# Patient Record
Sex: MALE | Race: WHITE | NOT HISPANIC OR LATINO | Employment: OTHER | URBAN - METROPOLITAN AREA
[De-identification: names, ages, dates, MRNs, and addresses within clinical notes are randomized per-mention and may not be internally consistent; named-entity substitution may affect disease eponyms.]

---

## 2017-02-10 ENCOUNTER — OFFICE VISIT (OUTPATIENT)
Dept: CARDIOLOGY | Facility: MEDICAL CENTER | Age: 67
End: 2017-02-10
Payer: COMMERCIAL

## 2017-02-10 VITALS
OXYGEN SATURATION: 97 % | BODY MASS INDEX: 35.49 KG/M2 | SYSTOLIC BLOOD PRESSURE: 130 MMHG | HEIGHT: 72 IN | WEIGHT: 262 LBS | DIASTOLIC BLOOD PRESSURE: 76 MMHG | HEART RATE: 58 BPM

## 2017-02-10 DIAGNOSIS — I65.29 STENOSIS OF CAROTID ARTERY, UNSPECIFIED LATERALITY: ICD-10-CM

## 2017-02-10 DIAGNOSIS — E78.01 FAMILIAL HYPERCHOLESTEROLEMIA: ICD-10-CM

## 2017-02-10 DIAGNOSIS — I25.10 CORONARY ARTERY DISEASE INVOLVING NATIVE CORONARY ARTERY OF NATIVE HEART WITHOUT ANGINA PECTORIS: ICD-10-CM

## 2017-02-10 DIAGNOSIS — I73.9 PVD (PERIPHERAL VASCULAR DISEASE) (HCC): ICD-10-CM

## 2017-02-10 DIAGNOSIS — I10 ESSENTIAL HYPERTENSION: ICD-10-CM

## 2017-02-10 DIAGNOSIS — Z95.5 STENTED CORONARY ARTERY: ICD-10-CM

## 2017-02-10 PROCEDURE — 99214 OFFICE O/P EST MOD 30 MIN: CPT | Performed by: INTERNAL MEDICINE

## 2017-02-10 ASSESSMENT — ENCOUNTER SYMPTOMS
PALPITATIONS: 0
EYES NEGATIVE: 1
FEVER: 0
HEMOPTYSIS: 0
RESPIRATORY NEGATIVE: 1
DIZZINESS: 0
MUSCULOSKELETAL NEGATIVE: 1
STRIDOR: 0
WEAKNESS: 0
CARDIOVASCULAR NEGATIVE: 1
SHORTNESS OF BREATH: 0
COUGH: 0
CHILLS: 0
ORTHOPNEA: 0
GASTROINTESTINAL NEGATIVE: 1
NEUROLOGICAL NEGATIVE: 1
CONSTITUTIONAL NEGATIVE: 1
WHEEZING: 0
PND: 0
LOSS OF CONSCIOUSNESS: 0
SORE THROAT: 0
CLAUDICATION: 0
SPUTUM PRODUCTION: 0
BRUISES/BLEEDS EASILY: 0

## 2017-02-10 NOTE — MR AVS SNAPSHOT
"        Jan Rivas   2/10/2017 2:00 PM   Office Visit   MRN: [id number]    Department:  [facility]   Dept Phone:  [phone]    Description:  Male : 1950   Provider:  Ketan Anne M.D.           Reason for Visit     Follow-Up           Allergies as of 2/10/2017     No Known Allergies      You were diagnosed with     Coronary artery disease involving native coronary artery of native heart without angina pectoris   [5830339]       Stenosis of carotid artery, unspecified laterality   [008030]       Essential hypertension   [6749414]       Familial hypercholesterolemia   [433661]       Stented coronary artery   [229050]       PVD (peripheral vascular disease) (CMS-HCC)   [699683]         Vital Signs     Blood Pressure Pulse Height Weight Body Mass Index Oxygen Saturation    130/76 mmHg 58 1.829 m (6' 0.01\") 118.842 kg (262 lb) 35.53 kg/m2 97%    Smoking Status                   Former Smoker           Basic Information     Date Of Birth Sex Race Ethnicity Preferred Language    1950 Male White Non- English      Problem List              ICD-10-CM Priority Class Noted - Resolved    CAD (coronary artery disease) I25.10 High  10/22/2013 - Present    Stented coronary artery Z95.5 Medium  10/31/2013 - Present    Hypertension I10 Low  10/31/2013 - Present    Hyperlipemia E78.5 Low  10/31/2013 - Present    Carotid stenosis I65.29   2014 - Present    HTN (hypertension) I10   3/5/2015 - Present    PVD (peripheral vascular disease) (CMS-HCC) I73.9   2/10/2017 - Present      Health Maintenance        Date Due Completion Dates    IMM DTaP/Tdap/Td Vaccine (1 - Tdap) 1971 ---    COLONOSCOPY 2002 ---    IMM ZOSTER VACCINE 2012 ---    IMM INFLUENZA (1) 2016 10/23/2013            Current Immunizations     Influenza Vaccine Pediatric 10/23/2013  2:41 PM    Pneumococcal polysaccharide vaccine (PPSV-23) 10/23/2013  2:42 PM      Below and/or attached are the medications your " provider expects you to take. Review all of your home medications and newly ordered medications with your provider and/or pharmacist. Follow medication instructions as directed by your provider and/or pharmacist. Please keep your medication list with you and share with your provider. Update the information when medications are discontinued, doses are changed, or new medications (including over-the-counter products) are added; and carry medication information at all times in the event of emergency situations     Allergies:  No Known Allergies          Medications  Valid as of: February 10, 2017 -  2:11 PM    Generic Name Brand Name Tablet Size Instructions for use    Aspirin (Chew Tab) ASA 81 MG Take 1 Tab by mouth every day.        Fluocinolone Acetonide (Solution) SYNALAR 0.01 % Apply  to affected area(s) 2 times a day.        Latanoprost   by Ophthalmic route.        Lisinopril (Tab) PRINIVIL 5 MG Take 1 Tab by mouth every day.        Metoprolol Tartrate (Tab) LOPRESSOR 25 MG Take 1 Tab by mouth 2 times a day.        Nitroglycerin (SL Tab) NITROSTAT 0.4 MG Place 1 Tab under tongue as needed for Chest Pain.        Simvastatin (Tab) ZOCOR 40 MG Take 1 Tab by mouth every evening.        .                 Medicines prescribed today were sent to:     [id number] - [place] - [address]    [address] [place] [address]    Phone: [phone] Fax: [phone]    Open 24 Hours?: No      Medication refill instructions:       If your prescription bottle indicates you have medication refills left, it is not necessary to call your provider’s office. Please contact your pharmacy and they will refill your medication.    If your prescription bottle indicates you do not have any refills left, you may request refills at any time through one of the following ways: The online [employer] system (except Urgent Care), by calling your provider’s office, or by asking your pharmacy to contact your provider’s office  with a refill request. Medication refills are processed only during regular business hours and may not be available until the next business day. Your provider may request additional information or to have a follow-up visit with you prior to refilling your medication.   *Please Note: Medication refills are assigned a new Rx number when refilled electronically. Your pharmacy may indicate that no refills were authorized even though a new prescription for the same medication is available at the pharmacy. Please request the medicine by name with the pharmacy before contacting your provider for a refill.        Your To Do List     Future Labs/Procedures Complete By Expires    LIPID PROFILE  As directed 2/11/2018         Qwbcg Access Code: [email or web address]  Expires: 3/12/2017  2:11 PM    Qwbcg  A secure, online tool to manage your health information     [employer]’s Qwbcg® is a secure, online tool that connects you to your personalized health information from the privacy of your home -- day or night - making it very easy for you to manage your healthcare. Once the activation process is completed, you can even access your medical information using the Qwbcg jordan, which is available for free in the [employer] Jordan store or Google Play store.     Qwbcg provides the following levels of access (as shown below):   My Chart Features   [facility] Doctor [facility]  Urgent  Care Non-[facility]  Primary Care  Doctor   Email your healthcare team securely and privately 24/7 X X X    Manage appointments: schedule your next appointment; view details of past/upcoming appointments X      Request prescription refills. X      View recent personal medical records, including lab and immunizations X X X X   View health record, including health history, allergies, medications X X X X   Read reports about your outpatient visits, procedures, consult and ER notes X X X X   See your discharge summary, which is a recap of  your hospital and/or ER visit that includes your diagnosis, lab results, and care plan. X X       How to register for [employer]:  1. Go to  [email or web address].  2. Click on the Sign Up Now box, which takes you to the New Member Sign Up page. You will need to provide the following information:  a. Enter your [employer] Access Code exactly as it appears at the top of this page. (You will not need to use this code after you’ve completed the sign-up process. If you do not sign up before the expiration date, you must request a new code.)   b. Enter your date of birth.   c. Enter your home email address.   d. Click Submit, and follow the next screen’s instructions.  3. Create a [employer] ID. This will be your [employer] login ID and cannot be changed, so think of one that is secure and easy to remember.  4. Create a [employer] password. You can change your password at any time.  5. Enter your Password Reset Question and Answer. This can be used at a later time if you forget your password.   6. Enter your e-mail address. This allows you to receive e-mail notifications when new information is available in [employer].  7. Click Sign Up. You can now view your health information.    For assistance activating your [employer] account, call [phone]

## 2017-02-10 NOTE — PROGRESS NOTES
Subjective:   Jan Rivas is a 66 y.o. male who presents today as an annual follow-up for his CAD and peripheral vascular disease. The last one year he said no significant changes to his past medical history and is not having any chest pain shortness breath lower extremity edema or PND. He checks his blood pressure at least once a week at the drugstore notices blood pressure which ranged in the systolic range from 120-128. He is otherwise doing well. His primary care physician would like to know if he needs an annual stress test.    Past Medical History   Diagnosis Date   • Hypertension    • Hyperlipidemia    • CAD (coronary artery disease) stenLCA 2011     Past Surgical History   Procedure Laterality Date   • Cardiac cath  2/21/15     Cath at [place].  Patent stent and no obstructive CAD.   • Cardiac cath  5/4/10     Taxus stent to LAD     Family History   Problem Relation Age of Onset   • Heart Attack Mother      History   Smoking status   • Former Smoker -- 0.50 packs/day for 40 years   • Types: Cigarettes   • Quit date: 05/04/2010   Smokeless tobacco   • Never Used     No Known Allergies  Outpatient Encounter Prescriptions as of 2/10/2017   Medication Sig Dispense Refill   • nitroglycerin (NITROSTAT) 0.4 MG SL Tab Place 1 Tab under tongue as needed for Chest Pain. 25 Tab 6   • simvastatin (ZOCOR) 40 MG Tab Take 1 Tab by mouth every evening. 90 Tab 3   • metoprolol (LOPRESSOR) 25 MG Tab Take 1 Tab by mouth 2 times a day. 180 Tab 3   • lisinopril (PRINIVIL) 5 MG Tab Take 1 Tab by mouth every day. 90 Tab 3   • aspirin (ASA) 81 MG CHEW chewable tablet Take 1 Tab by mouth every day. 100 Tab 11   • LATANOPROST OP by Ophthalmic route.     • fluocinolone acetonide (SYNALAR) 0.01 % external solution Apply  to affected area(s) 2 times a day.       No facility-administered encounter medications on file as of 2/10/2017.     Review of Systems   Constitutional: Negative.  Negative for fever, chills and malaise/fatigue.  "  HENT: Negative.  Negative for sore throat.    Eyes: Negative.    Respiratory: Negative.  Negative for cough, hemoptysis, sputum production, shortness of breath, wheezing and stridor.    Cardiovascular: Negative.  Negative for chest pain, palpitations, orthopnea, claudication, leg swelling and PND.   Gastrointestinal: Negative.    Genitourinary: Negative.    Musculoskeletal: Negative.    Skin: Negative.    Neurological: Negative.  Negative for dizziness, loss of consciousness and weakness.   Endo/Heme/Allergies: Negative.  Does not bruise/bleed easily.   All other systems reviewed and are negative.       Objective:   /76 mmHg  Pulse 58  Ht 1.829 m (6' 0.01\")  Wt 118.842 kg (262 lb)  BMI 35.53 kg/m2  SpO2 97%    Physical Exam   Constitutional: He is oriented to person, place, and time. He appears well-developed and well-nourished. No distress.   HENT:   Head: Normocephalic.   Mouth/Throat: Oropharynx is clear and moist.   Eyes: EOM are normal. Pupils are equal, round, and reactive to light. Right eye exhibits no discharge. Left eye exhibits no discharge. No scleral icterus.   Neck: Normal range of motion. Neck supple. No JVD present. No tracheal deviation present.   Cardiovascular: Normal rate, regular rhythm, S1 normal, S2 normal, normal heart sounds, intact distal pulses and normal pulses.  Exam reveals no gallop, no S3, no S4 and no friction rub.    No murmur heard.   No systolic murmur is present    No diastolic murmur is present   Pulses:       Carotid pulses are 2+ on the right side, and 2+ on the left side.       Radial pulses are 2+ on the right side, and 2+ on the left side.        Dorsalis pedis pulses are 2+ on the right side, and 2+ on the left side.        Posterior tibial pulses are 2+ on the right side, and 2+ on the left side.   Pulmonary/Chest: Effort normal and breath sounds normal. No respiratory distress. He has no wheezes. He has no rales.   Abdominal: Soft. Bowel sounds are normal. " He exhibits no distension and no mass. There is no tenderness. There is no rebound and no guarding.   Musculoskeletal: He exhibits no edema.   Neurological: He is alert and oriented to person, place, and time. No cranial nerve deficit.   Skin: Skin is warm and dry. He is not diaphoretic. No pallor.   Psychiatric: He has a normal mood and affect. His behavior is normal. Judgment and thought content normal.   Nursing note and vitals reviewed.      Assessment:     1. Coronary artery disease involving native coronary artery of native heart without angina pectoris     2. Stenosis of carotid artery, unspecified laterality     3. Essential hypertension     4. Familial hypercholesterolemia  LIPID PROFILE   5. Stented coronary artery  LIPID PROFILE   6. PVD (peripheral vascular disease) (CMS-[facility])  LIPID PROFILE       Medical Decision Making:  Today's Assessment / Status / Plan:     66-year-old male with CAD and peripheral vascular disease. He is doing quite well on his current medical regimen. We'll make no changes today. I will check a fasting cholesterol level to make sure that he is both compliant and had a reasonable level. Otherwise I will see him back in one year. He does not require an annual stress test at this point.    Thank for you allowing me to take part in your patient's care, please call should you have any questions or would like to discuss this patient.

## 2017-02-14 LAB
CHOLEST SERPL-MCNC: 127 MG/DL (ref 100–199)
COMMENT 011824: NORMAL
HDLC SERPL-MCNC: 55 MG/DL
LDLC SERPL CALC-MCNC: 53 MG/DL (ref 0–99)
TRIGL SERPL-MCNC: 95 MG/DL (ref 0–149)
VLDLC SERPL CALC-MCNC: 19 MG/DL (ref 5–40)

## 2017-04-21 DIAGNOSIS — E78.5 HYPERLIPIDEMIA, UNSPECIFIED HYPERLIPIDEMIA TYPE: ICD-10-CM

## 2017-04-21 DIAGNOSIS — I10 BENIGN ESSENTIAL HTN: ICD-10-CM

## 2017-04-21 RX ORDER — LISINOPRIL 5 MG/1
TABLET ORAL
Qty: 90 TAB | Refills: 3 | Status: SHIPPED | OUTPATIENT
Start: 2017-04-21 | End: 2018-04-18 | Stop reason: SDUPTHER

## 2017-04-21 RX ORDER — SIMVASTATIN 40 MG
TABLET ORAL
Qty: 90 TAB | Refills: 3 | Status: SHIPPED | OUTPATIENT
Start: 2017-04-21 | End: 2018-04-18 | Stop reason: SDUPTHER

## 2018-01-23 ENCOUNTER — OFFICE VISIT (OUTPATIENT)
Dept: CARDIOLOGY | Facility: MEDICAL CENTER | Age: 68
End: 2018-01-23
Payer: MEDICARE

## 2018-01-23 VITALS
SYSTOLIC BLOOD PRESSURE: 126 MMHG | OXYGEN SATURATION: 95 % | HEIGHT: 72 IN | BODY MASS INDEX: 35.08 KG/M2 | HEART RATE: 58 BPM | DIASTOLIC BLOOD PRESSURE: 78 MMHG | WEIGHT: 259 LBS

## 2018-01-23 DIAGNOSIS — Z95.5 STENTED CORONARY ARTERY: ICD-10-CM

## 2018-01-23 DIAGNOSIS — I73.9 PVD (PERIPHERAL VASCULAR DISEASE) (HCC): ICD-10-CM

## 2018-01-23 DIAGNOSIS — I25.10 CORONARY ARTERY DISEASE INVOLVING NATIVE CORONARY ARTERY OF NATIVE HEART WITHOUT ANGINA PECTORIS: ICD-10-CM

## 2018-01-23 DIAGNOSIS — Z79.899 HIGH RISK MEDICATION USE: ICD-10-CM

## 2018-01-23 DIAGNOSIS — E78.01 FAMILIAL HYPERCHOLESTEROLEMIA: ICD-10-CM

## 2018-01-23 DIAGNOSIS — I65.29 STENOSIS OF CAROTID ARTERY, UNSPECIFIED LATERALITY: ICD-10-CM

## 2018-01-23 DIAGNOSIS — I10 ESSENTIAL HYPERTENSION: ICD-10-CM

## 2018-01-23 PROCEDURE — 99214 OFFICE O/P EST MOD 30 MIN: CPT | Performed by: INTERNAL MEDICINE

## 2018-01-23 RX ORDER — ZOLPIDEM TARTRATE 10 MG/1
10 TABLET ORAL NIGHTLY PRN
COMMUNITY
End: 2019-07-31

## 2018-01-23 ASSESSMENT — ENCOUNTER SYMPTOMS
COUGH: 0
PND: 0
CARDIOVASCULAR NEGATIVE: 1
SHORTNESS OF BREATH: 0
CLAUDICATION: 0
HEMOPTYSIS: 0
NEUROLOGICAL NEGATIVE: 1
SORE THROAT: 0
ORTHOPNEA: 0
STRIDOR: 0
DIZZINESS: 0
CHILLS: 0
MUSCULOSKELETAL NEGATIVE: 1
SPUTUM PRODUCTION: 0
CONSTITUTIONAL NEGATIVE: 1
GASTROINTESTINAL NEGATIVE: 1
LOSS OF CONSCIOUSNESS: 0
WHEEZING: 0
EYES NEGATIVE: 1
PALPITATIONS: 0
FEVER: 0
WEAKNESS: 0
BRUISES/BLEEDS EASILY: 0
RESPIRATORY NEGATIVE: 1

## 2018-01-23 NOTE — PROGRESS NOTES
Subjective:   Jan Rivas is a 67 y.o. male who presents today as a follow-up for his CAD hyperlipidemia and peripheral vascular disease. Since he was last seen he had his lipids checked which were with cold. His blood pressure control. Is not having any chest pain palpitations PND or orthopnea. His last ultrasound of his carotids was 4 years ago. We will repeat that. He is otherwise having no complaints today.    Past Medical History:   Diagnosis Date   • CAD (coronary artery disease) stenLCA 2011   • Hyperlipidemia    • Hypertension      Past Surgical History:   Procedure Laterality Date   • CARDIAC CATH  2/21/15    Cath at [place].  Patent stent and no obstructive CAD.   • CARDIAC CATH  5/4/10    Taxus stent to LAD     Family History   Problem Relation Age of Onset   • Heart Attack Mother      History   Smoking Status   • Former Smoker   • Packs/day: 0.50   • Years: 40.00   • Types: Cigarettes   • Quit date: 5/4/2010   Smokeless Tobacco   • Never Used     No Known Allergies  Outpatient Encounter Prescriptions as of 1/23/2018   Medication Sig Dispense Refill   • zolpidem (AMBIEN) 10 MG Tab Take 10 mg by mouth at bedtime as needed for Sleep.     • metoprolol (LOPRESSOR) 25 MG Tab TAKE 1 TAB BY MOUTH 2 TIMES A DAY. 180 Tab 3   • lisinopril (PRINIVIL) 5 MG Tab TAKE 1 TAB BY MOUTH EVERY DAY. 90 Tab 3   • simvastatin (ZOCOR) 40 MG Tab TAKE 1 TAB BY MOUTH EVERY EVENING. 90 Tab 3   • nitroglycerin (NITROSTAT) 0.4 MG SL Tab Place 1 Tab under tongue as needed for Chest Pain. 25 Tab 6   • aspirin (ASA) 81 MG CHEW chewable tablet Take 1 Tab by mouth every day. 100 Tab 11   • LATANOPROST OP by Ophthalmic route.     • [DISCONTINUED] fluocinolone acetonide (SYNALAR) 0.01 % external solution Apply  to affected area(s) 2 times a day.       No facility-administered encounter medications on file as of 1/23/2018.      Review of Systems   Constitutional: Negative.  Negative for chills, fever and malaise/fatigue.   HENT: Negative.   Negative for sore throat.    Eyes: Negative.    Respiratory: Negative.  Negative for cough, hemoptysis, sputum production, shortness of breath, wheezing and stridor.    Cardiovascular: Negative.  Negative for chest pain, palpitations, orthopnea, claudication, leg swelling and PND.   Gastrointestinal: Negative.    Genitourinary: Negative.    Musculoskeletal: Negative.    Skin: Negative.    Neurological: Negative.  Negative for dizziness, loss of consciousness and weakness.   Endo/Heme/Allergies: Negative.  Does not bruise/bleed easily.   All other systems reviewed and are negative.       Objective:   /78   Pulse (!) 58   Ht 1.829 m (6')   Wt 117.5 kg (259 lb)   SpO2 95%   BMI 35.13 kg/m²     Physical Exam   Constitutional: He is oriented to person, place, and time. He appears well-developed and well-nourished. No distress.   HENT:   Head: Normocephalic.   Mouth/Throat: Oropharynx is clear and moist.   Eyes: EOM are normal. Pupils are equal, round, and reactive to light. Right eye exhibits no discharge. Left eye exhibits no discharge. No scleral icterus.   Neck: Normal range of motion. Neck supple. No JVD present. No tracheal deviation present.   Cardiovascular: Normal rate, regular rhythm, S1 normal, S2 normal, normal heart sounds, intact distal pulses and normal pulses.  Exam reveals no gallop, no S3, no S4 and no friction rub.    No murmur heard.   No systolic murmur is present    No diastolic murmur is present   Pulses:       Carotid pulses are 2+ on the right side, and 2+ on the left side.       Radial pulses are 2+ on the right side, and 2+ on the left side.        Dorsalis pedis pulses are 2+ on the right side, and 2+ on the left side.        Posterior tibial pulses are 2+ on the right side, and 2+ on the left side.   Pulmonary/Chest: Effort normal and breath sounds normal. No respiratory distress. He has no wheezes. He has no rales.   Abdominal: Soft. Bowel sounds are normal. He exhibits no  distension and no mass. There is no tenderness. There is no rebound and no guarding.   Musculoskeletal: He exhibits no edema.   Neurological: He is alert and oriented to person, place, and time. No cranial nerve deficit.   Skin: Skin is warm and dry. He is not diaphoretic. No pallor.   Psychiatric: He has a normal mood and affect. His behavior is normal. Judgment and thought content normal.   Nursing note and vitals reviewed.      Assessment:     1. Stented coronary artery     2. PVD (peripheral vascular disease) (CMS-HCC)     3. Essential hypertension  Carotid Duplex   4. Familial hypercholesterolemia  LIPID PANEL   5. Coronary artery disease involving native coronary artery of native heart without angina pectoris  Carotid Duplex   6. Stenosis of carotid artery, unspecified laterality  Carotid Duplex   7. High risk medication use  LIPID PANEL    Carotid Duplex       Medical Decision Making:  Today's Assessment / Status / Plan:     67-year-old male with CAD peripheral vascular disease hypertension and hyperlipidemia. We will check his cholesterol to make sure that it is still doing well. We will also repeat his peripheral vascular ultrasound of his carotid stenosis. I will see him back in 6 months.    Thank for you allowing me to take part in your patient's care, please call should you have any questions or would like to discuss this patient.

## 2018-01-25 ENCOUNTER — TELEPHONE (OUTPATIENT)
Dept: CARDIOLOGY | Facility: MEDICAL CENTER | Age: 68
End: 2018-01-25

## 2018-01-25 NOTE — TELEPHONE ENCOUNTER
patient has question about carotid duplex   Received: Today   Message Contents   INEZ Waters/Lilian       Patient had his carotid duplex faxed to Dr. Anne (in [place]) and he wants to know if he still needs to have the carotid duplex on 02/05 that was ordered by Dr. Anne. He can be reached at [phone].      Returned call. Pt not home. Female answerer asks if she can take a message. Made aware patient can hold off on exam for now if he wishes to. She asks about results of other tests. I let her know that I was only returning New's call to answer his very direct question. She states understanding. Explained to please have him call to cancel study also if he intends to do so. She states understanding.

## 2018-01-31 ENCOUNTER — APPOINTMENT (OUTPATIENT)
Dept: RADIOLOGY | Facility: MEDICAL CENTER | Age: 68
End: 2018-01-31
Attending: INTERNAL MEDICINE
Payer: COMMERCIAL

## 2018-04-18 DIAGNOSIS — E78.5 HYPERLIPIDEMIA, UNSPECIFIED HYPERLIPIDEMIA TYPE: ICD-10-CM

## 2018-04-18 DIAGNOSIS — I10 BENIGN ESSENTIAL HTN: ICD-10-CM

## 2018-04-20 RX ORDER — SIMVASTATIN 40 MG
TABLET ORAL
Qty: 90 TAB | Refills: 3 | Status: SHIPPED | OUTPATIENT
Start: 2018-04-20 | End: 2019-04-23 | Stop reason: SDUPTHER

## 2018-04-20 RX ORDER — LISINOPRIL 5 MG/1
TABLET ORAL
Qty: 90 TAB | Refills: 3 | Status: SHIPPED | OUTPATIENT
Start: 2018-04-20 | End: 2019-04-30 | Stop reason: SDUPTHER

## 2018-07-20 ENCOUNTER — TELEPHONE (OUTPATIENT)
Dept: CARDIOLOGY | Facility: MEDICAL CENTER | Age: 68
End: 2018-07-20

## 2018-07-20 NOTE — TELEPHONE ENCOUNTER
Attempted to call patient to find out if he ever had the blood work and carotid done that was ordered by RO on the last visit. VM was too full, unable to leave msg. Patient has a FV with Dayana on 7/23/2018 @ 10:00am*THIAGO

## 2018-07-23 ENCOUNTER — OFFICE VISIT (OUTPATIENT)
Dept: CARDIOLOGY | Facility: MEDICAL CENTER | Age: 68
End: 2018-07-23
Payer: MEDICARE

## 2018-07-23 VITALS
DIASTOLIC BLOOD PRESSURE: 80 MMHG | OXYGEN SATURATION: 96 % | WEIGHT: 253 LBS | HEART RATE: 58 BPM | SYSTOLIC BLOOD PRESSURE: 126 MMHG | BODY MASS INDEX: 34.27 KG/M2 | HEIGHT: 72 IN

## 2018-07-23 DIAGNOSIS — E78.01 FAMILIAL HYPERCHOLESTEROLEMIA: ICD-10-CM

## 2018-07-23 DIAGNOSIS — I25.10 CORONARY ARTERY DISEASE INVOLVING NATIVE CORONARY ARTERY OF NATIVE HEART WITHOUT ANGINA PECTORIS: ICD-10-CM

## 2018-07-23 DIAGNOSIS — I73.9 PVD (PERIPHERAL VASCULAR DISEASE) (HCC): ICD-10-CM

## 2018-07-23 DIAGNOSIS — Z79.899 HIGH RISK MEDICATION USE: ICD-10-CM

## 2018-07-23 DIAGNOSIS — I10 ESSENTIAL HYPERTENSION: ICD-10-CM

## 2018-07-23 DIAGNOSIS — Z95.5 STENTED CORONARY ARTERY: ICD-10-CM

## 2018-07-23 DIAGNOSIS — I65.29 STENOSIS OF CAROTID ARTERY, UNSPECIFIED LATERALITY: ICD-10-CM

## 2018-07-23 PROCEDURE — 99214 OFFICE O/P EST MOD 30 MIN: CPT | Performed by: INTERNAL MEDICINE

## 2018-07-23 ASSESSMENT — ENCOUNTER SYMPTOMS
ORTHOPNEA: 0
CARDIOVASCULAR NEGATIVE: 1
FEVER: 0
COUGH: 0
CONSTITUTIONAL NEGATIVE: 1
PND: 0
CLAUDICATION: 0
CHILLS: 0
HEMOPTYSIS: 0
SHORTNESS OF BREATH: 0
SPUTUM PRODUCTION: 0
GASTROINTESTINAL NEGATIVE: 1
EYES NEGATIVE: 1
LOSS OF CONSCIOUSNESS: 0
MUSCULOSKELETAL NEGATIVE: 1
PALPITATIONS: 0
WEAKNESS: 0
WHEEZING: 0
NEUROLOGICAL NEGATIVE: 1
BRUISES/BLEEDS EASILY: 0
STRIDOR: 0
SORE THROAT: 0
DIZZINESS: 0
RESPIRATORY NEGATIVE: 1

## 2018-07-23 NOTE — PROGRESS NOTES
Chief Complaint   Patient presents with   • HTN (Controlled)       Subjective:   Jan Rivas is a 68 y.o. male who presents today as a follow-up for his hyperlipidemia peripheral vascular disease and CAD status post stents.  Since we last saw him he did not complete his carotid duplex as he had had this done at [place].  He also did not get his lipids drawn.  He was recently diagnosed with a kidney cyst and was confused as to why surgery did not want to remove this.  He had an ultrasound to see why he had back pain.  He also completed a 21 day cruise from [place] to [place].  He is going to go back on [place] for 2 weeks.    Past Medical History:   Diagnosis Date   • CAD (coronary artery disease) stenLCA 2011   • Hyperlipidemia    • Hypertension      Past Surgical History:   Procedure Laterality Date   • CARDIAC CATH  2/21/15    Cath at [place].  Patent stent and no obstructive CAD.   • CARDIAC CATH  5/4/10    Taxus stent to LAD     Family History   Problem Relation Age of Onset   • Heart Attack Mother      Social History     Social History   • Marital status:      Spouse name: N/A   • Number of children: N/A   • Years of education: N/A     Occupational History   • Not on file.     Social History Main Topics   • Smoking status: Former Smoker     Packs/day: 0.50     Years: 40.00     Types: Cigarettes     Quit date: 5/4/2010   • Smokeless tobacco: Never Used   • Alcohol use Yes      Comment: evry day 2-3 drinks per day   • Drug use: No   • Sexual activity: Not on file     Other Topics Concern   • Not on file     Social History Narrative   • No narrative on file     No Known Allergies  Outpatient Encounter Prescriptions as of 7/23/2018   Medication Sig Dispense Refill   • simvastatin (ZOCOR) 40 MG Tab TAKE 1 TAB BY MOUTH EVERY EVENING. 90 Tab 3   • metoprolol (LOPRESSOR) 25 MG Tab TAKE 1 TAB BY MOUTH 2 TIMES A DAY. 180 Tab 3   • lisinopril (PRINIVIL) 5 MG Tab TAKE 1 TAB BY MOUTH EVERY DAY. 90  Tab 3   • zolpidem (AMBIEN) 10 MG Tab Take 10 mg by mouth at bedtime as needed for Sleep.     • nitroglycerin (NITROSTAT) 0.4 MG SL Tab Place 1 Tab under tongue as needed for Chest Pain. 25 Tab 6   • aspirin (ASA) 81 MG CHEW chewable tablet Take 1 Tab by mouth every day. 100 Tab 11   • LATANOPROST OP by Ophthalmic route.       No facility-administered encounter medications on file as of 7/23/2018.      Review of Systems   Constitutional: Negative.  Negative for chills, fever and malaise/fatigue.   HENT: Negative.  Negative for sore throat.    Eyes: Negative.    Respiratory: Negative.  Negative for cough, hemoptysis, sputum production, shortness of breath, wheezing and stridor.    Cardiovascular: Negative.  Negative for chest pain, palpitations, orthopnea, claudication, leg swelling and PND.   Gastrointestinal: Negative.    Genitourinary: Negative.    Musculoskeletal: Negative.    Skin: Negative.    Neurological: Negative.  Negative for dizziness, loss of consciousness and weakness.   Endo/Heme/Allergies: Negative.  Does not bruise/bleed easily.   All other systems reviewed and are negative.       Objective:   /80   Pulse (!) 58   Ht 1.829 m (6')   Wt 114.8 kg (253 lb)   SpO2 96%   BMI 34.31 kg/m²     Physical Exam   Constitutional: He appears well-developed and well-nourished. No distress.   HENT:   Head: Normocephalic and atraumatic.   Right Ear: External ear normal.   Left Ear: External ear normal.   Nose: Nose normal.   Mouth/Throat: No oropharyngeal exudate.   Eyes: Conjunctivae and EOM are normal. Pupils are equal, round, and reactive to light. Right eye exhibits no discharge. Left eye exhibits no discharge. No scleral icterus.   Neck: Neck supple. No JVD present.   Cardiovascular: Normal rate, regular rhythm and intact distal pulses.  Exam reveals no gallop and no friction rub.    No murmur heard.  Pulmonary/Chest: Effort normal. No stridor. No respiratory distress. He has no wheezes. He has no  rales. He exhibits no tenderness.   Abdominal: Soft. He exhibits no distension. There is no guarding.   Musculoskeletal: Normal range of motion. He exhibits no edema, tenderness or deformity.   Neurological: He is alert. He has normal reflexes. He displays normal reflexes. No cranial nerve deficit. He exhibits normal muscle tone. Coordination normal.   Skin: Skin is warm and dry. No rash noted. He is not diaphoretic. No erythema. No pallor.   Psychiatric: He has a normal mood and affect. His behavior is normal. Judgment and thought content normal.   Nursing note and vitals reviewed.    Echo:  FINDINGS  Left Ventricle  Normal left ventricular size and function. Normal left ventricular wall   thickness. Grade I diastolic dysfunction is present. Left ventricular   ejection fraction is 55% to 60%.No regional wall motion disturbances    Carotids:  CONCLUSIONS   Mild bilateral internal carotid artery stenosis (<50%).    No noted subcalvian or vertebral arterial disease.   No comparison available.    Assessment:     1. Stented coronary artery     2. PVD (peripheral vascular disease) (HCC)     3. Essential hypertension     4. Familial hypercholesterolemia  LIPID PANEL   5. High risk medication use     6. Stenosis of carotid artery, unspecified laterality     7. Coronary artery disease involving native coronary artery of native heart without angina pectoris  LIPID PANEL       Medical Decision Making:  Today's Assessment / Status / Plan:     68-year-old male with CAD status post stents with hypertension and peripheral vascular disease.  We will get his records from his most recent ultrasounds performed in [place].  Meanwhile we will check his lipids.  We will see him back in 1 year.    Thank for you allowing me to take part in your patient's care, please call should you have any questions or would like to discuss this patient.

## 2018-07-24 ENCOUNTER — TELEPHONE (OUTPATIENT)
Dept: CARDIOLOGY | Facility: MEDICAL CENTER | Age: 68
End: 2018-07-24

## 2018-07-24 NOTE — TELEPHONE ENCOUNTER
Message   Received: Yesterday   Message Contents   Ketan Anne M.D.  Arielle Queen R.N.             This patient had a carotid duplex and an HARRY done at Dr. Lizarraga's office in [place].  Do you mind getting me a copy of those?  Thanks      Faxed request to [phone]

## 2018-07-25 LAB
CHOLEST SERPL-MCNC: 124 MG/DL (ref 100–199)
HDLC SERPL-MCNC: 56 MG/DL
LABORATORY COMMENT REPORT: NORMAL
LDLC SERPL CALC-MCNC: 49 MG/DL (ref 0–99)
TRIGL SERPL-MCNC: 94 MG/DL (ref 0–149)
VLDLC SERPL CALC-MCNC: 19 MG/DL (ref 5–40)

## 2018-07-30 ENCOUNTER — TELEPHONE (OUTPATIENT)
Dept: CARDIOLOGY | Facility: MEDICAL CENTER | Age: 68
End: 2018-07-30

## 2019-04-23 DIAGNOSIS — E78.5 HYPERLIPIDEMIA, UNSPECIFIED HYPERLIPIDEMIA TYPE: ICD-10-CM

## 2019-04-23 DIAGNOSIS — I10 BENIGN ESSENTIAL HTN: ICD-10-CM

## 2019-04-24 RX ORDER — SIMVASTATIN 40 MG
TABLET ORAL
Qty: 90 TAB | Refills: 0 | Status: SHIPPED | OUTPATIENT
Start: 2019-04-24 | End: 2019-07-22 | Stop reason: SDUPTHER

## 2019-04-30 DIAGNOSIS — I10 BENIGN ESSENTIAL HTN: ICD-10-CM

## 2019-04-30 DIAGNOSIS — E78.5 HYPERLIPIDEMIA, UNSPECIFIED HYPERLIPIDEMIA TYPE: ICD-10-CM

## 2019-04-30 RX ORDER — LISINOPRIL 5 MG/1
5 TABLET ORAL
Qty: 90 TAB | Refills: 0 | Status: SHIPPED | OUTPATIENT
Start: 2019-04-30 | End: 2019-12-06

## 2019-05-29 ENCOUNTER — TELEPHONE (OUTPATIENT)
Dept: SCHEDULING | Facility: IMAGING CENTER | Age: 69
End: 2019-05-29

## 2019-06-03 ENCOUNTER — OFFICE VISIT (OUTPATIENT)
Dept: URGENT CARE | Facility: MEDICAL CENTER | Age: 69
End: 2019-06-03
Payer: MEDICARE

## 2019-06-03 ENCOUNTER — HOSPITAL ENCOUNTER (OUTPATIENT)
Dept: RADIOLOGY | Facility: MEDICAL CENTER | Age: 69
End: 2019-06-03
Attending: PHYSICIAN ASSISTANT
Payer: MEDICARE

## 2019-06-03 VITALS
WEIGHT: 255 LBS | BODY MASS INDEX: 34.54 KG/M2 | DIASTOLIC BLOOD PRESSURE: 70 MMHG | HEART RATE: 90 BPM | TEMPERATURE: 98.1 F | SYSTOLIC BLOOD PRESSURE: 128 MMHG | HEIGHT: 72 IN | OXYGEN SATURATION: 97 %

## 2019-06-03 DIAGNOSIS — M25.851 RIGHT HIP IMPINGEMENT SYNDROME: ICD-10-CM

## 2019-06-03 DIAGNOSIS — R21 RASH: ICD-10-CM

## 2019-06-03 DIAGNOSIS — M16.11 ARTHRITIS OF RIGHT HIP: ICD-10-CM

## 2019-06-03 PROCEDURE — 73502 X-RAY EXAM HIP UNI 2-3 VIEWS: CPT | Mod: RT

## 2019-06-03 PROCEDURE — 99204 OFFICE O/P NEW MOD 45 MIN: CPT | Performed by: PHYSICIAN ASSISTANT

## 2019-06-03 RX ORDER — MUPIROCIN CALCIUM 20 MG/G
CREAM TOPICAL
Qty: 1 TUBE | Refills: 0 | Status: SHIPPED | OUTPATIENT
Start: 2019-06-03 | End: 2019-12-06

## 2019-06-03 RX ORDER — TRAZODONE HYDROCHLORIDE 50 MG/1
TABLET ORAL
Refills: 7 | COMMUNITY
Start: 2019-05-21 | End: 2021-11-16 | Stop reason: SDUPTHER

## 2019-06-03 RX ORDER — LOSARTAN POTASSIUM 25 MG/1
25 TABLET ORAL DAILY
COMMUNITY
Start: 2019-05-30 | End: 2021-06-25 | Stop reason: SDUPTHER

## 2019-06-03 RX ORDER — METHYLPREDNISOLONE 4 MG/1
TABLET ORAL
Qty: 1 KIT | Refills: 0 | Status: SHIPPED | OUTPATIENT
Start: 2019-06-03 | End: 2019-07-31

## 2019-06-03 ASSESSMENT — ENCOUNTER SYMPTOMS
TINGLING: 0
CHILLS: 0
BACK PAIN: 1
FEVER: 0
HIP PAIN: 1
SENSORY CHANGE: 0
FOCAL WEAKNESS: 0

## 2019-06-03 NOTE — PROGRESS NOTES
Subjective:   Jan Rivas is a 69 y.o. male who presents today with   Chief Complaint   Patient presents with   • Hip Pain     x2weeks, pain in right lower back/hip radiating all the way down to ankle, tylenol isnt much help       Hip Pain   This is a new problem. The current episode started 1 to 4 weeks ago. The problem occurs constantly. The problem has been unchanged. Associated symptoms include a rash. Pertinent negatives include no chills or fever. He has tried acetaminophen for the symptoms. The treatment provided no relief.   Patient reports 2 weeks of right-sided hip and lower back pain.  He states that it is worse in the morning and is constant throughout the day.  Tylenol has not been helping him.  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  He also has a rash on his upper extremities in which he has been scratching and defaced.  Patient states his hip pain occasionally radiates all the way down to his ankle.    PMH:  has a past medical history of CAD (coronary artery disease) (stenLCA 2011); Hyperlipidemia; and Hypertension.  MEDS:   Current Outpatient Prescriptions:   •  losartan (COZAAR) 25 MG Tab, , Disp: , Rfl:   •  traZODone (DESYREL) 50 MG Tab, TAKE 1 TO 2 TABLETS BY MOUTH AT BEDTIME AS NEEDED, Disp: , Rfl: 7  •  MethylPREDNISolone (MEDROL DOSEPAK) 4 MG Tablet Therapy Pack, Take as directed on kit, Disp: 1 Kit, Rfl: 0  •  mupirocin calcium (BACTROBAN) 2 % Cream, Apply thin layer to affected area twice daily for 7 to 10 days, Disp: 1 Tube, Rfl: 0  •  simvastatin (ZOCOR) 40 MG Tab, TAKE 1 TAB BY MOUTH EVERY EVENING., Disp: 90 Tab, Rfl: 0  •  metoprolol (LOPRESSOR) 25 MG Tab, TAKE 1 TAB BY MOUTH 2 TIMES A DAY., Disp: 180 Tab, Rfl: 0  •  zolpidem (AMBIEN) 10 MG Tab, Take 10 mg by mouth at bedtime as needed for Sleep., Disp: , Rfl:   •  lisinopril (PRINIVIL) 5 MG Tab, Take 1 Tab by  mouth every day., Disp: 90 Tab, Rfl: 0  •  nitroglycerin (NITROSTAT) 0.4 MG SL Tab, Place 1 Tab under tongue as needed for Chest Pain., Disp: 25 Tab, Rfl: 6  •  aspirin (ASA) 81 MG CHEW chewable tablet, Take 1 Tab by mouth every day., Disp: 100 Tab, Rfl: 11  •  LATANOPROST OP, by Ophthalmic route., Disp: , Rfl:   ALLERGIES: No Known Allergies  SURGHX:   Past Surgical History:   Procedure Laterality Date   • [facility] CARDIAC CATH  2/21/15    Cath at [place].  Patent stent and no obstructive CAD.   • [facility] CARDIAC CATH  5/4/10    Taxus stent to LAD     SOCHX:  reports that he quit smoking about 9 years ago. His smoking use included Cigarettes. He has a 20.00 pack-year smoking history. He has never used smokeless tobacco. He reports that he drinks alcohol. He reports that he does not use drugs.  FH: Reviewed with patient, not pertinent to this visit.       Review of Systems   Constitutional: Negative for chills and fever.   Musculoskeletal: Positive for back pain.        Right sided hip pain   Skin: Positive for itching and rash.   Neurological: Negative for tingling, sensory change and focal weakness.   All other systems reviewed and are negative.       Objective:   /70   Pulse 90   Temp 36.7 °C (98.1 °F) (Temporal)   Ht 1.829 m (6')   Wt 115.7 kg (255 lb)   SpO2 97%   BMI 34.58 kg/m²   Physical Exam   Constitutional: Vital signs are normal. He appears well-developed and well-nourished. No distress.   HENT:   Head: Normocephalic and atraumatic.   Right Ear: Hearing normal.   Left Ear: Hearing normal.   Eyes: Pupils are equal, round, and reactive to light.   Cardiovascular: Normal rate, regular rhythm and normal heart sounds.    Pulmonary/Chest: Effort normal.   Musculoskeletal:        Right hip: He exhibits decreased range of motion, decreased strength and tenderness.   Neurological: He is alert. Coordination normal.   Skin: Skin is warm and dry. Rash noted. Rash is papular.        Erythematous excoriated rash  on patient's upper extremities.  No signs of infection today or drainage.   Psychiatric: He has a normal mood and affect.   Nursing note and vitals reviewed.    DX HIP  1.  Mild degenerative change of the right hip.    2.  Appearance of the proximal femur which has been scribed in the clinical setting of femoral acetabular impingement.    3.  Degenerative disc disease involving the lumbar spine.    Assessment/Plan:   Assessment    1. Arthritis of right hip  - MethylPREDNISolone (MEDROL DOSEPAK) 4 MG Tablet Therapy Pack; Take as directed on kit  Dispense: 1 Kit; Refill: 0    2. Rash  - mupirocin calcium (BACTROBAN) 2 % Cream; Apply thin layer to affected area twice daily for 7 to 10 days  Dispense: 1 Tube; Refill: 0    Other orders  - losartan (COZAAR) 25 MG Tab;   - traZODone (DESYREL) 50 MG Tab; TAKE 1 TO 2 TABLETS BY MOUTH AT BEDTIME AS NEEDED; Refill: 7  Discussed with patient that he should have a trial of steroids and follow-up with orthopedic doctor at this time.  Differential diagnosis, natural history, supportive care, and indications for immediate follow-up discussed.   Patient given instructions and understanding of medications and treatment.    If not improving in 3-5 days, F/U with PCP or return to  if symptoms worsen.    Patient agreeable to plan.      Please note that this dictation was created using voice recognition software. I have made every reasonable attempt to correct obvious errors, but I expect that there are errors of grammar and possibly content that I did not discover before finalizing the note.    Jose Carlos Minaya PA-C

## 2019-07-22 DIAGNOSIS — I10 BENIGN ESSENTIAL HTN: ICD-10-CM

## 2019-07-22 DIAGNOSIS — E78.5 HYPERLIPIDEMIA, UNSPECIFIED HYPERLIPIDEMIA TYPE: ICD-10-CM

## 2019-07-23 RX ORDER — SIMVASTATIN 40 MG
TABLET ORAL
Qty: 90 TAB | Refills: 0 | Status: SHIPPED | OUTPATIENT
Start: 2019-07-23 | End: 2019-07-25

## 2019-07-25 ENCOUNTER — OFFICE VISIT (OUTPATIENT)
Dept: CARDIOLOGY | Facility: MEDICAL CENTER | Age: 69
End: 2019-07-25
Payer: MEDICARE

## 2019-07-25 VITALS
DIASTOLIC BLOOD PRESSURE: 80 MMHG | BODY MASS INDEX: 35.03 KG/M2 | HEART RATE: 50 BPM | OXYGEN SATURATION: 97 % | HEIGHT: 72 IN | SYSTOLIC BLOOD PRESSURE: 116 MMHG | WEIGHT: 258.6 LBS

## 2019-07-25 DIAGNOSIS — Z95.5 STENTED CORONARY ARTERY: ICD-10-CM

## 2019-07-25 DIAGNOSIS — I25.10 CORONARY ARTERY DISEASE INVOLVING NATIVE CORONARY ARTERY OF NATIVE HEART WITHOUT ANGINA PECTORIS: ICD-10-CM

## 2019-07-25 DIAGNOSIS — I65.29 STENOSIS OF CAROTID ARTERY, UNSPECIFIED LATERALITY: ICD-10-CM

## 2019-07-25 DIAGNOSIS — I73.9 PVD (PERIPHERAL VASCULAR DISEASE) (HCC): ICD-10-CM

## 2019-07-25 DIAGNOSIS — Z79.899 HIGH RISK MEDICATION USE: ICD-10-CM

## 2019-07-25 DIAGNOSIS — E78.5 HYPERLIPIDEMIA, UNSPECIFIED HYPERLIPIDEMIA TYPE: ICD-10-CM

## 2019-07-25 DIAGNOSIS — I10 ESSENTIAL HYPERTENSION: ICD-10-CM

## 2019-07-25 DIAGNOSIS — E78.01 FAMILIAL HYPERCHOLESTEROLEMIA: ICD-10-CM

## 2019-07-25 DIAGNOSIS — I10 BENIGN ESSENTIAL HTN: ICD-10-CM

## 2019-07-25 PROCEDURE — 99214 OFFICE O/P EST MOD 30 MIN: CPT | Performed by: INTERNAL MEDICINE

## 2019-07-25 RX ORDER — LATANOPROST 50 UG/ML
1 SOLUTION/ DROPS OPHTHALMIC
COMMUNITY
Start: 2019-07-22

## 2019-07-25 RX ORDER — CELECOXIB 200 MG/1
200 CAPSULE ORAL
Refills: 2 | COMMUNITY
Start: 2019-07-13 | End: 2020-12-17

## 2019-07-25 RX ORDER — ATORVASTATIN CALCIUM 40 MG/1
40 TABLET, FILM COATED ORAL
Qty: 90 TAB | Refills: 3 | Status: SHIPPED | OUTPATIENT
Start: 2019-07-25 | End: 2020-06-23

## 2019-07-25 ASSESSMENT — ENCOUNTER SYMPTOMS
STRIDOR: 0
WEAKNESS: 0
NEUROLOGICAL NEGATIVE: 1
LOSS OF CONSCIOUSNESS: 0
CHILLS: 0
RESPIRATORY NEGATIVE: 1
MUSCULOSKELETAL NEGATIVE: 1
CONSTITUTIONAL NEGATIVE: 1
GASTROINTESTINAL NEGATIVE: 1
SPUTUM PRODUCTION: 0
SHORTNESS OF BREATH: 0
WHEEZING: 0
PALPITATIONS: 0
CLAUDICATION: 0
HEMOPTYSIS: 0
DIZZINESS: 0
FEVER: 0
EYES NEGATIVE: 1
COUGH: 0
CARDIOVASCULAR NEGATIVE: 1
PND: 0
BRUISES/BLEEDS EASILY: 0
SORE THROAT: 0
ORTHOPNEA: 0

## 2019-07-25 NOTE — PROGRESS NOTES
Chief Complaint   Patient presents with   • Coronary Artery Disease       Subjective:   Jan Rivas is a 69 y.o. male who presents today as a follow-up for his hypertension CAD hyperlipidemia peripheral vascular stents and obesity.  Since he was last seen he continues to do well.  He is physically active.  His last LDL last year was 49.  He is out of his simvastatin.  He needs a refill today.  His blood pressure is controlled.  In fact he checked in the other day and got a blood pressure 91/60.  He was asymptomatic.    Past Medical History:   Diagnosis Date   • CAD (coronary artery disease) stenLCA 2011   • Hyperlipidemia    • Hypertension      Past Surgical History:   Procedure Laterality Date   • [facility] CARDIAC CATH  2/21/15    Cath at [place].  Patent stent and no obstructive CAD.   • [facility] CARDIAC CATH  5/4/10    Taxus stent to LAD     Family History   Problem Relation Age of Onset   • Heart Attack Mother      Social History     Social History   • Marital status:      Spouse name: N/A   • Number of children: N/A   • Years of education: N/A     Occupational History   • Not on file.     Social History Main Topics   • Smoking status: Former Smoker     Packs/day: 0.50     Years: 40.00     Types: Cigarettes     Quit date: 5/4/2010   • Smokeless tobacco: Never Used   • Alcohol use Yes      Comment: evry day 2-3 drinks per day   • Drug use: No   • Sexual activity: Not on file     Other Topics Concern   • Not on file     Social History Narrative   • No narrative on file     No Known Allergies  Outpatient Encounter Prescriptions as of 7/25/2019   Medication Sig Dispense Refill   • celecoxib (CELEBREX) 200 MG Cap Take 200 mg by mouth.  2   • latanoprost (XALATAN) 0.005 % Solution      • atorvastatin (LIPITOR) 40 MG Tab Take 1 Tab by mouth 1/2 hour before dinner. 90 Tab 3   • metoprolol (LOPRESSOR) 25 MG Tab Take 1 Tab by mouth 2 times a day. TAKE 1 TABLET BY MOUTH TWICE A  Tab 3   • losartan (COZAAR)  25 MG Tab      • traZODone (DESYREL) 50 MG Tab TAKE 1 TO 2 TABLETS BY MOUTH AT BEDTIME AS NEEDED  7   • MethylPREDNISolone (MEDROL DOSEPAK) 4 MG Tablet Therapy Pack Take as directed on kit 1 Kit 0   • mupirocin calcium (BACTROBAN) 2 % Cream Apply thin layer to affected area twice daily for 7 to 10 days 1 Tube 0   • lisinopril (PRINIVIL) 5 MG Tab Take 1 Tab by mouth every day. 90 Tab 0   • zolpidem (AMBIEN) 10 MG Tab Take 10 mg by mouth at bedtime as needed for Sleep.     • nitroglycerin (NITROSTAT) 0.4 MG SL Tab Place 1 Tab under tongue as needed for Chest Pain. 25 Tab 6   • aspirin (ASA) 81 MG CHEW chewable tablet Take 1 Tab by mouth every day. 100 Tab 11   • LATANOPROST OP by Ophthalmic route.     • [DISCONTINUED] simvastatin (ZOCOR) 40 MG Tab TAKE 1 TABLET BY MOUTH EVERY DAY IN THE EVENING 90 Tab 0   • [DISCONTINUED] metoprolol (LOPRESSOR) 25 MG Tab TAKE 1 TABLET BY MOUTH TWICE A  Tab 0     No facility-administered encounter medications on file as of 7/25/2019.      Review of Systems   Constitutional: Negative.  Negative for chills, fever and malaise/fatigue.   HENT: Negative.  Negative for sore throat.    Eyes: Negative.    Respiratory: Negative.  Negative for cough, hemoptysis, sputum production, shortness of breath, wheezing and stridor.    Cardiovascular: Negative.  Negative for chest pain, palpitations, orthopnea, claudication, leg swelling and PND.   Gastrointestinal: Negative.    Genitourinary: Negative.    Musculoskeletal: Negative.    Skin: Negative.    Neurological: Negative.  Negative for dizziness, loss of consciousness and weakness.   Endo/Heme/Allergies: Negative.  Does not bruise/bleed easily.   All other systems reviewed and are negative.       Objective:   /80 (BP Location: Left arm, Patient Position: Sitting, BP Cuff Size: Adult)   Pulse (!) 50   Ht 1.829 m (6')   Wt 117.3 kg (258 lb 9.6 oz)   SpO2 97%   BMI 35.07 kg/m²     Physical Exam   Constitutional: He appears  well-developed and well-nourished. No distress.   HENT:   Head: Normocephalic and atraumatic.   Right Ear: External ear normal.   Left Ear: External ear normal.   Nose: Nose normal.   Mouth/Throat: No oropharyngeal exudate.   Eyes: Pupils are equal, round, and reactive to light. Conjunctivae and EOM are normal. Right eye exhibits no discharge. Left eye exhibits no discharge. No scleral icterus.   Neck: Neck supple. No JVD present.   Cardiovascular: Normal rate, regular rhythm and intact distal pulses.  Exam reveals no gallop and no friction rub.    No murmur heard.  Pulmonary/Chest: Effort normal. No stridor. No respiratory distress. He has no wheezes. He has no rales. He exhibits no tenderness.   Abdominal: Soft. He exhibits no distension. There is no guarding.   Musculoskeletal: Normal range of motion. He exhibits no edema, tenderness or deformity.   Neurological: He is alert. He has normal reflexes. He displays normal reflexes. No cranial nerve deficit. He exhibits normal muscle tone. Coordination normal.   Skin: Skin is warm and dry. No rash noted. He is not diaphoretic. No erythema. No pallor.   Psychiatric: He has a normal mood and affect. His behavior is normal. Judgment and thought content normal.   Nursing note and vitals reviewed.      Assessment:     1. High risk medication use     2. Familial hypercholesterolemia  atorvastatin (LIPITOR) 40 MG Tab   3. Essential hypertension  atorvastatin (LIPITOR) 40 MG Tab   4. PVD (peripheral vascular disease) ([facility])  atorvastatin (LIPITOR) 40 MG Tab   5. Stented coronary artery  atorvastatin (LIPITOR) 40 MG Tab   6. Stenosis of carotid artery, unspecified laterality     7. Coronary artery disease involving native coronary artery of native heart without angina pectoris     8. Hyperlipidemia, unspecified hyperlipidemia type  metoprolol (LOPRESSOR) 25 MG Tab   9. Benign essential HTN  metoprolol (LOPRESSOR) 25 MG Tab       Medical Decision Making:  Today's Assessment /  Status / Plan:     69-year-old male with hypertension hyperlipidemia peripheral vascular disease.  At this point we will refill his metoprolol and switch his simvastatin to atorvastatin 40.  He will stay on the aspirin.  He will continue to monitor his blood pressure.  We will see him back in 1 year.    Thank for you allowing me to take part in your patient's care, please call should you have any questions or would like to discuss this patient.

## 2019-07-31 ENCOUNTER — OFFICE VISIT (OUTPATIENT)
Dept: MEDICAL GROUP | Facility: MEDICAL CENTER | Age: 69
End: 2019-07-31
Payer: MEDICARE

## 2019-07-31 VITALS
DIASTOLIC BLOOD PRESSURE: 64 MMHG | HEART RATE: 59 BPM | WEIGHT: 256 LBS | SYSTOLIC BLOOD PRESSURE: 104 MMHG | HEIGHT: 72 IN | BODY MASS INDEX: 34.67 KG/M2 | OXYGEN SATURATION: 96 % | TEMPERATURE: 97.3 F | RESPIRATION RATE: 16 BRPM

## 2019-07-31 DIAGNOSIS — R73.09 ELEVATED HEMOGLOBIN A1C: ICD-10-CM

## 2019-07-31 DIAGNOSIS — L30.9 DERMATITIS: ICD-10-CM

## 2019-07-31 DIAGNOSIS — M54.50 LOW BACK PAIN WITH RADIATION: ICD-10-CM

## 2019-07-31 DIAGNOSIS — I25.10 CORONARY ARTERY DISEASE INVOLVING NATIVE CORONARY ARTERY OF NATIVE HEART WITHOUT ANGINA PECTORIS: ICD-10-CM

## 2019-07-31 DIAGNOSIS — Z13.29 THYROID DISORDER SCREEN: ICD-10-CM

## 2019-07-31 DIAGNOSIS — E78.01 FAMILIAL HYPERCHOLESTEROLEMIA: ICD-10-CM

## 2019-07-31 DIAGNOSIS — Z12.5 SCREENING FOR PROSTATE CANCER: ICD-10-CM

## 2019-07-31 DIAGNOSIS — I10 ESSENTIAL HYPERTENSION: ICD-10-CM

## 2019-07-31 DIAGNOSIS — I73.9 PVD (PERIPHERAL VASCULAR DISEASE) (HCC): ICD-10-CM

## 2019-07-31 PROCEDURE — 99214 OFFICE O/P EST MOD 30 MIN: CPT | Performed by: NURSE PRACTITIONER

## 2019-07-31 RX ORDER — TRIAMCINOLONE ACETONIDE 1 MG/G
2-4 OINTMENT TOPICAL 2 TIMES DAILY
Qty: 1 TUBE | Refills: 0 | Status: SHIPPED | OUTPATIENT
Start: 2019-07-31 | End: 2022-05-09

## 2019-07-31 ASSESSMENT — PATIENT HEALTH QUESTIONNAIRE - PHQ9: CLINICAL INTERPRETATION OF PHQ2 SCORE: 0

## 2019-07-31 NOTE — ASSESSMENT & PLAN NOTE
Dry inflamed patches of skin with small ulcerations over the right posterior shoulder and down the right upper arm which is been present for quite some time.  He was given Bactroban cream in urgent care but this is not helping.  This can be itching or burning at times.  No recent change in laundry detergent, soaps, hygiene products.  Not using any moisturizers

## 2019-07-31 NOTE — PROGRESS NOTES
Subjective:     Chief Complaint   Patient presents with   • Establish Care     Jan Rivas is a 69 y.o. male here to establish care.  He has been going back and forth between [place] and [place], his last PCP was in [place].  We have requested these records.  He is retired, has 2 adult sons, was a .  We discussed:    Hypertension  BP controlled on lisinopril 5 mg, losartan 25 mg daily, metoprolol 25 mg daily.  He has had one episode of chest pain in the last year for which he used nitroglycerin.  He is followed by cardiology.  No history of chronic kidney disease    Low back pain with radiation  Recently completed PT with some improvement, has been seeing Dr. Farris at [facility] and has follow-up planned today.  No lower extremity numbness or tingling, no saddle anesthesia    CAD (coronary artery disease)  H/o MI and stent placement in 2010  Now on atorvastatin and tolerating this well. On 81 mg ASA    PVD (peripheral vascular disease) ([facility])  Discoloration of bilateral lower extremities consistent with PVD.  No ulcerations, no numbness or tingling in the feet    Elevated hemoglobin A1c  Review of labs in our EMR shows A1c of 6.0 in 2013.  He is unaware of any prior diagnosis of prediabetes.  He has had more recent labs completed at an outside facility.  No medications for this.  He is not following any particular diet.  States that he does quite a bit of walking but no planned exercise regimen.  No polyuria, polydipsia    Hyperlipemia  Recently switched from simvastatin to atorvastatin about difficulty including myalgia    Dermatitis  Dry inflamed patches of skin with small ulcerations over the right posterior shoulder and down the right upper arm which is been present for quite some time.  He was given Bactroban cream in urgent care but this is not helping.  This can be itching or burning at times.  No recent change in laundry detergent, soaps, hygiene products.  Not using any  moisturizers       Current medicines (including changes today)  Current Outpatient Medications   Medication Sig Dispense Refill   • triamcinolone acetonide (KENALOG) 0.1 % Ointment Apply 2-4 g to affected area(s) 2 times a day. 1 Tube 0   • celecoxib (CELEBREX) 200 MG Cap Take 200 mg by mouth.  2   • latanoprost (XALATAN) 0.005 % Solution      • atorvastatin (LIPITOR) 40 MG Tab Take 1 Tab by mouth 1/2 hour before dinner. 90 Tab 3   • metoprolol (LOPRESSOR) 25 MG Tab Take 1 Tab by mouth 2 times a day. TAKE 1 TABLET BY MOUTH TWICE A  Tab 3   • losartan (COZAAR) 25 MG Tab      • traZODone (DESYREL) 50 MG Tab TAKE 1 TO 2 TABLETS BY MOUTH AT BEDTIME AS NEEDED  7   • mupirocin calcium (BACTROBAN) 2 % Cream Apply thin layer to affected area twice daily for 7 to 10 days 1 Tube 0   • lisinopril (PRINIVIL) 5 MG Tab Take 1 Tab by mouth every day. 90 Tab 0   • nitroglycerin (NITROSTAT) 0.4 MG SL Tab Place 1 Tab under tongue as needed for Chest Pain. 25 Tab 6   • aspirin (ASA) 81 MG CHEW chewable tablet Take 1 Tab by mouth every day. 100 Tab 11   • LATANOPROST OP by Ophthalmic route.       No current facility-administered medications for this visit.      He  has a past medical history of CAD (coronary artery disease) (stenLCA 2011), Hyperlipidemia, and Hypertension.    ROS included above     Objective:     /64 (BP Location: Left arm, Patient Position: Sitting, BP Cuff Size: Adult)   Pulse (!) 59   Temp 36.3 °C (97.3 °F) (Temporal)   Resp 16   Ht 1.829 m (6')   Wt 116.1 kg (256 lb)   SpO2 96%  Body mass index is 34.72 kg/m².     Physical Exam:  General: Alert, oriented in no acute distress.  Eye contact is good, speech is normal, affect calm  HEENT: Oral mucosa pink moist, no lesions. TMs gray with good landmarks bilaterally. No lymphadenopathy.  Lungs: clear to auscultation bilaterally, normal effort, no wheeze/ rhonchi/ rales.  CV: regular rate and rhythm, S1, S2, no murmur  Abdomen: soft, nontender  Ext: no  edema, discoloration of bilateral LE, no ulcerations, temperature normal    Assessment and Plan:   The following treatment plan was discussed  1. Essential hypertension   stable on current medication, followed by cardiology   2. Low back pain with radiation   patient has improved with physical therapy and has follow-up planned with physician at [facility]   3. Familial hypercholesterolemia   recently switched from simvastatin to atorvastatin, will update labs   4. Coronary artery disease involving native coronary artery of native heart without angina pectoris  Lipid Profile    Comp Metabolic Panel   5. Elevated hemoglobin A1c   review of chart shows prior A1c of 6.0.  Counseled on diet, exercise, weight loss recommendations.  Update labs  HEMOGLOBIN A1C   6. Dermatitis   skin care reviewed including minimal hot water, no soap or unnecessary, use of thick daily moisturizer such as Eucerin Aquaphor.  May use a small amount of Kenalog for flares  triamcinolone acetonide (KENALOG) 0.1 % Ointment   7. PVD (peripheral vascular disease) (HCC)   stable   8. Screening for prostate cancer  PROSTATE SPECIFIC AG SCREENING            Followup: pending labs         Please note that this dictation was created using voice recognition software. I have worked with consultants from the vendor as well as technical experts from [employer] to optimize the interface. I have made every reasonable attempt to correct obvious errors, but I expect that there are errors of grammar and possibly content that I did not discover before finalizing the note.

## 2019-07-31 NOTE — ASSESSMENT & PLAN NOTE
Discoloration of bilateral lower extremities consistent with PVD.  No ulcerations, no numbness or tingling in the feet

## 2019-07-31 NOTE — ASSESSMENT & PLAN NOTE
Recently completed PT with some improvement, has been seeing Dr. Farris at [facility] and has follow-up planned today.  No lower extremity numbness or tingling, no saddle anesthesia

## 2019-07-31 NOTE — LETTER
[facility]  AMISHA Hemphill.  [address]  [place] [address]  Fax: [phone]   Authorization for Release/Disclosure of   Protected Health Information   Name: SHAD RIVAS : 1950 SSN: [id number]   Address: [address] [place] [address] Phone:    [phone] (home)    I authorize the entity listed below to release/disclose the PHI below to:   [facility]/TOBY Hemphill and TOBY Hemphill   Provider or Entity Name:  Dr Lizarraga   Address   City, [facility], [facility] Phone:      Fax:     Reason for request: continuity of care   Information to be released:    [  ] LAST COLONOSCOPY,  including any PATH REPORT and follow-up  [  ] LAST FIT/COLOGUARD RESULT [  ] LAST DEXA  [  ] LAST MAMMOGRAM  [  ] LAST PAP  [  ] LAST LABS [  ] RETINA EXAM REPORT  [  ] IMMUNIZATION RECORDS  [x  ] Release all info      [  ] Check here and initial the line next to each item to release ALL health information INCLUDING  _____ Care and treatment for drug and / or alcohol abuse  _____ HIV testing, infection status, or AIDS  _____ Genetic Testing    DATES OF SERVICE OR TIME PERIOD TO BE DISCLOSED: _____________  I understand and acknowledge that:  * This Authorization may be revoked at any time by you in writing, except if your health information has already been used or disclosed.  * Your health information that will be used or disclosed as a result of you signing this authorization could be re-disclosed by the recipient. If this occurs, your re-disclosed health information may no longer be protected by State or Federal laws.  * You may refuse to sign this Authorization. Your refusal will not affect your ability to obtain treatment.  * This Authorization becomes effective upon signing and will  on (date) __________.      If no date is indicated, this Authorization will  one (1) year from the signature date.    Name: Shad Rivas    Signature:   Date:          7/31/2019       PLEASE FAX REQUESTED RECORDS BACK TO: [phone]

## 2019-07-31 NOTE — ASSESSMENT & PLAN NOTE
Review of labs in our EMR shows A1c of 6.0 in 2013.  He is unaware of any prior diagnosis of prediabetes.  He has had more recent labs completed at an outside facility.  No medications for this.  He is not following any particular diet.  States that he does quite a bit of walking but no planned exercise regimen.  No polyuria, polydipsia

## 2019-07-31 NOTE — ASSESSMENT & PLAN NOTE
BP controlled on lisinopril 5 mg, losartan 25 mg daily, metoprolol 25 mg daily.  He has had one episode of chest pain in the last year for which he used nitroglycerin.  He is followed by cardiology.  No history of chronic kidney disease

## 2019-08-02 LAB
ALBUMIN SERPL-MCNC: 4.3 G/DL (ref 3.6–4.8)
ALBUMIN/GLOB SERPL: 1.7 {RATIO} (ref 1.2–2.2)
ALP SERPL-CCNC: 49 IU/L (ref 39–117)
ALT SERPL-CCNC: 38 IU/L (ref 0–44)
AST SERPL-CCNC: 35 IU/L (ref 0–40)
BILIRUB SERPL-MCNC: 0.3 MG/DL (ref 0–1.2)
BUN SERPL-MCNC: 15 MG/DL (ref 8–27)
BUN/CREAT SERPL: 14 (ref 10–24)
CALCIUM SERPL-MCNC: 9.6 MG/DL (ref 8.6–10.2)
CHLORIDE SERPL-SCNC: 104 MMOL/L (ref 96–106)
CHOLEST SERPL-MCNC: 112 MG/DL (ref 100–199)
CO2 SERPL-SCNC: 23 MMOL/L (ref 20–29)
CREAT SERPL-MCNC: 1.04 MG/DL (ref 0.76–1.27)
GLOBULIN SER CALC-MCNC: 2.5 G/DL (ref 1.5–4.5)
GLUCOSE SERPL-MCNC: 105 MG/DL (ref 65–99)
HBA1C MFR BLD: 6 % (ref 4.8–5.6)
HDLC SERPL-MCNC: 49 MG/DL
LABORATORY COMMENT REPORT: NORMAL
LDLC SERPL CALC-MCNC: 45 MG/DL (ref 0–99)
POTASSIUM SERPL-SCNC: 4.7 MMOL/L (ref 3.5–5.2)
PROT SERPL-MCNC: 6.8 G/DL (ref 6–8.5)
PSA SERPL-MCNC: 0.9 NG/ML (ref 0–4)
SODIUM SERPL-SCNC: 144 MMOL/L (ref 134–144)
TRIGL SERPL-MCNC: 91 MG/DL (ref 0–149)
VLDLC SERPL CALC-MCNC: 18 MG/DL (ref 5–40)

## 2019-11-06 ENCOUNTER — TELEPHONE (OUTPATIENT)
Dept: MEDICAL GROUP | Facility: MEDICAL CENTER | Age: 69
End: 2019-11-06

## 2019-11-07 NOTE — TELEPHONE ENCOUNTER
TELEPHONE ENCOUNTER:    1. Caller Name: Jan Rivas                              Call Back Number: [phone]     2. Message: Patient called to let us know that he has a CPAP through [facility], just wanted to notify us for future reference for when he needs any supplies. He has been on this since Feb. 02. 2019.    Just an FYI for the chart.    3. Patient approves office to leave a detailed voicemail/MyChart message: N\A

## 2019-12-03 ENCOUNTER — TELEPHONE (OUTPATIENT)
Dept: MEDICAL GROUP | Facility: MEDICAL CENTER | Age: 69
End: 2019-12-03

## 2019-12-04 NOTE — TELEPHONE ENCOUNTER
Future Appointments       Provider Department Center    12/6/2019 9:20 AM TOBY Hemphill; [facility]          ANNUAL WELLNESS VISIT PRE-VISIT PLANNING WITHOUT OUTREACH    1.  Reviewed note from last office visit with PCP: YES    2.  If any orders were placed at last visit, do we have Results/Consult Notes?        •  Labs - Labs ordered, but not to be completed until Future.  Note: If patient appointment is for lab review and patient did not complete labs, check with provider if OK to reschedule patient until labs completed.       •  Imaging - Imaging was not ordered at last office visit.       •  Referrals - No referrals were ordered at last office visit.    3.  Immunizations were updated in Epic using WebIZ?: Epic matches WebIZ       •  WebIZ Recommendations: FLU, PREVNAR (PCV13) , TDAP, SHINGRIX (Shingles) and CPOX        •  Is patient due for Tdap? YES. Patient was not notified of copay/out of pocket cost.       •  Is patient due for Shingrix? YES. Patient was not notified of copay/out of pocket cost.     4.  Patient is due for the following Health Maintenance Topics:   Health Maintenance Due   Topic Date Due   • Annual Wellness Visit  1950   • HEPATITIS C SCREENING  1950   • IMM DTaP/Tdap/Td Vaccine (1 - Tdap) 04/16/1963   • COLONOSCOPY  04/16/2002   • IMM ZOSTER VACCINES (1 of 2) 04/16/2002   • IMM PNEUMOCOCCAL VACCINE: 65+ Years (1 of 2 - PCV13) 04/16/2017   • IMM INFLUENZA (1) 09/01/2019       5.  Reviewed/Updated the following with patient:       •   Preferred Pharmacy? NO       •   Preferred Lab? YES       •   Preferred Communication? NO       •   Allergies? NO       •   Medications? NO       •   Social History? NO       •   Family History (document living status of immediate family members and if + hx of  cancer, diabetes, hypertension, hyperlipidemia, heart attack, stroke) NO    6.  Care Team Updated:       •   DME  Company (gait device, O2, CPAP, etc.): NO       •   Other Specialists (eye doctor, derm, GYN, cardiology, endo, etc): NO    7. Orders for overdue Health Maintenance topics pended in Pre-Charting? N\A    8.  Patient has the following Care Path diagnoses on Problem List:  NONE    9.  Patient was advised: “This is a free wellness visit. The provider will screen for medical conditions to help you stay healthy. If you have other concerns to address you may be asked to discuss these at a separate visit or there may be an additional fee.”     10.  Patient was informed to arrive 15 min prior to their scheduled appointment and bring in their medication bottles.

## 2019-12-06 ENCOUNTER — OFFICE VISIT (OUTPATIENT)
Dept: MEDICAL GROUP | Facility: MEDICAL CENTER | Age: 69
End: 2019-12-06
Payer: MEDICARE

## 2019-12-06 VITALS
HEIGHT: 72 IN | WEIGHT: 253 LBS | DIASTOLIC BLOOD PRESSURE: 76 MMHG | BODY MASS INDEX: 34.27 KG/M2 | OXYGEN SATURATION: 94 % | TEMPERATURE: 98.2 F | HEART RATE: 60 BPM | SYSTOLIC BLOOD PRESSURE: 112 MMHG

## 2019-12-06 DIAGNOSIS — G47.33 OSA ON CPAP: ICD-10-CM

## 2019-12-06 DIAGNOSIS — R73.09 ELEVATED HEMOGLOBIN A1C: ICD-10-CM

## 2019-12-06 DIAGNOSIS — I10 ESSENTIAL HYPERTENSION: ICD-10-CM

## 2019-12-06 DIAGNOSIS — Z12.11 SCREENING FOR COLORECTAL CANCER: ICD-10-CM

## 2019-12-06 DIAGNOSIS — I25.10 CORONARY ARTERY DISEASE INVOLVING NATIVE CORONARY ARTERY OF NATIVE HEART WITHOUT ANGINA PECTORIS: ICD-10-CM

## 2019-12-06 DIAGNOSIS — Z12.12 SCREENING FOR COLORECTAL CANCER: ICD-10-CM

## 2019-12-06 DIAGNOSIS — L30.9 DERMATITIS: ICD-10-CM

## 2019-12-06 DIAGNOSIS — Z11.59 NEED FOR HEPATITIS C SCREENING TEST: ICD-10-CM

## 2019-12-06 DIAGNOSIS — Z00.00 MEDICARE ANNUAL WELLNESS VISIT, SUBSEQUENT: ICD-10-CM

## 2019-12-06 DIAGNOSIS — Z23 NEED FOR VACCINATION: ICD-10-CM

## 2019-12-06 DIAGNOSIS — E78.01 FAMILIAL HYPERCHOLESTEROLEMIA: ICD-10-CM

## 2019-12-06 DIAGNOSIS — I73.9 PVD (PERIPHERAL VASCULAR DISEASE) (HCC): ICD-10-CM

## 2019-12-06 PROCEDURE — 90670 PCV13 VACCINE IM: CPT | Performed by: NURSE PRACTITIONER

## 2019-12-06 PROCEDURE — G0439 PPPS, SUBSEQ VISIT: HCPCS | Mod: 25 | Performed by: NURSE PRACTITIONER

## 2019-12-06 PROCEDURE — G0009 ADMIN PNEUMOCOCCAL VACCINE: HCPCS | Performed by: NURSE PRACTITIONER

## 2019-12-06 ASSESSMENT — ACTIVITIES OF DAILY LIVING (ADL): BATHING_REQUIRES_ASSISTANCE: 0

## 2019-12-06 ASSESSMENT — ENCOUNTER SYMPTOMS: GENERAL WELL-BEING: GOOD

## 2019-12-06 ASSESSMENT — PATIENT HEALTH QUESTIONNAIRE - PHQ9: CLINICAL INTERPRETATION OF PHQ2 SCORE: 0

## 2019-12-06 NOTE — ASSESSMENT & PLAN NOTE
Erythema and dryness initially starting after swimming for exercise.  He has stent since stopped going to the pool.  He is using Eucerin Aquaphor and topical triamcinolone

## 2019-12-06 NOTE — PROGRESS NOTES
Chief Complaint   Patient presents with   • Annual Wellness Visit         HPI:  New is a 69 y.o. here for Medicare Annual Wellness Visit.  He tells me that he is in the process of selling his home and [employer] and planning to live in [place] now full-time.  He is up-to-date on colonoscopy, we will request this record.  We discussed:    Hypertension  Stable and controlled on losartan 25 mg daily and metoprolol 25 mg BID  Followed by cardiology  No chest pain, dizziness, palpitations    Dermatitis  Erythema and dryness initially starting after swimming for exercise.  He has stent since stopped going to the pool.  He is using Eucerin Aquaphor and topical triamcinolone     CAD (coronary artery disease)  Doing well on atorvastatin, tolerating medication without difficulty.  No recent chest pain, dizziness, palpitation.    Elevated hemoglobin A1c  Last a1c 6.0 in August 2019  Not following any particular diet.  Active through the day but not getting any planned exercise since having stopped going to the gym after the dermatitis issues he has had.  No polyuria, polydipsia, numbness or tingling in extremities    FRANK on CPAP  Started on CPAP earlier this year.  Reports consistent use, will need to establish with local pulmonologist for ongoing monitoring    PVD (peripheral vascular disease) (CMS-[facility])  Discoloration of bilateral lower extremities consistent with PVD.  No recent worsening or change.  No ulcerations    Hyperlipemia  Stable on statin treatment        Patient Active Problem List    Diagnosis Date Noted   • CAD (coronary artery disease) 10/22/2013     Priority: High   • Stented coronary artery 10/31/2013     Priority: Medium   • Hypertension 10/31/2013     Priority: Low   • Hyperlipemia 10/31/2013     Priority: Low   • Low back pain with radiation 07/31/2019   • Elevated hemoglobin A1c 07/31/2019   • Dermatitis 07/31/2019   • High risk medication use 01/23/2018   • PVD (peripheral vascular disease) ([facility])  02/10/2017   • Carotid stenosis 04/30/2014       Current Outpatient Medications   Medication Sig Dispense Refill   • triamcinolone acetonide (KENALOG) 0.1 % Ointment Apply 2-4 g to affected area(s) 2 times a day. 1 Tube 0   • latanoprost (XALATAN) 0.005 % Solution      • atorvastatin (LIPITOR) 40 MG Tab Take 1 Tab by mouth 1/2 hour before dinner. 90 Tab 3   • metoprolol (LOPRESSOR) 25 MG Tab Take 1 Tab by mouth 2 times a day. TAKE 1 TABLET BY MOUTH TWICE A  Tab 3   • losartan (COZAAR) 25 MG Tab      • traZODone (DESYREL) 50 MG Tab TAKE 1 TO 2 TABLETS BY MOUTH AT BEDTIME AS NEEDED  7   • nitroglycerin (NITROSTAT) 0.4 MG SL Tab Place 1 Tab under tongue as needed for Chest Pain. 25 Tab 6   • aspirin (ASA) 81 MG CHEW chewable tablet Take 1 Tab by mouth every day. 100 Tab 11   • LATANOPROST OP by Ophthalmic route.     • celecoxib (CELEBREX) 200 MG Cap Take 200 mg by mouth.  2   • mupirocin calcium (BACTROBAN) 2 % Cream Apply thin layer to affected area twice daily for 7 to 10 days (Patient not taking: Reported on 12/6/2019) 1 Tube 0   • lisinopril (PRINIVIL) 5 MG Tab Take 1 Tab by mouth every day. (Patient not taking: Reported on 12/6/2019) 90 Tab 0     No current facility-administered medications for this visit.         Patient is taking medications as noted in medication list.  Current supplements as per medication list.     Allergies: Patient has no known allergies.    Current social contact/activities: Patient visiting with friends, playing golf, play horse races     Is patient current with immunizations? No, due for FLU, PREVNAR (PCV13) , TDAP and SHINGRIX (Shingles). Patient is interested in receiving PREVNAR (PCV13)  today.    He  reports that he quit smoking about 9 years ago. His smoking use included cigarettes. He has a 20.00 pack-year smoking history. He has never used smokeless tobacco. He reports current alcohol use. He reports that he does not use drugs.  Counseling given: Not  Answered        DPA/Advanced directive: Patient has Living Will, but it is not on file. Instructed to bring in a copy to scan into their chart.    ROS:    Gait: Uses no assistive device   Ostomy: No   Other tubes: No   Amputations: No   Chronic oxygen use No   Last eye exam Patient reports September 2019   Wears hearing aids: No   : Denies any urinary leakage during the last 6 months        Depression Screening    Little interest or pleasure in doing things?  0 - not at all  Feeling down, depressed, or hopeless? 0 - not at all  Patient Health Questionnaire Score: 0    If depressive symptoms identified deferred to follow up visit unless specifically addressed in assessment and plan.    Interpretation of PHQ-9 Total Score   Score Severity   1-4 No Depression   5-9 Mild Depression   10-14 Moderate Depression   15-19 Moderately Severe Depression   20-27 Severe Depression    Screening for Cognitive Impairment    Three Minute Recall (village, kitchen, baby)  2/3    Jake clock face with all 12 numbers and set the hands to show 10 past 10.  Yes 5/5  If cognitive concerns identified, deferred for follow up unless specifically addressed in assessment and plan.    Fall Risk Assessment    Has the patient had two or more falls in the last year or any fall with injury in the last year?  No  If fall risk identified, deferred for follow up unless specifically addressed in assessment and plan.    Safety Assessment    Throw rugs on floor.  No  Handrails on all stairs.  Yes  Good lighting in all hallways.  Yes  Difficulty hearing.  No  Patient counseled about all safety risks that were identified.    Functional Assessment ADLs    Are there any barriers preventing you from cooking for yourself or meeting nutritional needs?  No.    Are there any barriers preventing you from driving safely or obtaining transportation?  No.    Are there any barriers preventing you from using a telephone or calling for help?  No.    Are there any  barriers preventing you from shopping?  No.    Are there any barriers preventing you from taking care of your own finances?  No.    Are there any barriers preventing you from managing your medications?  No.    Are there any barriers preventing you from showering, bathing or dressing yourself?  No.    Are you currently engaging in any exercise or physical activity?  No.     What is your perception of your health?  Good.    Health Maintenance Summary                Annual Wellness Visit Overdue 1950     HEPATITIS C SCREENING Overdue 1950     IMM DTaP/Tdap/Td Vaccine Overdue 1963     COLONOSCOPY Overdue 2002     IMM ZOSTER VACCINES Overdue 2002     IMM PNEUMOCOCCAL VACCINE: 65+ Years Overdue 2017      Done 2015 Ext Imm: Pneumococcal 23     Patient has more history with this topic...    IMM INFLUENZA Overdue 2019      Done 10/23/2013 Imm Admin: Influenza Vaccine Pediatric Preserv Free          Patient Care Team:  TOBY Hemphill as PCP - General (Family Medicine)    Social History     Tobacco Use   • Smoking status: Former Smoker     Packs/day: 0.50     Years: 40.00     Pack years: 20.00     Types: Cigarettes     Last attempt to quit: 2010     Years since quittin.5   • Smokeless tobacco: Never Used   Substance Use Topics   • Alcohol use: Yes     Comment: evry day 2-3 drinks per day   • Drug use: No     Family History   Problem Relation Age of Onset   • Heart Attack Mother      He  has a past medical history of CAD (coronary artery disease) (stenLCA ), Hyperlipidemia, and Hypertension.   Past Surgical History:   Procedure Laterality Date   • ZZZ CARDIAC CATH  2/21/15    Cath at [facility].  Patent stent and no obstructive CAD.   • ZZZ CARDIAC CATH  5/4/10    Taxus stent to LAD   • CYST EXCISION      posterior neck           Exam:     /76   Pulse 60   Temp 36.8 °C (98.2 °F) (Temporal)   Ht 1.829 m (6')   Wt 114.8 kg (253 lb)   SpO2 94%  Body mass index  is 34.31 kg/m².    Hearing excellent.    Dentition fair  Alert, oriented in no acute distress.  Eye contact is good, speech goal directed, affect calm  Heart: Regular rate and rhythm S1-S2, no murmur  Lungs: Clear and equal with good aeration, normal effort  Abdomen: Soft, nontender    Assessment and Plan. The following treatment and monitoring plan is recommended:  1. Medicare annual wellness visit, subsequent  From list and medications reviewed and updated.  General health and wellness discussion including healthy diet, regular exercise, encouraged to work on weight loss.    2. Essential hypertension  Stable and controlled, followed by cardiology  - Comp Metabolic Panel; Future    3. Dermatitis  Skin care reviewed including minimal hot water, minimal soap, use of thick daily moisturizer.  Continue Kenalog for flares    4. Coronary artery disease involving native coronary artery of native heart without angina pectoris  Stable  - Lipid Profile; Future    5. Elevated hemoglobin A1c  Counseled on diet, exercise, weight loss recommendations.  Labs in February  - HEMOGLOBIN A1C; Future    6. FRANK on CPAP  Consistently using CPAP, will need to establish with pulmonologist for ongoing monitoring  - REFERRAL TO PULMONOLOGY    7. PVD (peripheral vascular disease) (HCC)  Stable    8. Need for vaccination  I have placed the below orders and discussed them with an approved delegating provider. The MA is performing the below orders under the direction of Dr. Claire  - Prevnar-13 Vaccine (PCV13)    9. Screening for colorectal cancer  Patient reports that he is up-to-date on colonoscopy, will request this record    10. Familial hypercholesterolemia  Continue statin    11. Need for hepatitis C screening test  - HCV Scrn ( [id number] 1xLife); Future      Services suggested: No services needed at this time  Health Care Screening recommendations as per orders if indicated.  Referrals offered: PT/OT/Nutrition counseling/Behavioral  Health/Smoking cessation as per orders if indicated.    Discussion today about general wellness and lifestyle habits:    · Prevent falls and reduce trip hazards; Cautioned about securing or removing rugs.  · Have a working fire alarm and carbon monoxide detector;   · Engage in regular physical activity and social activities       Follow-up: 3 months with labs prior

## 2019-12-06 NOTE — ASSESSMENT & PLAN NOTE
Started on CPAP earlier this year.  Reports consistent use, will need to establish with local pulmonologist for ongoing monitoring

## 2019-12-06 NOTE — ASSESSMENT & PLAN NOTE
Doing well on atorvastatin, tolerating medication without difficulty.  No recent chest pain, dizziness, palpitation.

## 2019-12-06 NOTE — LETTER
[facility]  AMISHA Hemphill.  [address]  [place] [address]  Fax: [phone]   Authorization for Release/Disclosure of   Protected Health Information   Name: SHAD RIVAS : 1950 SSN: [id number]   Address: [address] [place] [address] Phone:    [phone] (home)    I authorize the entity listed below to release/disclose the PHI below to:   [facility]/TOBY Hemphill and TOBY Hemphill   Provider or Entity Name:  Dr Judd   [facility], [facility], [facility] Phone:      Fax:     Reason for request: continuity of care   Information to be released:    [ x] LAST COLONOSCOPY,  including any PATH REPORT and follow-up  [  ] LAST FIT/COLOGUARD RESULT [  ] LAST DEXA  [  ] LAST MAMMOGRAM  [  ] LAST PAP  [  ] LAST LABS [  ] RETINA EXAM REPORT  [  ] IMMUNIZATION RECORDS  [  ] Release all info      [  ] Check here and initial the line next to each item to release ALL health information INCLUDING  _____ Care and treatment for drug and / or alcohol abuse  _____ HIV testing, infection status, or AIDS  _____ Genetic Testing    DATES OF SERVICE OR TIME PERIOD TO BE DISCLOSED: _____________  I understand and acknowledge that:  * This Authorization may be revoked at any time by you in writing, except if your health information has already been used or disclosed.  * Your health information that will be used or disclosed as a result of you signing this authorization could be re-disclosed by the recipient. If this occurs, your re-disclosed health information may no longer be protected by State or Federal laws.  * You may refuse to sign this Authorization. Your refusal will not affect your ability to obtain treatment.  * This Authorization becomes effective upon signing and will  on (date) __________.      If no date is indicated, this Authorization will  one (1) year from the signature date.    Name: Shad Rivas    Signature:   Date:          12/6/2019       PLEASE FAX REQUESTED RECORDS BACK TO: [phone]

## 2019-12-06 NOTE — ASSESSMENT & PLAN NOTE
Discoloration of bilateral lower extremities consistent with PVD.  No recent worsening or change.  No ulcerations

## 2019-12-06 NOTE — ASSESSMENT & PLAN NOTE
Last a1c 6.0 in August 2019  Not following any particular diet.  Active through the day but not getting any planned exercise since having stopped going to the gym after the dermatitis issues he has had.  No polyuria, polydipsia, numbness or tingling in extremities

## 2019-12-06 NOTE — ASSESSMENT & PLAN NOTE
Stable and controlled on losartan 25 mg daily and metoprolol 25 mg BID  Followed by cardiology  No chest pain, dizziness, palpitations

## 2020-01-08 DIAGNOSIS — I10 ESSENTIAL HYPERTENSION: ICD-10-CM

## 2020-01-08 DIAGNOSIS — I25.10 CORONARY ARTERY DISEASE INVOLVING NATIVE CORONARY ARTERY OF NATIVE HEART WITHOUT ANGINA PECTORIS: ICD-10-CM

## 2020-01-09 RX ORDER — NITROGLYCERIN 0.4 MG/1
0.4 TABLET SUBLINGUAL PRN
Qty: 25 TAB | Refills: 0 | Status: SHIPPED | OUTPATIENT
Start: 2020-01-09 | End: 2021-07-13

## 2020-01-25 LAB
ALBUMIN SERPL-MCNC: 4.3 G/DL (ref 3.8–4.8)
ALBUMIN/GLOB SERPL: 1.7 {RATIO} (ref 1.2–2.2)
ALP SERPL-CCNC: 60 IU/L (ref 39–117)
ALT SERPL-CCNC: 35 IU/L (ref 0–44)
AST SERPL-CCNC: 30 IU/L (ref 0–40)
BILIRUB SERPL-MCNC: 0.4 MG/DL (ref 0–1.2)
BUN SERPL-MCNC: 12 MG/DL (ref 8–27)
BUN/CREAT SERPL: 12 (ref 10–24)
CALCIUM SERPL-MCNC: 9.6 MG/DL (ref 8.6–10.2)
CHLORIDE SERPL-SCNC: 102 MMOL/L (ref 96–106)
CHOLEST SERPL-MCNC: 114 MG/DL (ref 100–199)
CO2 SERPL-SCNC: 24 MMOL/L (ref 20–29)
CREAT SERPL-MCNC: 1.03 MG/DL (ref 0.76–1.27)
GLOBULIN SER CALC-MCNC: 2.6 G/DL (ref 1.5–4.5)
GLUCOSE SERPL-MCNC: 105 MG/DL (ref 65–99)
HBA1C MFR BLD: 5.9 % (ref 4.8–5.6)
HCV AB S/CO SERPL IA: <0.1 S/CO RATIO (ref 0–0.9)
HDLC SERPL-MCNC: 51 MG/DL
LABORATORY COMMENT REPORT: NORMAL
LDLC SERPL CALC-MCNC: 44 MG/DL (ref 0–99)
POTASSIUM SERPL-SCNC: 4.3 MMOL/L (ref 3.5–5.2)
PROT SERPL-MCNC: 6.9 G/DL (ref 6–8.5)
SODIUM SERPL-SCNC: 143 MMOL/L (ref 134–144)
TRIGL SERPL-MCNC: 93 MG/DL (ref 0–149)
VLDLC SERPL CALC-MCNC: 19 MG/DL (ref 5–40)

## 2020-01-29 ENCOUNTER — OFFICE VISIT (OUTPATIENT)
Dept: MEDICAL GROUP | Facility: MEDICAL CENTER | Age: 70
End: 2020-01-29
Payer: MEDICARE

## 2020-01-29 VITALS
RESPIRATION RATE: 16 BRPM | HEIGHT: 72 IN | DIASTOLIC BLOOD PRESSURE: 80 MMHG | TEMPERATURE: 98.6 F | BODY MASS INDEX: 34.4 KG/M2 | WEIGHT: 254 LBS | OXYGEN SATURATION: 96 % | HEART RATE: 81 BPM | SYSTOLIC BLOOD PRESSURE: 114 MMHG

## 2020-01-29 DIAGNOSIS — I10 ESSENTIAL HYPERTENSION: ICD-10-CM

## 2020-01-29 DIAGNOSIS — R73.09 ELEVATED HEMOGLOBIN A1C: ICD-10-CM

## 2020-01-29 DIAGNOSIS — J06.9 VIRAL URI WITH COUGH: ICD-10-CM

## 2020-01-29 DIAGNOSIS — Z13.29 THYROID DISORDER SCREEN: ICD-10-CM

## 2020-01-29 DIAGNOSIS — E78.00 PURE HYPERCHOLESTEROLEMIA: ICD-10-CM

## 2020-01-29 DIAGNOSIS — Z12.83 SKIN CANCER SCREENING: ICD-10-CM

## 2020-01-29 PROCEDURE — 99214 OFFICE O/P EST MOD 30 MIN: CPT | Performed by: NURSE PRACTITIONER

## 2020-01-29 ASSESSMENT — PATIENT HEALTH QUESTIONNAIRE - PHQ9: CLINICAL INTERPRETATION OF PHQ2 SCORE: 0

## 2020-01-29 NOTE — ASSESSMENT & PLAN NOTE
Stable with A1c 5.9.  He is watching his diet, not doing any specific exercise.  No polyuria, polydipsia

## 2020-01-29 NOTE — ASSESSMENT & PLAN NOTE
Well-controlled on atorvastatin, tolerating medication without difficulty.  Generally following a healthy diet.  Component      Latest Ref Rng & Units 1/24/2020           4:36 AM   Cholesterol,Tot      100 - 199 mg/dL 114   Triglycerides      0 - 149 mg/dL 93   HDL      >39 mg/dL 51   VLDL Cholesterol Calc      5 - 40 mg/dL 19   LDL      0 - 99 mg/dL 44   Comment:       CANCELED

## 2020-01-29 NOTE — ASSESSMENT & PLAN NOTE
He reports nasal congestion, cough, sore throat starting yesterday.  Symptoms are about the same today.  He denies any fever, chills, shortness of breath, myalgia, arthralgia.  Not using any medication for this.

## 2020-04-08 ENCOUNTER — SLEEP CENTER VISIT (OUTPATIENT)
Dept: SLEEP MEDICINE | Facility: MEDICAL CENTER | Age: 70
End: 2020-04-08
Payer: MEDICARE

## 2020-04-08 VITALS
OXYGEN SATURATION: 94 % | HEART RATE: 52 BPM | RESPIRATION RATE: 14 BRPM | BODY MASS INDEX: 33.86 KG/M2 | SYSTOLIC BLOOD PRESSURE: 122 MMHG | DIASTOLIC BLOOD PRESSURE: 74 MMHG | HEIGHT: 72 IN | WEIGHT: 250 LBS

## 2020-04-08 DIAGNOSIS — G47.33 OSA ON CPAP: ICD-10-CM

## 2020-04-08 PROCEDURE — 99203 OFFICE O/P NEW LOW 30 MIN: CPT | Performed by: FAMILY MEDICINE

## 2020-04-08 RX ORDER — BETAMETHASONE DIPROPIONATE 0.5 MG/G
CREAM TOPICAL
COMMUNITY
Start: 2020-03-26 | End: 2020-11-11 | Stop reason: SDUPTHER

## 2020-04-08 NOTE — PROGRESS NOTES
"     [facility]  Consult Note     Date: 4/8/2020 / Time: 9:25 AM    Patient ID:   Name:             Jan Rivas   YOB: 1950  Age:                 69 y.o.  male   MRN:               [id number]      Thank you for requesting a sleep medicine consultation on Jan Rivas at the sleep center. He presents today with the chief complaints of FRANK and to establish as a new pt. Pt was diagnosed with FRANK on 11/27/18 the AHI was 87.6/hr and the best tolerated pressure was CPAP 10 cm. The AHI improved to 1.6/hr. He has been on CPAP 10 cm since then.The initial presenting symptoms were snoring, frequent awakening and occasional excessive daytime sleepiness. He is referred by Maggie Huitron for evaluation and treatment of sleep disorder breathing.     HISTORY OF PRESENT ILLNESS:       At night,  Jan Rivas goes to bed around 11:30 pm on weekdays and  on the weekends. He gets out of bed at 7:30 am on weekdays and on the weekends.  He  Averages about 8 hrs of sleep on a good night and 7 hrs on a bad night. Pt denies having any bad nights these days. He falls asleep within 5-10 minutes. He awakens 1 times during the night due to bathroom use. It takes him 2-5 min to fall back asleep.    As per suppose questioner,He is not aware of snoring/breathing pauses/gasping or choking in sleep since he has been on the CPAP.  He  denies any symptoms of restless legs syndrome such as an \"urge to move\"  He  legs in the evening or bedtime. He  denies any symptoms of narcolepsy such as sleep paralysis or cataplexy, or any symptoms to suggest parasomnias such as sleep walking or acting out of dreams. He  has not used any medications for the sleep problem.    Overall, he does finds his sleep refreshing. In terms of  excessive daytime sleepiness, he denies of sleepiness while reading or watching TV or while driving. [place] sleepiness scale score is 2/24.He does not take regular naps.       REVIEW OF SYSTEMS:     "   Constitutional: Denies fevers, Denies weight changes  Eyes: Denies changes in vision, no eye pain  Ears/Nose/Throat/Mouth: Denies nasal congestion or sore throat   Cardiovascular: Denies chest pain or palpitations   Respiratory: Denies shortness of breath , Denies cough  Gastrointestinal/Hepatic: Denies abdominal pain, nausea, vomiting, diarrhea, constipation or GI bleeding   Genitourinary: Denies bladder dysfunction, dysuria or frequency  Musculoskeletal/Rheum: Denies  joint pain and swelling   Skin/Breast: Denies rash  Neurological: Denies headache, confusion, memory loss or focal weakness/parasthesias  Psychiatric: denies mood disorder     Comprehensive review of systems form is reviewed with the patient and is attached in the EMR.     PMH:  has a past medical history of Apnea, sleep, CAD (coronary artery disease) (stenLCA 2011), Chickenpox, [place] measles, Heart attack (HCC), Hyperlipidemia, Hypertension, Mumps, Sleep apnea, Snoring, and Wears glasses.  MEDS:   Current Outpatient Medications:   •  Aug Betamethasone Dipropionate (DIPROLENE-AF) 0.05 % Cream, APPLY EVERY DAY TO TRUNK AND EXTREMITIES FOR 1 MONTH, Disp: , Rfl:   •  nitroglycerin (NITROSTAT) 0.4 MG SL Tab, Place 1 Tab under tongue as needed for Chest Pain., Disp: 25 Tab, Rfl: 0  •  triamcinolone acetonide (KENALOG) 0.1 % Ointment, Apply 2-4 g to affected area(s) 2 times a day., Disp: 1 Tube, Rfl: 0  •  celecoxib (CELEBREX) 200 MG Cap, Take 200 mg by mouth., Disp: , Rfl: 2  •  latanoprost (XALATAN) 0.005 % Solution, , Disp: , Rfl:   •  atorvastatin (LIPITOR) 40 MG Tab, Take 1 Tab by mouth 1/2 hour before dinner., Disp: 90 Tab, Rfl: 3  •  metoprolol (LOPRESSOR) 25 MG Tab, Take 1 Tab by mouth 2 times a day. TAKE 1 TABLET BY MOUTH TWICE A DAY, Disp: 180 Tab, Rfl: 3  •  losartan (COZAAR) 25 MG Tab, , Disp: , Rfl:   •  traZODone (DESYREL) 50 MG Tab, TAKE 1 TO 2 TABLETS BY MOUTH AT BEDTIME AS NEEDED, Disp: , Rfl: 7  •  aspirin (ASA) 81 MG CHEW chewable  tablet, Take 1 Tab by mouth every day., Disp: 100 Tab, Rfl: 11  •  LATANOPROST OP, by Ophthalmic route., Disp: , Rfl:   ALLERGIES: No Known Allergies  SURGHX:   Past Surgical History:   Procedure Laterality Date   • [facility] CARDIAC CATH  2/21/15    Cath at [facility].  Patent stent and no obstructive CAD.   • [facility] CARDIAC CATH  5/4/10    Taxus stent to LAD   • CYST EXCISION      posterior neck     SOCHX:  reports that he quit smoking about 9 years ago. His smoking use included cigarettes. He has a 21.00 pack-year smoking history. He has never used smokeless tobacco. He reports current alcohol use. He reports current drug use. Drug: Marijuana..   FH:   Family History   Problem Relation Age of Onset   • Heart Attack Mother    • Cancer Mother    • Cancer Father    • Cancer Sister            Physical Exam:  Vitals/ General Appearance:   Weight/BMI: Body mass index is 33.91 kg/m².  /74 (BP Location: Left arm, Patient Position: Sitting, BP Cuff Size: Adult)   Pulse (!) 52   Resp 14   Ht 1.829 m (6')   Wt 113.4 kg (250 lb)   SpO2 94%   Vitals:    04/08/20 0916   BP: 122/74   BP Location: Left arm   Patient Position: Sitting   BP Cuff Size: Adult   Pulse: (!) 52   Resp: 14   SpO2: 94%   Weight: 113.4 kg (250 lb)   Height: 1.829 m (6')       Pt. is alert and oriented to time, place and person. Cooperative and in no apparent distress.       -Head: Atraumatic, normocephalic.   -. Nose: No inferior turbinate hypertophy, no septal deviation-. Throat: Oropharynx appears crowded in that the palate is overhanging (Malam Augustina scale 4. Tonsils are not enlarged, uvula islarge  -. Neck: Supple. No thyromegaly  -. Chest: Trachea central  -. Lungs auscultation: B/L good air entry, vesicular breath sounds, no adventitious sounds  -. Heart auscultation: 1st and 2nd heart sounds normal, regular rhythm. No appreciable murmur.  - Extremities: no pedal edema.  - Skin: No rash  - NEUROLOGICAL EXAMINATION: On neurological exam, the  patient was alert and oriented x3. speech was clear and fluent without dysarthria.      INVESTIGATIONS:       ASSESSMENT AND PLAN     ASSESSMENT AND PLAN     1. Sleep Apnea .He  Is currently on CPAP 10 cm with FFM. 30 day compliance was downloaded which shows adequate compliance.     The pathophysiology of sleep anea and the increased risk of cardiovascular morbidity from untreated sleep apnea is discussed in detail with the patient.    He is urged to avoid supine sleep, weight gain and alcoholic beverages since all of these can worsen sleep apnea. He is cautioned against drowsy driving. If He feels sleepy while driving, He must pull over for a break/nap, rather than persist on the road, in the interest of He own safety and that of others on the road.   Plan   - Continue CPAP 10 cm with FFM    - supplies are refilled   - compliance download and previous SS was reviewed and discussed with the pt   - compliance was reinforced     2. Regarding treatment of other past medical problems and general health maintenance,  He is urged to follow up with PCP.        Answers for HPI/ROS submitted by the patient on 4/6/2020   Year of your last physical exam: July 31, 2019  Results of exam: Good  Occupation : Retired  Height: 6’  Current weight: 247  6 months ago: 247  At age 22: 155  What is the reason for your visit today?: For [facility] to provide future [facility] services and supplies.  Sleep Study was originally done in [place], [place] November 2018 and CPAC was started February 2019.  Checkup and new supplies are needed.  Name of person referring you to the Sleep Center: Lulu Huitron NP  Have you ever been hospitalized?: Yes  Reason, year, and hospital in which you were hospitalized:: 3x for heart issues: stent and additional workups  Have you ever had problems with anesthesia?: No  Have you experienced post-operative delirium?: No  Any complications with surgery?: No  What year did you receive your last Flu shot?: 2019  Have you had  any previous evaluations, examinations, or treatment for this sleep problem or any other problems with your sleep? If so, please describe the evaluation, treatment, and results.: Yes, sleep study in [place], [place] November 2018  Have you used any medications (prescribed or otherwise) to help your sleep problem? If yes, include name, amount, frequency, and the prescribing physician.: CPAC & Trazadone  If employed, what time do you usually start and end work?: N/A  Do you ever change work shifts? If yes, describe how often (never, infrequently, regularly).: N/A  What time do you usually go to bed and wake up on: Weekdays? Weekends?: 11:30 pm & 8 am  Do you have a regular bed partner?: Yes  How many minutes does it usually take to fall asleep at night after turning off the lights?: 10  What do you ordinarily do just prior to turning out the lights and attempting to go to sleep (e.g., reading, TV, baths, etc.)?: TV  On average, how many times do you wake up during the night?: 0-1  On average, how many times do you wake up to use the bathroom?: 0-1  Do you often wake up too early in the morning and are unable to return to sleep?: Rarely  On average, how many hours of sleep do you get per night?: 8  How do you usually awaken?  Alarm, spontaneously, or other?: Spontaneously  Is it difficult for you to awaken and get out of bed after sleeping? (Not at all, Sometimes, Very): Sometimes  Do you nap or return to bed after arising?: No  Are you bothered by sleepiness during the day?: Minor  Do you feel that you get too much sleep at night?: No  Do you feel that you get too little sleep at night?: No  Do you usually feel tired during the day? If so, what do you attribute this to?: Slightly  Do you find yourself falling asleep when you don't mean to? : No  Have you ever suddenly fallen?: No  Have you ever experienced sudden body weakness?: No  Have you ever experienced weakness or paralysis upon going to sleep?: No  Have you  "ever experienced weakness or paralysis upon awakening from sleep?: No  Have you ever experienced seeing things or hearing voices/noises: That weren't real? On going to sleep? During the night? On awakening from sleep? During the day?: No  Have you been told you snore while asleep? If so, does it disturb a bed partner (or someone in the same room), or someone in the next room?: Yes, prior to [facility]  Have you ever experienced doing something without being aware of the action? If yes, please describe.: No  Have you ever experienced upon lying in bed before sleep or on awakening from sleep: Restlessness of legs, \"nervous legs\", \"creeping crawling\" sensation of legs, or twitching of legs?: No  Have you ever been told that your arms or legs jerk or twitch while you are asleep? If yes, how many times per night does this occur?: No  Do you know, or have you ever been told that you do any of the following while sleeping: talk, walk, grit teeth, wet the bed, wake up screaming or seemingly afraid, have disturbing dreams, have unusual movements, wake up with headaches, (males) have erections? If yes to any of these, please indicate how many times per week, age started, last occurrence, treatment received.: No    "

## 2020-04-29 ENCOUNTER — OFFICE VISIT (OUTPATIENT)
Dept: MEDICAL GROUP | Facility: MEDICAL CENTER | Age: 70
End: 2020-04-29
Payer: MEDICARE

## 2020-04-29 VITALS
BODY MASS INDEX: 34.04 KG/M2 | RESPIRATION RATE: 20 BRPM | HEIGHT: 72 IN | WEIGHT: 251.32 LBS | SYSTOLIC BLOOD PRESSURE: 120 MMHG | DIASTOLIC BLOOD PRESSURE: 64 MMHG | TEMPERATURE: 98 F

## 2020-04-29 DIAGNOSIS — Z23 NEED FOR TDAP VACCINATION: ICD-10-CM

## 2020-04-29 DIAGNOSIS — L29.9 ITCHING OF EAR: ICD-10-CM

## 2020-04-29 PROCEDURE — 99213 OFFICE O/P EST LOW 20 MIN: CPT | Mod: 25 | Performed by: NURSE PRACTITIONER

## 2020-04-29 PROCEDURE — 90471 IMMUNIZATION ADMIN: CPT | Performed by: NURSE PRACTITIONER

## 2020-04-29 PROCEDURE — 90715 TDAP VACCINE 7 YRS/> IM: CPT | Performed by: NURSE PRACTITIONER

## 2020-04-29 RX ORDER — CIPROFLOXACIN/HYDROCORTISONE 0.2 %-1 %
3 SUSPENSION, DROPS(FINAL DOSAGE FORM)(ML) OTIC (EAR) 2 TIMES DAILY
Qty: 1 BOTTLE | Refills: 0 | Status: SHIPPED | OUTPATIENT
Start: 2020-04-29 | End: 2020-12-17

## 2020-04-29 NOTE — PROGRESS NOTES
Subjective:     Chief Complaint   Patient presents with   • Pain     pain in both ears     Jan Rivas is a 70 y.o. male established patient here for evaluation of itching and irritation in the bilateral ear canals.  This is been ongoing for approximately a month, he denies any associated symptoms including pain, nasal congestion, sneezing, itchy watery eyes, change in hearing, discharge from the ears.  He has tried a small amount of Vaseline in the ear canal, unsure if this is helped.  He states that this is annoying but not painful.    No problem-specific Assessment & Plan notes found for this encounter.       Current medicines (including changes today)  Current Outpatient Medications   Medication Sig Dispense Refill   • ciprofloxacin (CIPRO HC) 0.2-1 % Suspension Place 3 Drops in ear 2 times a day. Administer drops to both ears. 1 Bottle 0   • Aug Betamethasone Dipropionate (DIPROLENE-AF) 0.05 % Cream APPLY EVERY DAY TO TRUNK AND EXTREMITIES FOR 1 MONTH     • nitroglycerin (NITROSTAT) 0.4 MG SL Tab Place 1 Tab under tongue as needed for Chest Pain. 25 Tab 0   • triamcinolone acetonide (KENALOG) 0.1 % Ointment Apply 2-4 g to affected area(s) 2 times a day. 1 Tube 0   • celecoxib (CELEBREX) 200 MG Cap Take 200 mg by mouth.  2   • latanoprost (XALATAN) 0.005 % Solution      • atorvastatin (LIPITOR) 40 MG Tab Take 1 Tab by mouth 1/2 hour before dinner. 90 Tab 3   • metoprolol (LOPRESSOR) 25 MG Tab Take 1 Tab by mouth 2 times a day. TAKE 1 TABLET BY MOUTH TWICE A  Tab 3   • losartan (COZAAR) 25 MG Tab      • traZODone (DESYREL) 50 MG Tab TAKE 1 TO 2 TABLETS BY MOUTH AT BEDTIME AS NEEDED  7   • aspirin (ASA) 81 MG CHEW chewable tablet Take 1 Tab by mouth every day. 100 Tab 11   • LATANOPROST OP by Ophthalmic route.       No current facility-administered medications for this visit.      He  has a past medical history of Apnea, sleep, CAD (coronary artery disease) (stenLCA 2011), Chickenpox, [place]  measles, Heart attack (HCC), Hyperlipidemia, Hypertension, Mumps, Sleep apnea, Snoring, and Wears glasses.    ROS included above     Objective:     /64   Temp 36.7 °C (98 °F) (Temporal)   Resp 20   Ht 1.829 m (6')   Wt 114 kg (251 lb 5.2 oz)  Body mass index is 34.09 kg/m².     Physical Exam:  General: Alert, oriented in no acute distress.  Eye contact is good, speech is normal, affect calm  HEENT: Bilateral TMs are intact, gray and reflective with good landmarks.  Bilateral canals with mild erythema, scaling.  Ext: no edema, color normal, vascularity normal, temperature normal    Assessment and Plan:   The following treatment plan was discussed   1. Itching of ear   flaking, erythema, mild swelling in the ear canal.  We will start trial of:  ciprofloxacin (CIPRO HC) 0.2-1 % Suspension  He will let me know if this is still causing him difficulty   2. Need for Tdap vaccination   patient requests vaccine today, he understands that this will not be covered by insurance.  I have placed the below orders and discussed them with an approved delegating provider. The MA is performing the below orders under the direction of Dr. Claire    Tdap =>6yo IM       Followup: As needed         Please note that this dictation was created using voice recognition software. I have worked with consultants from the vendor as well as technical experts from [facility] to optimize the interface. I have made every reasonable attempt to correct obvious errors, but I expect that there are errors of grammar and possibly content that I did not discover before finalizing the note.

## 2020-06-22 DIAGNOSIS — I10 ESSENTIAL HYPERTENSION: ICD-10-CM

## 2020-06-22 DIAGNOSIS — E78.01 FAMILIAL HYPERCHOLESTEROLEMIA: ICD-10-CM

## 2020-06-22 DIAGNOSIS — I73.9 PVD (PERIPHERAL VASCULAR DISEASE) (HCC): ICD-10-CM

## 2020-06-22 DIAGNOSIS — Z95.5 STENTED CORONARY ARTERY: ICD-10-CM

## 2020-06-23 ENCOUNTER — APPOINTMENT (RX ONLY)
Dept: URBAN - METROPOLITAN AREA CLINIC 4 | Facility: CLINIC | Age: 70
Setting detail: DERMATOLOGY
End: 2020-06-23

## 2020-06-23 DIAGNOSIS — L30.9 DERMATITIS, UNSPECIFIED: ICD-10-CM

## 2020-06-23 PROCEDURE — 99202 OFFICE O/P NEW SF 15 MIN: CPT

## 2020-06-23 PROCEDURE — ? COUNSELING

## 2020-06-23 PROCEDURE — ? PRESCRIPTION

## 2020-06-23 PROCEDURE — ? MEDICATION COUNSELING

## 2020-06-23 PROCEDURE — ? ORDER TESTS

## 2020-06-23 PROCEDURE — ? ADDITIONAL NOTES

## 2020-06-23 RX ORDER — DOXYCYCLINE HYCLATE 100 MG/1
CAPSULE, GELATIN COATED ORAL
Qty: 20 | Refills: 0 | Status: ERX | COMMUNITY
Start: 2020-06-23

## 2020-06-23 RX ADMIN — DOXYCYCLINE HYCLATE: 100 CAPSULE, GELATIN COATED ORAL at 00:00

## 2020-06-23 ASSESSMENT — LOCATION SIMPLE DESCRIPTION DERM
LOCATION SIMPLE: LEFT FOREARM
LOCATION SIMPLE: RIGHT FOREARM
LOCATION SIMPLE: LEFT PRETIBIAL REGION

## 2020-06-23 ASSESSMENT — LOCATION DETAILED DESCRIPTION DERM
LOCATION DETAILED: LEFT DISTAL PRETIBIAL REGION
LOCATION DETAILED: LEFT PROXIMAL PRETIBIAL REGION
LOCATION DETAILED: LEFT PROXIMAL DORSAL FOREARM
LOCATION DETAILED: RIGHT PROXIMAL DORSAL FOREARM

## 2020-06-23 ASSESSMENT — LOCATION ZONE DERM
LOCATION ZONE: ARM
LOCATION ZONE: LEG

## 2020-06-23 NOTE — PROCEDURE: MEDICATION COUNSELING
Fluconazole Counseling:  Patient counseled regarding adverse effects of fluconazole including but not limited to headache, diarrhea, nausea, upset stomach, liver function test abnormalities, taste disturbance, and stomach pain.  There is a rare possibility of liver failure that can occur when taking fluconazole.  The patient understands that monitoring of LFTs and kidney function test may be required, especially at baseline. The patient verbalized understanding of the proper use and possible adverse effects of fluconazole.  All of the patient's questions and concerns were addressed.
Eucrisa Counseling: Patient may experience a mild burning sensation during topical application. Eucrisa is not approved in children less than 2 years of age.
Xeljanz Counseling: I discussed with the patient the risks of Xeljanz therapy including increased risk of infection, liver issues, headache, diarrhea, or cold symptoms. Live vaccines should be avoided. They were instructed to call if they have any problems.
Niacinamide Pregnancy And Lactation Text: These medications are considered safe during pregnancy.
Albendazole Counseling:  I discussed with the patient the risks of albendazole including but not limited to cytopenia, kidney damage, nausea/vomiting and severe allergy.  The patient understands that this medication is being used in an off-label manner.
Thalidomide Counseling: I discussed with the patient the risks of thalidomide including but not limited to birth defects, anxiety, weakness, chest pain, dizziness, cough and severe allergy.
Niacinamide Counseling: I recommended taking niacin or niacinamide, also know as vitamin B3, twice daily. Recent evidence suggests that taking vitamin B3 (500 mg twice daily) can reduce the risk of actinic keratoses and non-melanoma skin cancers. Side effects of vitamin B3 include flushing and headache.
Sski Pregnancy And Lactation Text: This medication is Pregnancy Category D and isn't considered safe during pregnancy. It is excreted in breast milk.
Tremfya Pregnancy And Lactation Text: The risk during pregnancy and breastfeeding is uncertain with this medication.
Ilumya Counseling: I discussed with the patient the risks of tildrakizumab including but not limited to immunosuppression, malignancy, posterior leukoencephalopathy syndrome, and serious infections.  The patient understands that monitoring is required including a PPD at baseline and must alert us or the primary physician if symptoms of infection or other concerning signs are noted.
Clindamycin Pregnancy And Lactation Text: This medication can be used in pregnancy if certain situations. Clindamycin is also present in breast milk.
Arava Pregnancy And Lactation Text: This medication is Pregnancy Category X and is absolutely contraindicated during pregnancy. It is unknown if it is excreted in breast milk.
Albendazole Pregnancy And Lactation Text: This medication is Pregnancy Category C and it isn't known if it is safe during pregnancy. It is also excreted in breast milk.
Eucrisa Pregnancy And Lactation Text: This medication has not been assigned a Pregnancy Risk Category but animal studies failed to show danger with the topical medication. It is unknown if the medication is excreted in breast milk.
Fluconazole Pregnancy And Lactation Text: This medication is Pregnancy Category C and it isn't know if it is safe during pregnancy. It is also excreted in breast milk.
Nsaids Counseling: NSAID Counseling: I discussed with the patient that NSAIDs should be taken with food. Prolonged use of NSAIDs can result in the development of stomach ulcers.  Patient advised to stop taking NSAIDs if abdominal pain occurs.  The patient verbalized understanding of the proper use and possible adverse effects of NSAIDs.  All of the patient's questions and concerns were addressed.
Xelallisonz Pregnancy And Lactation Text: This medication is Pregnancy Category D and is not considered safe during pregnancy.  The risk during breast feeding is also uncertain.
Doxycycline Pregnancy And Lactation Text: This medication is Pregnancy Category D and not consider safe during pregnancy. It is also excreted in breast milk but is considered safe for shorter treatment courses.
Doxycycline Counseling:  Patient counseled regarding possible photosensitivity and increased risk for sunburn.  Patient instructed to avoid sunlight, if possible.  When exposed to sunlight, patients should wear protective clothing, sunglasses, and sunscreen.  The patient was instructed to call the office immediately if the following severe adverse effects occur:  hearing changes, easy bruising/bleeding, severe headache, or vision changes.  The patient verbalized understanding of the proper use and possible adverse effects of doxycycline.  All of the patient's questions and concerns were addressed.
Topical Retinoid counseling:  Patient advised to apply a pea-sized amount only at bedtime and wait 30 minutes after washing their face before applying.  If too drying, patient may add a non-comedogenic moisturizer. The patient verbalized understanding of the proper use and possible adverse effects of retinoids.  All of the patient's questions and concerns were addressed.
Clofazimine Counseling:  I discussed with the patient the risks of clofazimine including but not limited to skin and eye pigmentation, liver damage, nausea/vomiting, gastrointestinal bleeding and allergy.
Acitretin Counseling:  I discussed with the patient the risks of acitretin including but not limited to hair loss, dry lips/skin/eyes, liver damage, hyperlipidemia, depression/suicidal ideation, photosensitivity.  Serious rare side effects can include but are not limited to pancreatitis, pseudotumor cerebri, bony changes, clot formation/stroke/heart attack.  Patient understands that alcohol is contraindicated since it can result in liver toxicity and significantly prolong the elimination of the drug by many years.
Tranexamic Acid Counseling:  Patient advised of the small risk of bleeding problems with tranexamic acid. They were also instructed to call if they developed any nausea, vomiting or diarrhea. All of the patient's questions and concerns were addressed.
Xolair Counseling:  Patient informed of potential adverse effects including but not limited to fever, muscle aches, rash and allergic reactions.  The patient verbalized understanding of the proper use and possible adverse effects of Xolair.  All of the patient's questions and concerns were addressed.
Infliximab Pregnancy And Lactation Text: This medication is Pregnancy Category B and is considered safe during pregnancy. It is unknown if this medication is excreted in breast milk.
Tazorac Counseling:  Patient advised that medication is irritating and drying.  Patient may need to apply sparingly and wash off after an hour before eventually leaving it on overnight.  The patient verbalized understanding of the proper use and possible adverse effects of tazorac.  All of the patient's questions and concerns were addressed.
Erythromycin Counseling:  I discussed with the patient the risks of erythromycin including but not limited to GI upset, allergic reaction, drug rash, diarrhea, increase in liver enzymes, and yeast infections.
Nsaids Pregnancy And Lactation Text: These medications are considered safe up to 30 weeks gestation. It is excreted in breast milk.
Topical Retinoid Pregnancy And Lactation Text: This medication is Pregnancy Category C. It is unknown if this medication is excreted in breast milk.
Acitretin Pregnancy And Lactation Text: This medication is Pregnancy Category X and should not be given to women who are pregnant or may become pregnant in the future. This medication is excreted in breast milk.
Clofazimine Pregnancy And Lactation Text: This medication is Pregnancy Category C and isn't considered safe during pregnancy. It is excreted in breast milk.
Infliximab Counseling:  I discussed with the patient the risks of infliximab including but not limited to myelosuppression, immunosuppression, autoimmune hepatitis, demyelinating diseases, lymphoma, and serious infections.  The patient understands that monitoring is required including a PPD at baseline and must alert us or the primary physician if symptoms of infection or other concerning signs are noted.
Griseofulvin Counseling:  I discussed with the patient the risks of griseofulvin including but not limited to photosensitivity, cytopenia, liver damage, nausea/vomiting and severe allergy.  The patient understands that this medication is best absorbed when taken with a fatty meal (e.g., ice cream or french fries).
Ivermectin Counseling:  Patient instructed to take medication on an empty stomach with a full glass of water.  Patient informed of potential adverse effects including but not limited to nausea, diarrhea, dizziness, itching, and swelling of the extremities or lymph nodes.  The patient verbalized understanding of the proper use and possible adverse effects of ivermectin.  All of the patient's questions and concerns were addressed.
Hydroquinone Counseling:  Patient advised that medication may result in skin irritation, lightening (hypopigmentation), dryness, and burning.  In the event of skin irritation, the patient was advised to reduce the amount of the drug applied or use it less frequently.  Rarely, spots that are treated with hydroquinone can become darker (pseudoochronosis).  Should this occur, patient instructed to stop medication and call the office. The patient verbalized understanding of the proper use and possible adverse effects of hydroquinone.  All of the patient's questions and concerns were addressed.
Erythromycin Pregnancy And Lactation Text: This medication is Pregnancy Category B and is considered safe during pregnancy. It is also excreted in breast milk.
Detail Level: Zone
Odomzo Counseling- I discussed with the patient the risks of Odomzo including but not limited to nausea, vomiting, diarrhea, constipation, weight loss, changes in the sense of taste, decreased appetite, muscle spasms, and hair loss.  The patient verbalized understanding of the proper use and possible adverse effects of Odomzo.  All of the patient's questions and concerns were addressed.
Tranexamic Acid Pregnancy And Lactation Text: It is unknown if this medication is safe during pregnancy or breast feeding.
Xolair Pregnancy And Lactation Text: This medication is Pregnancy Category B and is considered safe during pregnancy. This medication is excreted in breast milk.
Rituxan Counseling:  I discussed with the patient the risks of Rituxan infusions. Side effects can include infusion reactions, severe drug rashes including mucocutaneous reactions, reactivation of latent hepatitis and other infections and rarely progressive multifocal leukoencephalopathy.  All of the patient's questions and concerns were addressed.
Griseofulvin Pregnancy And Lactation Text: This medication is Pregnancy Category X and is known to cause serious birth defects. It is unknown if this medication is excreted in breast milk but breast feeding should be avoided.
Bexarotene Counseling:  I discussed with the patient the risks of bexarotene including but not limited to hair loss, dry lips/skin/eyes, liver abnormalities, hyperlipidemia, pancreatitis, depression/suicidal ideation, photosensitivity, drug rash/allergic reactions, hypothyroidism, anemia, leukopenia, infection, cataracts, and teratogenicity.  Patient understands that they will need regular blood tests to check lipid profile, liver function tests, white blood cell count, thyroid function tests and pregnancy test if applicable.
Colchicine Counseling:  Patient counseled regarding adverse effects including but not limited to stomach upset (nausea, vomiting, stomach pain, or diarrhea).  Patient instructed to limit alcohol consumption while taking this medication.  Colchicine may reduce blood counts especially with prolonged use.  The patient understands that monitoring of kidney function and blood counts may be required, especially at baseline. The patient verbalized understanding of the proper use and possible adverse effects of colchicine.  All of the patient's questions and concerns were addressed.
Valtrex Counseling: I discussed with the patient the risks of valacyclovir including but not limited to kidney damage, nausea, vomiting and severe allergy.  The patient understands that if the infection seems to be worsening or is not improving, they are to call.
Rituxan Pregnancy And Lactation Text: This medication is Pregnancy Category C and it isn't know if it is safe during pregnancy. It is unknown if this medication is excreted in breast milk but similar antibodies are known to be excreted.
Topical Clindamycin Counseling: Patient counseled that this medication may cause skin irritation or allergic reactions.  In the event of skin irritation, the patient was advised to reduce the amount of the drug applied or use it less frequently.   The patient verbalized understanding of the proper use and possible adverse effects of clindamycin.  All of the patient's questions and concerns were addressed.
Dapsone Counseling: I discussed with the patient the risks of dapsone including but not limited to hemolytic anemia, agranulocytosis, rashes, methemoglobinemia, kidney failure, peripheral neuropathy, headaches, GI upset, and liver toxicity.  Patients who start dapsone require monitoring including baseline LFTs and weekly CBCs for the first month, then every month thereafter.  The patient verbalized understanding of the proper use and possible adverse effects of dapsone.  All of the patient's questions and concerns were addressed.
Imiquimod Counseling:  I discussed with the patient the risks of imiquimod including but not limited to erythema, scaling, itching, weeping, crusting, and pain.  Patient understands that the inflammatory response to imiquimod is variable from person to person and was educated regarded proper titration schedule.  If flu-like symptoms develop, patient knows to discontinue the medication and contact us.
Tazorac Pregnancy And Lactation Text: This medication is not safe during pregnancy. It is unknown if this medication is excreted in breast milk.
Itraconazole Counseling:  I discussed with the patient the risks of itraconazole including but not limited to liver damage, nausea/vomiting, neuropathy, and severe allergy.  The patient understands that this medication is best absorbed when taken with acidic beverages such as non-diet cola or ginger ale.  The patient understands that monitoring is required including baseline LFTs and repeat LFTs at intervals.  The patient understands that they are to contact us or the primary physician if concerning signs are noted.
Bexarotene Pregnancy And Lactation Text: This medication is Pregnancy Category X and should not be given to women who are pregnant or may become pregnant. This medication should not be used if you are breast feeding.
Metronidazole Counseling:  I discussed with the patient the risks of metronidazole including but not limited to seizures, nausea/vomiting, a metallic taste in the mouth, nausea/vomiting and severe allergy.
Topical Clindamycin Pregnancy And Lactation Text: This medication is Pregnancy Category B and is considered safe during pregnancy. It is unknown if it is excreted in breast milk.
Metronidazole Pregnancy And Lactation Text: This medication is Pregnancy Category B and considered safe during pregnancy.  It is also excreted in breast milk.
Isotretinoin Pregnancy And Lactation Text: This medication is Pregnancy Category X and is considered extremely dangerous during pregnancy. It is unknown if it is excreted in breast milk.
Dapsone Pregnancy And Lactation Text: This medication is Pregnancy Category C and is not considered safe during pregnancy or breast feeding.
Siliq Counseling:  I discussed with the patient the risks of Siliq including but not limited to new or worsening depression, suicidal thoughts and behavior, immunosuppression, malignancy, posterior leukoencephalopathy syndrome, and serious infections.  The patient understands that monitoring is required including a PPD at baseline and must alert us or the primary physician if symptoms of infection or other concerning signs are noted. There is also a special program designed to monitor depression which is required with Siliq.
Include Pregnancy/Lactation Warning?: No
Isotretinoin Counseling: Patient should get monthly blood tests, not donate blood, not drive at night if vision affected, not share medication, and not undergo elective surgery for 6 months after tx completed. Side effects reviewed, pt to contact office should one occur.
Azathioprine Counseling:  I discussed with the patient the risks of azathioprine including but not limited to myelosuppression, immunosuppression, hepatotoxicity, lymphoma, and infections.  The patient understands that monitoring is required including baseline LFTs, Creatinine, possible TPMP genotyping and weekly CBCs for the first month and then every 2 weeks thereafter.  The patient verbalized understanding of the proper use and possible adverse effects of azathioprine.  All of the patient's questions and concerns were addressed.
Valtrex Pregnancy And Lactation Text: this medication is Pregnancy Category B and is considered safe during pregnancy. This medication is not directly found in breast milk but it's metabolite acyclovir is present.
Otezla Counseling: The side effects of Otezla were discussed with the patient, including but not limited to worsening or new depression, weight loss, diarrhea, nausea, upper respiratory tract infection, and headache. Patient instructed to call the office should any adverse effect occur.  The patient verbalized understanding of the proper use and possible adverse effects of Otezla.  All the patient's questions and concerns were addressed.
Azathioprine Pregnancy And Lactation Text: This medication is Pregnancy Category D and isn't considered safe during pregnancy. It is unknown if this medication is excreted in breast milk.
Benzoyl Peroxide Pregnancy And Lactation Text: This medication is Pregnancy Category C. It is unknown if benzoyl peroxide is excreted in breast milk.
Topical Sulfur Applications Counseling: Topical Sulfur Counseling: Patient counseled that this medication may cause skin irritation or allergic reactions.  In the event of skin irritation, the patient was advised to reduce the amount of the drug applied or use it less frequently.   The patient verbalized understanding of the proper use and possible adverse effects of topical sulfur application.  All of the patient's questions and concerns were addressed.
Otezla Pregnancy And Lactation Text: This medication is Pregnancy Category C and it isn't known if it is safe during pregnancy. It is unknown if it is excreted in breast milk.
Erivedge Counseling- I discussed with the patient the risks of Erivedge including but not limited to nausea, vomiting, diarrhea, constipation, weight loss, changes in the sense of taste, decreased appetite, muscle spasms, and hair loss.  The patient verbalized understanding of the proper use and possible adverse effects of Erivedge.  All of the patient's questions and concerns were addressed.
Ketoconazole Pregnancy And Lactation Text: This medication is Pregnancy Category C and it isn't know if it is safe during pregnancy. It is also excreted in breast milk and breast feeding isn't recommended.
Minoxidil Counseling: Minoxidil is a topical medication which can increase blood flow where it is applied. It is uncertain how this medication increases hair growth. Side effects are uncommon and include stinging and allergic reactions.
Ketoconazole Counseling:   Patient counseled regarding improving absorption with orange juice.  Adverse effects include but are not limited to breast enlargement, headache, diarrhea, nausea, upset stomach, liver function test abnormalities, taste disturbance, and stomach pain.  There is a rare possibility of liver failure that can occur when taking ketoconazole. The patient understands that monitoring of LFTs may be required, especially at baseline. The patient verbalized understanding of the proper use and possible adverse effects of ketoconazole.  All of the patient's questions and concerns were addressed.
Minocycline Counseling: Patient advised regarding possible photosensitivity and discoloration of the teeth, skin, lips, tongue and gums.  Patient instructed to avoid sunlight, if possible.  When exposed to sunlight, patients should wear protective clothing, sunglasses, and sunscreen.  The patient was instructed to call the office immediately if the following severe adverse effects occur:  hearing changes, easy bruising/bleeding, severe headache, or vision changes.  The patient verbalized understanding of the proper use and possible adverse effects of minocycline.  All of the patient's questions and concerns were addressed.
Oxybutynin Counseling:  I discussed with the patient the risks of oxybutynin including but not limited to skin rash, drowsiness, dry mouth, difficulty urinating, and blurred vision.
Cellcept Counseling:  I discussed with the patient the risks of mycophenolate mofetil including but not limited to infection/immunosuppression, GI upset, hypokalemia, hypercholesterolemia, bone marrow suppression, lymphoproliferative disorders, malignancy, GI ulceration/bleed/perforation, colitis, interstitial lung disease, kidney failure, progressive multifocal leukoencephalopathy, and birth defects.  The patient understands that monitoring is required including a baseline creatinine and regular CBC testing. In addition, patient must alert us immediately if symptoms of infection or other concerning signs are noted.
Mirvaso Counseling: Mirvaso is a topical medication which can decrease superficial blood flow where applied. Side effects are uncommon and include stinging, redness and allergic reactions.
High Dose Vitamin A Pregnancy And Lactation Text: High dose vitamin A therapy is contraindicated during pregnancy and breast feeding.
Terbinafine Counseling: Patient counseling regarding adverse effects of terbinafine including but not limited to headache, diarrhea, rash, upset stomach, liver function test abnormalities, itching, taste/smell disturbance, nausea, abdominal pain, and flatulence.  There is a rare possibility of liver failure that can occur when taking terbinafine.  The patient understands that a baseline LFT and kidney function test may be required. The patient verbalized understanding of the proper use and possible adverse effects of terbinafine.  All of the patient's questions and concerns were addressed.
Cimzia Pregnancy And Lactation Text: This medication crosses the placenta but can be considered safe in certain situations. Cimzia may be excreted in breast milk.
Simponi Counseling:  I discussed with the patient the risks of golimumab including but not limited to myelosuppression, immunosuppression, autoimmune hepatitis, demyelinating diseases, lymphoma, and serious infections.  The patient understands that monitoring is required including a PPD at baseline and must alert us or the primary physician if symptoms of infection or other concerning signs are noted.
High Dose Vitamin A Counseling: Side effects reviewed, pt to contact office should one occur.
Cimzia Counseling:  I discussed with the patient the risks of Cimzia including but not limited to immunosuppression, allergic reactions and infections.  The patient understands that monitoring is required including a PPD at baseline and must alert us or the primary physician if symptoms of infection or other concerning signs are noted.
Carac Counseling:  I discussed with the patient the risks of Carac including but not limited to erythema, scaling, itching, weeping, crusting, and pain.
Minocycline Pregnancy And Lactation Text: This medication is Pregnancy Category D and not consider safe during pregnancy. It is also excreted in breast milk.
Topical Sulfur Applications Pregnancy And Lactation Text: This medication is Pregnancy Category C and has an unknown safety profile during pregnancy. It is unknown if this topical medication is excreted in breast milk.
Quinolones Counseling:  I discussed with the patient the risks of fluoroquinolones including but not limited to GI upset, allergic reaction, drug rash, diarrhea, dizziness, photosensitivity, yeast infections, liver function test abnormalities, tendonitis/tendon rupture.
Terbinafine Pregnancy And Lactation Text: This medication is Pregnancy Category B and is considered safe during pregnancy. It is also excreted in breast milk and breast feeding isn't recommended.
Finasteride Male Counseling: Finasteride Counseling:  I discussed with the patient the risks of use of finasteride including but not limited to decreased libido, decreased ejaculate volume, gynecomastia, and depression. Women should not handle medication.  All of the patient's questions and concerns were addressed.
Mirvaso Pregnancy And Lactation Text: This medication has not been assigned a Pregnancy Risk Category. It is unknown if the medication is excreted in breast milk.
Wartpeel Counseling:  I discussed with the patient the risks of Wartpeel including but not limited to erythema, scaling, itching, weeping, crusting, and pain.
Carac Pregnancy And Lactation Text: This medication is Pregnancy Category X and contraindicated in pregnancy and in women who may become pregnant. It is unknown if this medication is excreted in breast milk.
Propranolol Counseling:  I discussed with the patient the risks of propranolol including but not limited to low heart rate, low blood pressure, low blood sugar, restlessness and increased cold sensitivity. They should call the office if they experience any of these side effects.
Finasteride Pregnancy And Lactation Text: This medication is absolutely contraindicated during pregnancy. It is unknown if it is excreted in breast milk.
Benzoyl Peroxide Counseling: Patient counseled that medicine may cause skin irritation and bleach clothing.  In the event of skin irritation, the patient was advised to reduce the amount of the drug applied or use it less frequently.   The patient verbalized understanding of the proper use and possible adverse effects of benzoyl peroxide.  All of the patient's questions and concerns were addressed.
Azithromycin Counseling:  I discussed with the patient the risks of azithromycin including but not limited to GI upset, allergic reaction, drug rash, diarrhea, and yeast infections.
Skyrizi Counseling: I discussed with the patient the risks of risankizumab-rzaa including but not limited to immunosuppression, and serious infections.  The patient understands that monitoring is required including a PPD at baseline and must alert us or the primary physician if symptoms of infection or other concerning signs are noted.
Picato Counseling:  I discussed with the patient the risks of Picato including but not limited to erythema, scaling, itching, weeping, crusting, and pain.
Cyclophosphamide Pregnancy And Lactation Text: This medication is Pregnancy Category D and it isn't considered safe during pregnancy. This medication is excreted in breast milk.
Cyclophosphamide Counseling:  I discussed with the patient the risks of cyclophosphamide including but not limited to hair loss, hormonal abnormalities, decreased fertility, abdominal pain, diarrhea, nausea and vomiting, bone marrow suppression and infection. The patient understands that monitoring is required while taking this medication.
Cosentyx Counseling:  I discussed with the patient the risks of Cosentyx including but not limited to worsening of Crohn's disease, immunosuppression, allergic reactions and infections.  The patient understands that monitoring is required including a PPD at baseline and must alert us or the primary physician if symptoms of infection or other concerning signs are noted.
Calcipotriene Counseling:  I discussed with the patient the risks of calcipotriene including but not limited to erythema, scaling, itching, and irritation.
Cimetidine Counseling:  I discussed with the patient the risks of Cimetidine including but not limited to gynecomastia, headache, diarrhea, nausea, drowsiness, arrhythmias, pancreatitis, skin rashes, psychosis, bone marrow suppression and kidney toxicity.
Dupixent Counseling: I discussed with the patient the risks of dupilumab including but not limited to eye infection and irritation, cold sores, injection site reactions, worsening of asthma, allergic reactions and increased risk of parasitic infection.  Live vaccines should be avoided while taking dupilumab. Dupilumab will also interact with certain medications such as warfarin and cyclosporine. The patient understands that monitoring is required and they must alert us or the primary physician if symptoms of infection or other concerning signs are noted.
Rifampin Pregnancy And Lactation Text: This medication is Pregnancy Category C and it isn't know if it is safe during pregnancy. It is also excreted in breast milk and should not be used if you are breast feeding.
Azithromycin Pregnancy And Lactation Text: This medication is considered safe during pregnancy and is also secreted in breast milk.
5-Fu Counseling: 5-Fluorouracil Counseling:  I discussed with the patient the risks of 5-fluorouracil including but not limited to erythema, scaling, itching, weeping, crusting, and pain.
Calcipotriene Pregnancy And Lactation Text: This medication has not been proven safe during pregnancy. It is unknown if this medication is excreted in breast milk.
Rifampin Counseling: I discussed with the patient the risks of rifampin including but not limited to liver damage, kidney damage, red-orange body fluids, nausea/vomiting and severe allergy.
Zyclara Counseling:  I discussed with the patient the risks of imiquimod including but not limited to erythema, scaling, itching, weeping, crusting, and pain.  Patient understands that the inflammatory response to imiquimod is variable from person to person and was educated regarded proper titration schedule.  If flu-like symptoms develop, patient knows to discontinue the medication and contact us.
Gabapentin Counseling: I discussed with the patient the risks of gabapentin including but not limited to dizziness, somnolence, fatigue and ataxia.
Propranolol Pregnancy And Lactation Text: This medication is Pregnancy Category C and it isn't known if it is safe during pregnancy. It is excreted in breast milk.
Bactrim Counseling:  I discussed with the patient the risks of sulfa antibiotics including but not limited to GI upset, allergic reaction, drug rash, diarrhea, dizziness, photosensitivity, and yeast infections.  Rarely, more serious reactions can occur including but not limited to aplastic anemia, agranulocytosis, methemoglobinemia, blood dyscrasias, liver or kidney failure, lung infiltrates or desquamative/blistering drug rashes.
Dupixent Pregnancy And Lactation Text: This medication likely crosses the placenta but the risk for the fetus is uncertain. This medication is excreted in breast milk.
Stelara Counseling:  I discussed with the patient the risks of ustekinumab including but not limited to immunosuppression, malignancy, posterior leukoencephalopathy syndrome, and serious infections.  The patient understands that monitoring is required including a PPD at baseline and must alert us or the primary physician if symptoms of infection or other concerning signs are noted.
Cyclosporine Counseling:  I discussed with the patient the risks of cyclosporine including but not limited to hypertension, gingival hyperplasia,myelosuppression, immunosuppression, liver damage, kidney damage, neurotoxicity, lymphoma, and serious infections. The patient understands that monitoring is required including baseline blood pressure, CBC, CMP, lipid panel and uric acid, and then 1-2 times monthly CMP and blood pressure.
Birth Control Pills Counseling: Birth Control Pill Counseling: I discussed with the patient the potential side effects of OCPs including but not limited to increased risk of stroke, heart attack, thrombophlebitis, deep venous thrombosis, hepatic adenomas, breast changes, GI upset, headaches, and depression.  The patient verbalized understanding of the proper use and possible adverse effects of OCPs. All of the patient's questions and concerns were addressed.
Protopic Counseling: Patient may experience a mild burning sensation during topical application. Protopic is not approved in children less than 2 years of age. There have been case reports of hematologic and skin malignancies in patients using topical calcineurin inhibitors although causality is questionable.
Bactrim Pregnancy And Lactation Text: This medication is Pregnancy Category D and is known to cause fetal risk.  It is also excreted in breast milk.
Opioid Counseling: I discussed with the patient the potential side effects of opioids including but not limited to addiction, altered mental status, and depression. I stressed avoiding alcohol, benzodiazepines, muscle relaxants and sleep aids unless specifically okayed by a physician. The patient verbalized understanding of the proper use and possible adverse effects of opioids. All of the patient's questions and concerns were addressed. They were instructed to flush the remaining pills down the toilet if they did not need them for pain.
Enbrel Counseling:  I discussed with the patient the risks of etanercept including but not limited to myelosuppression, immunosuppression, autoimmune hepatitis, demyelinating diseases, lymphoma, and infections.  The patient understands that monitoring is required including a PPD at baseline and must alert us or the primary physician if symptoms of infection or other concerning signs are noted.
Methotrexate Counseling:  Patient counseled regarding adverse effects of methotrexate including but not limited to nausea, vomiting, abnormalities in liver function tests. Patients may develop mouth sores, rash, diarrhea, and abnormalities in blood counts. The patient understands that monitoring is required including LFT's and blood counts.  There is a rare possibility of scarring of the liver and lung problems that can occur when taking methotrexate. Persistent nausea, loss of appetite, pale stools, dark urine, cough, and shortness of breath should be reported immediately. Patient advised to discontinue methotrexate treatment at least three months before attempting to become pregnant.  I discussed the need for folate supplements while taking methotrexate.  These supplements can decrease side effects during methotrexate treatment. The patient verbalized understanding of the proper use and possible adverse effects of methotrexate.  All of the patient's questions and concerns were addressed.
Birth Control Pills Pregnancy And Lactation Text: This medication should be avoided if pregnant and for the first 30 days post-partum.
Cyclosporine Pregnancy And Lactation Text: This medication is Pregnancy Category C and it isn't know if it is safe during pregnancy. This medication is excreted in breast milk.
Sarecycline Counseling: Patient advised regarding possible photosensitivity and discoloration of the teeth, skin, lips, tongue and gums.  Patient instructed to avoid sunlight, if possible.  When exposed to sunlight, patients should wear protective clothing, sunglasses, and sunscreen.  The patient was instructed to call the office immediately if the following severe adverse effects occur:  hearing changes, easy bruising/bleeding, severe headache, or vision changes.  The patient verbalized understanding of the proper use and possible adverse effects of sarecycline.  All of the patient's questions and concerns were addressed.
Protopic Pregnancy And Lactation Text: This medication is Pregnancy Category C. It is unknown if this medication is excreted in breast milk when applied topically.
Doxepin Counseling:  Patient advised that the medication is sedating and not to drive a car after taking this medication. Patient informed of potential adverse effects including but not limited to dry mouth, urinary retention, and blurry vision.  The patient verbalized understanding of the proper use and possible adverse effects of doxepin.  All of the patient's questions and concerns were addressed.
Glycopyrrolate Counseling:  I discussed with the patient the risks of glycopyrrolate including but not limited to skin rash, drowsiness, dry mouth, difficulty urinating, and blurred vision.
Rhofade Counseling: Rhofade is a topical medication which can decrease superficial blood flow where applied. Side effects are uncommon and include stinging, redness and allergic reactions.
Methotrexate Pregnancy And Lactation Text: This medication is Pregnancy Category X and is known to cause fetal harm. This medication is excreted in breast milk.
Drysol Pregnancy And Lactation Text: This medication is considered safe during pregnancy and breast feeding.
Tetracycline Counseling: Patient counseled regarding possible photosensitivity and increased risk for sunburn.  Patient instructed to avoid sunlight, if possible.  When exposed to sunlight, patients should wear protective clothing, sunglasses, and sunscreen.  The patient was instructed to call the office immediately if the following severe adverse effects occur:  hearing changes, easy bruising/bleeding, severe headache, or vision changes.  The patient verbalized understanding of the proper use and possible adverse effects of tetracycline.  All of the patient's questions and concerns were addressed. Patient understands to avoid pregnancy while on therapy due to potential birth defects.
Opioid Pregnancy And Lactation Text: These medications can lead to premature delivery and should be avoided during pregnancy. These medications are also present in breast milk in small amounts.
Drysol Counseling:  I discussed with the patient the risks of drysol/aluminum chloride including but not limited to skin rash, itching, irritation, burning.
Taltz Counseling: I discussed with the patient the risks of ixekizumab including but not limited to immunosuppression, serious infections, worsening of inflammatory bowel disease and drug reactions.  The patient understands that monitoring is required including a PPD at baseline and must alert us or the primary physician if symptoms of infection or other concerning signs are noted.
Glycopyrrolate Pregnancy And Lactation Text: This medication is Pregnancy Category B and is considered safe during pregnancy. It is unknown if it is excreted breast milk.
Doxepin Pregnancy And Lactation Text: This medication is Pregnancy Category C and it isn't known if it is safe during pregnancy. It is also excreted in breast milk and breast feeding isn't recommended.
Spironolactone Counseling: Patient advised regarding risks of diarrhea, abdominal pain, hyperkalemia, birth defects (for female patients), liver toxicity and renal toxicity. The patient may need blood work to monitor liver and kidney function and potassium levels while on therapy. The patient verbalized understanding of the proper use and possible adverse effects of spironolactone.  All of the patient's questions and concerns were addressed.
Cephalexin Counseling: I counseled the patient regarding use of cephalexin as an antibiotic for prophylactic and/or therapeutic purposes. Cephalexin (commonly prescribed under brand name Keflex) is a cephalosporin antibiotic which is active against numerous classes of bacteria, including most skin bacteria. Side effects may include nausea, diarrhea, gastrointestinal upset, rash, hives, yeast infections, and in rare cases, hepatitis, kidney disease, seizures, fever, confusion, neurologic symptoms, and others. Patients with severe allergies to penicillin medications are cautioned that there is about a 10% incidence of cross-reactivity with cephalosporins. When possible, patients with penicillin allergies should use alternatives to cephalosporins for antibiotic therapy.
Elidel Counseling: Patient may experience a mild burning sensation during topical application. Elidel is not approved in children less than 2 years of age. There have been case reports of hematologic and skin malignancies in patients using topical calcineurin inhibitors although causality is questionable.
Prednisone Counseling:  I discussed with the patient the risks of prolonged use of prednisone including but not limited to weight gain, insomnia, osteoporosis, mood changes, diabetes, susceptibility to infection, glaucoma and high blood pressure.  In cases where prednisone use is prolonged, patients should be monitored with blood pressure checks, serum glucose levels and an eye exam.  Additionally, the patient may need to be placed on GI prophylaxis, PCP prophylaxis, and calcium and vitamin D supplementation and/or a bisphosphonate.  The patient verbalized understanding of the proper use and the possible adverse effects of prednisone.  All of the patient's questions and concerns were addressed.
SSKI Counseling:  I discussed with the patient the risks of SSKI including but not limited to thyroid abnormalities, metallic taste, GI upset, fever, headache, acne, arthralgias, paraesthesias, lymphadenopathy, easy bleeding, arrhythmias, and allergic reaction.
Hydroxychloroquine Counseling:  I discussed with the patient that a baseline ophthalmologic exam is needed at the start of therapy and every year thereafter while on therapy. A CBC may also be warranted for monitoring.  The side effects of this medication were discussed with the patient, including but not limited to agranulocytosis, aplastic anemia, seizures, rashes, retinopathy, and liver toxicity. Patient instructed to call the office should any adverse effect occur.  The patient verbalized understanding of the proper use and possible adverse effects of Plaquenil.  All the patient's questions and concerns were addressed.
Spironolactone Pregnancy And Lactation Text: This medication can cause feminization of the male fetus and should be avoided during pregnancy. The active metabolite is also found in breast milk.
Cephalexin Pregnancy And Lactation Text: This medication is Pregnancy Category B and considered safe during pregnancy.  It is also excreted in breast milk but can be used safely for shorter doses.
Humira Counseling:  I discussed with the patient the risks of adalimumab including but not limited to myelosuppression, immunosuppression, autoimmune hepatitis, demyelinating diseases, lymphoma, and serious infections.  The patient understands that monitoring is required including a PPD at baseline and must alert us or the primary physician if symptoms of infection or other concerning signs are noted.
Hydroxyzine Counseling: Patient advised that the medication is sedating and not to drive a car after taking this medication.  Patient informed of potential adverse effects including but not limited to dry mouth, urinary retention, and blurry vision.  The patient verbalized understanding of the proper use and possible adverse effects of hydroxyzine.  All of the patient's questions and concerns were addressed.
Arava Counseling:  Patient counseled regarding adverse effects of Arava including but not limited to nausea, vomiting, abnormalities in liver function tests. Patients may develop mouth sores, rash, diarrhea, and abnormalities in blood counts. The patient understands that monitoring is required including LFTs and blood counts.  There is a rare possibility of scarring of the liver and lung problems that can occur when taking methotrexate. Persistent nausea, loss of appetite, pale stools, dark urine, cough, and shortness of breath should be reported immediately. Patient advised to discontinue Arava treatment and consult with a physician prior to attempting conception. The patient will have to undergo a treatment to eliminate Arava from the body prior to conception.
Solaraze Pregnancy And Lactation Text: This medication is Pregnancy Category B and is considered safe. There is some data to suggest avoiding during the third trimester. It is unknown if this medication is excreted in breast milk.
Hydroxyzine Pregnancy And Lactation Text: This medication is not safe during pregnancy and should not be taken. It is also excreted in breast milk and breast feeding isn't recommended.
Clindamycin Counseling: I counseled the patient regarding use of clindamycin as an antibiotic for prophylactic and/or therapeutic purposes. Clindamycin is active against numerous classes of bacteria, including skin bacteria. Side effects may include nausea, diarrhea, gastrointestinal upset, rash, hives, yeast infections, and in rare cases, colitis.
Solaraze Counseling:  I discussed with the patient the risks of Solaraze including but not limited to erythema, scaling, itching, weeping, crusting, and pain.
Hydroxychloroquine Pregnancy And Lactation Text: This medication has been shown to cause fetal harm but it isn't assigned a Pregnancy Risk Category. There are small amounts excreted in breast milk.
Tremfya Counseling: I discussed with the patient the risks of guselkumab including but not limited to immunosuppression, serious infections, worsening of inflammatory bowel disease and drug reactions.  The patient understands that monitoring is required including a PPD at baseline and must alert us or the primary physician if symptoms of infection or other concerning signs are noted.

## 2020-06-23 NOTE — HPI: RASH
What Type Of Note Output Would You Prefer (Optional)?: Standard Output
How Severe Is Your Rash?: moderate
Is This A New Presentation, Or A Follow-Up?: Rash
Additional History: Patient states he has dealt with this rash for years that is itchy, inflamed and irritated; it forms bleeding scabs due to itchy. Patient was seeing dermatologist in [place], [place]. Biopsy done 10/ 2020 to check for Lupus which resulted negative. Re-biopsy was done 2/2020 and resulted negative to Lupus again. Patient was seen 3/2020 by dermatologist who prescribed Betamethasone once daily on the arms, back, shoulders and neck. Patient was seen 6/2020 again by the same dermatologist who prescribed TAC for face and neck. Patient has had minor improvement. Copies of records from last dermatologist given to us.

## 2020-06-23 NOTE — PROCEDURE: MIPS QUALITY
Detail Level: Detailed
Quality 111:Pneumonia Vaccination Status For Older Adults: Pneumococcal Vaccination Previously Received
Quality 226: Preventive Care And Screening: Tobacco Use: Screening And Cessation Intervention: Patient screened for tobacco use and is an ex/non-smoker
Quality 110: Preventive Care And Screening: Influenza Immunization: Influenza Immunization Administered during Influenza season
Quality 130: Documentation Of Current Medications In The Medical Record: Current Medications Documented

## 2020-06-23 NOTE — PROCEDURE: ORDER TESTS
Bill For Surgical Tray: no
Expected Date Of Service: 06/23/2020
Performing Laboratory: [id number]
Billing Type: Third-Party Bill

## 2020-06-23 NOTE — PROCEDURE: ADDITIONAL NOTES
Detail Level: Simple
Additional Notes: Biopsy for Lupus tested negative from last dermatologist. \\nWill prescribe Doxycycline and send rx to pharmacy. \\nExplained in detail how to take oral antibiotic x10 days. \\nContinue using Betamethasone on the affected areas on the legs and arms. \\nRecommended using anti-histamine such as Zyrtec daily. \\nWill f/u in 8 weeks.

## 2020-06-24 RX ORDER — ATORVASTATIN CALCIUM 40 MG/1
40 TABLET, FILM COATED ORAL
Qty: 90 TAB | Refills: 0 | Status: SHIPPED | OUTPATIENT
Start: 2020-06-24 | End: 2020-09-18

## 2020-06-26 ENCOUNTER — OFFICE VISIT (OUTPATIENT)
Dept: CARDIOLOGY | Facility: MEDICAL CENTER | Age: 70
End: 2020-06-26
Payer: MEDICARE

## 2020-06-26 VITALS
OXYGEN SATURATION: 96 % | BODY MASS INDEX: 33.51 KG/M2 | HEIGHT: 72 IN | SYSTOLIC BLOOD PRESSURE: 124 MMHG | HEART RATE: 68 BPM | DIASTOLIC BLOOD PRESSURE: 68 MMHG | WEIGHT: 247.4 LBS

## 2020-06-26 DIAGNOSIS — Z79.899 HIGH RISK MEDICATION USE: ICD-10-CM

## 2020-06-26 DIAGNOSIS — I25.10 CORONARY ARTERY DISEASE INVOLVING NATIVE CORONARY ARTERY OF NATIVE HEART WITHOUT ANGINA PECTORIS: ICD-10-CM

## 2020-06-26 DIAGNOSIS — R73.09 ELEVATED HEMOGLOBIN A1C: ICD-10-CM

## 2020-06-26 DIAGNOSIS — E78.00 PURE HYPERCHOLESTEROLEMIA: ICD-10-CM

## 2020-06-26 DIAGNOSIS — I65.29 STENOSIS OF CAROTID ARTERY, UNSPECIFIED LATERALITY: ICD-10-CM

## 2020-06-26 DIAGNOSIS — I73.9 PVD (PERIPHERAL VASCULAR DISEASE) (HCC): ICD-10-CM

## 2020-06-26 DIAGNOSIS — I10 ESSENTIAL HYPERTENSION: ICD-10-CM

## 2020-06-26 DIAGNOSIS — G47.33 OSA ON CPAP: ICD-10-CM

## 2020-06-26 DIAGNOSIS — Z95.5 STENTED CORONARY ARTERY: ICD-10-CM

## 2020-06-26 PROCEDURE — 99214 OFFICE O/P EST MOD 30 MIN: CPT | Performed by: INTERNAL MEDICINE

## 2020-06-26 ASSESSMENT — ENCOUNTER SYMPTOMS
COUGH: 0
CHILLS: 0
SORE THROAT: 0
WHEEZING: 0
EYES NEGATIVE: 1
RESPIRATORY NEGATIVE: 1
SPUTUM PRODUCTION: 0
CONSTITUTIONAL NEGATIVE: 1
BRUISES/BLEEDS EASILY: 0
PALPITATIONS: 0
CARDIOVASCULAR NEGATIVE: 1
STRIDOR: 0
GASTROINTESTINAL NEGATIVE: 1
PND: 0
DIZZINESS: 0
ORTHOPNEA: 0
NEUROLOGICAL NEGATIVE: 1
WEAKNESS: 0
LOSS OF CONSCIOUSNESS: 0
SHORTNESS OF BREATH: 0
MUSCULOSKELETAL NEGATIVE: 1
CLAUDICATION: 0
FEVER: 0
HEMOPTYSIS: 0

## 2020-06-26 NOTE — PROGRESS NOTES
Chief Complaint   Patient presents with   • Follow-Up     1 Year Follow Up HTN, Stended Coronary Artery       Subjective:   New Rivas is a 70 y.o. male who presents today as a follow-up for his carotid stenosis CAD hypertension hyperlipidemia and elevated A1c.  Since he was last seen he continues to do well.  He said no chest pain palpitations or PND.  He is trying to sell his house.  His last LDL was at goal.    Past Medical History:   Diagnosis Date   • Apnea, sleep    • CAD (coronary artery disease) stenLCA 2011   • Chickenpox    • Japanese measles    • Heart attack (HCC)    • Hyperlipidemia    • Hypertension    • Mumps    • Sleep apnea    • Snoring    • Wears glasses      Past Surgical History:   Procedure Laterality Date   • ZZZ CARDIAC CATH  2/21/15    Cath at [facility].  Patent stent and no obstructive CAD.   • [facility]ZZ CARDIAC CATH  5/4/10    Taxus stent to LAD   • CYST EXCISION      posterior neck     Family History   Problem Relation Age of Onset   • Heart Attack Mother    • Cancer Mother    • Cancer Father    • Cancer Sister      Social History     Socioeconomic History   • Marital status:      Spouse name: Not on file   • Number of children: Not on file   • Years of education: Not on file   • Highest education level: Not on file   Occupational History   • Not on file   Social Needs   • Financial resource strain: Not on file   • Food insecurity     Worry: Not on file     Inability: Not on file   • Transportation needs     Medical: Not on file     Non-medical: Not on file   Tobacco Use   • Smoking status: Former Smoker     Packs/day: 0.50     Years: 42.00     Pack years: 21.00     Types: Cigarettes     Last attempt to quit: 5/4/2010     Years since quitting: 10.1   • Smokeless tobacco: Never Used   Substance and Sexual Activity   • Alcohol use: Yes     Comment: evry day 2-3 drinks per day   • Drug use: Yes     Types: Marijuana   • Sexual activity: Not on file   Lifestyle   • Physical activity      Days per week: Not on file     Minutes per session: Not on file   • Stress: Not on file   Relationships   • Social connections     Talks on phone: Not on file     Gets together: Not on file     Attends Confucianism service: Not on file     Active member of club or organization: Not on file     Attends meetings of clubs or organizations: Not on file     Relationship status: Not on file   • Intimate partner violence     Fear of current or ex partner: Not on file     Emotionally abused: Not on file     Physically abused: Not on file     Forced sexual activity: Not on file   Other Topics Concern   • Not on file   Social History Narrative   • Not on file     No Known Allergies  Outpatient Encounter Medications as of 6/26/2020   Medication Sig Dispense Refill   • atorvastatin (LIPITOR) 40 MG Tab TAKE 1 TAB BY MOUTH 1/2 HOUR BEFORE DINNER. 90 Tab 0   • Aug Betamethasone Dipropionate (DIPROLENE-AF) 0.05 % Cream APPLY EVERY DAY TO TRUNK AND EXTREMITIES FOR 1 MONTH     • nitroglycerin (NITROSTAT) 0.4 MG SL Tab Place 1 Tab under tongue as needed for Chest Pain. 25 Tab 0   • latanoprost (XALATAN) 0.005 % Solution      • metoprolol (LOPRESSOR) 25 MG Tab Take 1 Tab by mouth 2 times a day. TAKE 1 TABLET BY MOUTH TWICE A  Tab 3   • losartan (COZAAR) 25 MG Tab      • traZODone (DESYREL) 50 MG Tab TAKE 1 TO 2 TABLETS BY MOUTH AT BEDTIME AS NEEDED  7   • aspirin (ASA) 81 MG CHEW chewable tablet Take 1 Tab by mouth every day. 100 Tab 11   • LATANOPROST OP by Ophthalmic route.     • ciprofloxacin (CIPRO HC) 0.2-1 % Suspension Place 3 Drops in ear 2 times a day. Administer drops to both ears. 1 Bottle 0   • triamcinolone acetonide (KENALOG) 0.1 % Ointment Apply 2-4 g to affected area(s) 2 times a day. (Patient not taking: Reported on 6/26/2020) 1 Tube 0   • celecoxib (CELEBREX) 200 MG Cap Take 200 mg by mouth.  2     No facility-administered encounter medications on file as of 6/26/2020.      Review of Systems   Constitutional:  Negative.  Negative for chills, fever and malaise/fatigue.   HENT: Negative.  Negative for sore throat.    Eyes: Negative.    Respiratory: Negative.  Negative for cough, hemoptysis, sputum production, shortness of breath, wheezing and stridor.    Cardiovascular: Negative.  Negative for chest pain, palpitations, orthopnea, claudication, leg swelling and PND.   Gastrointestinal: Negative.    Genitourinary: Negative.    Musculoskeletal: Negative.    Skin: Negative.    Neurological: Negative.  Negative for dizziness, loss of consciousness and weakness.   Endo/Heme/Allergies: Negative.  Does not bruise/bleed easily.   All other systems reviewed and are negative.       Objective:   /68 (BP Location: Left arm, Patient Position: Sitting, BP Cuff Size: Adult)   Pulse 68   Ht 1.829 m (6')   Wt 112.2 kg (247 lb 6.4 oz)   SpO2 96%   BMI 33.55 kg/m²     Physical Exam   Constitutional: He appears well-developed and well-nourished. No distress.   HENT:   Head: Normocephalic and atraumatic.   Right Ear: External ear normal.   Left Ear: External ear normal.   Nose: Nose normal.   Mouth/Throat: No oropharyngeal exudate.   Eyes: Pupils are equal, round, and reactive to light. Conjunctivae and EOM are normal. Right eye exhibits no discharge. Left eye exhibits no discharge. No scleral icterus.   Neck: Neck supple. No JVD present.   Cardiovascular: Normal rate, regular rhythm and intact distal pulses. Exam reveals no gallop and no friction rub.   No murmur heard.  Pulmonary/Chest: Effort normal. No stridor. No respiratory distress. He has no wheezes. He has no rales. He exhibits no tenderness.   Abdominal: Soft. He exhibits no distension. There is no guarding.   Musculoskeletal: Normal range of motion.         General: No tenderness, deformity or edema.   Neurological: He is alert. He has normal reflexes. He displays normal reflexes. No cranial nerve deficit. He exhibits normal muscle tone. Coordination normal.   Skin: Skin is  warm and dry. No rash noted. He is not diaphoretic. No erythema. No pallor.   Psychiatric: He has a normal mood and affect. His behavior is normal. Judgment and thought content normal.   Nursing note and vitals reviewed.      Assessment:     1. Elevated hemoglobin A1c     2. High risk medication use     3. Essential hypertension     4. FRANK on CPAP     5. Pure hypercholesterolemia     6. PVD (peripheral vascular disease) (HCC)     7. Stented coronary artery     8. Stenosis of carotid artery, unspecified laterality     9. Coronary artery disease involving native coronary artery of native heart without angina pectoris         Medical Decision Making:  Today's Assessment / Status / Plan:     62-year-old male with hypertension CAD hyperlipidemia and hypercholesteremia.  At this point he is doing well.  I refilled his medications recently.  We will make any changes.  I will see him back in 1 year.

## 2020-07-20 ENCOUNTER — APPOINTMENT (RX ONLY)
Dept: URBAN - METROPOLITAN AREA CLINIC 4 | Facility: CLINIC | Age: 70
Setting detail: DERMATOLOGY
End: 2020-07-20

## 2020-07-20 DIAGNOSIS — L30.9 DERMATITIS, UNSPECIFIED: ICD-10-CM

## 2020-07-20 PROCEDURE — ? ADDITIONAL NOTES

## 2020-07-20 PROCEDURE — ? PRESCRIPTION

## 2020-07-20 PROCEDURE — 99214 OFFICE O/P EST MOD 30 MIN: CPT

## 2020-07-20 PROCEDURE — ? COUNSELING

## 2020-07-20 PROCEDURE — ? MEDICATION COUNSELING

## 2020-07-20 RX ORDER — MUPIROCIN 20 MG/G
1 OINTMENT TOPICAL QD
Qty: 1 | Refills: 1 | Status: ERX | COMMUNITY
Start: 2020-07-20

## 2020-07-20 RX ORDER — SULFAMETHOXAZOLE AND TRIMETHOPRIM 800; 160 MG/1; MG/1
1 TABLET ORAL BID
Qty: 20 | Refills: 0 | Status: ERX | COMMUNITY
Start: 2020-07-20

## 2020-07-20 RX ADMIN — SULFAMETHOXAZOLE AND TRIMETHOPRIM 1: 800; 160 TABLET ORAL at 00:00

## 2020-07-20 RX ADMIN — MUPIROCIN 1: 20 OINTMENT TOPICAL at 00:00

## 2020-07-20 ASSESSMENT — LOCATION SIMPLE DESCRIPTION DERM
LOCATION SIMPLE: LEFT FOREARM
LOCATION SIMPLE: LEFT CHEEK
LOCATION SIMPLE: LEFT ANTERIOR NECK
LOCATION SIMPLE: RIGHT UPPER BACK
LOCATION SIMPLE: RIGHT FOREARM
LOCATION SIMPLE: LEFT PRETIBIAL REGION

## 2020-07-20 ASSESSMENT — LOCATION DETAILED DESCRIPTION DERM
LOCATION DETAILED: LEFT SUPERIOR ANTERIOR NECK
LOCATION DETAILED: LEFT PROXIMAL PRETIBIAL REGION
LOCATION DETAILED: RIGHT PROXIMAL DORSAL FOREARM
LOCATION DETAILED: RIGHT SUPERIOR MEDIAL UPPER BACK
LOCATION DETAILED: LEFT DISTAL PRETIBIAL REGION
LOCATION DETAILED: LEFT INFERIOR CENTRAL MALAR CHEEK
LOCATION DETAILED: LEFT PROXIMAL DORSAL FOREARM

## 2020-07-20 ASSESSMENT — LOCATION ZONE DERM
LOCATION ZONE: LEG
LOCATION ZONE: NECK
LOCATION ZONE: ARM
LOCATION ZONE: FACE
LOCATION ZONE: TRUNK

## 2020-07-20 NOTE — PROCEDURE: ADDITIONAL NOTES
Additional Notes: Patient to continue the Betamethasone Ointment. \\nWill send Rx for Mupirocin Ointment for patient to use on open areas. \\nWill send Rx for Bactria DS, as culture showing Staphylococcus.\\nDiscussed treatment and risks in detail with patient.
Detail Level: Simple

## 2020-07-20 NOTE — PROCEDURE: MEDICATION COUNSELING
Dupixent Pregnancy And Lactation Text: This medication likely crosses the placenta but the risk for the fetus is uncertain. This medication is excreted in breast milk.
Stelara Pregnancy And Lactation Text: This medication is Pregnancy Category B and is considered safe during pregnancy. It is unknown if this medication is excreted in breast milk.
Opioid Pregnancy And Lactation Text: These medications can lead to premature delivery and should be avoided during pregnancy. These medications are also present in breast milk in small amounts.
Rifampin Pregnancy And Lactation Text: This medication is Pregnancy Category C and it isn't know if it is safe during pregnancy. It is also excreted in breast milk and should not be used if you are breast feeding.
Methotrexate Counseling:  Patient counseled regarding adverse effects of methotrexate including but not limited to nausea, vomiting, abnormalities in liver function tests. Patients may develop mouth sores, rash, diarrhea, and abnormalities in blood counts. The patient understands that monitoring is required including LFT's and blood counts.  There is a rare possibility of scarring of the liver and lung problems that can occur when taking methotrexate. Persistent nausea, loss of appetite, pale stools, dark urine, cough, and shortness of breath should be reported immediately. Patient advised to discontinue methotrexate treatment at least three months before attempting to become pregnant.  I discussed the need for folate supplements while taking methotrexate.  These supplements can decrease side effects during methotrexate treatment. The patient verbalized understanding of the proper use and possible adverse effects of methotrexate.  All of the patient's questions and concerns were addressed.
Carac Pregnancy And Lactation Text: This medication is Pregnancy Category X and contraindicated in pregnancy and in women who may become pregnant. It is unknown if this medication is excreted in breast milk.
Rhofade Counseling: Rhofade is a topical medication which can decrease superficial blood flow where applied. Side effects are uncommon and include stinging, redness and allergic reactions.
Methotrexate Pregnancy And Lactation Text: This medication is Pregnancy Category X and is known to cause fetal harm. This medication is excreted in breast milk.
Sski Pregnancy And Lactation Text: This medication is Pregnancy Category D and isn't considered safe during pregnancy. It is excreted in breast milk.
Calcipotriene Counseling:  I discussed with the patient the risks of calcipotriene including but not limited to erythema, scaling, itching, and irritation.
Hydroxychloroquine Pregnancy And Lactation Text: This medication has been shown to cause fetal harm but it isn't assigned a Pregnancy Risk Category. There are small amounts excreted in breast milk.
Cimetidine Pregnancy And Lactation Text: This medication is Pregnancy Category B and is considered safe during pregnancy. It is also excreted in breast milk and breast feeding isn't recommended.
Azithromycin Pregnancy And Lactation Text: This medication is considered safe during pregnancy and is also secreted in breast milk.
Rhofade Pregnancy And Lactation Text: This medication has not been assigned a Pregnancy Risk Category. It is unknown if the medication is excreted in breast milk.
Doxepin Counseling:  Patient advised that the medication is sedating and not to drive a car after taking this medication. Patient informed of potential adverse effects including but not limited to dry mouth, urinary retention, and blurry vision.  The patient verbalized understanding of the proper use and possible adverse effects of doxepin.  All of the patient's questions and concerns were addressed.
Enbrel Counseling:  I discussed with the patient the risks of etanercept including but not limited to myelosuppression, immunosuppression, autoimmune hepatitis, demyelinating diseases, lymphoma, and infections.  The patient understands that monitoring is required including a PPD at baseline and must alert us or the primary physician if symptoms of infection or other concerning signs are noted.
Thalidomide Counseling: I discussed with the patient the risks of thalidomide including but not limited to birth defects, anxiety, weakness, chest pain, dizziness, cough and severe allergy.
Taltz Counseling: I discussed with the patient the risks of ixekizumab including but not limited to immunosuppression, serious infections, worsening of inflammatory bowel disease and drug reactions.  The patient understands that monitoring is required including a PPD at baseline and must alert us or the primary physician if symptoms of infection or other concerning signs are noted.
Arava Counseling:  Patient counseled regarding adverse effects of Arava including but not limited to nausea, vomiting, abnormalities in liver function tests. Patients may develop mouth sores, rash, diarrhea, and abnormalities in blood counts. The patient understands that monitoring is required including LFTs and blood counts.  There is a rare possibility of scarring of the liver and lung problems that can occur when taking methotrexate. Persistent nausea, loss of appetite, pale stools, dark urine, cough, and shortness of breath should be reported immediately. Patient advised to discontinue Arava treatment and consult with a physician prior to attempting conception. The patient will have to undergo a treatment to eliminate Arava from the body prior to conception.
Calcipotriene Pregnancy And Lactation Text: This medication has not been proven safe during pregnancy. It is unknown if this medication is excreted in breast milk.
Niacinamide Counseling: I recommended taking niacin or niacinamide, also know as vitamin B3, twice daily. Recent evidence suggests that taking vitamin B3 (500 mg twice daily) can reduce the risk of actinic keratoses and non-melanoma skin cancers. Side effects of vitamin B3 include flushing and headache.
Bactrim Counseling:  I discussed with the patient the risks of sulfa antibiotics including but not limited to GI upset, allergic reaction, drug rash, diarrhea, dizziness, photosensitivity, and yeast infections.  Rarely, more serious reactions can occur including but not limited to aplastic anemia, agranulocytosis, methemoglobinemia, blood dyscrasias, liver or kidney failure, lung infiltrates or desquamative/blistering drug rashes.
Taltz Pregnancy And Lactation Text: The risk during pregnancy and breastfeeding is uncertain with this medication.
Solaraze Counseling:  I discussed with the patient the risks of Solaraze including but not limited to erythema, scaling, itching, weeping, crusting, and pain.
Prednisone Counseling:  I discussed with the patient the risks of prolonged use of prednisone including but not limited to weight gain, insomnia, osteoporosis, mood changes, diabetes, susceptibility to infection, glaucoma and high blood pressure.  In cases where prednisone use is prolonged, patients should be monitored with blood pressure checks, serum glucose levels and an eye exam.  Additionally, the patient may need to be placed on GI prophylaxis, PCP prophylaxis, and calcium and vitamin D supplementation and/or a bisphosphonate.  The patient verbalized understanding of the proper use and the possible adverse effects of prednisone.  All of the patient's questions and concerns were addressed.
Sarecycline Counseling: Patient advised regarding possible photosensitivity and discoloration of the teeth, skin, lips, tongue and gums.  Patient instructed to avoid sunlight, if possible.  When exposed to sunlight, patients should wear protective clothing, sunglasses, and sunscreen.  The patient was instructed to call the office immediately if the following severe adverse effects occur:  hearing changes, easy bruising/bleeding, severe headache, or vision changes.  The patient verbalized understanding of the proper use and possible adverse effects of sarecycline.  All of the patient's questions and concerns were addressed.
Bactrim Pregnancy And Lactation Text: This medication is Pregnancy Category D and is known to cause fetal risk.  It is also excreted in breast milk.
Arava Pregnancy And Lactation Text: This medication is Pregnancy Category X and is absolutely contraindicated during pregnancy. It is unknown if it is excreted in breast milk.
Doxepin Pregnancy And Lactation Text: This medication is Pregnancy Category C and it isn't known if it is safe during pregnancy. It is also excreted in breast milk and breast feeding isn't recommended.
Tremfya Counseling: I discussed with the patient the risks of guselkumab including but not limited to immunosuppression, serious infections, worsening of inflammatory bowel disease and drug reactions.  The patient understands that monitoring is required including a PPD at baseline and must alert us or the primary physician if symptoms of infection or other concerning signs are noted.
Prednisone Pregnancy And Lactation Text: This medication is Pregnancy Category C and it isn't know if it is safe during pregnancy. This medication is excreted in breast milk.
Niacinamide Pregnancy And Lactation Text: These medications are considered safe during pregnancy.
5-Fu Counseling: 5-Fluorouracil Counseling:  I discussed with the patient the risks of 5-fluorouracil including but not limited to erythema, scaling, itching, weeping, crusting, and pain.
Sarecycline Pregnancy And Lactation Text: This medication is Pregnancy Category D and not consider safe during pregnancy. It is also excreted in breast milk.
Solaraze Pregnancy And Lactation Text: This medication is Pregnancy Category B and is considered safe. There is some data to suggest avoiding during the third trimester. It is unknown if this medication is excreted in breast milk.
Tetracycline Counseling: Patient counseled regarding possible photosensitivity and increased risk for sunburn.  Patient instructed to avoid sunlight, if possible.  When exposed to sunlight, patients should wear protective clothing, sunglasses, and sunscreen.  The patient was instructed to call the office immediately if the following severe adverse effects occur:  hearing changes, easy bruising/bleeding, severe headache, or vision changes.  The patient verbalized understanding of the proper use and possible adverse effects of tetracycline.  All of the patient's questions and concerns were addressed. Patient understands to avoid pregnancy while on therapy due to potential birth defects.
Tranexamic Acid Counseling:  Patient advised of the small risk of bleeding problems with tranexamic acid. They were also instructed to call if they developed any nausea, vomiting or diarrhea. All of the patient's questions and concerns were addressed.
Hydroxyzine Counseling: Patient advised that the medication is sedating and not to drive a car after taking this medication.  Patient informed of potential adverse effects including but not limited to dry mouth, urinary retention, and blurry vision.  The patient verbalized understanding of the proper use and possible adverse effects of hydroxyzine.  All of the patient's questions and concerns were addressed.
Nsaids Counseling: NSAID Counseling: I discussed with the patient that NSAIDs should be taken with food. Prolonged use of NSAIDs can result in the development of stomach ulcers.  Patient advised to stop taking NSAIDs if abdominal pain occurs.  The patient verbalized understanding of the proper use and possible adverse effects of NSAIDs.  All of the patient's questions and concerns were addressed.
Humira Counseling:  I discussed with the patient the risks of adalimumab including but not limited to myelosuppression, immunosuppression, autoimmune hepatitis, demyelinating diseases, lymphoma, and serious infections.  The patient understands that monitoring is required including a PPD at baseline and must alert us or the primary physician if symptoms of infection or other concerning signs are noted.
Cephalexin Counseling: I counseled the patient regarding use of cephalexin as an antibiotic for prophylactic and/or therapeutic purposes. Cephalexin (commonly prescribed under brand name Keflex) is a cephalosporin antibiotic which is active against numerous classes of bacteria, including most skin bacteria. Side effects may include nausea, diarrhea, gastrointestinal upset, rash, hives, yeast infections, and in rare cases, hepatitis, kidney disease, seizures, fever, confusion, neurologic symptoms, and others. Patients with severe allergies to penicillin medications are cautioned that there is about a 10% incidence of cross-reactivity with cephalosporins. When possible, patients with penicillin allergies should use alternatives to cephalosporins for antibiotic therapy.
Topical Retinoid counseling:  Patient advised to apply a pea-sized amount only at bedtime and wait 30 minutes after washing their face before applying.  If too drying, patient may add a non-comedogenic moisturizer. The patient verbalized understanding of the proper use and possible adverse effects of retinoids.  All of the patient's questions and concerns were addressed.
Clofazimine Counseling:  I discussed with the patient the risks of clofazimine including but not limited to skin and eye pigmentation, liver damage, nausea/vomiting, gastrointestinal bleeding and allergy.
Hydroxyzine Pregnancy And Lactation Text: This medication is not safe during pregnancy and should not be taken. It is also excreted in breast milk and breast feeding isn't recommended.
Nsaids Pregnancy And Lactation Text: These medications are considered safe up to 30 weeks gestation. It is excreted in breast milk.
Xeljanz Counseling: I discussed with the patient the risks of Xeljanz therapy including increased risk of infection, liver issues, headache, diarrhea, or cold symptoms. Live vaccines should be avoided. They were instructed to call if they have any problems.
Topical Retinoid Pregnancy And Lactation Text: This medication is Pregnancy Category C. It is unknown if this medication is excreted in breast milk.
Drysol Counseling:  I discussed with the patient the risks of drysol/aluminum chloride including but not limited to skin rash, itching, irritation, burning.
Tranexamic Acid Pregnancy And Lactation Text: It is unknown if this medication is safe during pregnancy or breast feeding.
Ilumya Counseling: I discussed with the patient the risks of tildrakizumab including but not limited to immunosuppression, malignancy, posterior leukoencephalopathy syndrome, and serious infections.  The patient understands that monitoring is required including a PPD at baseline and must alert us or the primary physician if symptoms of infection or other concerning signs are noted.
Clofazimine Pregnancy And Lactation Text: This medication is Pregnancy Category C and isn't considered safe during pregnancy. It is excreted in breast milk.
Cephalexin Pregnancy And Lactation Text: This medication is Pregnancy Category B and considered safe during pregnancy.  It is also excreted in breast milk but can be used safely for shorter doses.
Acitretin Counseling:  I discussed with the patient the risks of acitretin including but not limited to hair loss, dry lips/skin/eyes, liver damage, hyperlipidemia, depression/suicidal ideation, photosensitivity.  Serious rare side effects can include but are not limited to pancreatitis, pseudotumor cerebri, bony changes, clot formation/stroke/heart attack.  Patient understands that alcohol is contraindicated since it can result in liver toxicity and significantly prolong the elimination of the drug by many years.
Valtrex Counseling: I discussed with the patient the risks of valacyclovir including but not limited to kidney damage, nausea, vomiting and severe allergy.  The patient understands that if the infection seems to be worsening or is not improving, they are to call.
Odomzo Counseling- I discussed with the patient the risks of Odomzo including but not limited to nausea, vomiting, diarrhea, constipation, weight loss, changes in the sense of taste, decreased appetite, muscle spasms, and hair loss.  The patient verbalized understanding of the proper use and possible adverse effects of Odomzo.  All of the patient's questions and concerns were addressed.
Acitretin Pregnancy And Lactation Text: This medication is Pregnancy Category X and should not be given to women who are pregnant or may become pregnant in the future. This medication is excreted in breast milk.
Drysol Pregnancy And Lactation Text: This medication is considered safe during pregnancy and breast feeding.
Clindamycin Counseling: I counseled the patient regarding use of clindamycin as an antibiotic for prophylactic and/or therapeutic purposes. Clindamycin is active against numerous classes of bacteria, including skin bacteria. Side effects may include nausea, diarrhea, gastrointestinal upset, rash, hives, yeast infections, and in rare cases, colitis.
Tazorac Counseling:  Patient advised that medication is irritating and drying.  Patient may need to apply sparingly and wash off after an hour before eventually leaving it on overnight.  The patient verbalized understanding of the proper use and possible adverse effects of tazorac.  All of the patient's questions and concerns were addressed.
Colchicine Counseling:  Patient counseled regarding adverse effects including but not limited to stomach upset (nausea, vomiting, stomach pain, or diarrhea).  Patient instructed to limit alcohol consumption while taking this medication.  Colchicine may reduce blood counts especially with prolonged use.  The patient understands that monitoring of kidney function and blood counts may be required, especially at baseline. The patient verbalized understanding of the proper use and possible adverse effects of colchicine.  All of the patient's questions and concerns were addressed.
Fluconazole Counseling:  Patient counseled regarding adverse effects of fluconazole including but not limited to headache, diarrhea, nausea, upset stomach, liver function test abnormalities, taste disturbance, and stomach pain.  There is a rare possibility of liver failure that can occur when taking fluconazole.  The patient understands that monitoring of LFTs and kidney function test may be required, especially at baseline. The patient verbalized understanding of the proper use and possible adverse effects of fluconazole.  All of the patient's questions and concerns were addressed.
Albendazole Counseling:  I discussed with the patient the risks of albendazole including but not limited to cytopenia, kidney damage, nausea/vomiting and severe allergy.  The patient understands that this medication is being used in an off-label manner.
Elidel Counseling: Patient may experience a mild burning sensation during topical application. Elidel is not approved in children less than 2 years of age. There have been case reports of hematologic and skin malignancies in patients using topical calcineurin inhibitors although causality is questionable.
Valtrex Pregnancy And Lactation Text: this medication is Pregnancy Category B and is considered safe during pregnancy. This medication is not directly found in breast milk but it's metabolite acyclovir is present.
Xelallisonz Pregnancy And Lactation Text: This medication is Pregnancy Category D and is not considered safe during pregnancy.  The risk during breast feeding is also uncertain.
Clindamycin Pregnancy And Lactation Text: This medication can be used in pregnancy if certain situations. Clindamycin is also present in breast milk.
Infliximab Counseling:  I discussed with the patient the risks of infliximab including but not limited to myelosuppression, immunosuppression, autoimmune hepatitis, demyelinating diseases, lymphoma, and serious infections.  The patient understands that monitoring is required including a PPD at baseline and must alert us or the primary physician if symptoms of infection or other concerning signs are noted.
Hydroquinone Pregnancy And Lactation Text: This medication has not been assigned a Pregnancy Risk Category but animal studies failed to show danger with the topical medication. It is unknown if the medication is excreted in breast milk.
Bexarotene Counseling:  I discussed with the patient the risks of bexarotene including but not limited to hair loss, dry lips/skin/eyes, liver abnormalities, hyperlipidemia, pancreatitis, depression/suicidal ideation, photosensitivity, drug rash/allergic reactions, hypothyroidism, anemia, leukopenia, infection, cataracts, and teratogenicity.  Patient understands that they will need regular blood tests to check lipid profile, liver function tests, white blood cell count, thyroid function tests and pregnancy test if applicable.
Albendazole Pregnancy And Lactation Text: This medication is Pregnancy Category C and it isn't known if it is safe during pregnancy. It is also excreted in breast milk.
Tazorac Pregnancy And Lactation Text: This medication is not safe during pregnancy. It is unknown if this medication is excreted in breast milk.
Doxycycline Counseling:  Patient counseled regarding possible photosensitivity and increased risk for sunburn.  Patient instructed to avoid sunlight, if possible.  When exposed to sunlight, patients should wear protective clothing, sunglasses, and sunscreen.  The patient was instructed to call the office immediately if the following severe adverse effects occur:  hearing changes, easy bruising/bleeding, severe headache, or vision changes.  The patient verbalized understanding of the proper use and possible adverse effects of doxycycline.  All of the patient's questions and concerns were addressed.
Xolair Counseling:  Patient informed of potential adverse effects including but not limited to fever, muscle aches, rash and allergic reactions.  The patient verbalized understanding of the proper use and possible adverse effects of Xolair.  All of the patient's questions and concerns were addressed.
Fluconazole Pregnancy And Lactation Text: This medication is Pregnancy Category C and it isn't know if it is safe during pregnancy. It is also excreted in breast milk.
Use Enhanced Medication Counseling?: No
Xolair Pregnancy And Lactation Text: This medication is Pregnancy Category B and is considered safe during pregnancy. This medication is excreted in breast milk.
Dapsone Counseling: I discussed with the patient the risks of dapsone including but not limited to hemolytic anemia, agranulocytosis, rashes, methemoglobinemia, kidney failure, peripheral neuropathy, headaches, GI upset, and liver toxicity.  Patients who start dapsone require monitoring including baseline LFTs and weekly CBCs for the first month, then every month thereafter.  The patient verbalized understanding of the proper use and possible adverse effects of dapsone.  All of the patient's questions and concerns were addressed.
Otezla Counseling: The side effects of Otezla were discussed with the patient, including but not limited to worsening or new depression, weight loss, diarrhea, nausea, upper respiratory tract infection, and headache. Patient instructed to call the office should any adverse effect occur.  The patient verbalized understanding of the proper use and possible adverse effects of Otezla.  All the patient's questions and concerns were addressed.
Bexarotene Pregnancy And Lactation Text: This medication is Pregnancy Category X and should not be given to women who are pregnant or may become pregnant. This medication should not be used if you are breast feeding.
Imiquimod Counseling:  I discussed with the patient the risks of imiquimod including but not limited to erythema, scaling, itching, weeping, crusting, and pain.  Patient understands that the inflammatory response to imiquimod is variable from person to person and was educated regarded proper titration schedule.  If flu-like symptoms develop, patient knows to discontinue the medication and contact us.
Topical Clindamycin Counseling: Patient counseled that this medication may cause skin irritation or allergic reactions.  In the event of skin irritation, the patient was advised to reduce the amount of the drug applied or use it less frequently.   The patient verbalized understanding of the proper use and possible adverse effects of clindamycin.  All of the patient's questions and concerns were addressed.
Isotretinoin Counseling: Patient should get monthly blood tests, not donate blood, not drive at night if vision affected, not share medication, and not undergo elective surgery for 6 months after tx completed. Side effects reviewed, pt to contact office should one occur.
Eucrisa Counseling: Patient may experience a mild burning sensation during topical application. Eucrisa is not approved in children less than 2 years of age.
Griseofulvin Counseling:  I discussed with the patient the risks of griseofulvin including but not limited to photosensitivity, cytopenia, liver damage, nausea/vomiting and severe allergy.  The patient understands that this medication is best absorbed when taken with a fatty meal (e.g., ice cream or french fries).
Otezla Pregnancy And Lactation Text: This medication is Pregnancy Category C and it isn't known if it is safe during pregnancy. It is unknown if it is excreted in breast milk.
Ivermectin Counseling:  Patient instructed to take medication on an empty stomach with a full glass of water.  Patient informed of potential adverse effects including but not limited to nausea, diarrhea, dizziness, itching, and swelling of the extremities or lymph nodes.  The patient verbalized understanding of the proper use and possible adverse effects of ivermectin.  All of the patient's questions and concerns were addressed.
Doxycycline Pregnancy And Lactation Text: This medication is Pregnancy Category D and not consider safe during pregnancy. It is also excreted in breast milk but is considered safe for shorter treatment courses.
Dapsone Pregnancy And Lactation Text: This medication is Pregnancy Category C and is not considered safe during pregnancy or breast feeding.
Topical Clindamycin Pregnancy And Lactation Text: This medication is Pregnancy Category B and is considered safe during pregnancy. It is unknown if it is excreted in breast milk.
Rituxan Counseling:  I discussed with the patient the risks of Rituxan infusions. Side effects can include infusion reactions, severe drug rashes including mucocutaneous reactions, reactivation of latent hepatitis and other infections and rarely progressive multifocal leukoencephalopathy.  All of the patient's questions and concerns were addressed.
Erivedge Counseling- I discussed with the patient the risks of Erivedge including but not limited to nausea, vomiting, diarrhea, constipation, weight loss, changes in the sense of taste, decreased appetite, muscle spasms, and hair loss.  The patient verbalized understanding of the proper use and possible adverse effects of Erivedge.  All of the patient's questions and concerns were addressed.
Erythromycin Counseling:  I discussed with the patient the risks of erythromycin including but not limited to GI upset, allergic reaction, drug rash, diarrhea, increase in liver enzymes, and yeast infections.
Topical Sulfur Applications Counseling: Topical Sulfur Counseling: Patient counseled that this medication may cause skin irritation or allergic reactions.  In the event of skin irritation, the patient was advised to reduce the amount of the drug applied or use it less frequently.   The patient verbalized understanding of the proper use and possible adverse effects of topical sulfur application.  All of the patient's questions and concerns were addressed.
Isotretinoin Pregnancy And Lactation Text: This medication is Pregnancy Category X and is considered extremely dangerous during pregnancy. It is unknown if it is excreted in breast milk.
Oxybutynin Counseling:  I discussed with the patient the risks of oxybutynin including but not limited to skin rash, drowsiness, dry mouth, difficulty urinating, and blurred vision.
Griseofulvin Pregnancy And Lactation Text: This medication is Pregnancy Category X and is known to cause serious birth defects. It is unknown if this medication is excreted in breast milk but breast feeding should be avoided.
Rituxan Pregnancy And Lactation Text: This medication is Pregnancy Category C and it isn't know if it is safe during pregnancy. It is unknown if this medication is excreted in breast milk but similar antibodies are known to be excreted.
Erythromycin Pregnancy And Lactation Text: This medication is Pregnancy Category B and is considered safe during pregnancy. It is also excreted in breast milk.
Azathioprine Counseling:  I discussed with the patient the risks of azathioprine including but not limited to myelosuppression, immunosuppression, hepatotoxicity, lymphoma, and infections.  The patient understands that monitoring is required including baseline LFTs, Creatinine, possible TPMP genotyping and weekly CBCs for the first month and then every 2 weeks thereafter.  The patient verbalized understanding of the proper use and possible adverse effects of azathioprine.  All of the patient's questions and concerns were addressed.
Minoxidil Counseling: Minoxidil is a topical medication which can increase blood flow where it is applied. It is uncertain how this medication increases hair growth. Side effects are uncommon and include stinging and allergic reactions.
Azathioprine Pregnancy And Lactation Text: This medication is Pregnancy Category D and isn't considered safe during pregnancy. It is unknown if this medication is excreted in breast milk.
High Dose Vitamin A Counseling: Side effects reviewed, pt to contact office should one occur.
Itraconazole Counseling:  I discussed with the patient the risks of itraconazole including but not limited to liver damage, nausea/vomiting, neuropathy, and severe allergy.  The patient understands that this medication is best absorbed when taken with acidic beverages such as non-diet cola or ginger ale.  The patient understands that monitoring is required including baseline LFTs and repeat LFTs at intervals.  The patient understands that they are to contact us or the primary physician if concerning signs are noted.
Hydroquinone Counseling:  Patient advised that medication may result in skin irritation, lightening (hypopigmentation), dryness, and burning.  In the event of skin irritation, the patient was advised to reduce the amount of the drug applied or use it less frequently.  Rarely, spots that are treated with hydroquinone can become darker (pseudoochronosis).  Should this occur, patient instructed to stop medication and call the office. The patient verbalized understanding of the proper use and possible adverse effects of hydroquinone.  All of the patient's questions and concerns were addressed.
Detail Level: Zone
Topical Sulfur Applications Pregnancy And Lactation Text: This medication is Pregnancy Category C and has an unknown safety profile during pregnancy. It is unknown if this topical medication is excreted in breast milk.
Propranolol Counseling:  I discussed with the patient the risks of propranolol including but not limited to low heart rate, low blood pressure, low blood sugar, restlessness and increased cold sensitivity. They should call the office if they experience any of these side effects.
Siliq Counseling:  I discussed with the patient the risks of Siliq including but not limited to new or worsening depression, suicidal thoughts and behavior, immunosuppression, malignancy, posterior leukoencephalopathy syndrome, and serious infections.  The patient understands that monitoring is required including a PPD at baseline and must alert us or the primary physician if symptoms of infection or other concerning signs are noted. There is also a special program designed to monitor depression which is required with Siliq.
Metronidazole Counseling:  I discussed with the patient the risks of metronidazole including but not limited to seizures, nausea/vomiting, a metallic taste in the mouth, nausea/vomiting and severe allergy.
Wartpeel Counseling:  I discussed with the patient the risks of Wartpeel including but not limited to erythema, scaling, itching, weeping, crusting, and pain.
Finasteride Male Counseling: Finasteride Counseling:  I discussed with the patient the risks of use of finasteride including but not limited to decreased libido, decreased ejaculate volume, gynecomastia, and depression. Women should not handle medication.  All of the patient's questions and concerns were addressed.
Mirvaso Counseling: Mirvaso is a topical medication which can decrease superficial blood flow where applied. Side effects are uncommon and include stinging, redness and allergic reactions.
Cellcept Counseling:  I discussed with the patient the risks of mycophenolate mofetil including but not limited to infection/immunosuppression, GI upset, hypokalemia, hypercholesterolemia, bone marrow suppression, lymphoproliferative disorders, malignancy, GI ulceration/bleed/perforation, colitis, interstitial lung disease, kidney failure, progressive multifocal leukoencephalopathy, and birth defects.  The patient understands that monitoring is required including a baseline creatinine and regular CBC testing. In addition, patient must alert us immediately if symptoms of infection or other concerning signs are noted.
High Dose Vitamin A Pregnancy And Lactation Text: High dose vitamin A therapy is contraindicated during pregnancy and breast feeding.
Propranolol Pregnancy And Lactation Text: This medication is Pregnancy Category C and it isn't known if it is safe during pregnancy. It is excreted in breast milk.
Ketoconazole Counseling:   Patient counseled regarding improving absorption with orange juice.  Adverse effects include but are not limited to breast enlargement, headache, diarrhea, nausea, upset stomach, liver function test abnormalities, taste disturbance, and stomach pain.  There is a rare possibility of liver failure that can occur when taking ketoconazole. The patient understands that monitoring of LFTs may be required, especially at baseline. The patient verbalized understanding of the proper use and possible adverse effects of ketoconazole.  All of the patient's questions and concerns were addressed.
Simponi Counseling:  I discussed with the patient the risks of golimumab including but not limited to myelosuppression, immunosuppression, autoimmune hepatitis, demyelinating diseases, lymphoma, and serious infections.  The patient understands that monitoring is required including a PPD at baseline and must alert us or the primary physician if symptoms of infection or other concerning signs are noted.
Finasteride Pregnancy And Lactation Text: This medication is absolutely contraindicated during pregnancy. It is unknown if it is excreted in breast milk.
Metronidazole Pregnancy And Lactation Text: This medication is Pregnancy Category B and considered safe during pregnancy.  It is also excreted in breast milk.
Minocycline Counseling: Patient advised regarding possible photosensitivity and discoloration of the teeth, skin, lips, tongue and gums.  Patient instructed to avoid sunlight, if possible.  When exposed to sunlight, patients should wear protective clothing, sunglasses, and sunscreen.  The patient was instructed to call the office immediately if the following severe adverse effects occur:  hearing changes, easy bruising/bleeding, severe headache, or vision changes.  The patient verbalized understanding of the proper use and possible adverse effects of minocycline.  All of the patient's questions and concerns were addressed.
Birth Control Pills Counseling: Birth Control Pill Counseling: I discussed with the patient the potential side effects of OCPs including but not limited to increased risk of stroke, heart attack, thrombophlebitis, deep venous thrombosis, hepatic adenomas, breast changes, GI upset, headaches, and depression.  The patient verbalized understanding of the proper use and possible adverse effects of OCPs. All of the patient's questions and concerns were addressed.
Ketoconazole Pregnancy And Lactation Text: This medication is Pregnancy Category C and it isn't know if it is safe during pregnancy. It is also excreted in breast milk and breast feeding isn't recommended.
Gabapentin Counseling: I discussed with the patient the risks of gabapentin including but not limited to dizziness, somnolence, fatigue and ataxia.
Cimzia Counseling:  I discussed with the patient the risks of Cimzia including but not limited to immunosuppression, allergic reactions and infections.  The patient understands that monitoring is required including a PPD at baseline and must alert us or the primary physician if symptoms of infection or other concerning signs are noted.
Zyclara Counseling:  I discussed with the patient the risks of imiquimod including but not limited to erythema, scaling, itching, weeping, crusting, and pain.  Patient understands that the inflammatory response to imiquimod is variable from person to person and was educated regarded proper titration schedule.  If flu-like symptoms develop, patient knows to discontinue the medication and contact us.
Terbinafine Counseling: Patient counseling regarding adverse effects of terbinafine including but not limited to headache, diarrhea, rash, upset stomach, liver function test abnormalities, itching, taste/smell disturbance, nausea, abdominal pain, and flatulence.  There is a rare possibility of liver failure that can occur when taking terbinafine.  The patient understands that a baseline LFT and kidney function test may be required. The patient verbalized understanding of the proper use and possible adverse effects of terbinafine.  All of the patient's questions and concerns were addressed.
Cimzia Pregnancy And Lactation Text: This medication crosses the placenta but can be considered safe in certain situations. Cimzia may be excreted in breast milk.
Picato Counseling:  I discussed with the patient the risks of Picato including but not limited to erythema, scaling, itching, weeping, crusting, and pain.
Birth Control Pills Pregnancy And Lactation Text: This medication should be avoided if pregnant and for the first 30 days post-partum.
Cyclophosphamide Counseling:  I discussed with the patient the risks of cyclophosphamide including but not limited to hair loss, hormonal abnormalities, decreased fertility, abdominal pain, diarrhea, nausea and vomiting, bone marrow suppression and infection. The patient understands that monitoring is required while taking this medication.
Skyrizi Counseling: I discussed with the patient the risks of risankizumab-rzaa including but not limited to immunosuppression, and serious infections.  The patient understands that monitoring is required including a PPD at baseline and must alert us or the primary physician if symptoms of infection or other concerning signs are noted.
Benzoyl Peroxide Counseling: Patient counseled that medicine may cause skin irritation and bleach clothing.  In the event of skin irritation, the patient was advised to reduce the amount of the drug applied or use it less frequently.   The patient verbalized understanding of the proper use and possible adverse effects of benzoyl peroxide.  All of the patient's questions and concerns were addressed.
Cosentyx Counseling:  I discussed with the patient the risks of Cosentyx including but not limited to worsening of Crohn's disease, immunosuppression, allergic reactions and infections.  The patient understands that monitoring is required including a PPD at baseline and must alert us or the primary physician if symptoms of infection or other concerning signs are noted.
Cyclophosphamide Pregnancy And Lactation Text: This medication is Pregnancy Category D and it isn't considered safe during pregnancy. This medication is excreted in breast milk.
Cyclosporine Counseling:  I discussed with the patient the risks of cyclosporine including but not limited to hypertension, gingival hyperplasia,myelosuppression, immunosuppression, liver damage, kidney damage, neurotoxicity, lymphoma, and serious infections. The patient understands that monitoring is required including baseline blood pressure, CBC, CMP, lipid panel and uric acid, and then 1-2 times monthly CMP and blood pressure.
Glycopyrrolate Counseling:  I discussed with the patient the risks of glycopyrrolate including but not limited to skin rash, drowsiness, dry mouth, difficulty urinating, and blurred vision.
Spironolactone Counseling: Patient advised regarding risks of diarrhea, abdominal pain, hyperkalemia, birth defects (for female patients), liver toxicity and renal toxicity. The patient may need blood work to monitor liver and kidney function and potassium levels while on therapy. The patient verbalized understanding of the proper use and possible adverse effects of spironolactone.  All of the patient's questions and concerns were addressed.
Quinolones Counseling:  I discussed with the patient the risks of fluoroquinolones including but not limited to GI upset, allergic reaction, drug rash, diarrhea, dizziness, photosensitivity, yeast infections, liver function test abnormalities, tendonitis/tendon rupture.
Benzoyl Peroxide Pregnancy And Lactation Text: This medication is Pregnancy Category C. It is unknown if benzoyl peroxide is excreted in breast milk.
Protopic Counseling: Patient may experience a mild burning sensation during topical application. Protopic is not approved in children less than 2 years of age. There have been case reports of hematologic and skin malignancies in patients using topical calcineurin inhibitors although causality is questionable.
Carac Counseling:  I discussed with the patient the risks of Carac including but not limited to erythema, scaling, itching, weeping, crusting, and pain.
Spironolactone Pregnancy And Lactation Text: This medication can cause feminization of the male fetus and should be avoided during pregnancy. The active metabolite is also found in breast milk.
Glycopyrrolate Pregnancy And Lactation Text: This medication is Pregnancy Category B and is considered safe during pregnancy. It is unknown if it is excreted breast milk.
Opioid Counseling: I discussed with the patient the potential side effects of opioids including but not limited to addiction, altered mental status, and depression. I stressed avoiding alcohol, benzodiazepines, muscle relaxants and sleep aids unless specifically okayed by a physician. The patient verbalized understanding of the proper use and possible adverse effects of opioids. All of the patient's questions and concerns were addressed. They were instructed to flush the remaining pills down the toilet if they did not need them for pain.
Dupixent Counseling: I discussed with the patient the risks of dupilumab including but not limited to eye infection and irritation, cold sores, injection site reactions, worsening of asthma, allergic reactions and increased risk of parasitic infection.  Live vaccines should be avoided while taking dupilumab. Dupilumab will also interact with certain medications such as warfarin and cyclosporine. The patient understands that monitoring is required and they must alert us or the primary physician if symptoms of infection or other concerning signs are noted.
Protopic Pregnancy And Lactation Text: This medication is Pregnancy Category C. It is unknown if this medication is excreted in breast milk when applied topically.
Hydroxychloroquine Counseling:  I discussed with the patient that a baseline ophthalmologic exam is needed at the start of therapy and every year thereafter while on therapy. A CBC may also be warranted for monitoring.  The side effects of this medication were discussed with the patient, including but not limited to agranulocytosis, aplastic anemia, seizures, rashes, retinopathy, and liver toxicity. Patient instructed to call the office should any adverse effect occur.  The patient verbalized understanding of the proper use and possible adverse effects of Plaquenil.  All the patient's questions and concerns were addressed.
Azithromycin Counseling:  I discussed with the patient the risks of azithromycin including but not limited to GI upset, allergic reaction, drug rash, diarrhea, and yeast infections.
Stelara Counseling:  I discussed with the patient the risks of ustekinumab including but not limited to immunosuppression, malignancy, posterior leukoencephalopathy syndrome, and serious infections.  The patient understands that monitoring is required including a PPD at baseline and must alert us or the primary physician if symptoms of infection or other concerning signs are noted.
Rifampin Counseling: I discussed with the patient the risks of rifampin including but not limited to liver damage, kidney damage, red-orange body fluids, nausea/vomiting and severe allergy.
SSKI Counseling:  I discussed with the patient the risks of SSKI including but not limited to thyroid abnormalities, metallic taste, GI upset, fever, headache, acne, arthralgias, paraesthesias, lymphadenopathy, easy bleeding, arrhythmias, and allergic reaction.
Cimetidine Counseling:  I discussed with the patient the risks of Cimetidine including but not limited to gynecomastia, headache, diarrhea, nausea, drowsiness, arrhythmias, pancreatitis, skin rashes, psychosis, bone marrow suppression and kidney toxicity.

## 2020-07-20 NOTE — HPI: RASH
How Severe Is Your Rash?: moderate
Is This A New Presentation, Or A Follow-Up?: Follow Up Rash
Additional History: \\nPatient was seen by me on 6/23/2020 and was told to continue Betamethasone Ointment and take Doxycycline for 10 days. C/S was done showing Staphylococcus, resistant to Doxycycline.  Patient states the rash is still spreading up arms.

## 2020-08-04 LAB
ALBUMIN SERPL-MCNC: 4.3 G/DL (ref 3.8–4.8)
ALBUMIN/GLOB SERPL: 1.9 {RATIO} (ref 1.2–2.2)
ALP SERPL-CCNC: 53 IU/L (ref 39–117)
ALT SERPL-CCNC: 29 IU/L (ref 0–44)
AST SERPL-CCNC: 31 IU/L (ref 0–40)
BILIRUB SERPL-MCNC: 0.2 MG/DL (ref 0–1.2)
BUN SERPL-MCNC: 16 MG/DL (ref 8–27)
BUN/CREAT SERPL: 16 (ref 10–24)
CALCIUM SERPL-MCNC: 9.5 MG/DL (ref 8.6–10.2)
CHLORIDE SERPL-SCNC: 103 MMOL/L (ref 96–106)
CHOLEST SERPL-MCNC: 111 MG/DL (ref 100–199)
CO2 SERPL-SCNC: 23 MMOL/L (ref 20–29)
CREAT SERPL-MCNC: 1.02 MG/DL (ref 0.76–1.27)
GLOBULIN SER CALC-MCNC: 2.3 G/DL (ref 1.5–4.5)
GLUCOSE SERPL-MCNC: 102 MG/DL (ref 65–99)
HBA1C MFR BLD: 5.7 % (ref 4.8–5.6)
HDLC SERPL-MCNC: 58 MG/DL
LABORATORY COMMENT REPORT: NORMAL
LDLC SERPL CALC-MCNC: 39 MG/DL (ref 0–99)
POTASSIUM SERPL-SCNC: 4.3 MMOL/L (ref 3.5–5.2)
PROT SERPL-MCNC: 6.6 G/DL (ref 6–8.5)
SODIUM SERPL-SCNC: 141 MMOL/L (ref 134–144)
TRIGL SERPL-MCNC: 72 MG/DL (ref 0–149)
TSH SERPL DL<=0.005 MIU/L-ACNC: 3.92 UIU/ML (ref 0.45–4.5)
VLDLC SERPL CALC-MCNC: 14 MG/DL (ref 5–40)

## 2020-08-10 ENCOUNTER — APPOINTMENT (RX ONLY)
Dept: URBAN - METROPOLITAN AREA CLINIC 4 | Facility: CLINIC | Age: 70
Setting detail: DERMATOLOGY
End: 2020-08-10

## 2020-08-10 DIAGNOSIS — L30.9 DERMATITIS, UNSPECIFIED: ICD-10-CM | Status: WELL CONTROLLED

## 2020-08-10 PROCEDURE — ? COUNSELING

## 2020-08-10 PROCEDURE — ? MEDICATION COUNSELING

## 2020-08-10 PROCEDURE — 99213 OFFICE O/P EST LOW 20 MIN: CPT

## 2020-08-10 PROCEDURE — ? ADDITIONAL NOTES

## 2020-08-10 ASSESSMENT — LOCATION ZONE DERM
LOCATION ZONE: TRUNK
LOCATION ZONE: ARM
LOCATION ZONE: LEG
LOCATION ZONE: NECK
LOCATION ZONE: FACE

## 2020-08-10 ASSESSMENT — LOCATION DETAILED DESCRIPTION DERM
LOCATION DETAILED: RIGHT SUPERIOR MEDIAL UPPER BACK
LOCATION DETAILED: LEFT PROXIMAL PRETIBIAL REGION
LOCATION DETAILED: LEFT PROXIMAL DORSAL FOREARM
LOCATION DETAILED: LEFT INFERIOR CENTRAL MALAR CHEEK
LOCATION DETAILED: LEFT SUPERIOR ANTERIOR NECK
LOCATION DETAILED: LEFT DISTAL PRETIBIAL REGION
LOCATION DETAILED: RIGHT PROXIMAL DORSAL FOREARM

## 2020-08-10 ASSESSMENT — LOCATION SIMPLE DESCRIPTION DERM
LOCATION SIMPLE: RIGHT UPPER BACK
LOCATION SIMPLE: LEFT ANTERIOR NECK
LOCATION SIMPLE: RIGHT FOREARM
LOCATION SIMPLE: LEFT CHEEK
LOCATION SIMPLE: LEFT FOREARM
LOCATION SIMPLE: LEFT PRETIBIAL REGION

## 2020-08-10 NOTE — HPI: RASH
How Severe Is Your Rash?: moderate
Is This A New Presentation, Or A Follow-Up?: Follow Up Rash
Additional History: Patient states rash is improved since last visit. Patient is less itchy and open sores have been healing with Mupirocin Ointment.

## 2020-08-10 NOTE — PROCEDURE: ADDITIONAL NOTES
Additional Notes: Patient to continue the Betamethasone Ointment. \\nPatient to continue Mupirocin Ointment to use on open areas, also discussed using in nares twice daily for 14 days. \\nDiscussed treatment and risks in detail with patient.\\nRecommend gentle cleansers and moisturizers daily, like Cetaphil or CeraVe. Moisturize within 3 minutes of showering.\\nRecommend a daily antihistamine, like Zyrtec.\\nRecommend cutting fingernails short, to avoid scratching at night.
Detail Level: Simple

## 2020-09-03 DIAGNOSIS — E78.5 HYPERLIPIDEMIA, UNSPECIFIED HYPERLIPIDEMIA TYPE: ICD-10-CM

## 2020-09-03 DIAGNOSIS — I10 BENIGN ESSENTIAL HTN: ICD-10-CM

## 2020-09-17 DIAGNOSIS — I73.9 PVD (PERIPHERAL VASCULAR DISEASE) (HCC): ICD-10-CM

## 2020-09-17 DIAGNOSIS — E78.01 FAMILIAL HYPERCHOLESTEROLEMIA: ICD-10-CM

## 2020-09-17 DIAGNOSIS — I10 ESSENTIAL HYPERTENSION: ICD-10-CM

## 2020-09-17 DIAGNOSIS — Z95.5 STENTED CORONARY ARTERY: ICD-10-CM

## 2020-09-21 RX ORDER — ATORVASTATIN CALCIUM 40 MG/1
40 TABLET, FILM COATED ORAL
Qty: 90 TAB | Refills: 2 | Status: SHIPPED | OUTPATIENT
Start: 2020-09-21 | End: 2021-06-25 | Stop reason: SDUPTHER

## 2020-11-11 ENCOUNTER — OFFICE VISIT (OUTPATIENT)
Dept: MEDICAL GROUP | Facility: MEDICAL CENTER | Age: 70
End: 2020-11-11
Payer: MEDICARE

## 2020-11-11 VITALS
RESPIRATION RATE: 16 BRPM | SYSTOLIC BLOOD PRESSURE: 116 MMHG | TEMPERATURE: 97.9 F | WEIGHT: 251 LBS | DIASTOLIC BLOOD PRESSURE: 62 MMHG | BODY MASS INDEX: 34 KG/M2 | OXYGEN SATURATION: 96 % | HEART RATE: 58 BPM | HEIGHT: 72 IN

## 2020-11-11 DIAGNOSIS — I25.10 CORONARY ARTERY DISEASE INVOLVING NATIVE CORONARY ARTERY OF NATIVE HEART WITHOUT ANGINA PECTORIS: ICD-10-CM

## 2020-11-11 DIAGNOSIS — R73.09 ELEVATED HEMOGLOBIN A1C: ICD-10-CM

## 2020-11-11 DIAGNOSIS — N52.9 MALE ERECTILE DISORDER: ICD-10-CM

## 2020-11-11 DIAGNOSIS — L29.9 EAR ITCH: ICD-10-CM

## 2020-11-11 DIAGNOSIS — L65.9 HAIR LOSS: ICD-10-CM

## 2020-11-11 DIAGNOSIS — E78.00 PURE HYPERCHOLESTEROLEMIA: ICD-10-CM

## 2020-11-11 DIAGNOSIS — I10 ESSENTIAL HYPERTENSION: ICD-10-CM

## 2020-11-11 DIAGNOSIS — Z12.5 SCREENING PSA (PROSTATE SPECIFIC ANTIGEN): ICD-10-CM

## 2020-11-11 PROBLEM — J06.9 VIRAL URI WITH COUGH: Status: RESOLVED | Noted: 2020-01-29 | Resolved: 2020-11-11

## 2020-11-11 PROCEDURE — 99214 OFFICE O/P EST MOD 30 MIN: CPT | Performed by: NURSE PRACTITIONER

## 2020-11-11 RX ORDER — BETAMETHASONE DIPROPIONATE 0.5 MG/G
50 CREAM TOPICAL 2 TIMES DAILY PRN
Qty: 50 G | Refills: 0 | Status: SHIPPED | OUTPATIENT
Start: 2020-11-11 | End: 2022-05-09

## 2020-11-11 RX ORDER — FINASTERIDE 1 MG/1
1 TABLET, FILM COATED ORAL DAILY
Qty: 90 TAB | Refills: 1 | Status: SHIPPED | OUTPATIENT
Start: 2020-11-11 | End: 2021-05-05

## 2020-11-11 NOTE — PROGRESS NOTES
Subjective:     Chief Complaint   Patient presents with   • Other     both ears itch along with light bleeding     Jan Rivas is a 70 y.o. male here today to follow up on:    Ear itching  Persistent itching in the ear canals which is ongoing for several months.  He is occasionally using a Q-tip to relieve the itch.  He has not tried any ointments or medication for this.  He does take daily Zyrtec.  No rash elsewhere.  No associated pain, discharge from the ears, change in hearing    Hypertension  Blood pressure is stable and controlled on current medication.  No chest pain, dizziness, palpitation    Male erectile disorder  States that he has had difficulty with ED since having his heart attack.  He has not tried any treatment for this but is not interested in pursuing it currently.    Hair loss  He is very concerned that his hairline is receding and has been thinning over recent years.  This makes him very self-conscious.  He is interested in medication treatment options to help him with hair retention.  No scalp itching, flaking, rash       Current medicines (including changes today)  Current Outpatient Medications   Medication Sig Dispense Refill   • Aug Betamethasone Dipropionate (DIPROLENE-AF) 0.05 % Cream Apply 50 g to affected area(s) 2 times a day as needed. 50 g 0   • finasteride (PROPECIA) 1 MG tablet Take 1 Tab by mouth every day. 90 Tab 1   • atorvastatin (LIPITOR) 40 MG Tab TAKE 1 TAB BY MOUTH 1/2 HOUR BEFORE DINNER. 90 Tab 2   • metoprolol (LOPRESSOR) 25 MG Tab TAKE 1 TAB BY MOUTH 2 TIMES A  Tab 3   • ciprofloxacin (CIPRO HC) 0.2-1 % Suspension Place 3 Drops in ear 2 times a day. Administer drops to both ears. 1 Bottle 0   • nitroglycerin (NITROSTAT) 0.4 MG SL Tab Place 1 Tab under tongue as needed for Chest Pain. 25 Tab 0   • triamcinolone acetonide (KENALOG) 0.1 % Ointment Apply 2-4 g to affected area(s) 2 times a day. (Patient not taking: Reported on 6/26/2020) 1 Tube 0   • celecoxib  (CELEBREX) 200 MG Cap Take 200 mg by mouth.  2   • latanoprost (XALATAN) 0.005 % Solution      • losartan (COZAAR) 25 MG Tab      • traZODone (DESYREL) 50 MG Tab TAKE 1 TO 2 TABLETS BY MOUTH AT BEDTIME AS NEEDED  7   • aspirin (ASA) 81 MG CHEW chewable tablet Take 1 Tab by mouth every day. 100 Tab 11   • LATANOPROST OP by Ophthalmic route.       No current facility-administered medications for this visit.      He  has a past medical history of Apnea, sleep, CAD (coronary artery disease) (stenLCA 2011), Chickenpox, Swiss measles, Heart attack (HCC), Hyperlipidemia, Hypertension, Mumps, Sleep apnea, Snoring, and Wears glasses.    ROS included above     Objective:     /62 (BP Location: Right arm, Patient Position: Sitting, BP Cuff Size: Large adult)   Pulse (!) 58   Temp 36.6 °C (97.9 °F) (Temporal)   Resp 16   Ht 1.829 m (6')   Wt 113.9 kg (251 lb)   SpO2 96%  Body mass index is 34.04 kg/m².     Physical Exam:  General: Alert, oriented in no acute distress.  Eye contact is good, speech is normal, affect calm  HEENT: Bilateral ear canal with dry flaking skin, mild erythema.  TMs intact, gray, no effusion  Lungs: clear to auscultation bilaterally, normal effort, no wheeze/ rhonchi/ rales.  CV: regular rate and rhythm, S1, S2, no murmur  Ext: no edema, color normal, vascularity normal, temperature normal    Assessment and Plan:   The following treatment plan was discussed   1. Coronary artery disease involving native coronary artery of native heart without angina pectoris   doing well with atorvastatin, update labs as listed   2. Elevated hemoglobin A1c   diet and exercise recommendations reviewed, no medications at this time.  Update labs  HEMOGLOBIN A1C   3. Pure hypercholesterolemia  Lipid Profile    Comp Metabolic Panel   4. Ear itch   dry mildly erythematous ear canals without evidence of infection.  Continue Zyrtec, may try small amount of:  Aug Betamethasone Dipropionate (DIPROLENE-AF) 0.05 % Cream    5. Hair loss   requesting medication to help with her retention.  May try:  finasteride (PROPECIA) 1 MG tablet  Risks and side effects reviewed   6. Essential hypertension   stable   7. Male erectile disorder   declines trial of medication at this time.   8. Screening PSA (prostate specific antigen)  PROSTATE SPECIFIC AG SCREENING       Followup: Pending labs         Please note that this dictation was created using voice recognition software. I have worked with consultants from the vendor as well as technical experts from [facility] [employer] to optimize the interface. I have made every reasonable attempt to correct obvious errors, but I expect that there are errors of grammar and possibly content that I did not discover before finalizing the note.

## 2020-11-11 NOTE — ASSESSMENT & PLAN NOTE
States that he has had difficulty with ED since having his heart attack.  He has not tried any treatment for this but is not interested in pursuing it currently.

## 2020-11-11 NOTE — ASSESSMENT & PLAN NOTE
Persistent itching in the ear canals which is ongoing for several months.  He is occasionally using a Q-tip to relieve the itch.  He has not tried any ointments or medication for this.  He does take daily Zyrtec.  No rash elsewhere.  No associated pain, discharge from the ears, change in hearing

## 2020-11-11 NOTE — ASSESSMENT & PLAN NOTE
He is very concerned that his hairline is receding and has been thinning over recent years.  This makes him very self-conscious.  He is interested in medication treatment options to help him with hair retention.  No scalp itching, flaking, rash

## 2020-11-11 NOTE — ASSESSMENT & PLAN NOTE
Blood pressure is stable and controlled on current medication.  No chest pain, dizziness, palpitation

## 2020-12-17 ENCOUNTER — TELEMEDICINE (OUTPATIENT)
Dept: MEDICAL GROUP | Facility: MEDICAL CENTER | Age: 70
End: 2020-12-17
Payer: MEDICARE

## 2020-12-17 VITALS — HEIGHT: 72 IN | WEIGHT: 251 LBS | BODY MASS INDEX: 34 KG/M2

## 2020-12-17 DIAGNOSIS — Z11.59 SCREENING FOR VIRAL DISEASE: ICD-10-CM

## 2020-12-17 PROCEDURE — 99212 OFFICE O/P EST SF 10 MIN: CPT | Mod: 95,CR | Performed by: NURSE PRACTITIONER

## 2020-12-17 NOTE — PROGRESS NOTES
Telemedicine: Established Patient   This evaluation was conducted via [employer] using secure and encrypted videoconferencing technology. The patient was in a private location in the state of [place].    The patient's identity was confirmed and verbal consent was obtained for this virtual visit.    Subjective:   CC: No chief complaint on file.      Jan Rivas is a 70 y.o. male presenting to request a Covid screening test for travel.  He has an upcoming trip to [place] and is required to test within a few days of his trip.  He denies any recent illness or known Covid exposure.  He has no other concerns today.  ROS      No Known Allergies    Current medicines (including changes today)  Current Outpatient Medications   Medication Sig Dispense Refill   • Aug Betamethasone Dipropionate (DIPROLENE-AF) 0.05 % Cream Apply 50 g to affected area(s) 2 times a day as needed. 50 g 0   • finasteride (PROPECIA) 1 MG tablet Take 1 Tab by mouth every day. 90 Tab 1   • atorvastatin (LIPITOR) 40 MG Tab TAKE 1 TAB BY MOUTH 1/2 HOUR BEFORE DINNER. 90 Tab 2   • metoprolol (LOPRESSOR) 25 MG Tab TAKE 1 TAB BY MOUTH 2 TIMES A  Tab 3   • nitroglycerin (NITROSTAT) 0.4 MG SL Tab Place 1 Tab under tongue as needed for Chest Pain. 25 Tab 0   • triamcinolone acetonide (KENALOG) 0.1 % Ointment Apply 2-4 g to affected area(s) 2 times a day. 1 Tube 0   • latanoprost (XALATAN) 0.005 % Solution      • losartan (COZAAR) 25 MG Tab      • traZODone (DESYREL) 50 MG Tab TAKE 1 TO 2 TABLETS BY MOUTH AT BEDTIME AS NEEDED  7   • aspirin (ASA) 81 MG CHEW chewable tablet Take 1 Tab by mouth every day. 100 Tab 11   • LATANOPROST OP by Ophthalmic route.       No current facility-administered medications for this visit.        Patient Active Problem List    Diagnosis Date Noted   • CAD (coronary artery disease) 10/22/2013     Priority: High   • Stented coronary artery 10/31/2013     Priority: Medium   • Hypertension 10/31/2013     Priority: Low   •  Pure hypercholesterolemia 10/31/2013     Priority: Low   • Ear itching 11/11/2020   • Male erectile disorder 11/11/2020   • Hair loss 11/11/2020   • FRANK on CPAP 12/06/2019   • BMI 34.0-34.9,adult 12/06/2019   • Low back pain with radiation 07/31/2019   • Elevated hemoglobin A1c 07/31/2019   • Dermatitis 07/31/2019   • High risk medication use 01/23/2018   • PVD (peripheral vascular disease) (HCC) 02/10/2017   • Carotid stenosis 04/30/2014       Family History   Problem Relation Age of Onset   • Heart Attack Mother    • Cancer Mother    • Cancer Father    • Cancer Sister        He  has a past medical history of Apnea, sleep, CAD (coronary artery disease) (stenLCA 2011), Chickenpox, Italian measles, Heart attack (HCC), Hyperlipidemia, Hypertension, Mumps, Sleep apnea, Snoring, and Wears glasses.  He  has a past surgical history that includes zzz cardiac cath (2/21/15); zzz cardiac cath (5/4/10); and cyst excision.       Objective:   Ht 1.829 m (6')   Wt 113.9 kg (251 lb)   BMI 34.04 kg/m²     Physical Exam  Physical Exam:  Constitutional: Alert, no distress, well-groomed.  Skin: No rashes in visible areas.  Eye: Round. Conjunctiva clear, lids normal. No icterus.   ENMT: Lips pink without lesions, good dentition, moist mucous membranes. Phonation normal.  Neck: No masses, no thyromegaly. Moves freely without pain.  CV: Pulse as reported by patient  Respiratory: Unlabored respiratory effort, no cough or audible wheeze  Psych: Alert and oriented x3, normal affect and mood.     Assessment and Plan:   The following treatment plan was discussed:     1. Screening for viral disease  - COVID/SARS COV-2 PCR; Future    He will test a few days prior to travel as required.  Travel precautions reviewed    Follow-up: No follow-ups on file.

## 2020-12-30 ENCOUNTER — HOSPITAL ENCOUNTER (OUTPATIENT)
Dept: LAB | Facility: MEDICAL CENTER | Age: 70
End: 2020-12-30
Attending: NURSE PRACTITIONER
Payer: MEDICARE

## 2020-12-30 DIAGNOSIS — Z11.59 SCREENING FOR VIRAL DISEASE: ICD-10-CM

## 2020-12-30 LAB
COVID ORDER STATUS COVID19: NORMAL
SARS-COV-2 RNA RESP QL NAA+PROBE: NOTDETECTED
SPECIMEN SOURCE: NORMAL

## 2020-12-30 PROCEDURE — C9803 HOPD COVID-19 SPEC COLLECT: HCPCS

## 2020-12-30 PROCEDURE — U0003 INFECTIOUS AGENT DETECTION BY NUCLEIC ACID (DNA OR RNA); SEVERE ACUTE RESPIRATORY SYNDROME CORONAVIRUS 2 (SARS-COV-2) (CORONAVIRUS DISEASE [COVID-19]), AMPLIFIED PROBE TECHNIQUE, MAKING USE OF HIGH THROUGHPUT TECHNOLOGIES AS DESCRIBED BY CMS-2020-01-R: HCPCS

## 2021-02-10 ENCOUNTER — TELEPHONE (OUTPATIENT)
Dept: MEDICAL GROUP | Facility: MEDICAL CENTER | Age: 71
End: 2021-02-10

## 2021-02-10 ENCOUNTER — APPOINTMENT (RX ONLY)
Dept: URBAN - METROPOLITAN AREA CLINIC 4 | Facility: CLINIC | Age: 71
Setting detail: DERMATOLOGY
End: 2021-02-10

## 2021-02-10 DIAGNOSIS — L30.9 DERMATITIS, UNSPECIFIED: ICD-10-CM | Status: INADEQUATELY CONTROLLED

## 2021-02-10 LAB
ALBUMIN SERPL-MCNC: 4 G/DL (ref 3.8–4.8)
ALBUMIN/GLOB SERPL: 1.5 {RATIO} (ref 1.2–2.2)
ALP SERPL-CCNC: 56 IU/L (ref 39–117)
ALT SERPL-CCNC: 21 IU/L (ref 0–44)
AST SERPL-CCNC: 20 IU/L (ref 0–40)
BILIRUB SERPL-MCNC: 0.3 MG/DL (ref 0–1.2)
BUN SERPL-MCNC: 14 MG/DL (ref 8–27)
BUN/CREAT SERPL: 12 (ref 10–24)
CALCIUM SERPL-MCNC: 9.3 MG/DL (ref 8.6–10.2)
CHLORIDE SERPL-SCNC: 104 MMOL/L (ref 96–106)
CHOLEST SERPL-MCNC: 117 MG/DL (ref 100–199)
CO2 SERPL-SCNC: 24 MMOL/L (ref 20–29)
CREAT SERPL-MCNC: 1.14 MG/DL (ref 0.76–1.27)
GLOBULIN SER CALC-MCNC: 2.7 G/DL (ref 1.5–4.5)
GLUCOSE SERPL-MCNC: 113 MG/DL (ref 65–99)
HBA1C MFR BLD: 5.8 % (ref 4.8–5.6)
HDLC SERPL-MCNC: 51 MG/DL
LABORATORY COMMENT REPORT: ABNORMAL
LDLC SERPL CALC-MCNC: 39 MG/DL (ref 0–99)
POTASSIUM SERPL-SCNC: 4.3 MMOL/L (ref 3.5–5.2)
PROT SERPL-MCNC: 6.7 G/DL (ref 6–8.5)
PSA SERPL-MCNC: 0.8 NG/ML (ref 0–4)
SODIUM SERPL-SCNC: 142 MMOL/L (ref 134–144)
TRIGL SERPL-MCNC: 160 MG/DL (ref 0–149)
VLDLC SERPL CALC-MCNC: 27 MG/DL (ref 5–40)

## 2021-02-10 PROCEDURE — ? ADDITIONAL NOTES

## 2021-02-10 PROCEDURE — ? PRESCRIPTION

## 2021-02-10 PROCEDURE — ? COUNSELING

## 2021-02-10 PROCEDURE — 99214 OFFICE O/P EST MOD 30 MIN: CPT

## 2021-02-10 RX ORDER — PIMECROLIMUS 10 MG/G
1 CREAM TOPICAL BID
Qty: 1 | Refills: 2 | Status: ERX | COMMUNITY
Start: 2021-02-10

## 2021-02-10 RX ORDER — MINOCYCLINE HYDROCHLORIDE 100 MG/1
1 CAPSULE ORAL BID
Qty: 60 | Refills: 5 | Status: ERX | COMMUNITY
Start: 2021-02-10

## 2021-02-10 RX ORDER — CLOBETASOL PROPIONATE 0.5 MG/G
1 CREAM TOPICAL BID
Qty: 1 | Refills: 3 | Status: ERX | COMMUNITY
Start: 2021-02-10

## 2021-02-10 RX ADMIN — CLOBETASOL PROPIONATE 1: 0.5 CREAM TOPICAL at 00:00

## 2021-02-10 RX ADMIN — PIMECROLIMUS 1: 10 CREAM TOPICAL at 00:00

## 2021-02-10 RX ADMIN — MINOCYCLINE HYDROCHLORIDE 1: 100 CAPSULE ORAL at 00:00

## 2021-02-10 ASSESSMENT — LOCATION SIMPLE DESCRIPTION DERM
LOCATION SIMPLE: LEFT CHEEK
LOCATION SIMPLE: LEFT PRETIBIAL REGION
LOCATION SIMPLE: LEFT ANTERIOR NECK
LOCATION SIMPLE: LEFT FOREARM
LOCATION SIMPLE: RIGHT UPPER BACK
LOCATION SIMPLE: RIGHT FOREARM

## 2021-02-10 ASSESSMENT — LOCATION DETAILED DESCRIPTION DERM
LOCATION DETAILED: LEFT PROXIMAL DORSAL FOREARM
LOCATION DETAILED: LEFT DISTAL PRETIBIAL REGION
LOCATION DETAILED: LEFT SUPERIOR ANTERIOR NECK
LOCATION DETAILED: LEFT PROXIMAL PRETIBIAL REGION
LOCATION DETAILED: RIGHT SUPERIOR MEDIAL UPPER BACK
LOCATION DETAILED: RIGHT PROXIMAL DORSAL FOREARM
LOCATION DETAILED: LEFT INFERIOR CENTRAL MALAR CHEEK

## 2021-02-10 ASSESSMENT — LOCATION ZONE DERM
LOCATION ZONE: LEG
LOCATION ZONE: TRUNK
LOCATION ZONE: FACE
LOCATION ZONE: ARM
LOCATION ZONE: NECK

## 2021-02-10 NOTE — TELEPHONE ENCOUNTER
Outside information NOT reconciled using the MeetBall feature. Per [employer] May, the MeetBall feature is down as of 02/09/2021 at 2:00pm. Will reconcile outside information at a later date.

## 2021-02-10 NOTE — PROCEDURE: ADDITIONAL NOTES
Render Risk Assessment In Note?: no
Additional Notes: Will Rx Minocycline tablets to pharmacy today.\\nWill Rx Pimecrolimus 1% cream and Clobetasol 0.5% cream to control inflammation and irritation.\\nPatient to discontinue Betamethason.\\nRecommend patient to take one antihistamine a day. Patient take Zyrtec every AM, mentions does not notice an improvement. Patient to take two a day. Discussed possible side effects. \\nPatient to continue this treatment until next appointment- August 11.
Detail Level: Zone

## 2021-02-10 NOTE — TELEPHONE ENCOUNTER
ESTABLISHED PATIENT PRE-VISIT PLANNING     Patient was NOT contacted to complete PVP.     Note: Patient will not be contacted if there is no indication to call.     1.  Reviewed notes from the last few office visits within the medical group: Yes    2.  If any orders were placed at last visit or intended to be done for this visit (i.e. 6 mos follow-up), do we have Results/Consult Notes?         •  Labs - Labs ordered, completed on 02/08/2021 and results are in chart.  Note: If patient appointment is for lab review and patient did not complete labs, check with provider if OK to reschedule patient until labs completed.       •  Imaging - Imaging was not ordered at last office visit.       •  Referrals - Referral ordered, patient has NOT been seen.    3. Is this appointment scheduled as a Hospital Follow-Up? No    4.  Immunizations were updated in Epic using Reconcile Outside Information activity? Yes    5.  Patient is due for the following Health Maintenance Topics:   Health Maintenance Due   Topic Date Due   • Annual Wellness Visit  12/06/2020   • IMM PNEUMOCOCCAL VACCINE: 65+ Years (2 of 2 - PPSV23) 12/06/2020       6.  AHA (Pulse8) form printed for Provider? N/A

## 2021-02-16 ENCOUNTER — OFFICE VISIT (OUTPATIENT)
Dept: MEDICAL GROUP | Facility: MEDICAL CENTER | Age: 71
End: 2021-02-16
Payer: MEDICARE

## 2021-02-16 VITALS
WEIGHT: 259.26 LBS | OXYGEN SATURATION: 94 % | HEIGHT: 72 IN | RESPIRATION RATE: 18 BRPM | DIASTOLIC BLOOD PRESSURE: 72 MMHG | TEMPERATURE: 97.6 F | BODY MASS INDEX: 35.12 KG/M2 | SYSTOLIC BLOOD PRESSURE: 112 MMHG | HEART RATE: 55 BPM

## 2021-02-16 DIAGNOSIS — Z00.00 ENCOUNTER FOR ANNUAL WELLNESS EXAM IN MEDICARE PATIENT: ICD-10-CM

## 2021-02-16 DIAGNOSIS — Z23 NEED FOR VACCINATION: ICD-10-CM

## 2021-02-16 DIAGNOSIS — I73.9 PVD (PERIPHERAL VASCULAR DISEASE) (HCC): ICD-10-CM

## 2021-02-16 DIAGNOSIS — E78.00 PURE HYPERCHOLESTEROLEMIA: ICD-10-CM

## 2021-02-16 DIAGNOSIS — R73.09 ELEVATED HEMOGLOBIN A1C: ICD-10-CM

## 2021-02-16 PROCEDURE — 90732 PPSV23 VACC 2 YRS+ SUBQ/IM: CPT | Performed by: NURSE PRACTITIONER

## 2021-02-16 PROCEDURE — G0009 ADMIN PNEUMOCOCCAL VACCINE: HCPCS | Performed by: NURSE PRACTITIONER

## 2021-02-16 PROCEDURE — G0439 PPPS, SUBSEQ VISIT: HCPCS | Performed by: NURSE PRACTITIONER

## 2021-02-16 RX ORDER — MINOCYCLINE HYDROCHLORIDE 100 MG/1
100 CAPSULE ORAL 2 TIMES DAILY
COMMUNITY
Start: 2021-02-10 | End: 2022-06-21

## 2021-02-16 RX ORDER — PIMECROLIMUS 10 MG/G
1 CREAM TOPICAL 2 TIMES DAILY
COMMUNITY
Start: 2021-02-10 | End: 2023-05-25

## 2021-02-16 RX ORDER — CLOBETASOL PROPIONATE 0.5 MG/G
CREAM TOPICAL
COMMUNITY
Start: 2021-02-10 | End: 2022-05-09

## 2021-02-16 ASSESSMENT — ACTIVITIES OF DAILY LIVING (ADL): BATHING_REQUIRES_ASSISTANCE: 0

## 2021-02-16 ASSESSMENT — PATIENT HEALTH QUESTIONNAIRE - PHQ9: CLINICAL INTERPRETATION OF PHQ2 SCORE: 0

## 2021-02-16 ASSESSMENT — ENCOUNTER SYMPTOMS: GENERAL WELL-BEING: GOOD

## 2021-02-16 NOTE — NON-PROVIDER
New is a 70 year old here today to review lab work and for vaccine update.     Elevated hemoglobin A1c  A1C at 5.8  follows a heathy diet inconsistently. He is snacking more than usual.  He exercises 2x/week on and off by walking   He drinks 2 acholic drinks/ night, beer and/or wine  Takes medications as prescribed   Denies any dizziness, CP, urinary issues    Pure hypercholesterolemia  Triglycerides at 160, HDL 51, LDL 39  follows a heathy diet inconsistently. He is snacking more than usual.  He exercises 2x/week on and off by walking   He drinks 2 acholic drinks/ night, beer and/or wine  Takes medications as prescribed   Denies any dizziness, CP, urinary issues      1. Elevated hemoglobin A1c  Denies any polyuria, polydipsia, polyphagia. Last A1c at 5.8   Maintain a consistent diet by increasing fruits and vegetables, minimize sweets and carbohydrates  Exercise at least 5x/week for 30 minutes each time  Stay well hydrated  - HEMOGLOBIN A1C; Future    2. Pure hypercholesterolemia  Triglycerides at 160, HDL 51, LDL 39  Maintain a consistent diet by increasing fruits and vegetables, minimize sweets and carbohydrates  Exercise at least 5x/week for 30 minutes each time  Stay well hydrated    - Comp Metabolic Panel; Future  - Lipid Profile; Future    3. Need for vaccination  Vaccination given at office today  - Pneumococal Polysaccharide Vaccine 23-Valent =>1YO SQ/IM    4. Encounter for annual wellness exam in Medicare patient  Labs reviewed in office today.   Next set of labs to be done in 6 months.

## 2021-02-16 NOTE — PROGRESS NOTES
CC:  Wellness exam and follow up medical problems.    HPI:  Jan is a 70-year-old established male patient here for HRA    PVD (peripheral vascular disease) (CMS-[facility])  Stable at this time with no recent worsening.  No ulcerations or drainage      Elevated hemoglobin A1c  A1C at 5.8  follows a heathy diet inconsistently. He is snacking more than usual.  He exercises 2x/week on and off by walking   He drinks 2 acholic drinks/ night, beer and/or wine  Takes medications as prescribed   Denies any dizziness, CP, urinary issues     Pure hypercholesterolemia  Triglycerides at 160, HDL 51, LDL 39  follows a heathy diet inconsistently. He is snacking more than usual.  He exercises 2x/week on and off by walking   He drinks 2 acholic drinks/ night, beer and/or wine  Takes medications as prescribed   Denies any dizziness, CP, urinary issues     Depression Screening    Little interest or pleasure in doing things?  0 - not at all  Feeling down, depressed , or hopeless? 0 - not at all  Patient Health Questionnaire Score: 0     If depressive symptoms identified deferred to follow up visit unless specifically addressed in assessment and plan.    Interpretation of PHQ-9 Total Score   Score Severity   1-4 No Depression   5-9 Mild Depression   10-14 Moderate Depression   15-19 Moderately Severe Depression   20-27 Severe Depression    Screening for Cognitive Impairment    Three Minute Recall (river, allyson, finger) 2/3    Jake clock face with all 12 numbers and set the hands to show 10 past 11.  Yes    Cognitive concerns identified deferred for follow up unless specifically addressed in assessment and plan.    Fall Risk Assessment    Has the patient had two or more falls in the last year or any fall with injury in the last year?  No    Safety Assessment    Throw rugs on floor.  No  Handrails on all stairs.  Yes  Good lighting in all hallways.  Yes  Difficulty hearing.  No  Patient counseled about all safety risks that were  identified.    Functional Assessment ADLs    Are there any barriers preventing you from cooking for yourself or meeting nutritional needs?  No.    Are there any barriers preventing you from driving safely or obtaining transportation?  No.    Are there any barriers preventing you from using a telephone or calling for help?  No.    Are there any barriers preventing you from shopping?  No.    Are there any barriers preventing you from taking care of your own finances?  No.    Are there any barriers preventing you from managing your medications?  No.    Are there any barriers preventing you from showering, bathing or dressing yourself?  No.    Are you currently engaging in any exercise or physical activity?  Yes.     What is your perception of your health?  Good.      Health Maintenance Summary                Annual Wellness Visit Overdue 12/6/2020      Done 12/6/2019 Visit Dx: Medicare annual wellness visit, subsequent    COLONOSCOPY Next Due 4/3/2029      Done 4/3/2019 REFERRAL TO GI FOR COLONOSCOPY    IMM DTaP/Tdap/Td Vaccine Next Due 4/29/2030      Done 4/29/2020 Imm Admin: Tdap Vaccine          Patient Care Team:  TOBY Hemphill as PCP - General (Family Medicine)  [employer]  (DME Supplier)  See chart problem list or referrals section for names of specialist.    Patient Active Problem List    Diagnosis Date Noted   • CAD (coronary artery disease) 10/22/2013   • Stented coronary artery 10/31/2013   • Hypertension 10/31/2013   • Pure hypercholesterolemia 10/31/2013   • Ear itching 11/11/2020   • Male erectile disorder 11/11/2020   • Hair loss 11/11/2020   • FRANK on CPAP 12/06/2019   • Low back pain with radiation 07/31/2019   • Elevated hemoglobin A1c 07/31/2019   • Dermatitis 07/31/2019   • High risk medication use 01/23/2018   • PVD (peripheral vascular disease) ([facility]) 02/10/2017   • Carotid stenosis 04/30/2014       Current Outpatient Medications on File Prior to Visit   Medication Sig Dispense Refill   •  pimecrolimus (ELIDEL) 1 % cream      • minocycline (MINOCIN) 100 MG Cap      • clobetasol (TEMOVATE) 0.05 % Cream      • mupirocin (BACTROBAN) 2 % Ointment APPLY TO OPEN WOUNDS TWICE DAILY UNTIL HEALED.     • Aug Betamethasone Dipropionate (DIPROLENE-AF) 0.05 % Cream Apply 50 g to affected area(s) 2 times a day as needed. 50 g 0   • finasteride (PROPECIA) 1 MG tablet Take 1 Tab by mouth every day. 90 Tab 1   • atorvastatin (LIPITOR) 40 MG Tab TAKE 1 TAB BY MOUTH 1/2 HOUR BEFORE DINNER. 90 Tab 2   • metoprolol (LOPRESSOR) 25 MG Tab TAKE 1 TAB BY MOUTH 2 TIMES A  Tab 3   • nitroglycerin (NITROSTAT) 0.4 MG SL Tab Place 1 Tab under tongue as needed for Chest Pain. 25 Tab 0   • triamcinolone acetonide (KENALOG) 0.1 % Ointment Apply 2-4 g to affected area(s) 2 times a day. 1 Tube 0   • latanoprost (XALATAN) 0.005 % Solution      • losartan (COZAAR) 25 MG Tab      • traZODone (DESYREL) 50 MG Tab TAKE 1 TO 2 TABLETS BY MOUTH AT BEDTIME AS NEEDED  7   • aspirin (ASA) 81 MG CHEW chewable tablet Take 1 Tab by mouth every day. 100 Tab 11   • LATANOPROST OP by Ophthalmic route.       No current facility-administered medications on file prior to visit.       Patient has no known allergies.    Social History     Socioeconomic History   • Marital status:      Spouse name: Not on file   • Number of children: Not on file   • Years of education: Not on file   • Highest education level: Not on file   Occupational History   • Not on file   Tobacco Use   • Smoking status: Former Smoker     Packs/day: 0.50     Years: 42.00     Pack years: 21.00     Types: Cigarettes     Quit date: 5/4/2010     Years since quitting: 10.7   • Smokeless tobacco: Never Used   Substance and Sexual Activity   • Alcohol use: Yes     Comment: evry day 2-3 drinks per day   • Drug use: Yes     Types: Marijuana   • Sexual activity: Not on file   Other Topics Concern   • Not on file   Social History Narrative   • Not on file     Social Determinants of  Health     Financial Resource Strain:    • Difficulty of Paying Living Expenses:    Food Insecurity:    • Worried About Running Out of Food in the Last Year:    • Ran Out of Food in the Last Year:    Transportation Needs:    • Lack of Transportation (Medical):    • Lack of Transportation (Non-Medical):    Physical Activity:    • Days of Exercise per Week:    • Minutes of Exercise per Session:    Stress:    • Feeling of Stress :    Social Connections:    • Frequency of Communication with Friends and Family:    • Frequency of Social Gatherings with Friends and Family:    • Attends Zoroastrianism Services:    • Active Member of Clubs or Organizations:    • Attends Club or Organization Meetings:    • Marital Status:    Intimate Partner Violence:    • Fear of Current or Ex-Partner:    • Emotionally Abused:    • Physically Abused:    • Sexually Abused:        Family History   Problem Relation Age of Onset   • Heart Attack Mother    • Cancer Mother    • Cancer Father    • Cancer Sister        Past Surgical History:   Procedure Laterality Date   • [facility] CARDIAC CATH  2/21/15    Cath at [place].  Patent stent and no obstructive CAD.   • [facility] CARDIAC CATH  5/4/10    Taxus stent to LAD   • CYST EXCISION      posterior neck       ROS:  Denies any Headache, Blurred Vision, Confusion Chest pain,  Shortness of breath,  Abdominal pain, Changes of bowel or bladder, Lower ext edema, Fevers, Nights sweats, Weight Changes, Focal weakness or numbness.  All other systems are negative.    PHYSICAL:    /72 (BP Location: Left arm, Patient Position: Sitting, BP Cuff Size: Adult)   Pulse (!) 55   Temp 36.4 °C (97.6 °F) (Temporal)   Resp 18   Ht 1.829 m (6')   Wt 118 kg (259 lb 4.2 oz)   SpO2 94%     Gen:         Alert and oriented, No apparent distress.  Lungs:     Clear to auscultation bilaterally  CV:          Regular rate and rhythm. No murmurs, rubs or gallops.  Abd:         Soft non tender, non distended. Normal active bowel  sounds. No Hepatosplenomegaly, No pulsatile masses.                       Ext:          No clubbing, cyanosis, edema.  Skin:        No concerning lesions or rashes.    Health Maintenance Due   Topic Date Due   • Annual Wellness Visit  12/06/2020         ASSESSMENT AND PLAN:  Screening test reviewed with patient today and updated within health-care maintenance record. Discussion today about general wellness and lifestyle habits.    1. Encounter for annual wellness exam in Medicare patient  From list of medications reviewed and updated, chronic condition stable.  General health and wellness discussion including healthy diet, regular exercise, encouraged to work on weight loss    2. PVD (peripheral vascular disease) (HCC)  Stable    3. Elevated hemoglobin A1c  Recent A1c 5.8.  Diet and exercise discussed  - HEMOGLOBIN A1C; Future    4. Pure hypercholesterolemia  Stable on statin  - Comp Metabolic Panel; Future  - Lipid Profile; Future    5. Need for vaccination  I have placed the below orders and discussed them with an approved delegating provider. The MA is performing the below orders under the direction of Dr. Neumann  - Pneumococal Polysaccharide Vaccine 23-Valent =>1YO SQ/IM

## 2021-03-03 DIAGNOSIS — Z23 NEED FOR VACCINATION: ICD-10-CM

## 2021-05-05 DIAGNOSIS — L65.9 HAIR LOSS: ICD-10-CM

## 2021-05-05 RX ORDER — FINASTERIDE 1 MG/1
TABLET, FILM COATED ORAL
Qty: 90 TABLET | Refills: 1 | Status: SHIPPED | OUTPATIENT
Start: 2021-05-05 | End: 2021-06-25

## 2021-06-25 ENCOUNTER — OFFICE VISIT (OUTPATIENT)
Dept: CARDIOLOGY | Facility: MEDICAL CENTER | Age: 71
End: 2021-06-25
Payer: MEDICARE

## 2021-06-25 VITALS
BODY MASS INDEX: 33.7 KG/M2 | OXYGEN SATURATION: 94 % | HEIGHT: 72 IN | SYSTOLIC BLOOD PRESSURE: 106 MMHG | DIASTOLIC BLOOD PRESSURE: 64 MMHG | WEIGHT: 248.8 LBS | HEART RATE: 62 BPM

## 2021-06-25 DIAGNOSIS — E78.01 FAMILIAL HYPERCHOLESTEROLEMIA: ICD-10-CM

## 2021-06-25 DIAGNOSIS — Z79.899 HIGH RISK MEDICATION USE: ICD-10-CM

## 2021-06-25 DIAGNOSIS — I10 BENIGN ESSENTIAL HTN: ICD-10-CM

## 2021-06-25 DIAGNOSIS — Z95.5 STENTED CORONARY ARTERY: ICD-10-CM

## 2021-06-25 DIAGNOSIS — I73.9 PVD (PERIPHERAL VASCULAR DISEASE) (HCC): ICD-10-CM

## 2021-06-25 DIAGNOSIS — G47.33 OSA ON CPAP: ICD-10-CM

## 2021-06-25 DIAGNOSIS — E78.5 HYPERLIPIDEMIA, UNSPECIFIED HYPERLIPIDEMIA TYPE: ICD-10-CM

## 2021-06-25 DIAGNOSIS — I65.29 STENOSIS OF CAROTID ARTERY, UNSPECIFIED LATERALITY: ICD-10-CM

## 2021-06-25 DIAGNOSIS — I10 ESSENTIAL HYPERTENSION: ICD-10-CM

## 2021-06-25 DIAGNOSIS — E78.00 PURE HYPERCHOLESTEROLEMIA: ICD-10-CM

## 2021-06-25 DIAGNOSIS — I25.10 CORONARY ARTERY DISEASE INVOLVING NATIVE CORONARY ARTERY OF NATIVE HEART WITHOUT ANGINA PECTORIS: ICD-10-CM

## 2021-06-25 DIAGNOSIS — R73.09 ELEVATED HEMOGLOBIN A1C: ICD-10-CM

## 2021-06-25 PROCEDURE — 99214 OFFICE O/P EST MOD 30 MIN: CPT | Performed by: INTERNAL MEDICINE

## 2021-06-25 RX ORDER — LOSARTAN POTASSIUM 25 MG/1
25 TABLET ORAL DAILY
Qty: 90 TABLET | Refills: 3 | Status: SHIPPED | OUTPATIENT
Start: 2021-06-25 | End: 2021-12-27 | Stop reason: SDUPTHER

## 2021-06-25 RX ORDER — ATORVASTATIN CALCIUM 40 MG/1
40 TABLET, FILM COATED ORAL
Qty: 90 TABLET | Refills: 3 | Status: SHIPPED | OUTPATIENT
Start: 2021-06-25 | End: 2021-12-27 | Stop reason: SDUPTHER

## 2021-06-25 ASSESSMENT — ENCOUNTER SYMPTOMS
WHEEZING: 0
PALPITATIONS: 0
WEAKNESS: 0
MUSCULOSKELETAL NEGATIVE: 1
BRUISES/BLEEDS EASILY: 0
FEVER: 0
NEUROLOGICAL NEGATIVE: 1
SORE THROAT: 0
CHILLS: 0
CARDIOVASCULAR NEGATIVE: 1
LOSS OF CONSCIOUSNESS: 0
PND: 0
ORTHOPNEA: 0
SHORTNESS OF BREATH: 0
CONSTITUTIONAL NEGATIVE: 1
DIZZINESS: 0
RESPIRATORY NEGATIVE: 1
HEMOPTYSIS: 0
SPUTUM PRODUCTION: 0
GASTROINTESTINAL NEGATIVE: 1
EYES NEGATIVE: 1
COUGH: 0
CLAUDICATION: 0
STRIDOR: 0

## 2021-06-25 NOTE — PROGRESS NOTES
Chief Complaint   Patient presents with   • Coronary Artery Disease     F/V DX:CAD (coronary artery disease)   • Peripheral Vascular Disease (PVD)       Subjective:   New Rivas is a 71 y.o. male who presents today as a follow-up for his hypertension hyperlipidemia and carotid stenosis.  Since last seen he continues on the same medications.  He is moved back to the [facility] area from [place].  He is compliant with medications.  Last LDL was well controlled.  He has no chest pain or shortness of breath.    Past Medical History:   Diagnosis Date   • Apnea, sleep    • CAD (coronary artery disease) stenLCA    • Chickenpox    • Croatian measles    • Heart attack (HCC)    • Hyperlipidemia    • Hypertension    • Mumps    • Sleep apnea    • Snoring    • Wears glasses      Past Surgical History:   Procedure Laterality Date   • [facility] CARDIAC CATH  2/21/15    Cath at [place].  Patent stent and no obstructive CAD.   • Z CARDIAC CATH  5/4/10    Taxus stent to LAD   • CYST EXCISION      posterior neck     Family History   Problem Relation Age of Onset   • Heart Attack Mother    • Cancer Mother    • Cancer Father    • Cancer Sister      Social History     Socioeconomic History   • Marital status:      Spouse name: Not on file   • Number of children: Not on file   • Years of education: Not on file   • Highest education level: Not on file   Occupational History   • Not on file   Tobacco Use   • Smoking status: Former Smoker     Packs/day: 0.50     Years: 42.00     Pack years: 21.00     Types: Cigarettes     Quit date: 2010     Years since quittin.1   • Smokeless tobacco: Never Used   Vaping Use   • Vaping Use: Former   Substance and Sexual Activity   • Alcohol use: Yes     Comment: evry day 2-3 drinks per day   • Drug use: Yes     Types: Marijuana   • Sexual activity: Not on file   Other Topics Concern   • Not on file   Social History Narrative   • Not on file     Social Determinants of Health      Financial Resource Strain:    • Difficulty of Paying Living Expenses:    Food Insecurity:    • Worried About Running Out of Food in the Last Year:    • Ran Out of Food in the Last Year:    Transportation Needs:    • Lack of Transportation (Medical):    • Lack of Transportation (Non-Medical):    Physical Activity:    • Days of Exercise per Week:    • Minutes of Exercise per Session:    Stress:    • Feeling of Stress :    Social Connections:    • Frequency of Communication with Friends and Family:    • Frequency of Social Gatherings with Friends and Family:    • Attends Yarsanism Services:    • Active Member of Clubs or Organizations:    • Attends Club or Organization Meetings:    • Marital Status:    Intimate Partner Violence:    • Fear of Current or Ex-Partner:    • Emotionally Abused:    • Physically Abused:    • Sexually Abused:      No Known Allergies  Outpatient Encounter Medications as of 6/25/2021   Medication Sig Dispense Refill   • atorvastatin (LIPITOR) 40 MG Tab Take 1 tablet by mouth 1/2 hour before dinner. 90 tablet 3   • losartan (COZAAR) 25 MG Tab Take 1 tablet by mouth every day. 90 tablet 3   • metoprolol tartrate (LOPRESSOR) 25 MG Tab Take 1 tablet by mouth 2 times a day. TAKE 1 TAB BY MOUTH 2 TIMES A DAY. 180 tablet 3   • pimecrolimus (ELIDEL) 1 % cream      • minocycline (MINOCIN) 100 MG Cap Take 100 mg by mouth 2 times a day.     • clobetasol (TEMOVATE) 0.05 % Cream      • mupirocin (BACTROBAN) 2 % Ointment APPLY TO OPEN WOUNDS TWICE DAILY UNTIL HEALED.     • Aug Betamethasone Dipropionate (DIPROLENE-AF) 0.05 % Cream Apply 50 g to affected area(s) 2 times a day as needed. 50 g 0   • nitroglycerin (NITROSTAT) 0.4 MG SL Tab Place 1 Tab under tongue as needed for Chest Pain. 25 Tab 0   • triamcinolone acetonide (KENALOG) 0.1 % Ointment Apply 2-4 g to affected area(s) 2 times a day. 1 Tube 0   • latanoprost (XALATAN) 0.005 % Solution      • traZODone (DESYREL) 50 MG Tab TAKE 1 TO 2 TABLETS BY  MOUTH AT BEDTIME AS NEEDED  7   • aspirin (ASA) 81 MG CHEW chewable tablet Take 1 Tab by mouth every day. 100 Tab 11   • [DISCONTINUED] finasteride (PROPECIA) 1 MG tablet TAKE 1 TABLET BY MOUTH EVERY DAY (Patient not taking: Reported on 6/25/2021) 90 tablet 1   • [DISCONTINUED] atorvastatin (LIPITOR) 40 MG Tab TAKE 1 TAB BY MOUTH 1/2 HOUR BEFORE DINNER. 90 Tab 2   • [DISCONTINUED] metoprolol (LOPRESSOR) 25 MG Tab TAKE 1 TAB BY MOUTH 2 TIMES A  Tab 3   • [DISCONTINUED] losartan (COZAAR) 25 MG Tab Take 25 mg by mouth every day.     • [DISCONTINUED] LATANOPROST OP by Ophthalmic route.       No facility-administered encounter medications on file as of 6/25/2021.     Review of Systems   Constitutional: Negative.  Negative for chills, fever and malaise/fatigue.   HENT: Negative.  Negative for sore throat.    Eyes: Negative.    Respiratory: Negative.  Negative for cough, hemoptysis, sputum production, shortness of breath, wheezing and stridor.    Cardiovascular: Negative.  Negative for chest pain, palpitations, orthopnea, claudication, leg swelling and PND.   Gastrointestinal: Negative.    Genitourinary: Negative.    Musculoskeletal: Negative.    Skin: Negative.    Neurological: Negative.  Negative for dizziness, loss of consciousness and weakness.   Endo/Heme/Allergies: Negative.  Does not bruise/bleed easily.   All other systems reviewed and are negative.       Objective:   /64 (BP Location: Left arm, Patient Position: Sitting, BP Cuff Size: Adult)   Pulse 62   Ht 1.829 m (6')   Wt 113 kg (248 lb 12.8 oz)   SpO2 94%   BMI 33.74 kg/m²     Physical Exam   Constitutional: He appears well-developed. No distress.   HENT:   Head: Normocephalic and atraumatic.   Right Ear: External ear normal.   Left Ear: External ear normal.   Nose: Nose normal.   Mouth/Throat: No oropharyngeal exudate.   Eyes: Pupils are equal, round, and reactive to light. Conjunctivae are normal. Right eye exhibits no discharge. Left eye  exhibits no discharge. No scleral icterus.   Neck: No JVD present.   Cardiovascular: Normal rate and regular rhythm. Exam reveals no gallop and no friction rub.   No murmur heard.  Pulmonary/Chest: Effort normal. No stridor. No respiratory distress. He has no wheezes. He has no rales. He exhibits no tenderness.   Abdominal: Soft. He exhibits no distension. There is no guarding.   Musculoskeletal:         General: No tenderness or deformity. Normal range of motion.      Cervical back: Neck supple.   Neurological: He is alert. He has normal reflexes. He displays normal reflexes. No cranial nerve deficit. He exhibits normal muscle tone. Coordination normal.   Skin: Skin is warm and dry. No rash noted. He is not diaphoretic. No erythema. No pallor.   Psychiatric: His behavior is normal. Judgment and thought content normal.   Nursing note and vitals reviewed.      Assessment:     1. Coronary artery disease involving native coronary artery of native heart without angina pectoris     2. Stenosis of carotid artery, unspecified laterality     3. Elevated hemoglobin A1c     4. High risk medication use  losartan (COZAAR) 25 MG Tab   5. Essential hypertension  atorvastatin (LIPITOR) 40 MG Tab    losartan (COZAAR) 25 MG Tab   6. FRANK on CPAP  losartan (COZAAR) 25 MG Tab   7. Pure hypercholesterolemia  losartan (COZAAR) 25 MG Tab   8. PVD (peripheral vascular disease) (HCC)  atorvastatin (LIPITOR) 40 MG Tab    losartan (COZAAR) 25 MG Tab   9. Stented coronary artery  atorvastatin (LIPITOR) 40 MG Tab   10. Familial hypercholesterolemia  atorvastatin (LIPITOR) 40 MG Tab   11. Hyperlipidemia, unspecified hyperlipidemia type  metoprolol tartrate (LOPRESSOR) 25 MG Tab   12. Benign essential HTN  metoprolol tartrate (LOPRESSOR) 25 MG Tab       Medical Decision Making:  Today's Assessment / Status / Plan:   71-year-old male with hypertension hyperlipidemia CAD and peripheral vascular disease.  All his risk factors are well managed.  I  refilled his losartan atorvastatin and metoprolol.  I reviewed his most recent labs for his eyes medication to use including his LDL creatinine sodium and potassium.  We will see him back in 6 months.

## 2021-07-13 DIAGNOSIS — I10 ESSENTIAL HYPERTENSION: ICD-10-CM

## 2021-07-13 DIAGNOSIS — I25.10 CORONARY ARTERY DISEASE INVOLVING NATIVE CORONARY ARTERY OF NATIVE HEART WITHOUT ANGINA PECTORIS: ICD-10-CM

## 2021-07-13 RX ORDER — NITROGLYCERIN 0.4 MG/1
0.4 TABLET SUBLINGUAL PRN
Qty: 25 TABLET | Refills: 0 | Status: SHIPPED | OUTPATIENT
Start: 2021-07-13 | End: 2022-05-09

## 2021-07-16 DIAGNOSIS — M25.561 ACUTE PAIN OF RIGHT KNEE: ICD-10-CM

## 2021-07-16 NOTE — PROGRESS NOTES
Acute right knee pain, after kneeling on ground to do yard work for an hour 3 days ago. Tried ibuprofen, topical diclofenac, did not help. Recommend patient to go to [facility] express urgent care for further evaluation and management.

## 2021-08-11 ENCOUNTER — APPOINTMENT (RX ONLY)
Dept: URBAN - METROPOLITAN AREA CLINIC 4 | Facility: CLINIC | Age: 71
Setting detail: DERMATOLOGY
End: 2021-08-11

## 2021-08-11 DIAGNOSIS — L30.9 DERMATITIS, UNSPECIFIED: ICD-10-CM

## 2021-08-11 PROCEDURE — ? TREATMENT REGIMEN

## 2021-08-11 PROCEDURE — 99213 OFFICE O/P EST LOW 20 MIN: CPT

## 2021-08-11 PROCEDURE — ? COUNSELING

## 2021-08-11 ASSESSMENT — LOCATION SIMPLE DESCRIPTION DERM: LOCATION SIMPLE: UPPER BACK

## 2021-08-11 ASSESSMENT — LOCATION DETAILED DESCRIPTION DERM: LOCATION DETAILED: INFERIOR THORACIC SPINE

## 2021-08-11 ASSESSMENT — LOCATION ZONE DERM: LOCATION ZONE: TRUNK

## 2021-08-11 NOTE — PROCEDURE: TREATMENT REGIMEN
Continue Regimen: Antihistamine once a day PRN
Initiate Treatment: Apply Elidel BID when not flared\\nUse clobetasol when having a flared area BID then take a break when they’re is no flares.
Detail Level: Zone

## 2021-08-24 ENCOUNTER — SLEEP CENTER VISIT (OUTPATIENT)
Dept: SLEEP MEDICINE | Facility: MEDICAL CENTER | Age: 71
End: 2021-08-24
Payer: MEDICARE

## 2021-08-24 VITALS
HEIGHT: 72 IN | DIASTOLIC BLOOD PRESSURE: 82 MMHG | OXYGEN SATURATION: 95 % | HEART RATE: 56 BPM | BODY MASS INDEX: 34.4 KG/M2 | SYSTOLIC BLOOD PRESSURE: 124 MMHG | WEIGHT: 254 LBS | RESPIRATION RATE: 16 BRPM

## 2021-08-24 DIAGNOSIS — G47.33 OSA ON CPAP: ICD-10-CM

## 2021-08-24 PROCEDURE — 99213 OFFICE O/P EST LOW 20 MIN: CPT | Performed by: FAMILY MEDICINE

## 2021-08-24 NOTE — PROGRESS NOTES
[facility] Follow Up Note     Date: 8/24/2021 / Time: 8:31 AM    Patient ID:   Name:             Jan Rivas   YOB: 1950  Age:                 71 y.o.  male   MRN:               [id number]      Thank you for requesting a sleep medicine consultation on Jan Rivas at the sleep center. He presents today with the chief complaints of FRANK follow up. Pt was diagnosed with FRANK on 11/27/18 the AHI was 87.6/hr and the best tolerated pressure was CPAP 10 cm. The AHI improved to 1.6/hr.    HISTORY OF PRESENT ILLNESS:       Pt is currently on CPAP 10 cm. He goes to sleep around 11:30 pm and wakes up around 7:30 am. He is getting about 7.5 hrs of sleep on a good night. The bad nights are rare per week. He is on trazodone 50 mg 2 tabs per night.it is managed by PCP. Overall,  He does finds his sleep refreshing.He does not take regular naps. He denies any symptoms of RLS, narcolepsy or any symptoms to suggest parasomnias such as nightmares, sleep walking or acting out of dreams.He is using CPAP most days of the week. Pt reports 7 hrs of average nightly use of CPAP. Pt denies snoring, gasping,choking.Pt also denies significant mask leak that is interfering with sleep. The 30 day compliance was downloaded which shows adequate compliance with more that 4 hr usage about 100%. The AHI is has improved to 2.4/hr. The mask leak is normal.The symptoms of FRANK has improved with the therapy.         REVIEW OF SYSTEMS:       Constitutional: Denies fevers, Denies weight changes  Eyes: Denies changes in vision, no eye pain  Ears/Nose/Throat/Mouth: Denies nasal congestion or sore throat   Cardiovascular: Denies chest pain or palpitations   Respiratory: Denies shortness of breath , Denies cough  Gastrointestinal/Hepatic: Denies abdominal pain, nausea, vomiting, diarrhea, constipation or GI bleeding   Genitourinary: Deniesdysuria or frequency  Musculoskeletal/Rheum: Denies  joint pain and swelling    Skin/Breast: Denies rash,   Neurological: Denies headache, confusion, memory loss or focal weakness/parasthesias  Psychiatric: denies mood disorder   Sleep: denies snoring     Comprehensive review of systems form is reviewed with the patient and is attached in the EMR.     PMH:  has a past medical history of Apnea, sleep, CAD (coronary artery disease) (stenLCA 2011), Chickenpox, Azeri measles, Heart attack (HCC), Hyperlipidemia, Hypertension, Mumps, Sleep apnea, Snoring, and Wears glasses.  MEDS:   Current Outpatient Medications:   •  atorvastatin (LIPITOR) 40 MG Tab, Take 1 tablet by mouth 1/2 hour before dinner., Disp: 90 tablet, Rfl: 3  •  losartan (COZAAR) 25 MG Tab, Take 1 tablet by mouth every day., Disp: 90 tablet, Rfl: 3  •  metoprolol tartrate (LOPRESSOR) 25 MG Tab, Take 1 tablet by mouth 2 times a day. TAKE 1 TAB BY MOUTH 2 TIMES A DAY., Disp: 180 tablet, Rfl: 3  •  minocycline (MINOCIN) 100 MG Cap, Take 100 mg by mouth 2 times a day., Disp: , Rfl:   •  clobetasol (TEMOVATE) 0.05 % Cream, , Disp: , Rfl:   •  Aug Betamethasone Dipropionate (DIPROLENE-AF) 0.05 % Cream, Apply 50 g to affected area(s) 2 times a day as needed., Disp: 50 g, Rfl: 0  •  triamcinolone acetonide (KENALOG) 0.1 % Ointment, Apply 2-4 g to affected area(s) 2 times a day., Disp: 1 Tube, Rfl: 0  •  traZODone (DESYREL) 50 MG Tab, TAKE 1 TO 2 TABLETS BY MOUTH AT BEDTIME AS NEEDED, Disp: , Rfl: 7  •  aspirin (ASA) 81 MG CHEW chewable tablet, Take 1 Tab by mouth every day., Disp: 100 Tab, Rfl: 11  •  meloxicam (MOBIC) 15 MG tablet, Take 1 tablet by mouth every day. (Patient not taking: Reported on 8/24/2021), Disp: 30 tablet, Rfl: 0  •  nitroglycerin (NITROSTAT) 0.4 MG SL Tab, PLACE 1 TAB UNDER TONGUE AS NEEDED FOR CHEST PAIN. (Patient not taking: Reported on 8/24/2021), Disp: 25 tablet, Rfl: 0  •  pimecrolimus (ELIDEL) 1 % cream, , Disp: , Rfl:   •  mupirocin (BACTROBAN) 2 % Ointment, APPLY TO OPEN WOUNDS TWICE DAILY UNTIL HEALED., Disp:  , Rfl:   •  latanoprost (XALATAN) 0.005 % Solution, , Disp: , Rfl:   ALLERGIES: No Known Allergies  SURGHX:   Past Surgical History:   Procedure Laterality Date   • [facility] CARDIAC CATH  2/21/15    Cath at [place].  Patent stent and no obstructive CAD.   • [facility] CARDIAC CATH  5/4/10    Taxus stent to LAD   • CYST EXCISION      posterior neck     SOCHX:  reports that he quit smoking about 11 years ago. His smoking use included cigarettes. He has a 21.00 pack-year smoking history. He has never used smokeless tobacco. He reports current alcohol use. He reports current drug use. Drug: Marijuana..  FH:   Family History   Problem Relation Age of Onset   • Heart Attack Mother    • Cancer Mother    • Cancer Father    • Cancer Sister          Physical Exam:  Vitals/ General Appearance:   Weight/BMI: Body mass index is 34.45 kg/m².  /82 (BP Location: Left arm, Patient Position: Sitting, BP Cuff Size: Adult)   Pulse (!) 56   Resp 16   Ht 1.829 m (6')   Wt 115 kg (254 lb)   SpO2 95%   Vitals:    08/24/21 0821   BP: 124/82   BP Location: Left arm   Patient Position: Sitting   BP Cuff Size: Adult   Pulse: (!) 56   Resp: 16   SpO2: 95%   Weight: 115 kg (254 lb)   Height: 1.829 m (6')       Pt. is alert and oriented to time, place and person. Cooperative and in no apparent distress.       Constitutional: Alert, no distress, well-groomed.  Skin: No rashes in visible areas.  Eye: Round. Conjunctiva clear, lids normal. No icterus.   ENMT: Lips pink without lesions, good dentition, moist mucous membranes. Phonation normal.  Neck: No masses, no thyromegaly. Moves freely without pain.  CV: Pulse as reported by patient  Respiratory: Unlabored respiratory effort, no cough or audible wheeze  Psych: Alert and oriented x3, normal affect and mood.     ASSESSMENT AND PLAN     1. Sleep Apnea .The pathophysiology of sleep anea and the increased risk of cardiovascular morbidity from untreated sleep apnea is discussed in detail with the  patient. He is urged to avoid supine sleep, weight gain and alcoholic beverages since all of these can worsen sleep apnea. He is cautioned against drowsy driving. If He feels sleepy while driving, He must pull over for a break/nap, rather than persist on the road, in the interest of He own safety and that of others on the road.   Plan   - Continue CPAP 10 cm with FFM   - supplies are refilled    - compliance download was reviewed and discussed with the pt   - compliance was reinforced     2. Regarding treatment of other past medical problems and general health maintenance,  He is urged to follow up with PCP.

## 2021-08-24 NOTE — PATIENT INSTRUCTIONS
"CPAP and BPAP Information  CPAP and BPAP are methods of helping a person breathe with the use of air pressure. CPAP stands for \"continuous positive airway pressure.\" BPAP stands for \"bi-level positive airway pressure.\" In both methods, air is blown through your nose or mouth and into your air passages to help you breathe well.  CPAP and BPAP use different amounts of pressure to blow air. With CPAP, the amount of pressure stays the same while you breathe in and out. With BPAP, the amount of pressure is increased when you breathe in (inhale) so that you can take larger breaths. Your health care provider will recommend whether CPAP or BPAP would be more helpful for you.  Why are CPAP and BPAP treatments used?  CPAP or BPAP can be helpful if you have:  · Sleep apnea.  · Chronic obstructive pulmonary disease (COPD).  · Heart failure.  · Medical conditions that weaken the muscles of the chest including muscular dystrophy, or neurological diseases such as amyotrophic lateral sclerosis (ALS).  · Other problems that cause breathing to be weak, abnormal, or difficult.  CPAP is most commonly used for obstructive sleep apnea (FRANK) to keep the airways from collapsing when the muscles relax during sleep.  How is CPAP or BPAP administered?  Both CPAP and BPAP are provided by a small machine with a flexible plastic tube that attaches to a plastic mask. You wear the mask. Air is blown through the mask into your nose or mouth. The amount of pressure that is used to blow the air can be adjusted on the machine. Your health care provider will determine the pressure setting that should be used based on your individual needs.  When should CPAP or BPAP be used?  In most cases, the mask only needs to be worn during sleep. Generally, the mask needs to be worn throughout the night and during any daytime naps. People with certain medical conditions may also need to wear the mask at other times when they are awake. Follow instructions from your " health care provider about when to use the machine.  What are some tips for using the mask?    · Because the mask needs to be snug, some people feel trapped or closed-in (claustrophobic) when first using the mask. If you feel this way, you may need to get used to the mask. One way to do this is by holding the mask loosely over your nose or mouth and then gradually applying the mask more snugly. You can also gradually increase the amount of time that you use the mask.  · Masks are available in various types and sizes. Some fit over your mouth and nose while others fit over just your nose. If your mask does not fit well, talk with your health care provider about getting a different one.  · If you are using a mask that fits over your nose and you tend to breathe through your mouth, a chin strap may be applied to help keep your mouth closed.  · The CPAP and BPAP machines have alarms that may sound if the mask comes off or develops a leak.  · If you have trouble with the mask, it is very important that you talk with your health care provider about finding a way to make the mask easier to tolerate. Do not stop using the mask. Stopping the use of the mask could have a negative impact on your health.  What are some tips for using the machine?  · Place your CPAP or BPAP machine on a secure table or stand near an electrical outlet.  · Know where the on/off switch is located on the machine.  · Follow instructions from your health care provider about how to set the pressure on your machine and when you should use it.  · Do not eat or drink while the CPAP or BPAP machine is on. Food or fluids could get pushed into your lungs by the pressure of the CPAP or BPAP.  · Do not smoke. Tobacco smoke residue can damage the machine.  · For home use, CPAP and BPAP machines can be rented or purchased through home health care companies. Many different brands of machines are available. Renting a machine before purchasing may help you find out  which particular machine works well for you.  · Keep the CPAP or BPAP machine and attachments clean. Ask your health care provider for specific instructions.  Get help right away if:  · You have redness or open areas around your nose or mouth where the mask fits.  · You have trouble using the CPAP or BPAP machine.  · You cannot tolerate wearing the CPAP or BPAP mask.  · You have pain, discomfort, and bloating in your abdomen.  Summary  · CPAP and BPAP are methods of helping a person breathe with the use of air pressure.  · Both CPAP and BPAP are provided by a small machine with a flexible plastic tube that attaches to a plastic mask.  · If you have trouble with the mask, it is very important that you talk with your health care provider about finding a way to make the mask easier to tolerate.  This information is not intended to replace advice given to you by your health care provider. Make sure you discuss any questions you have with your health care provider.  Document Released: 09/15/2005 Document Revised: 04/08/2020 Document Reviewed: 11/06/2017  Elsevier Patient Education © 2020 [employer]

## 2021-11-03 LAB
ALBUMIN SERPL-MCNC: 4.1 G/DL (ref 3.8–4.8)
ALBUMIN/GLOB SERPL: 1.8 {RATIO} (ref 1.2–2.2)
ALP SERPL-CCNC: 61 IU/L (ref 44–121)
ALT SERPL-CCNC: 21 IU/L (ref 0–44)
AST SERPL-CCNC: 22 IU/L (ref 0–40)
BILIRUB SERPL-MCNC: 0.4 MG/DL (ref 0–1.2)
BUN SERPL-MCNC: 16 MG/DL (ref 8–27)
BUN/CREAT SERPL: 15 (ref 10–24)
CALCIUM SERPL-MCNC: 9.3 MG/DL (ref 8.6–10.2)
CHLORIDE SERPL-SCNC: 102 MMOL/L (ref 96–106)
CHOLEST SERPL-MCNC: 111 MG/DL (ref 100–199)
CO2 SERPL-SCNC: 26 MMOL/L (ref 20–29)
CREAT SERPL-MCNC: 1.1 MG/DL (ref 0.76–1.27)
GLOBULIN SER CALC-MCNC: 2.3 G/DL (ref 1.5–4.5)
GLUCOSE SERPL-MCNC: 110 MG/DL (ref 65–99)
HBA1C MFR BLD: 6.2 % (ref 4.8–5.6)
HDLC SERPL-MCNC: 50 MG/DL
LABORATORY COMMENT REPORT: NORMAL
LDLC SERPL CALC-MCNC: 41 MG/DL (ref 0–99)
POTASSIUM SERPL-SCNC: 4.3 MMOL/L (ref 3.5–5.2)
PROT SERPL-MCNC: 6.4 G/DL (ref 6–8.5)
PSA SERPL-MCNC: 1.2 NG/ML (ref 0–4)
SODIUM SERPL-SCNC: 141 MMOL/L (ref 134–144)
TRIGL SERPL-MCNC: 110 MG/DL (ref 0–149)
VLDLC SERPL CALC-MCNC: 20 MG/DL (ref 5–40)

## 2021-11-10 ENCOUNTER — APPOINTMENT (RX ONLY)
Dept: URBAN - METROPOLITAN AREA CLINIC 4 | Facility: CLINIC | Age: 71
Setting detail: DERMATOLOGY
End: 2021-11-10

## 2021-11-10 DIAGNOSIS — L30.9 DERMATITIS, UNSPECIFIED: ICD-10-CM | Status: INADEQUATELY CONTROLLED

## 2021-11-10 PROCEDURE — ? COUNSELING

## 2021-11-10 PROCEDURE — ? TREATMENT REGIMEN

## 2021-11-10 PROCEDURE — 99213 OFFICE O/P EST LOW 20 MIN: CPT

## 2021-11-10 ASSESSMENT — LOCATION ZONE DERM: LOCATION ZONE: TRUNK

## 2021-11-10 ASSESSMENT — LOCATION SIMPLE DESCRIPTION DERM: LOCATION SIMPLE: UPPER BACK

## 2021-11-10 ASSESSMENT — LOCATION DETAILED DESCRIPTION DERM: LOCATION DETAILED: INFERIOR THORACIC SPINE

## 2021-11-10 NOTE — PROCEDURE: TREATMENT REGIMEN
Continue Regimen: Antihistamine once a day PRN\\nClobetasol when having a flared area BID then take a break when they’re is no flares.
Initiate Treatment: OTC Aveeno anti-itch cream
Detail Level: Zone
Plan: Patient states the Elidel cream didn’t improve the areas of concern. Discussed with patient the options of internal steroids and other options. Patient agrees to continue Clobetasol cream and Aveeno cream.

## 2021-11-12 ENCOUNTER — APPOINTMENT (OUTPATIENT)
Dept: MEDICAL GROUP | Facility: MEDICAL CENTER | Age: 71
End: 2021-11-12
Payer: MEDICARE

## 2021-11-16 ENCOUNTER — OFFICE VISIT (OUTPATIENT)
Dept: MEDICAL GROUP | Facility: MEDICAL CENTER | Age: 71
End: 2021-11-16
Payer: MEDICARE

## 2021-11-16 VITALS
DIASTOLIC BLOOD PRESSURE: 68 MMHG | BODY MASS INDEX: 34.04 KG/M2 | HEART RATE: 53 BPM | HEIGHT: 72 IN | OXYGEN SATURATION: 95 % | SYSTOLIC BLOOD PRESSURE: 120 MMHG | TEMPERATURE: 97.1 F | WEIGHT: 251.32 LBS | RESPIRATION RATE: 16 BRPM

## 2021-11-16 DIAGNOSIS — R73.09 ELEVATED HEMOGLOBIN A1C: ICD-10-CM

## 2021-11-16 DIAGNOSIS — E78.00 PURE HYPERCHOLESTEROLEMIA: ICD-10-CM

## 2021-11-16 DIAGNOSIS — I10 PRIMARY HYPERTENSION: ICD-10-CM

## 2021-11-16 DIAGNOSIS — F51.01 PRIMARY INSOMNIA: ICD-10-CM

## 2021-11-16 PROCEDURE — 99214 OFFICE O/P EST MOD 30 MIN: CPT | Performed by: NURSE PRACTITIONER

## 2021-11-16 RX ORDER — TRAZODONE HYDROCHLORIDE 50 MG/1
50-100 TABLET ORAL NIGHTLY PRN
Qty: 180 TABLET | Refills: 3 | Status: SHIPPED | OUTPATIENT
Start: 2021-11-16 | End: 2023-05-25

## 2021-11-16 NOTE — ASSESSMENT & PLAN NOTE
Stable on losartan, metoprolol.  Followed by cardiology.  Denies any recent chest pain, dizziness, palpitation.  Kidney function has been normal

## 2021-11-16 NOTE — ASSESSMENT & PLAN NOTE
Component      Latest Ref Rng & Units 8/3/2020 2/9/2021 11/2/2021           4:18 AM  5:50 AM  5:45 AM   Glycohemoglobin      4.8 - 5.6 % 5.7 (H) 5.8 (H) 6.2 (H)   A1c has increased in the last few months, he has put on about 15 pounds.  Walking 30 minutes daily, no other planned exercise.  Admits that he could be doing better with his diet.  No medication for this issue.  No polyuria, polydipsia

## 2021-11-16 NOTE — ASSESSMENT & PLAN NOTE
Stable, controlled on atorvastatin.  Tolerating medication without difficulty.  Liver function normal.

## 2021-11-16 NOTE — PROGRESS NOTES
Subjective:     Chief Complaint   Patient presents with   • Lab Results     Jan Rivas is a 71 y.o. male here today to follow up on:    Elevated hemoglobin A1c  Component      Latest Ref Rng & Units 8/3/2020 2/9/2021 11/2/2021           4:18 AM  5:50 AM  5:45 AM   Glycohemoglobin      4.8 - 5.6 % 5.7 (H) 5.8 (H) 6.2 (H)   A1c has increased in the last few months, he has put on about 15 pounds.  Walking 30 minutes daily, no other planned exercise.  Admits that he could be doing better with his diet.  No medication for this issue.  No polyuria, polydipsia    Hypertension  Stable on losartan, metoprolol.  Followed by cardiology.  Denies any recent chest pain, dizziness, palpitation.  Kidney function has been normal    Pure hypercholesterolemia  Stable, controlled on atorvastatin.  Tolerating medication without difficulty.  Liver function normal.    BMI 34.0-34.9,adult  He has gained about 15 pounds in the last few months       Current medicines (including changes today)  Current Outpatient Medications   Medication Sig Dispense Refill   • traZODone (DESYREL) 50 MG Tab Take 1-2 Tablets by mouth at bedtime as needed for Sleep. 180 Tablet 3   • meloxicam (MOBIC) 15 MG tablet Take 1 tablet by mouth every day. (Patient not taking: Reported on 8/24/2021) 30 tablet 0   • nitroglycerin (NITROSTAT) 0.4 MG SL Tab PLACE 1 TAB UNDER TONGUE AS NEEDED FOR CHEST PAIN. (Patient not taking: Reported on 8/24/2021) 25 tablet 0   • atorvastatin (LIPITOR) 40 MG Tab Take 1 tablet by mouth 1/2 hour before dinner. 90 tablet 3   • losartan (COZAAR) 25 MG Tab Take 1 tablet by mouth every day. 90 tablet 3   • metoprolol tartrate (LOPRESSOR) 25 MG Tab Take 1 tablet by mouth 2 times a day. TAKE 1 TAB BY MOUTH 2 TIMES A DAY. 180 tablet 3   • pimecrolimus (ELIDEL) 1 % cream      • minocycline (MINOCIN) 100 MG Cap Take 100 mg by mouth 2 times a day.     • clobetasol (TEMOVATE) 0.05 % Cream      • mupirocin (BACTROBAN) 2 % Ointment APPLY TO  OPEN WOUNDS TWICE DAILY UNTIL HEALED.     • Aug Betamethasone Dipropionate (DIPROLENE-AF) 0.05 % Cream Apply 50 g to affected area(s) 2 times a day as needed. 50 g 0   • triamcinolone acetonide (KENALOG) 0.1 % Ointment Apply 2-4 g to affected area(s) 2 times a day. 1 Tube 0   • latanoprost (XALATAN) 0.005 % Solution      • aspirin (ASA) 81 MG CHEW chewable tablet Take 1 Tab by mouth every day. 100 Tab 11     No current facility-administered medications for this visit.     He  has a past medical history of Apnea, sleep, CAD (coronary artery disease) (stenLCA 2011), Chickenpox, Malagasy measles, Heart attack (HCC), Hyperlipidemia, Hypertension, Mumps, Sleep apnea, Snoring, and Wears glasses.    ROS included above     Objective:     /68 (BP Location: Right arm, Patient Position: Sitting, BP Cuff Size: Adult)   Pulse (!) 53   Temp 36.2 °C (97.1 °F) (Temporal)   Resp 16   Ht 1.829 m (6')   Wt 114 kg (251 lb 5.2 oz)   SpO2 95%  Body mass index is 34.09 kg/m².     Physical Exam:  General: Alert, oriented in no acute distress.  Eye contact is good, speech is normal, affect calm  Lungs: clear to auscultation bilaterally, normal effort, no wheeze/ rhonchi/ rales.  CV: regular rate and rhythm, S1, S2, no murmur  Abdomen: soft, nontender  Ext: no edema, color normal, vascularity normal, temperature normal    Assessment and Plan:   The following treatment plan was discussed  1. Elevated hemoglobin A1c   A1c now at 6.2.  He has gained 15 pounds in the last few months.  We have discussed correlation between weight and glycemic control.  Dietary recommendations reviewed, encouraged increased exercise to 1 hour daily.  No medication at this time.  He is motivated to work on diet and exercise plan for repeat labs in 6 months  HEMOGLOBIN A1C   2. Primary insomnia   stable with as needed use of trazodone  traZODone (DESYREL) 50 MG Tab   3. Primary hypertension   stable  Comp Metabolic Panel   4. Pure hypercholesterolemia    controlled on statin  Lipid Profile   5. BMI 34.0-34.9,adult  Patient identified as having weight management issue.  Appropriate orders and counseling given.       Followup: Labs in 6 months.  Patient is aware that I am leaving [facility] supplies we will plan to establish with a new provider         Please note that this dictation was created using voice recognition software. I have worked with consultants from the vendor as well as technical experts from [facility] to optimize the interface. I have made every reasonable attempt to correct obvious errors, but I expect that there are errors of grammar and possibly content that I did not discover before finalizing the note.

## 2021-12-27 ENCOUNTER — OFFICE VISIT (OUTPATIENT)
Dept: CARDIOLOGY | Facility: MEDICAL CENTER | Age: 71
End: 2021-12-27
Payer: MEDICARE

## 2021-12-27 VITALS
RESPIRATION RATE: 16 BRPM | SYSTOLIC BLOOD PRESSURE: 112 MMHG | DIASTOLIC BLOOD PRESSURE: 68 MMHG | WEIGHT: 253.6 LBS | HEART RATE: 55 BPM | HEIGHT: 72 IN | OXYGEN SATURATION: 94 % | BODY MASS INDEX: 34.35 KG/M2

## 2021-12-27 DIAGNOSIS — E78.00 PURE HYPERCHOLESTEROLEMIA: ICD-10-CM

## 2021-12-27 DIAGNOSIS — Z95.5 STENTED CORONARY ARTERY: ICD-10-CM

## 2021-12-27 DIAGNOSIS — Z79.899 HIGH RISK MEDICATION USE: ICD-10-CM

## 2021-12-27 DIAGNOSIS — G47.33 OSA ON CPAP: ICD-10-CM

## 2021-12-27 DIAGNOSIS — I73.9 PVD (PERIPHERAL VASCULAR DISEASE) (HCC): ICD-10-CM

## 2021-12-27 DIAGNOSIS — I10 ESSENTIAL HYPERTENSION: ICD-10-CM

## 2021-12-27 DIAGNOSIS — I10 PRIMARY HYPERTENSION: ICD-10-CM

## 2021-12-27 DIAGNOSIS — E78.5 HYPERLIPIDEMIA, UNSPECIFIED HYPERLIPIDEMIA TYPE: ICD-10-CM

## 2021-12-27 DIAGNOSIS — I65.29 STENOSIS OF CAROTID ARTERY, UNSPECIFIED LATERALITY: ICD-10-CM

## 2021-12-27 DIAGNOSIS — R73.09 ELEVATED HEMOGLOBIN A1C: ICD-10-CM

## 2021-12-27 DIAGNOSIS — E78.01 FAMILIAL HYPERCHOLESTEROLEMIA: ICD-10-CM

## 2021-12-27 DIAGNOSIS — I10 BENIGN ESSENTIAL HTN: ICD-10-CM

## 2021-12-27 DIAGNOSIS — I25.10 CORONARY ARTERY DISEASE INVOLVING NATIVE CORONARY ARTERY OF NATIVE HEART WITHOUT ANGINA PECTORIS: ICD-10-CM

## 2021-12-27 PROCEDURE — 99214 OFFICE O/P EST MOD 30 MIN: CPT | Performed by: INTERNAL MEDICINE

## 2021-12-27 RX ORDER — ATORVASTATIN CALCIUM 40 MG/1
40 TABLET, FILM COATED ORAL
Qty: 90 TABLET | Refills: 3 | Status: SHIPPED | OUTPATIENT
Start: 2021-12-27 | End: 2022-12-30 | Stop reason: SDUPTHER

## 2021-12-27 RX ORDER — LOSARTAN POTASSIUM 25 MG/1
25 TABLET ORAL DAILY
Qty: 90 TABLET | Refills: 3 | Status: SHIPPED | OUTPATIENT
Start: 2021-12-27 | End: 2022-12-30 | Stop reason: SDUPTHER

## 2021-12-27 ASSESSMENT — ENCOUNTER SYMPTOMS
SORE THROAT: 0
CARDIOVASCULAR NEGATIVE: 1
DIZZINESS: 0
SPUTUM PRODUCTION: 0
HEMOPTYSIS: 0
COUGH: 0
LOSS OF CONSCIOUSNESS: 0
PND: 0
NEUROLOGICAL NEGATIVE: 1
PALPITATIONS: 0
RESPIRATORY NEGATIVE: 1
SHORTNESS OF BREATH: 0
BRUISES/BLEEDS EASILY: 0
CHILLS: 0
CLAUDICATION: 0
GASTROINTESTINAL NEGATIVE: 1
WEAKNESS: 0
MUSCULOSKELETAL NEGATIVE: 1
FEVER: 0
EYES NEGATIVE: 1
WHEEZING: 0
STRIDOR: 0
ORTHOPNEA: 0
CONSTITUTIONAL NEGATIVE: 1

## 2021-12-27 NOTE — PROGRESS NOTES
Chief Complaint   Patient presents with   • Coronary Artery Disease   • Hypertension       Subjective:   New Rivas is a 71 y.o. male who presents today as a follow-up for his hypertension hyperlipidemia and carotid stenosis.  Since he was last seen he continues on the medication with no changes.  Is having no chest pain palpitations or PND.  Last LDL was at goal.  Last time he had his carotids imaged was in .    Past Medical History:   Diagnosis Date   • Apnea, sleep    • CAD (coronary artery disease) stenLCA    • Chickenpox    • Croatian measles    • Heart attack (HCC)    • Hyperlipidemia    • Hypertension    • Mumps    • Sleep apnea    • Snoring    • Wears glasses      Past Surgical History:   Procedure Laterality Date   • ZZZ CARDIAC CATH  2/21/15    Cath at [facility].  Patent stent and no obstructive CAD.   • ZZZ CARDIAC CATH  5/4/10    Taxus stent to LAD   • CYST EXCISION      posterior neck     Family History   Problem Relation Age of Onset   • Heart Attack Mother    • Cancer Mother    • Cancer Father    • Cancer Sister      Social History     Socioeconomic History   • Marital status:      Spouse name: Not on file   • Number of children: Not on file   • Years of education: Not on file   • Highest education level: Not on file   Occupational History   • Not on file   Tobacco Use   • Smoking status: Former Smoker     Packs/day: 0.50     Years: 42.00     Pack years: 21.00     Types: Cigarettes     Quit date: 2010     Years since quittin.6   • Smokeless tobacco: Never Used   Vaping Use   • Vaping Use: Former   Substance and Sexual Activity   • Alcohol use: Yes     Comment: 2 drinks per day    • Drug use: Yes     Types: Marijuana     Comment: Occ   • Sexual activity: Not on file   Other Topics Concern   • Not on file   Social History Narrative   • Not on file     Social Determinants of Health     Financial Resource Strain:    • Difficulty of Paying Living Expenses: Not on file   Food  Insecurity:    • Worried About Running Out of Food in the Last Year: Not on file   • Ran Out of Food in the Last Year: Not on file   Transportation Needs:    • Lack of Transportation (Medical): Not on file   • Lack of Transportation (Non-Medical): Not on file   Physical Activity:    • Days of Exercise per Week: Not on file   • Minutes of Exercise per Session: Not on file   Stress:    • Feeling of Stress : Not on file   Social Connections:    • Frequency of Communication with Friends and Family: Not on file   • Frequency of Social Gatherings with Friends and Family: Not on file   • Attends Church Services: Not on file   • Active Member of Clubs or Organizations: Not on file   • Attends Club or Organization Meetings: Not on file   • Marital Status: Not on file   Intimate Partner Violence:    • Fear of Current or Ex-Partner: Not on file   • Emotionally Abused: Not on file   • Physically Abused: Not on file   • Sexually Abused: Not on file   Housing Stability:    • Unable to Pay for Housing in the Last Year: Not on file   • Number of Places Lived in the Last Year: Not on file   • Unstable Housing in the Last Year: Not on file     No Known Allergies  Outpatient Encounter Medications as of 12/27/2021   Medication Sig Dispense Refill   • metoprolol tartrate (LOPRESSOR) 25 MG Tab Take 1 Tablet by mouth 2 times a day. TAKE 1 TAB BY MOUTH 2 TIMES A DAY. 180 Tablet 3   • losartan (COZAAR) 25 MG Tab Take 1 Tablet by mouth every day. 90 Tablet 3   • atorvastatin (LIPITOR) 40 MG Tab Take 1 Tablet by mouth 1/2 hour before dinner. 90 Tablet 3   • traZODone (DESYREL) 50 MG Tab Take 1-2 Tablets by mouth at bedtime as needed for Sleep. 180 Tablet 3   • nitroglycerin (NITROSTAT) 0.4 MG SL Tab PLACE 1 TAB UNDER TONGUE AS NEEDED FOR CHEST PAIN. 25 tablet 0   • pimecrolimus (ELIDEL) 1 % cream      • minocycline (MINOCIN) 100 MG Cap Take 100 mg by mouth 2 times a day.     • clobetasol (TEMOVATE) 0.05 % Cream      • mupirocin (BACTROBAN)  2 % Ointment APPLY TO OPEN WOUNDS TWICE DAILY UNTIL HEALED.     • Aug Betamethasone Dipropionate (DIPROLENE-AF) 0.05 % Cream Apply 50 g to affected area(s) 2 times a day as needed. 50 g 0   • triamcinolone acetonide (KENALOG) 0.1 % Ointment Apply 2-4 g to affected area(s) 2 times a day. 1 Tube 0   • latanoprost (XALATAN) 0.005 % Solution      • aspirin (ASA) 81 MG CHEW chewable tablet Take 1 Tab by mouth every day. 100 Tab 11   • meloxicam (MOBIC) 15 MG tablet Take 1 tablet by mouth every day. (Patient not taking: Reported on 8/24/2021) 30 tablet 0   • [DISCONTINUED] atorvastatin (LIPITOR) 40 MG Tab Take 1 tablet by mouth 1/2 hour before dinner. 90 tablet 3   • [DISCONTINUED] losartan (COZAAR) 25 MG Tab Take 1 tablet by mouth every day. 90 tablet 3   • [DISCONTINUED] metoprolol tartrate (LOPRESSOR) 25 MG Tab Take 1 tablet by mouth 2 times a day. TAKE 1 TAB BY MOUTH 2 TIMES A DAY. 180 tablet 3     No facility-administered encounter medications on file as of 12/27/2021.     Review of Systems   Constitutional: Negative.  Negative for chills, fever and malaise/fatigue.   HENT: Negative.  Negative for sore throat.    Eyes: Negative.    Respiratory: Negative.  Negative for cough, hemoptysis, sputum production, shortness of breath, wheezing and stridor.    Cardiovascular: Negative.  Negative for chest pain, palpitations, orthopnea, claudication, leg swelling and PND.   Gastrointestinal: Negative.    Genitourinary: Negative.    Musculoskeletal: Negative.    Skin: Negative.    Neurological: Negative.  Negative for dizziness, loss of consciousness and weakness.   Endo/Heme/Allergies: Negative.  Does not bruise/bleed easily.   All other systems reviewed and are negative.       Objective:   /68 (BP Location: Left arm, Patient Position: Sitting, BP Cuff Size: Adult)   Pulse (!) 55   Resp 16   Ht 1.829 m (6')   Wt 115 kg (253 lb 9.6 oz)   SpO2 94%   BMI 34.39 kg/m²     Physical Exam  Vitals and nursing note reviewed.    Constitutional:       General: He is not in acute distress.     Appearance: He is well-developed. He is not diaphoretic.   HENT:      Head: Normocephalic and atraumatic.      Right Ear: External ear normal.      Left Ear: External ear normal.      Nose: Nose normal.      Mouth/Throat:      Pharynx: No oropharyngeal exudate.   Eyes:      General: No scleral icterus.        Right eye: No discharge.         Left eye: No discharge.      Conjunctiva/sclera: Conjunctivae normal.      Pupils: Pupils are equal, round, and reactive to light.   Neck:      Vascular: No JVD.   Cardiovascular:      Rate and Rhythm: Normal rate and regular rhythm.      Heart sounds: No murmur heard.  No friction rub. No gallop.    Pulmonary:      Effort: Pulmonary effort is normal. No respiratory distress.      Breath sounds: No stridor. No wheezing or rales.   Chest:      Chest wall: No tenderness.   Abdominal:      General: There is no distension.      Palpations: Abdomen is soft.      Tenderness: There is no guarding.   Musculoskeletal:         General: No tenderness or deformity. Normal range of motion.      Cervical back: Neck supple.   Skin:     General: Skin is warm and dry.      Coloration: Skin is not pale.      Findings: No erythema or rash.   Neurological:      Mental Status: He is alert.      Cranial Nerves: No cranial nerve deficit.      Motor: No abnormal muscle tone.      Coordination: Coordination normal.      Deep Tendon Reflexes: Reflexes are normal and symmetric. Reflexes normal.   Psychiatric:         Behavior: Behavior normal.         Thought Content: Thought content normal.         Judgment: Judgment normal.         Assessment:     1. Coronary artery disease involving native coronary artery of native heart without angina pectoris     2. Stented coronary artery  atorvastatin (LIPITOR) 40 MG Tab   3. Primary hypertension     4. Pure hypercholesterolemia  losartan (COZAAR) 25 MG Tab   5. Stenosis of carotid artery,  unspecified laterality     6. PVD (peripheral vascular disease) (HCC)  losartan (COZAAR) 25 MG Tab    atorvastatin (LIPITOR) 40 MG Tab   7. High risk medication use  losartan (COZAAR) 25 MG Tab   8. Elevated hemoglobin A1c     9. FRANK on CPAP  losartan (COZAAR) 25 MG Tab   10. BMI 34.0-34.9,adult     11. Hyperlipidemia, unspecified hyperlipidemia type  metoprolol tartrate (LOPRESSOR) 25 MG Tab   12. Benign essential HTN  metoprolol tartrate (LOPRESSOR) 25 MG Tab   13. Essential hypertension  losartan (COZAAR) 25 MG Tab    atorvastatin (LIPITOR) 40 MG Tab   14. Familial hypercholesterolemia  atorvastatin (LIPITOR) 40 MG Tab       Medical Decision Making:  Today's Assessment / Status / Plan:   71-year-old male with hypertension hyperlipidemia CAD and peripheral vascular disease.  I refilled his medications.  We did discuss indication for reimaging his carotids but given his lack of symptoms and good LDL control we will hold off for now.  I will see him back in 1 year.

## 2022-02-15 ENCOUNTER — APPOINTMENT (RX ONLY)
Dept: URBAN - METROPOLITAN AREA CLINIC 4 | Facility: CLINIC | Age: 72
Setting detail: DERMATOLOGY
End: 2022-02-15

## 2022-02-15 DIAGNOSIS — L30.9 DERMATITIS, UNSPECIFIED: ICD-10-CM | Status: INADEQUATELY CONTROLLED

## 2022-02-15 PROCEDURE — ? INTRAMUSCULAR KENALOG

## 2022-02-15 PROCEDURE — 96372 THER/PROPH/DIAG INJ SC/IM: CPT

## 2022-02-15 PROCEDURE — ? COUNSELING

## 2022-02-15 PROCEDURE — ? TREATMENT REGIMEN

## 2022-02-15 PROCEDURE — 99213 OFFICE O/P EST LOW 20 MIN: CPT | Mod: 25

## 2022-02-15 PROCEDURE — ? SEPARATE AND IDENTIFIABLE DOCUMENTATION

## 2022-02-15 ASSESSMENT — LOCATION ZONE DERM: LOCATION ZONE: TRUNK

## 2022-02-15 ASSESSMENT — LOCATION SIMPLE DESCRIPTION DERM
LOCATION SIMPLE: LEFT BUTTOCK
LOCATION SIMPLE: UPPER BACK

## 2022-02-15 ASSESSMENT — LOCATION DETAILED DESCRIPTION DERM
LOCATION DETAILED: INFERIOR THORACIC SPINE
LOCATION DETAILED: LEFT BUTTOCK

## 2022-02-15 NOTE — PROCEDURE: INTRAMUSCULAR KENALOG
Total Volume (Ccs): 2
Expiration Date (Optional): Feb 2023
Kenalog Preparation: kenalog
Lot # (Optional): [id number]
Detail Level: Detailed
Concentration (Mg/Ml) Of Additional Medication: 2.5
Consent: The risks of atrophy were reviewed with the patient.
Concentration (Mg/Ml): 40.0
Administered By (Optional): RAUL Nagel
Add Option For Additional Mediation: No
Patient declined information

## 2022-02-15 NOTE — PROCEDURE: TREATMENT REGIMEN
Continue Regimen: Antihistamine once a day\\nClobetasol when having a flared area BID then take a break when they’re is no flares.\\nOTC Aveeno anti-itch cream qd
Detail Level: Zone
Plan: Patient presents with flares on both arms and back. Discussed with patient the risk factors of internal steroids. Patient agrees to receive IM TAC today. Discussed with patient risk factors and if IM injection doesn’t help control flared areas by next visit consider starting Dupixent process.

## 2022-05-03 ENCOUNTER — APPOINTMENT (RX ONLY)
Dept: URBAN - METROPOLITAN AREA CLINIC 4 | Facility: CLINIC | Age: 72
Setting detail: DERMATOLOGY
End: 2022-05-03

## 2022-05-03 DIAGNOSIS — L81.8 OTHER SPECIFIED DISORDERS OF PIGMENTATION: ICD-10-CM

## 2022-05-03 DIAGNOSIS — L85.3 XEROSIS CUTIS: ICD-10-CM

## 2022-05-03 DIAGNOSIS — L30.9 DERMATITIS, UNSPECIFIED: ICD-10-CM | Status: INADEQUATELY CONTROLLED

## 2022-05-03 PROCEDURE — 99214 OFFICE O/P EST MOD 30 MIN: CPT

## 2022-05-03 PROCEDURE — ? COUNSELING

## 2022-05-03 PROCEDURE — ? TREATMENT REGIMEN

## 2022-05-03 PROCEDURE — ? PRESCRIPTION

## 2022-05-03 PROCEDURE — ? SEPARATE AND IDENTIFIABLE DOCUMENTATION

## 2022-05-03 RX ORDER — DOXYCYCLINE HYCLATE 100 MG/1
1 CAPSULE, GELATIN COATED ORAL BID
Qty: 60 | Refills: 6 | Status: ERX | COMMUNITY
Start: 2022-05-03

## 2022-05-03 RX ORDER — PIMECROLIMUS 10 MG/G
1 CREAM TOPICAL QD
Qty: 60 | Refills: 4 | Status: ERX | COMMUNITY
Start: 2022-05-03

## 2022-05-03 RX ADMIN — PIMECROLIMUS 1: 10 CREAM TOPICAL at 00:00

## 2022-05-03 RX ADMIN — DOXYCYCLINE HYCLATE 1: 100 CAPSULE, GELATIN COATED ORAL at 00:00

## 2022-05-03 ASSESSMENT — LOCATION ZONE DERM
LOCATION ZONE: NOSE
LOCATION ZONE: TRUNK

## 2022-05-03 ASSESSMENT — LOCATION DETAILED DESCRIPTION DERM
LOCATION DETAILED: COLUMELLA
LOCATION DETAILED: INFERIOR THORACIC SPINE

## 2022-05-03 ASSESSMENT — LOCATION SIMPLE DESCRIPTION DERM
LOCATION SIMPLE: UPPER BACK
LOCATION SIMPLE: NOSE

## 2022-05-03 NOTE — PROCEDURE: COUNSELING
Detail Level: Generalized
Detail Level: Detailed
Moisturizer Recommendations: Cerave cream, Cetaphil cream, Amlactin lotion

## 2022-05-03 NOTE — PROCEDURE: TREATMENT REGIMEN
Continue Regimen: Antihistamine twice a day\\nClobetasol when having a flared area BID then take a break when they’re is no flares.\\nOTC creams qd
Initiate Treatment: Doxycycline 100 mg twice daily
Detail Level: Zone
Plan: Patient presents with flares on both arms. Patient states IM injection helped with itching for 3-4 weeks that patient noticed. Discussed with patient side effects of having to much of cortisone steroid, will hold off on injection today and patient will call office if patient needs another IM injection.
Discontinue Regimen: Minocycline tablets
Initiate Treatment: Doxycycline capsules 100 mg

## 2022-05-09 ENCOUNTER — APPOINTMENT (OUTPATIENT)
Dept: RADIOLOGY | Facility: MEDICAL CENTER | Age: 72
End: 2022-05-09
Payer: MEDICARE

## 2022-05-09 ENCOUNTER — APPOINTMENT (OUTPATIENT)
Dept: RADIOLOGY | Facility: MEDICAL CENTER | Age: 72
End: 2022-05-09
Attending: EMERGENCY MEDICINE
Payer: MEDICARE

## 2022-05-09 ENCOUNTER — HOSPITAL ENCOUNTER (INPATIENT)
Facility: MEDICAL CENTER | Age: 72
LOS: 2 days | DRG: 065 | End: 2022-05-11
Attending: EMERGENCY MEDICINE | Admitting: INTERNAL MEDICINE
Payer: MEDICARE

## 2022-05-09 ENCOUNTER — HOSPITAL ENCOUNTER (INPATIENT)
Facility: REHABILITATION | Age: 72
End: 2022-05-09
Attending: PHYSICAL MEDICINE & REHABILITATION | Admitting: PHYSICAL MEDICINE & REHABILITATION
Payer: MEDICARE

## 2022-05-09 ENCOUNTER — HOSPITAL ENCOUNTER (EMERGENCY)
Facility: MEDICAL CENTER | Age: 72
End: 2022-05-09
Attending: EMERGENCY MEDICINE
Payer: MEDICARE

## 2022-05-09 VITALS
RESPIRATION RATE: 18 BRPM | OXYGEN SATURATION: 94 % | WEIGHT: 250 LBS | DIASTOLIC BLOOD PRESSURE: 82 MMHG | TEMPERATURE: 97.1 F | BODY MASS INDEX: 33.86 KG/M2 | SYSTOLIC BLOOD PRESSURE: 137 MMHG | HEIGHT: 72 IN | HEART RATE: 64 BPM

## 2022-05-09 DIAGNOSIS — R47.01 APHASIA: ICD-10-CM

## 2022-05-09 DIAGNOSIS — I63.9 ACUTE ISCHEMIC STROKE (HCC): ICD-10-CM

## 2022-05-09 DIAGNOSIS — I63.9 ACUTE CVA (CEREBROVASCULAR ACCIDENT) (HCC): Primary | ICD-10-CM

## 2022-05-09 LAB
ABO GROUP BLD: NORMAL
ALBUMIN SERPL BCP-MCNC: 4.2 G/DL (ref 3.2–4.9)
ALBUMIN/GLOB SERPL: 1.5 G/DL
ALP SERPL-CCNC: 69 U/L (ref 30–99)
ALT SERPL-CCNC: 22 U/L (ref 2–50)
ANION GAP SERPL CALC-SCNC: 12 MMOL/L (ref 7–16)
APTT PPP: 25.8 SEC (ref 24.7–36)
AST SERPL-CCNC: 19 U/L (ref 12–45)
BASOPHILS # BLD AUTO: 0.7 % (ref 0–1.8)
BASOPHILS # BLD: 0.06 K/UL (ref 0–0.12)
BILIRUB SERPL-MCNC: 0.5 MG/DL (ref 0.1–1.5)
BLD GP AB SCN SERPL QL: NORMAL
BUN SERPL-MCNC: 16 MG/DL (ref 8–22)
CALCIUM SERPL-MCNC: 9.2 MG/DL (ref 8.4–10.2)
CHLORIDE SERPL-SCNC: 101 MMOL/L (ref 96–112)
CO2 SERPL-SCNC: 29 MMOL/L (ref 20–33)
CREAT SERPL-MCNC: 1.15 MG/DL (ref 0.5–1.4)
EKG IMPRESSION: NORMAL
EOSINOPHIL # BLD AUTO: 0.12 K/UL (ref 0–0.51)
EOSINOPHIL NFR BLD: 1.4 % (ref 0–6.9)
ERYTHROCYTE [DISTWIDTH] IN BLOOD BY AUTOMATED COUNT: 48.3 FL (ref 35.9–50)
GFR SERPLBLD CREATININE-BSD FMLA CKD-EPI: 68 ML/MIN/1.73 M 2
GLOBULIN SER CALC-MCNC: 2.8 G/DL (ref 1.9–3.5)
GLUCOSE BLD STRIP.AUTO-MCNC: 111 MG/DL (ref 65–99)
GLUCOSE SERPL-MCNC: 101 MG/DL (ref 65–99)
HCT VFR BLD AUTO: 41.8 % (ref 42–52)
HGB BLD-MCNC: 13.6 G/DL (ref 14–18)
IMM GRANULOCYTES # BLD AUTO: 0.06 K/UL (ref 0–0.11)
IMM GRANULOCYTES NFR BLD AUTO: 0.7 % (ref 0–0.9)
INR PPP: 0.99 (ref 0.87–1.13)
LYMPHOCYTES # BLD AUTO: 2.42 K/UL (ref 1–4.8)
LYMPHOCYTES NFR BLD: 28.5 % (ref 22–41)
MCH RBC QN AUTO: 32.5 PG (ref 27–33)
MCHC RBC AUTO-ENTMCNC: 32.5 G/DL (ref 33.7–35.3)
MCV RBC AUTO: 99.8 FL (ref 81.4–97.8)
MONOCYTES # BLD AUTO: 0.77 K/UL (ref 0–0.85)
MONOCYTES NFR BLD AUTO: 9.1 % (ref 0–13.4)
NEUTROPHILS # BLD AUTO: 5.06 K/UL (ref 1.82–7.42)
NEUTROPHILS NFR BLD: 59.6 % (ref 44–72)
NRBC # BLD AUTO: 0 K/UL
NRBC BLD-RTO: 0 /100 WBC
PLATELET # BLD AUTO: 217 K/UL (ref 164–446)
PMV BLD AUTO: 10 FL (ref 9–12.9)
POTASSIUM SERPL-SCNC: 3.9 MMOL/L (ref 3.6–5.5)
PROT SERPL-MCNC: 7 G/DL (ref 6–8.2)
PROTHROMBIN TIME: 12.3 SEC (ref 12–14.6)
RBC # BLD AUTO: 4.19 M/UL (ref 4.7–6.1)
RH BLD: NORMAL
SODIUM SERPL-SCNC: 142 MMOL/L (ref 135–145)
TROPONIN T SERPL-MCNC: 16 NG/L (ref 6–19)
WBC # BLD AUTO: 8.5 K/UL (ref 4.8–10.8)

## 2022-05-09 PROCEDURE — 99291 CRITICAL CARE FIRST HOUR: CPT | Performed by: INTERNAL MEDICINE

## 2022-05-09 PROCEDURE — 99291 CRITICAL CARE FIRST HOUR: CPT

## 2022-05-09 PROCEDURE — 70498 CT ANGIOGRAPHY NECK: CPT | Mod: MG

## 2022-05-09 PROCEDURE — 86901 BLOOD TYPING SEROLOGIC RH(D): CPT

## 2022-05-09 PROCEDURE — A9270 NON-COVERED ITEM OR SERVICE: HCPCS | Performed by: INTERNAL MEDICINE

## 2022-05-09 PROCEDURE — 70450 CT HEAD/BRAIN W/O DYE: CPT | Mod: MG

## 2022-05-09 PROCEDURE — 36415 COLL VENOUS BLD VENIPUNCTURE: CPT

## 2022-05-09 PROCEDURE — 94760 N-INVAS EAR/PLS OXIMETRY 1: CPT

## 2022-05-09 PROCEDURE — 99285 EMERGENCY DEPT VISIT HI MDM: CPT

## 2022-05-09 PROCEDURE — 71045 X-RAY EXAM CHEST 1 VIEW: CPT

## 2022-05-09 PROCEDURE — 85730 THROMBOPLASTIN TIME PARTIAL: CPT

## 2022-05-09 PROCEDURE — 86850 RBC ANTIBODY SCREEN: CPT

## 2022-05-09 PROCEDURE — 85610 PROTHROMBIN TIME: CPT

## 2022-05-09 PROCEDURE — 82962 GLUCOSE BLOOD TEST: CPT

## 2022-05-09 PROCEDURE — 85025 COMPLETE CBC W/AUTO DIFF WBC: CPT

## 2022-05-09 PROCEDURE — 86900 BLOOD TYPING SEROLOGIC ABO: CPT

## 2022-05-09 PROCEDURE — 0042T CT-CEREBRAL PERFUSION ANALYSIS: CPT

## 2022-05-09 PROCEDURE — 770022 HCHG ROOM/CARE - ICU (200)

## 2022-05-09 PROCEDURE — 700111 HCHG RX REV CODE 636 W/ 250 OVERRIDE (IP)

## 2022-05-09 PROCEDURE — 93005 ELECTROCARDIOGRAM TRACING: CPT | Performed by: EMERGENCY MEDICINE

## 2022-05-09 PROCEDURE — 700105 HCHG RX REV CODE 258: Performed by: INTERNAL MEDICINE

## 2022-05-09 PROCEDURE — 700117 HCHG RX CONTRAST REV CODE 255: Performed by: EMERGENCY MEDICINE

## 2022-05-09 PROCEDURE — 700102 HCHG RX REV CODE 250 W/ 637 OVERRIDE(OP): Performed by: INTERNAL MEDICINE

## 2022-05-09 PROCEDURE — 80053 COMPREHEN METABOLIC PANEL: CPT

## 2022-05-09 PROCEDURE — 99223 1ST HOSP IP/OBS HIGH 75: CPT | Mod: FS | Performed by: NURSE PRACTITIONER

## 2022-05-09 PROCEDURE — 70496 CT ANGIOGRAPHY HEAD: CPT | Mod: MG

## 2022-05-09 PROCEDURE — 96374 THER/PROPH/DIAG INJ IV PUSH: CPT | Mod: XU

## 2022-05-09 PROCEDURE — 84484 ASSAY OF TROPONIN QUANT: CPT

## 2022-05-09 RX ORDER — LOSARTAN POTASSIUM 25 MG/1
25 TABLET ORAL DAILY
Status: DISCONTINUED | OUTPATIENT
Start: 2022-05-09 | End: 2022-05-11 | Stop reason: HOSPADM

## 2022-05-09 RX ORDER — BISACODYL 10 MG
10 SUPPOSITORY, RECTAL RECTAL
Status: DISCONTINUED | OUTPATIENT
Start: 2022-05-09 | End: 2022-05-11 | Stop reason: HOSPADM

## 2022-05-09 RX ORDER — LATANOPROST 50 UG/ML
1 SOLUTION/ DROPS OPHTHALMIC
Status: DISCONTINUED | OUTPATIENT
Start: 2022-05-09 | End: 2022-05-11 | Stop reason: HOSPADM

## 2022-05-09 RX ORDER — AMOXICILLIN 250 MG
2 CAPSULE ORAL 2 TIMES DAILY
Status: DISCONTINUED | OUTPATIENT
Start: 2022-05-09 | End: 2022-05-11 | Stop reason: HOSPADM

## 2022-05-09 RX ORDER — ONDANSETRON 2 MG/ML
4 INJECTION INTRAMUSCULAR; INTRAVENOUS EVERY 4 HOURS PRN
Status: DISCONTINUED | OUTPATIENT
Start: 2022-05-09 | End: 2022-05-11 | Stop reason: HOSPADM

## 2022-05-09 RX ORDER — LABETALOL HYDROCHLORIDE 5 MG/ML
10 INJECTION, SOLUTION INTRAVENOUS
Status: DISCONTINUED | OUTPATIENT
Start: 2022-05-09 | End: 2022-05-11 | Stop reason: HOSPADM

## 2022-05-09 RX ORDER — SODIUM CHLORIDE 9 MG/ML
INJECTION, SOLUTION INTRAVENOUS CONTINUOUS
Status: DISCONTINUED | OUTPATIENT
Start: 2022-05-09 | End: 2022-05-10

## 2022-05-09 RX ORDER — ATORVASTATIN CALCIUM 20 MG/1
80 TABLET, FILM COATED ORAL EVERY EVENING
Status: DISCONTINUED | OUTPATIENT
Start: 2022-05-09 | End: 2022-05-10

## 2022-05-09 RX ORDER — POLYETHYLENE GLYCOL 3350 17 G/17G
1 POWDER, FOR SOLUTION ORAL
Status: DISCONTINUED | OUTPATIENT
Start: 2022-05-09 | End: 2022-05-11 | Stop reason: HOSPADM

## 2022-05-09 RX ORDER — HYDRALAZINE HYDROCHLORIDE 20 MG/ML
10 INJECTION INTRAMUSCULAR; INTRAVENOUS
Status: DISCONTINUED | OUTPATIENT
Start: 2022-05-09 | End: 2022-05-11 | Stop reason: HOSPADM

## 2022-05-09 RX ORDER — ONDANSETRON 4 MG/1
4 TABLET, ORALLY DISINTEGRATING ORAL EVERY 4 HOURS PRN
Status: DISCONTINUED | OUTPATIENT
Start: 2022-05-09 | End: 2022-05-11 | Stop reason: HOSPADM

## 2022-05-09 RX ADMIN — SODIUM CHLORIDE: 9 INJECTION, SOLUTION INTRAVENOUS at 15:26

## 2022-05-09 RX ADMIN — IOHEXOL 40 ML: 350 INJECTION, SOLUTION INTRAVENOUS at 12:43

## 2022-05-09 RX ADMIN — LATANOPROST 1 DROP: 50 SOLUTION OPHTHALMIC at 21:54

## 2022-05-09 RX ADMIN — TENECTEPLASE 25 MG: KIT at 12:48

## 2022-05-09 RX ADMIN — IOHEXOL 80 ML: 350 INJECTION, SOLUTION INTRAVENOUS at 12:45

## 2022-05-09 ASSESSMENT — FIBROSIS 4 INDEX
FIB4 SCORE: 1.55
FIB4 SCORE: 1.31
FIB4 SCORE: 1.31

## 2022-05-09 ASSESSMENT — COPD QUESTIONNAIRES
DO YOU EVER COUGH UP ANY MUCUS OR PHLEGM?: NO/ONLY WITH OCCASIONAL COLDS OR INFECTIONS
DURING THE PAST 4 WEEKS HOW MUCH DID YOU FEEL SHORT OF BREATH: NONE/LITTLE OF THE TIME
COPD SCREENING SCORE: 2
HAVE YOU SMOKED AT LEAST 100 CIGARETTES IN YOUR ENTIRE LIFE: NO/DON'T KNOW

## 2022-05-09 ASSESSMENT — PAIN DESCRIPTION - PAIN TYPE: TYPE: ACUTE PAIN

## 2022-05-09 NOTE — ED NOTES
Pt able to tell you it is the 9th of 2022 and when asked yes or no questions he can say that it is May. Knows his wife when asked what his name is he cannot express that but if given multiple choice can tell you that his name is New Rivas

## 2022-05-09 NOTE — ED NOTES
Report called to [facility] charge RN. Aware of pt transfer with [facility]

## 2022-05-09 NOTE — ED PROVIDER NOTES
ED Provider Note     2022  12:22 PM    Means of Arrival: Walk In  History obtained by: spouse  Limitations: Altered mental status  PCP: Tomy  CODE STATUS: Full    CHIEF COMPLAINT  Chief Complaint   Patient presents with   • Possible Stroke       HPI  Jan Rivas is a 72 y.o. male with history of MI status post LCA stent , hypertension, hyperlipidemia who presents with concerns of possible stroke.  Last seen normal at 9:30am (3 hours ago).  His wife last saw him at 930 this morning before she left the house.  When she came home he was lying down on the bed.)  She did not notice anything abnormal but when she went to talk to him he was not making any sense.  He could not say words clearly.  He was able to walk with her and ride in the car here to the emergency department.  He was not complaining of any pain.  History limited due to aphasia.    REVIEW OF SYSTEMS  Review of Systems   Unable to perform ROS: Patient nonverbal     See HPI for further details.    PAST MEDICAL HISTORY   has a past medical history of Apnea, sleep, CAD (coronary artery disease) (stenLCA ), Chickenpox, Maltese measles, Heart attack (HCC), Hyperlipidemia, Hypertension, Mumps, Sleep apnea, Snoring, and Wears glasses.    FAMILY HISTORY  Family History   Problem Relation Age of Onset   • Heart Attack Mother    • Cancer Mother    • Cancer Father    • Cancer Sister        SOCIAL HISTORY  Social History     Tobacco Use   • Smoking status: Former Smoker     Packs/day: 0.50     Years: 42.00     Pack years: 21.00     Types: Cigarettes     Quit date: 2010     Years since quittin.0   • Smokeless tobacco: Never Used   Vaping Use   • Vaping Use: Former   Substance and Sexual Activity   • Alcohol use: Yes     Comment: 2 drinks per day    • Drug use: Yes     Types: Marijuana     Comment: Occ   • Sexual activity: Not on file       SURGICAL HISTORY   has a past surgical history that includes zzz cardiac cath (2/21/15); zzz cardiac  cath (5/4/10); and cyst excision.    CURRENT MEDICATIONS  Home Medications    **Home medications have not yet been reviewed for this encounter**         ALLERGIES  No Known Allergies    PHYSICAL EXAM  VITAL SIGNS: /82   Pulse 64   Temp 36.2 °C (97.1 °F) (Temporal)   Resp 18   Ht 1.829 m (6')   Wt 113 kg (250 lb)   SpO2 94%   BMI 33.91 kg/m²     Constitutional: Alert, sitting semisupine on ED bed.  Calm.  Well-nourished.  HENT: No signs of trauma, Bilateral external ears normal, Nose normal.   Eyes: Pupils are equal and reactive, Conjunctiva normal, Non-icteric.  Normal eye movements  Neck: Normal range of motion, No tenderness, Supple, No stridor.   Cardiovascular: Regular rate and rhythm, no murmurs. Symmetric distal pulses. No cyanosis of extremities. No peripheral edema of extremities.  Thorax & Lungs: Normal breath sounds, No respiratory distress  Abdomen: Soft, No tenderness, No pulsatile masses. No peritoneal signs.  Skin: Warm, Dry, No erythema, No rash.   Back: No midline bony tenderness,   Musculoskeletal: Good passive range of motion in all major joints. No tenderness to palpation or major deformities noted.   Neurologic: NIHSS 9.  Expressive aphasia and dysarthria.  Unable to state his correct age.  He follows some commands but not all.  Left-sided facial weakness.  There is drift at his left upper and left lower extremity.  Normal eye movements.  No signs of vision loss.  No signs of ataxia.  Psychiatric: Flat affect  Physical Exam      DIAGNOSTIC STUDIES / PROCEDURES    EKG  Results for orders placed or performed during the hospital encounter of 22   EKG (NOW)   Result Value Ref Range    Report       [facility] Emergency Dept.    Test Date:  2022  Pt Name:    SHAD DONALDSON                 Department: [facility]  MRN:        [id number]                      Room:       [facility]  Gender:     Male                         Technician: [id number]  :        1950                    Requested By:ER TRIAGE PROTOCOL  Order #:    [id number]                    Reading MD: Aram Jimenez II, MD    Measurements  Intervals                                Axis  Rate:       61                           P:          47  WI:         200                          QRS:        -67  QRSD:       98                           T:          52  QT:         432  QTc:        435    Interpretive Statements  SINUS RHYTHM  Rate 61.  LAD, CONSIDER LEFT ANTERIOR FASCICULAR BLOCK  No ST elevation or depression.  Normal T waves.  Impression: Normal sinus rhythm EKG with signs of old MI.  [facility] to ECG 10/22/2013 19:27:56  T-wave abnormality no longer present  Myocardial infarct finding still  present  Electronically Signed On 5-9-2022 12:54:34 PDT by Aram Jimenez II, MD           LABS  Pertinent Labs & Imaging studies reviewed. (See chart for details)    RADIOLOGY  Pertinent Labs & Imaging studies reviewed. (See chart for details)    COURSE & MEDICAL DECISION MAKING  Pertinent Labs & Imaging studies reviewed. (See chart for details)    12:22 PM This is an emergent evaluation of a  72 y.o. male who presents with symptoms concerning for stroke.  Upon arrival stroke alert was sent out and patient was immediately taken to a hallway bed for evaluation.  Physical findings include expressive and receptive aphasia and dysarthria.  Left-sided facial and extremity weakness.  They were immediately taken to imaging following a brief history and exam.  The differential diagnosis includes but is not limited to ischemic for symptomatic stroke, MI, metabolic abnormality, unlikely intoxication.  Stroke order set placed.    12:44 PM Discussed with Dr. Alaniz, Neurology.  Plan for thrombolytics, findings of new CVA on perfusion scan. Left parietal lobe involvement. Blood pressure 130 systolic.  NIHSS score is 9. Contacting transfer center and [facility] for transfer to [facility].    12:51 PM  Dr. Matos, ERP at [facility]  has accepted.     1:13 PM  He has left ER in serious condition.    I have ordered continuous pulse ox and cardiac monitoring. Pulse ox shows a normal waveform with saturations of >95%. Cardiac monitors show sinus rhythm, no ectopy.        CRITICAL CARE  The very real possibilty of a deterioration of this patient's condition required the highest level of my preparedness for sudden, emergent intervention.  I provided critical care services, which included medication orders, frequent reevaluations of the patient's condition and response to treatment, ordering and reviewing test results, and discussing the case with various consultants.  The critical care time associated with the care of the patient was 30  minutes. Review chart for interventions. This time is exclusive of any other billable procedures.          FINAL IMPRESSION    ICD-10-CM   1. Acute CVA (cerebrovascular accident) (HCC) Active I63.9   2. Aphasia  R47.01            This dictation was created using voice recognition software. The accuracy of the dictation is limited to the abilities of the software. I expect there may be some errors of grammar and possibly content. The nursing notes were reviewed and certain aspects of this information were incorporated into this note.    Electronically signed by: Aram Jimenez II, M.D., 5/9/2022 12:22 PM

## 2022-05-09 NOTE — CONSULTS
"Neurology STROKE CODE H&P  [facility]    Referring Physician: Danish Matos D.O.    STROKE CODE:   Chief Complaint   Patient presents with   • Possible Stroke       To obtain the most accurate data regarding the time called, and time patient seen, refer to the stroke run-sheet and chart.  For time of CT, refer to the radiology report. See A&P below for TPA Decision and door to needle time if and when applicable.    HPI: Jan \"New\" Abhishek Rivas is a 72 y.o. male with history of hypertension, hyperlipidemia, CAD s/p stent, MI, and FRANK presenting to the hospital for slurred speech and word salad and consulted for possible stroke. The patient was last seen normal today at 09:30 when his wife left the house for a workout class. Upon returning, she found New laying down on the bed without any obvious abnormalities, but noted he was slurring his words and his speech was nonsensical. This prompted the patient to present with his wife to [facility] today for evaluation. Upon arrival, NIHSS was reported to be 9 significant for dysarthria, aphasia, left facial droop, and LUE and LLE drift. CT head at [facility] showed no acute hemorrhage, mass, with encephalomalacia of the left frontal lobe. CTA head and neck revealed partial occlusion in a tiny, distal left parietal cortical branch artery. CTP reveals a perfusion defect to the left MCA territory with a small core and mismatch volume of 27ml of ischemic penumbra. The ERP at [facility] contacted Dr. Alaniz, Neurology, and IV-tenecteplase was administered at 12:50. Patient was then transferred to [facility] for post-thrombolytic care. Upon arrival, NIHSS is current 3 significant for persistent dysarthria and mixed aphasia. Patient denies headache or pain, but ROS otherwise limited given mixed aphasia.     Review of systems: In addition to what is detailed in the HPI above, all other systems reviewed and are negative.    Past Medical History:    has a past " medical history of Apnea, sleep, CAD (coronary artery disease) (stenLCA 2011), Chickenpox, Swazi measles, Heart attack (HCC), Hyperlipidemia, Hypertension, Mumps, Sleep apnea, Snoring, and Wears glasses.    FHx:  family history includes Cancer in his father, mother, and sister; Heart Attack in his mother.    SHx:   reports that he quit smoking about 12 years ago. His smoking use included cigarettes. He has a 21.00 pack-year smoking history. He has never used smokeless tobacco. He reports current alcohol use. He reports previous drug use. Drugs: Marijuana and Inhaled.    Allergies:  No Known Allergies    Medications:  No current facility-administered medications for this encounter.    Current Outpatient Medications:   •  metoprolol tartrate (LOPRESSOR) 25 MG Tab, Take 1 Tablet by mouth 2 times a day. TAKE 1 TAB BY MOUTH 2 TIMES A DAY., Disp: 180 Tablet, Rfl: 3  •  losartan (COZAAR) 25 MG Tab, Take 1 Tablet by mouth every day., Disp: 90 Tablet, Rfl: 3  •  atorvastatin (LIPITOR) 40 MG Tab, Take 1 Tablet by mouth 1/2 hour before dinner., Disp: 90 Tablet, Rfl: 3  •  traZODone (DESYREL) 50 MG Tab, Take 1-2 Tablets by mouth at bedtime as needed for Sleep., Disp: 180 Tablet, Rfl: 3  •  pimecrolimus (ELIDEL) 1 % cream, Apply 1 Application topically 2 times a day., Disp: , Rfl:   •  minocycline (MINOCIN) 100 MG Cap, Take 100 mg by mouth 2 times a day., Disp: , Rfl:   •  latanoprost (XALATAN) 0.005 % Solution, Administer 1 Drop into both eyes at bedtime., Disp: , Rfl:     Physical Examination:    Vitals:    05/09/22 1345 05/09/22 1400 05/09/22 1415 05/09/22 1430   BP: 144/76 150/84 143/86 147/92   Pulse: (!) 56 (!) 52 (!) 49 (!) 58   Resp: 16 16 16 16   Temp:       TempSrc:       SpO2: 98% 95% 98% 98%   Weight:       Height:           General: Patient is awake and in no acute distress  Eyes: Examination of optic disks not indicated at this time given acuity of consult.  CV: RRR    NEUROLOGICAL EXAM:     Mental status: Awake,  alert, oriented to self, age, and month with choices (given aphasia), and follows simple commands with cues.  Speech and language: Speech is mildly dysarthric with loss of fluency c/w mixed aphasia (expressive>receptive). The patient is able to name 1-2 items, repeats simple words and phases with paraphasic errors, and and follows simple central commands with cues, but not 2-step appendicular commands.  Cranial nerve exam: Pupils are equal, round and reactive to light bilaterally. Visual fields are full. Extraocular muscles are intact. Sensation in the face is intact to light touch. Face is symmetric. Hearing to finger rub equal. Palate elevates symmetrically. Shoulder shrug is full. Tongue is midline.  Motor exam: Strength is antigravity in all extremities without drift appreciated. Tone is normal. No abnormal movements were seen on exam.  Sensory exam: No sensory deficits identified. No neglect with double stimuli.   Deep tendon reflexes:  2+ and symmetric. Toes down-going bilaterally.  Coordination: No ataxia with a normal finger-nose-finger. Normal rapidly alternating movements.   Gait: Patient was observed to stand from sitting on gurney without assistance, ambulated to bathroom with normal gait and arm swing, and returned, turned, and sat back onto the gurney without tendency to fall. Romberg deferred.     NIH Stroke Scale:    1a. Level of Consciousness (Alert, drowsy, etc): 0= Alert    1b. LOC Questions (Month, age): 0= Answers both correctly    1c. LOC Commands (Open/close eyes make fist/let go): 1= Obeys one correctly    2.   Best Gaze (Eyes open - patient follows examiner's finger on face): 0= Normal    3.   Visual Fields (introduce visual stimulus/threat to patient's field quadrants): 0= No visual loss  4.   Facial Paresis (Show teeth, raise eyebrows and squeeze eyes shut): 0= Normal     5a. Motor Arm - Left (Elevate arm to 90 degrees if patient is sitting, 45 degrees if  supine): 0= No drift    5b. Motor  Arm - Right (Elevate arm to 90 degrees if patient is sitting, 45 degrees if supine): 0= No drift    6a. Motor Leg - Left (Elevate leg 30 degrees with patient supine): 0= No drift    6b. Motor Leg - Right  (Elevate leg 30 degrees with patient supine): 0= No drift    7.   Limb Ataxia (Finger-nose, heel down shin): 0= No ataxia    8.   Sensory (Pin prick to face, arm, trunk and leg - compare side to side): 0= Normal    9.  Best Language (Name item, describe a picture and read sentences): 1= Mild to moderate aphasia    10. Dysarthria (Evaluate speech clarity by patient repeating listed words): 1= Mild to moderate slurring    11. Extinction and Inattention (Use information from prior testing to identify neglect or  double simultaneous stimuli testing): 0= No neglect    Total NIH Score: 3      Modified Terry Scale (MRS): 0 = No symptoms      Objective Data:    Labs:  Lab Results   Component Value Date/Time    PROTHROMBTM 12.3 05/09/2022 12:24 PM    INR 0.99 05/09/2022 12:24 PM      Lab Results   Component Value Date/Time    WBC 8.5 05/09/2022 12:24 PM    RBC 4.19 (L) 05/09/2022 12:24 PM    HEMOGLOBIN 13.6 (L) 05/09/2022 12:24 PM    HEMATOCRIT 41.8 (L) 05/09/2022 12:24 PM    MCV 99.8 (H) 05/09/2022 12:24 PM    MCH 32.5 05/09/2022 12:24 PM    MCHC 32.5 (L) 05/09/2022 12:24 PM    MPV 10.0 05/09/2022 12:24 PM    NEUTSPOLYS 59.60 05/09/2022 12:24 PM    LYMPHOCYTES 28.50 05/09/2022 12:24 PM    MONOCYTES 9.10 05/09/2022 12:24 PM    EOSINOPHILS 1.40 05/09/2022 12:24 PM    BASOPHILS 0.70 05/09/2022 12:24 PM      Lab Results   Component Value Date/Time    SODIUM 142 05/09/2022 12:24 PM    POTASSIUM 3.9 05/09/2022 12:24 PM    CHLORIDE 101 05/09/2022 12:24 PM    CO2 29 05/09/2022 12:24 PM    GLUCOSE 101 (H) 05/09/2022 12:24 PM    BUN 16 05/09/2022 12:24 PM    CREATININE 1.15 05/09/2022 12:24 PM    BUNCREATRAT 15 11/02/2021 05:45 AM      Lab Results   Component Value Date/Time    CHOLSTRLTOT 111 11/02/2021 05:45 AM    CHOLSTRLTOT 127  10/23/2013 04:55 AM    LDL 39 08/03/2020 04:18 AM    LDL 55 10/23/2013 04:55 AM    HDL 50 11/02/2021 05:45 AM    HDL 56 10/23/2013 04:55 AM    TRIGLYCERIDE 110 11/02/2021 05:45 AM    TRIGLYCERIDE 79 10/23/2013 04:55 AM       Lab Results   Component Value Date/Time    ALKPHOSPHAT 69 05/09/2022 12:24 PM    ASTSGOT 19 05/09/2022 12:24 PM    ALTSGPT 22 05/09/2022 12:24 PM    TBILIRUBIN 0.5 05/09/2022 12:24 PM        Imaging/Testing:    I interpreted and/or reviewed the patient's neuroimaging    No orders to display       Presumed Mechanism by TOAST:  Unknown- Embolic (athero- vs cardio-)      Assessment and Plan:    Jan Rivas is a 72 y.o. male with relevant history of hypertension, hyperlipidemia, CAD s/p stent, MI, and FRANK presenting to the hospital for slurred speech and word salad and neurology was consulted for possible stroke. Upon arrival to [facility], NIHSS was reported to be 9 significant for dysarthria, aphasia, left facial droop, and LUE and LLE drift. CT head at [facility] showed no acute hemorrhage, mass, with encephalomalacia of the left frontal lobe. CTA head and neck at [facility] revealed partial occlusion in a tiny, distal left parietal cortical branch artery not amendable to endovascular clot-retrieval given distal location and subocclusion. CTP at [facility] reveals a perfusion defect to the left MCA territory with a small core and mismatch volume of 27ml of ischemic penumbra. The ERP at [facility] contacted Dr. Alaniz, Neurology, and IV-tenecteplase was administered at 12:50. Patient was then transferred to [facility] for post-thrombolytic care. Neurological exam currently appears to be improving with NIHSS now 3 significant for persistent dysarthria and mixed aphasia. Etiology is most embolic (athero- vs cardio-), pending further stroke diagnostics as below.    Plan:    -Admit to ICU per post-thrombolytic protocol.   -Neuro assessment and VS per post tNK protocol- q15 minutes x2 hours, q30 minutes x6 hours, and  q1hr x16 hours.   -Strict SBP goal < 180/105, avoiding hypotension. Antihypertensives per ICU team.   -Obtain MRI Brain wo contrast, not necessary to wait 24 hours post-tNK.   -No anticoagulation/anti thrombotics x 24 hours post tNK, and post tNK imaging is without hemorrhagic conversion. May then plan to initiate ASA.   -Telemetry; currently SR. Screen for Afib/arrhythmia.   -Obtain TTE   -Continue home Atorvastatin 40 mg PO q HS for LDL goal <70. Check lipid panel.   -BG management per ICU/primary team. BG goal . Check hemoglobin A1c, goal <7%   -PT/OT/SLP eval and treat.   -Will follow up with results of the above and make additional recommendations accordingly.    -DVT PPX: SCDs.      The evaluation of the patient, and recommended management, was discussed with Dr. Alaniz, Dr. Matos, and bedside RN.    Melvin Kong, MSN [facility]  Nurse Practitioner  [facility]  (t) [phone]

## 2022-05-09 NOTE — H&P
PULMONARY AND CRITICAL CARE MEDICINE HISTORY AND PHYSICAL EXAMINATION    Date of Admission:  5/9/2022    Admitting Physician:  Blake Sweeney MD    Reason for Admission: Acute ischemic stroke    Chief Complaint: Speech difficulties    History of Present Illness:    I was kindly asked to see and evaluate Jan Rivas, a 72 y.o. male for evaluation and management of the above problem.    The history is obtained from healthcare providers, the medical record and the wife at the bedside as this gentleman has aphasia and has some difficulty providing a history.  This gentleman has a history of primary hypertension, dyslipidemia, CAD with prior MI and PCI to the LAD, severe FRANK with an AHI of 87.6 on CPAP at 10 cmH2O and PAD.  He was last seen well at approximately 0930 hours this morning.  His wife went to swim class and when she came home she noticed that there was sausage and singletary on the kitchen counter.  She prepared lunch and when she went to summon this gentleman for lunch she noticed that his speech was garbled.  She immediately took him to [facility].  CT imaging revealed no acute pathology, but evidence of a perfusion deficit and decreased flow in a tiny arterial branch in the left parietal region.  He received tenecteplase and was sent to [facility] for subspecialty care.  He does not smoke cigarettes.  He admits to enjoying two rather stiff highballs every evening consisting of either [employer] Comfort or Festus Beam with some 7-Up.  He also admits that he likes to have a couple hits of marijuana after supper and he then he takes a gummy prior to going to bed with his CPAP.    Medications Prior to Admission:    No current facility-administered medications on file prior to encounter.     Current Outpatient Medications on File Prior to Encounter   Medication Sig Dispense Refill   • metoprolol tartrate (LOPRESSOR) 25 MG Tab Take 1 Tablet by mouth 2 times a day. TAKE 1 TAB  BY MOUTH 2 TIMES A DAY. 180 Tablet 3   • losartan (COZAAR) 25 MG Tab Take 1 Tablet by mouth every day. 90 Tablet 3   • atorvastatin (LIPITOR) 40 MG Tab Take 1 Tablet by mouth 1/2 hour before dinner. 90 Tablet 3   • traZODone (DESYREL) 50 MG Tab Take 1-2 Tablets by mouth at bedtime as needed for Sleep. 180 Tablet 3   • pimecrolimus (ELIDEL) 1 % cream Apply 1 Application topically 2 times a day.     • minocycline (MINOCIN) 100 MG Cap Take 100 mg by mouth 2 times a day.     • latanoprost (XALATAN) 0.005 % Solution Administer 1 Drop into both eyes at bedtime.         Current Medications:      Current Facility-Administered Medications:   •  senna-docusate (PERICOLACE or SENOKOT S) 8.6-50 MG per tablet 2 Tablet, 2 Tablet, Oral, BID **AND** polyethylene glycol/lytes (MIRALAX) PACKET 1 Packet, 1 Packet, Oral, QDAY PRN **AND** magnesium hydroxide (MILK OF MAGNESIA) suspension 30 mL, 30 mL, Oral, QDAY PRN **AND** bisacodyl (DULCOLAX) suppository 10 mg, 10 mg, Rectal, QDAY PRN, Blake Sweeney M.D.  •  Respiratory Therapy Consult, , Nebulization, Continuous RT, Blake Sweeney M.D.  •  ondansetron (ZOFRAN) syringe/vial injection 4 mg, 4 mg, Intravenous, Q4HRS PRN, Blake Sweeney M.D.  •  ondansetron (ZOFRAN ODT) dispertab 4 mg, 4 mg, Oral, Q4HRS PRN, Blake Sweeney M.D.  •  NS infusion, , Intravenous, Continuous, Blake Sweeney M.D.  •  labetalol (NORMODYNE/TRANDATE) injection 10 mg, 10 mg, Intravenous, Q10 MIN PRN, Blake Sweeney M.D.  •  hydrALAZINE (APRESOLINE) injection 10 mg, 10 mg, Intravenous, Q2HRS PRN, Blake Sweeney M.D.  •  niCARdipine (CARDENE) 25 mg in  mL Infusion, 0-15 mg/hr, Intravenous, Continuous, Blake Sweeney M.D.  •  atorvastatin (LIPITOR) tablet 80 mg, 80 mg, Oral, Q EVENING, Blake Sweeney M.D.  •  latanoprost (XALATAN) 0.005 % ophthalmic solution 1 Drop, 1 Drop, Both Eyes, QHS, Blake Sweeney M.D.  •  losartan (COZAAR)  tablet 25 mg, 25 mg, Oral, DAILY, Blake Sweeney M.D.  •  metoprolol tartrate (LOPRESSOR) tablet 25 mg, 25 mg, Oral, BID, Blake Sweeney M.D.    Allergies:    Patient has no known allergies.    Past Surgical History:    Past Surgical History:   Procedure Laterality Date   • [facility] CARDIAC CATH  2/21/15    Cath at [place].  Patent stent and no obstructive CAD.   • [facility] CARDIAC CATH  5/4/10    Taxus stent to LAD   • CYST EXCISION      posterior neck       Past Medical History:    Past Medical History:   Diagnosis Date   • Apnea, sleep    • CAD (coronary artery disease) stenLCA    • Chickenpox    • Bengali measles    • Heart attack (HCC)    • Hyperlipidemia    • Hypertension    • Mumps    • Sleep apnea    • Snoring    • Wears glasses        Social History:    Social History     Socioeconomic History   • Marital status:      Spouse name: Not on file   • Number of children: Not on file   • Years of education: Not on file   • Highest education level: Not on file   Occupational History   • Not on file   Tobacco Use   • Smoking status: Former Smoker     Packs/day: 0.50     Years: 42.00     Pack years: 21.00     Types: Cigarettes     Quit date: 2010     Years since quittin.0   • Smokeless tobacco: Never Used   Vaping Use   • Vaping Use: Former   Substance and Sexual Activity   • Alcohol use: Yes     Comment: 2 drinks per day    • Drug use: Not Currently     Types: Marijuana, Inhaled     Comment: Every other day marijuana   • Sexual activity: Not on file   Other Topics Concern   • Not on file   Social History Narrative   • Not on file     Social Determinants of Health     Financial Resource Strain: Not on file   Food Insecurity: Not on file   Transportation Needs: Not on file   Physical Activity: Not on file   Stress: Not on file   Social Connections: Not on file   Intimate Partner Violence: Not on file   Housing Stability: Not on file       Family History:    Family History   Problem  "Relation Age of Onset   • Heart Attack Mother    • Cancer Mother    • Cancer Father    • Cancer Sister        Review of System:    Review of Systems   Unable to perform ROS: Acuity of condition       Physical Examination:    /80   Pulse (!) 49   Temp 36.2 °C (97.2 °F) (Temporal)   Resp 16   Ht 1.854 m (6' 1\")   Wt 113 kg (250 lb) Comment: stated no scale available  SpO2 98%   BMI 32.98 kg/m²   Physical Exam  HENT:      Head: Normocephalic.      Nose: Nose normal.      Mouth/Throat:      Pharynx: Oropharynx is clear.   Eyes:      Pupils: Pupils are equal, round, and reactive to light.   Cardiovascular:      Heart sounds: No murmur heard.    No gallop.      Comments: Sinus rhythm  Pulmonary:      Breath sounds: No wheezing or rales.   Abdominal:      General: There is no distension.      Tenderness: There is no abdominal tenderness.   Musculoskeletal:      Cervical back: Normal range of motion.      Right lower leg: No edema.      Left lower leg: No edema.   Skin:     General: Skin is warm.      Capillary Refill: Capillary refill takes less than 2 seconds.   Neurological:      Comments: Awake and alert.  Very pleasant.  No focal weakness.  Some expressive aphasia.         Laboratory Data:        Recent Labs     05/09/22  1224   WBC 8.5   RBC 4.19*   HEMOGLOBIN 13.6*   HEMATOCRIT 41.8*   MCV 99.8*   MCH 32.5   MCHC 32.5*   RDW 48.3   PLATELETCT 217   MPV 10.0     Recent Labs     05/09/22  1224   SODIUM 142   POTASSIUM 3.9   CHLORIDE 101   CO2 29   GLUCOSE 101*   BUN 16   CREATININE 1.15   CALCIUM 9.2                   Imaging:    I personally viewed all the available CXR and CT scan images as well as reviewed the radiology interpretation reports.    EC-ECHOCARDIOGRAM COMPLETE W/O CONT    (Results Pending)       Assessment and Plan:    * Acute ischemic stroke (HCC)- (present on admission)  Assessment & Plan  Characterized by speech difficulties  CT head without acute pathology  CT perfusion study with " evidence of ischemia  CTA of head/neck without large vessel occlusion  S/P TNKase  High intensity statin  Check echocardiogram, glycohemoglobin, lipid panel  Strict blood pressure control with goal BP less than 180/105  Neuro checks every hour  MRI brain    PVD (peripheral vascular disease) (HCC)- (present on admission)  Assessment & Plan  Continue statin    Hypertension- (present on admission)  Assessment & Plan  Goal BP less than 180/105    CAD (coronary artery disease)- (present on admission)  Assessment & Plan  History of CAD with prior MI and PCI to the LAD  High intensity statin    FRANK on CPAP- (present on admission)  Assessment & Plan  AHI 87.6  Continue home CPAP at 10 cm of water      I have assessed and reassessed his blood pressure, hemodynamics, cardiovascular status and neurologic status.  This gentleman is critically ill with an acute ischemic stroke and he has received thrombolytic therapy.  He requires very strict blood pressure control and close neurological monitoring.  He is at increased risk for worsening CNS system dysfunction.    High risk of deterioration and worsening vital organ dysfunction and death without the above critical care interventions.    The patient is critically ill.  Critical care time = 40 minutes in directly providing and coordinating critical care and extensive data review.  No time overlap and excludes procedures.    Discussed with RN    Blake Sweeney MD  Pulmonary and Critical Care Medicine

## 2022-05-09 NOTE — ED TRIAGE NOTES
Pt transferred from [facility] for possible stroke. TNK 25 given mild aphasia and dysarthria noted. Pt ambulated to restroom with steady gait back to bed, MD and Neuro MD at bedside

## 2022-05-09 NOTE — ED PROVIDER NOTES
ED Provider Note    CHIEF COMPLAINT  Chief Complaint   Patient presents with   • Possible Stroke       HPI  Jan Rivas is a 72 y.o. male who presents to the emergency department presents as a transfer from [facility].  The patient was last seen abnormal at 9:30 AM with his wife and she left the house, upon returning she found patient laying on the bed, he had left-sided upper lower extremity weakness, slurred speech, inability to talk and severe confusion when trying to talk.  The patient was not following commands that point time.  He was taken to [facility], CTA head and neck and perfusion study completed revealed evidence of no significant narrowing of the major intracranial vessels although there is decreased flow in the tiny arterial branch in the left parietal region concerning for partial occlusion but no LVO, the perfusion study was positive as well for left-sided infarct.  The patient received TNKase at that facility per the recommendation of Dr. Torres and transferred to our facility.  General blood sugar, normal blood pressure, no other risk factors and was given TNKase and sent to our facility.  Upon arrival here, EMS states that he has had slight improvement of symptoms.  REVIEW OF SYSTEMS  Positives as above. Pertinent negatives include loss of conscious, chest pain, Roshan pain, back pain.  All other 10 review of systems are negative fever, cough, nausea, vomiting,    PAST MEDICAL HISTORY  Past Medical History:   Diagnosis Date   • Apnea, sleep    • CAD (coronary artery disease) stenLCA 2011   • Chickenpox    • Macedonian measles    • Heart attack (HCC)    • Hyperlipidemia    • Hypertension    • Mumps    • Sleep apnea    • Snoring    • Wears glasses        FAMILY HISTORY  Noncontributory    SOCIAL HISTORY  Social History     Socioeconomic History   • Marital status:    Tobacco Use   • Smoking status: Former Smoker     Packs/day: 0.50     Years: 42.00      "Pack years: 21.00     Types: Cigarettes     Quit date: 2010     Years since quittin.0   • Smokeless tobacco: Never Used   Vaping Use   • Vaping Use: Former   Substance and Sexual Activity   • Alcohol use: Yes     Comment: 2 drinks per day    • Drug use: Not Currently     Types: Marijuana, Inhaled     Comment: Every other day marijuana       SURGICAL HISTORY  Past Surgical History:   Procedure Laterality Date   • [facility] CARDIAC CATH  2/21/15    Cath at [place].  Patent stent and no obstructive CAD.   • [facility] CARDIAC CATH  5/4/10    Taxus stent to LAD   • CYST EXCISION      posterior neck       CURRENT MEDICATIONS  Home Medications     Reviewed by Jayne Soto (Pharmacy Tech) on 22 at 1416  Med List Status: Complete   Medication Last Dose Status   atorvastatin (LIPITOR) 40 MG Tab UNKNOWN Active   latanoprost (XALATAN) 0.005 % Solution UNKNOWN Active   losartan (COZAAR) 25 MG Tab UNKNOWN Active   metoprolol tartrate (LOPRESSOR) 25 MG Tab UNKNOWN Active   minocycline (MINOCIN) 100 MG Cap UNKNOWN Active   pimecrolimus (ELIDEL) 1 % cream UNKNOWN Active   traZODone (DESYREL) 50 MG Tab UNKNOWN Active                ALLERGIES  No Known Allergies    PHYSICAL EXAM  VITAL SIGNS: /80   Pulse (!) 50   Temp 35.8 °C (96.5 °F) (Temporal)   Resp (!) 23   Ht 1.854 m (6' 1\")   Wt 105 kg (230 lb 13.2 oz)   SpO2 98%   BMI 30.45 kg/m²      Constitutional: Well developed, Well nourished, No acute distress, Non-toxic appearance.   Eyes: PERRLA, EOMI, Conjunctiva normal, No discharge.   Cardiovascular: Normal heart rate, Normal rhythm, No murmurs, No rubs, No gallops, and intact distal pulses.   Thorax & Lungs:  No respiratory distress, no rales, no rhonchi, No wheezing, No chest wall tenderness.   Abdomen: Bowel sounds normal, Soft, No tenderness, No guarding, No rebound, No pulsatile masses.   Skin: Warm, Dry, No erythema, No rash.   Extremities: Full range of motion, no deformity, no edema.  Neurologic: NIH " of 3, expressive aphasia, slight receptive aphasia, slight left upper extremity weakness, negative finger-nose bilaterally, following commands intermittently, leg lift 5/5 bilaterally, sensation intact throughout, no facial droop  Psychiatric: Affect normal for clinical presentation.      LABORATORY/ECG  Results for orders placed or performed during the hospital encounter of 05/09/22   CBC WITH DIFFERENTIAL   Result Value Ref Range    WBC 8.5 4.8 - 10.8 K/uL    RBC 4.19 (L) 4.70 - 6.10 M/uL    Hemoglobin 13.6 (L) 14.0 - 18.0 g/dL    Hematocrit 41.8 (L) 42.0 - 52.0 %    MCV 99.8 (H) 81.4 - 97.8 fL    MCH 32.5 27.0 - 33.0 pg    MCHC 32.5 (L) 33.7 - 35.3 g/dL    RDW 48.3 35.9 - 50.0 fL    Platelet Count 217 164 - 446 K/uL    MPV 10.0 9.0 - 12.9 fL    Neutrophils-Polys 59.60 44.00 - 72.00 %    Lymphocytes 28.50 22.00 - 41.00 %    Monocytes 9.10 0.00 - 13.40 %    Eosinophils 1.40 0.00 - 6.90 %    Basophils 0.70 0.00 - 1.80 %    Immature Granulocytes 0.70 0.00 - 0.90 %    Nucleated RBC 0.00 /100 WBC    Neutrophils (Absolute) 5.06 1.82 - 7.42 K/uL    Lymphs (Absolute) 2.42 1.00 - 4.80 K/uL    Monos (Absolute) 0.77 0.00 - 0.85 K/uL    Eos (Absolute) 0.12 0.00 - 0.51 K/uL    Baso (Absolute) 0.06 0.00 - 0.12 K/uL    Immature Granulocytes (abs) 0.06 0.00 - 0.11 K/uL    NRBC (Absolute) 0.00 K/uL   COMP METABOLIC PANEL   Result Value Ref Range    Sodium 142 135 - 145 mmol/L    Potassium 3.9 3.6 - 5.5 mmol/L    Chloride 101 96 - 112 mmol/L    Co2 29 20 - 33 mmol/L    Anion Gap 12.0 7.0 - 16.0    Glucose 101 (H) 65 - 99 mg/dL    Bun 16 8 - 22 mg/dL    Creatinine 1.15 0.50 - 1.40 mg/dL    Calcium 9.2 8.4 - 10.2 mg/dL    AST(SGOT) 19 12 - 45 U/L    ALT(SGPT) 22 2 - 50 U/L    Alkaline Phosphatase 69 30 - 99 U/L    Total Bilirubin 0.5 0.1 - 1.5 mg/dL    Albumin 4.2 3.2 - 4.9 g/dL    Total Protein 7.0 6.0 - 8.2 g/dL    Globulin 2.8 1.9 - 3.5 g/dL    A-G Ratio 1.5 g/dL   PROTHROMBIN TIME   Result Value Ref Range    PT 12.3 12.0 - 14.6  sec    INR 0.99 0.87 - 1.13   APTT   Result Value Ref Range    APTT 25.8 24.7 - 36.0 sec   COD (ADULT)   Result Value Ref Range    ABO Grouping Only A     Rh Grouping Only POS     Antibody Screen-Cod NEG    TROPONIN   Result Value Ref Range    Troponin T 16 6 - 19 ng/L   ESTIMATED GFR   Result Value Ref Range    GFR (CKD-EPI) 68 >60 mL/min/1.73 m 2   EKG (NOW)   Result Value Ref Range    Report       [facility] Emergency Dept.    Test Date:  2022  Pt Name:    SHAD DONALDSON                 Department: [facility]  MRN:        [id number]                      Room:       [facility]  Gender:     Male                         Technician: [id number]  :        1950                   Requested By:ER TRIAGE PROTOCOL  Order #:    [id number]                    Reading MD: Aram Jimenez II, MD    Measurements  Intervals                                Axis  Rate:       61                           P:          47  IL:         200                          QRS:        -67  QRSD:       98                           T:          52  QT:         432  QTc:        435    Interpretive Statements  SINUS RHYTHM  Rate 61.  LAD, CONSIDER LEFT ANTERIOR FASCICULAR BLOCK  No ST elevation or depression.  Normal T waves.  Impression: Normal sinus rhythm EKG with signs of old MI.  [facility] to ECG 10/22/2013 19:27:56  T-wave abnormality no longer present  Myocardial infarct finding still  present  Electronically Signed On 2022 12:54:34 PDT by Aram Jimenez II, MD     POCT glucose device results   Result Value Ref Range    POC Glucose, Blood 111 (H) 65 - 99 mg/dL         RADIOLOGY/PROCEDURES  EC-ECHOCARDIOGRAM COMPLETE W/O CONT    (Results Pending)     Reviewed the CT scan and the perfusion study that was significant for a left intracranial infarct acute    COURSE & MEDICAL DECISION MAKING  Pertinent Labs & Imaging studies reviewed. (See chart for details)  This is a pleasant 9-year-old gentleman presents with a  left intracranial infarct that was acutely received TNKase prior to arrival.  Here in the emergency department, symptoms went from a NIH of 9 at [facility] 2-3 here.  The patient was seen by Dr. Alaniz and I discussed the patient with Dr. Cornejo for hospitalization to the ICU.  My evaluation the patient was improving in front of me.  I do believe the patient will warrant further evaluation and management secondary to his thrombolytic medication.    CRITICAL CARE  The very real possibilty of a deterioration of this patient's condition required the highest level of my preparedness for sudden, emergent intervention.  I provided critical care services, which included medication orders, frequent reevaluations of the patient's condition and response to treatment, ordering and reviewing test results, and discussing the case with various consultants.  The critical care time associated with the care of the patient was 35 minutes. Review chart for interventions. This time is exclusive of any other billable procedures.         FINAL IMPRESSION     1. Acute ischemic stroke (HCC)    2.  Critical care time 35 minutes           Electronically signed by: Danish Matos D.O., 5/9/2022 1:20 PM

## 2022-05-09 NOTE — ED NOTES
Pt back from CT. Pt medicated with  tenecteplase per ERP order. Pt on portable cardiac monitor and BP machine. No other complaints at this time. Family at BS

## 2022-05-09 NOTE — ASSESSMENT & PLAN NOTE
AHI 87.6  Continue home CPAP at 10 cm of water   SEVERITY:- NORMAL ECG -

SINUS RHYTHM

:

Confirmed by: Eyal Ryan 12-Mar-2020 09:58:12

## 2022-05-09 NOTE — ED NOTES
Med rec completed per [facility] on [address] ([phone]) and [facility] at [address] [place] [address] ([phone]).  Patient unable to participate in interview due to aphasia and spouse unsure of patient's medications. Unable to verify times of last doses or recent over-the-counter medication usage.  Per [facility], patient was dispensed a 30 day supply of minocycline 100 mg (1 capsule every 12 hours) on 4/15/2022, and has a new prescription for doxycycline 100 mg (1 capsule every 12 hours), also for a 30 day supply, which has not been picked up yet.

## 2022-05-09 NOTE — DISCHARGE PLANNING
[facility] Transitional Care Coordination    Referral from:  Dr. Sweeney  Insurance Provider on Facesheet:  Medicare/[place]  Potential Rehab Diagnosis:  Stroke    Chart review indicates patient has on going medical management and may have therapy needs to possibly meet inpatient rehab facility criteria with the goal of returning to community.    D/C support: Spouse     Physiatry to consult.  Concern for stroke.  Received TNK.  Work up ongoing, MRI brain pending.  PT/OT pending as clinically appropriate.      Last Covid test:       Thank you for the referral.

## 2022-05-10 ENCOUNTER — APPOINTMENT (OUTPATIENT)
Dept: RADIOLOGY | Facility: MEDICAL CENTER | Age: 72
DRG: 065 | End: 2022-05-10
Attending: INTERNAL MEDICINE
Payer: MEDICARE

## 2022-05-10 ENCOUNTER — APPOINTMENT (OUTPATIENT)
Dept: CARDIOLOGY | Facility: MEDICAL CENTER | Age: 72
DRG: 065 | End: 2022-05-10
Attending: INTERNAL MEDICINE
Payer: MEDICARE

## 2022-05-10 ENCOUNTER — NON-PROVIDER VISIT (OUTPATIENT)
Dept: CARDIOLOGY | Facility: MEDICAL CENTER | Age: 72
End: 2022-05-10
Payer: MEDICARE

## 2022-05-10 DIAGNOSIS — I47.10 SVT (SUPRAVENTRICULAR TACHYCARDIA) (HCC): ICD-10-CM

## 2022-05-10 DIAGNOSIS — I49.1 APC (ATRIAL PREMATURE CONTRACTIONS): ICD-10-CM

## 2022-05-10 DIAGNOSIS — I49.3 PVC'S (PREMATURE VENTRICULAR CONTRACTIONS): ICD-10-CM

## 2022-05-10 LAB
ANION GAP SERPL CALC-SCNC: 10 MMOL/L (ref 7–16)
BASOPHILS # BLD AUTO: 0.6 % (ref 0–1.8)
BASOPHILS # BLD: 0.05 K/UL (ref 0–0.12)
BUN SERPL-MCNC: 12 MG/DL (ref 8–22)
CALCIUM SERPL-MCNC: 8.6 MG/DL (ref 8.5–10.5)
CHLORIDE SERPL-SCNC: 108 MMOL/L (ref 96–112)
CHOLEST SERPL-MCNC: 113 MG/DL (ref 100–199)
CO2 SERPL-SCNC: 27 MMOL/L (ref 20–33)
CREAT SERPL-MCNC: 0.92 MG/DL (ref 0.5–1.4)
EOSINOPHIL # BLD AUTO: 0.13 K/UL (ref 0–0.51)
EOSINOPHIL NFR BLD: 1.6 % (ref 0–6.9)
ERYTHROCYTE [DISTWIDTH] IN BLOOD BY AUTOMATED COUNT: 47.8 FL (ref 35.9–50)
EST. AVERAGE GLUCOSE BLD GHB EST-MCNC: 114 MG/DL
GFR SERPLBLD CREATININE-BSD FMLA CKD-EPI: 89 ML/MIN/1.73 M 2
GLUCOSE SERPL-MCNC: 100 MG/DL (ref 65–99)
HBA1C MFR BLD: 5.6 % (ref 4–5.6)
HCT VFR BLD AUTO: 38.4 % (ref 42–52)
HDLC SERPL-MCNC: 52 MG/DL
HGB BLD-MCNC: 12.8 G/DL (ref 14–18)
IMM GRANULOCYTES # BLD AUTO: 0.06 K/UL (ref 0–0.11)
IMM GRANULOCYTES NFR BLD AUTO: 0.8 % (ref 0–0.9)
LDLC SERPL CALC-MCNC: 40 MG/DL
LYMPHOCYTES # BLD AUTO: 2.04 K/UL (ref 1–4.8)
LYMPHOCYTES NFR BLD: 25.9 % (ref 22–41)
MAGNESIUM SERPL-MCNC: 1.9 MG/DL (ref 1.5–2.5)
MCH RBC QN AUTO: 32.7 PG (ref 27–33)
MCHC RBC AUTO-ENTMCNC: 33.3 G/DL (ref 33.7–35.3)
MCV RBC AUTO: 98.2 FL (ref 81.4–97.8)
MONOCYTES # BLD AUTO: 0.67 K/UL (ref 0–0.85)
MONOCYTES NFR BLD AUTO: 8.5 % (ref 0–13.4)
NEUTROPHILS # BLD AUTO: 4.94 K/UL (ref 1.82–7.42)
NEUTROPHILS NFR BLD: 62.6 % (ref 44–72)
NRBC # BLD AUTO: 0 K/UL
NRBC BLD-RTO: 0 /100 WBC
PHOSPHATE SERPL-MCNC: 3.1 MG/DL (ref 2.5–4.5)
PLATELET # BLD AUTO: 203 K/UL (ref 164–446)
PMV BLD AUTO: 10.2 FL (ref 9–12.9)
POTASSIUM SERPL-SCNC: 4 MMOL/L (ref 3.6–5.5)
RBC # BLD AUTO: 3.91 M/UL (ref 4.7–6.1)
SODIUM SERPL-SCNC: 145 MMOL/L (ref 135–145)
TRIGL SERPL-MCNC: 103 MG/DL (ref 0–149)
WBC # BLD AUTO: 7.9 K/UL (ref 4.8–10.8)

## 2022-05-10 PROCEDURE — 70551 MRI BRAIN STEM W/O DYE: CPT | Mod: MG

## 2022-05-10 PROCEDURE — 99291 CRITICAL CARE FIRST HOUR: CPT | Mod: GC | Performed by: INTERNAL MEDICINE

## 2022-05-10 PROCEDURE — 84100 ASSAY OF PHOSPHORUS: CPT

## 2022-05-10 PROCEDURE — 99233 SBSQ HOSP IP/OBS HIGH 50: CPT | Performed by: NURSE PRACTITIONER

## 2022-05-10 PROCEDURE — 770020 HCHG ROOM/CARE - TELE (206)

## 2022-05-10 PROCEDURE — 700102 HCHG RX REV CODE 250 W/ 637 OVERRIDE(OP): Performed by: INTERNAL MEDICINE

## 2022-05-10 PROCEDURE — A9270 NON-COVERED ITEM OR SERVICE: HCPCS | Performed by: NURSE PRACTITIONER

## 2022-05-10 PROCEDURE — 92610 EVALUATE SWALLOWING FUNCTION: CPT

## 2022-05-10 PROCEDURE — 700102 HCHG RX REV CODE 250 W/ 637 OVERRIDE(OP): Performed by: NURSE PRACTITIONER

## 2022-05-10 PROCEDURE — 85025 COMPLETE CBC W/AUTO DIFF WBC: CPT

## 2022-05-10 PROCEDURE — A9270 NON-COVERED ITEM OR SERVICE: HCPCS | Performed by: INTERNAL MEDICINE

## 2022-05-10 PROCEDURE — 99222 1ST HOSP IP/OBS MODERATE 55: CPT | Performed by: STUDENT IN AN ORGANIZED HEALTH CARE EDUCATION/TRAINING PROGRAM

## 2022-05-10 PROCEDURE — 700111 HCHG RX REV CODE 636 W/ 250 OVERRIDE (IP): Performed by: STUDENT IN AN ORGANIZED HEALTH CARE EDUCATION/TRAINING PROGRAM

## 2022-05-10 PROCEDURE — 80061 LIPID PANEL: CPT

## 2022-05-10 PROCEDURE — 83735 ASSAY OF MAGNESIUM: CPT

## 2022-05-10 PROCEDURE — 83036 HEMOGLOBIN GLYCOSYLATED A1C: CPT

## 2022-05-10 PROCEDURE — 80048 BASIC METABOLIC PNL TOTAL CA: CPT

## 2022-05-10 RX ORDER — CLOPIDOGREL BISULFATE 75 MG/1
75 TABLET ORAL DAILY
Status: DISCONTINUED | OUTPATIENT
Start: 2022-05-10 | End: 2022-05-11 | Stop reason: HOSPADM

## 2022-05-10 RX ORDER — ATORVASTATIN CALCIUM 40 MG/1
40 TABLET, FILM COATED ORAL EVERY EVENING
Status: DISCONTINUED | OUTPATIENT
Start: 2022-05-10 | End: 2022-05-11 | Stop reason: HOSPADM

## 2022-05-10 RX ORDER — MAGNESIUM SULFATE 1 G/100ML
1 INJECTION INTRAVENOUS ONCE
Status: COMPLETED | OUTPATIENT
Start: 2022-05-10 | End: 2022-05-10

## 2022-05-10 RX ADMIN — LATANOPROST 1 DROP: 50 SOLUTION OPHTHALMIC at 21:31

## 2022-05-10 RX ADMIN — MAGNESIUM SULFATE HEPTAHYDRATE 1 G: 1 INJECTION, SOLUTION INTRAVENOUS at 13:13

## 2022-05-10 RX ADMIN — SENNOSIDES AND DOCUSATE SODIUM 2 TABLET: 50; 8.6 TABLET ORAL at 17:50

## 2022-05-10 RX ADMIN — CLOPIDOGREL BISULFATE 75 MG: 75 TABLET ORAL at 14:25

## 2022-05-10 RX ADMIN — METOPROLOL TARTRATE 25 MG: 25 TABLET, FILM COATED ORAL at 17:51

## 2022-05-10 RX ADMIN — ATORVASTATIN CALCIUM 40 MG: 40 TABLET, FILM COATED ORAL at 17:51

## 2022-05-10 ASSESSMENT — ENCOUNTER SYMPTOMS
MYALGIAS: 0
NAUSEA: 0
ORTHOPNEA: 0
TINGLING: 0
COUGH: 0
FOCAL WEAKNESS: 0
BLOOD IN STOOL: 0
TREMORS: 0
SENSORY CHANGE: 0
VOMITING: 0
EYE DISCHARGE: 0
SPUTUM PRODUCTION: 0
SHORTNESS OF BREATH: 0
FEVER: 0
STRIDOR: 0
DIARRHEA: 0
EYE PAIN: 0
DIZZINESS: 0
EYE REDNESS: 0
PND: 0
LOSS OF CONSCIOUSNESS: 0
BLURRED VISION: 0
SPEECH CHANGE: 1
CLAUDICATION: 0
WEAKNESS: 1
INSOMNIA: 0
HEADACHES: 1
SINUS PAIN: 0
HEARTBURN: 0
CHILLS: 0
PHOTOPHOBIA: 0
HEMOPTYSIS: 0
DOUBLE VISION: 0
CONSTIPATION: 0
ABDOMINAL PAIN: 0
HEADACHES: 0
SORE THROAT: 0
SEIZURES: 0
DIAPHORESIS: 0
WEIGHT LOSS: 0
BACK PAIN: 0
WHEEZING: 0
PALPITATIONS: 0

## 2022-05-10 ASSESSMENT — FIBROSIS 4 INDEX: FIB4 SCORE: 1.4

## 2022-05-10 ASSESSMENT — LIFESTYLE VARIABLES: SUBSTANCE_ABUSE: 0

## 2022-05-10 NOTE — DISCHARGE PLANNING
Ischemic stroke - physiatry consult pended, awaiting therapy evals when appropriate. TCC will continue to follow.

## 2022-05-10 NOTE — THERAPY
Missed Therapy     Patient Name: Jan Rivas  Age:  72 y.o., Sex:  male  Medical Record #: [id number]  Today's Date: 5/10/2022         05/10/22 0817   Initial Contact Note    Initial Contact Note Order Received and Verified, Occupational Therapy Evaluation in Progress with Full Report to Follow.   Interdisciplinary Plan of Care Collaboration   Collaboration Comments OT consult received.  Per chart review pt with activity orders beginning 5/10 at 1506.  Will HOLD and attempt OT eval when pt cleared to participate in therapy as able/appropriate   Session Information   Date / Session Number  5/10 held   Priority   (NEEDS EVAL)

## 2022-05-10 NOTE — CARE PLAN
The patient is Watcher - Medium risk of patient condition declining or worsening    Shift Goals  Clinical Goals: Stable neuro exam  Patient Goals: Rest, go home  Family Goals: Comfort, rest    Progress made toward(s) clinical / shift goals:  Patient educated about post-stroke acute care measures, MRI screening form completed, patient educated about MRI process and states he will be able to hold still.       Problem: Optimal Care of the Stroke Patient  Goal: Optimal acute care for the stroke patient  Outcome: Progressing     Problem: Knowledge Deficit - Stroke Education  Goal: Patient's knowledge of stroke and risk factors will improve  Outcome: Progressing     Problem: Neuro Status  Goal: Neuro status will remain stable or improve  Outcome: Progressing     Problem: Hemodynamic Monitoring  Goal: Patient's hemodynamics, fluid balance and neurologic status will be stable or improve  Outcome: Progressing     Problem: Respiratory - Stroke Patient  Goal: Patient will achieve/maintain optimum respiratory rate/effort  Outcome: Progressing     Problem: Urinary Elimination  Goal: Establish and maintain regular urinary output  Outcome: Progressing     Problem: Skin Integrity  Goal: Skin integrity is maintained or improved  Outcome: Progressing

## 2022-05-10 NOTE — CARE PLAN
The patient is The patient is stable    Shift Goals  Clinical Goals: Stable neuro exam  Patient Goals: Rest, go home  Family Goals: Comfort, rest    Progress made toward(s) clinical / shift goals:  Patient educated about post-stroke acute care measures, MRI screening form completed, patient educated about MRI process and states he will be able to hold still.       Problem: Optimal Care of the Stroke Patient  Goal: Optimal acute care for the stroke patient  Outcome: Progressing     Problem: Knowledge Deficit - Stroke Education  Goal: Patient's knowledge of stroke and risk factors will improve  Outcome: Progressing     Problem: Neuro Status  Goal: Neuro status will remain stable or improve  Outcome: Progressing     Problem: Hemodynamic Monitoring  Goal: Patient's hemodynamics, fluid balance and neurologic status will be stable or improve  Outcome: Progressing     Problem: Respiratory - Stroke Patient  Goal: Patient will achieve/maintain optimum respiratory rate/effort  Outcome: Progressing     Problem: Urinary Elimination  Goal: Establish and maintain regular urinary output  Outcome: Progressing     Problem: Skin Integrity  Goal: Skin integrity is maintained or improved  Outcome: Progressing       Shift Goals  Clinical Goals: STABLE NEURO EXAM  Patient Goals: REST  Family Goals: Comfort, rest    Progress made toward(s) clinical / shift goals:  Patient educated about post stroke acute care measures, MRI completed.     Patient is not progressing towards the following goals:

## 2022-05-10 NOTE — PROGRESS NOTES
Neurology Progress Note  [facility] Acute Neurology    Referring Physician: Dio Locke M.D.    Chief Complaint   Patient presents with   • Possible Stroke       HPI: Refer to initial documented Neurology H&P, as detailed in the patient's chart.    Interval History 5/10/2022: IV TNK given yesterday at 12:50. No acute events overnight. Patient is currently sitting up in bed, awake, alert, fully oriented, and following commands. Expressive aphasia appears to be improving with mild difficulty with repeating phrases, otherwise dysarthria and confusion appear resolved with patient to near baseline per wife at bedside. Denies headache, vision changes, facial or focal weakness, numbness, tingling, abnormal movements, LOC, or memory loss.     Past Medical History:   Past Medical History:   Diagnosis Date   • Apnea, sleep    • CAD (coronary artery disease) stenLCA    • Chickenpox    • Maltese measles    • Heart attack (HCC)    • Hyperlipidemia    • Hypertension    • Mumps    • Sleep apnea    • Snoring    • Wears glasses         FHx:  Family History   Problem Relation Age of Onset   • Heart Attack Mother    • Cancer Mother    • Cancer Father    • Cancer Sister         SHx:  Social History     Socioeconomic History   • Marital status:      Spouse name: Not on file   • Number of children: Not on file   • Years of education: Not on file   • Highest education level: Not on file   Occupational History   • Not on file   Tobacco Use   • Smoking status: Former Smoker     Packs/day: 0.50     Years: 42.00     Pack years: 21.00     Types: Cigarettes     Quit date: 2010     Years since quittin.0   • Smokeless tobacco: Never Used   Vaping Use   • Vaping Use: Former   Substance and Sexual Activity   • Alcohol use: Yes     Comment: 2 drinks per day    • Drug use: Not Currently     Types: Marijuana, Inhaled     Comment: Every other day marijuana   • Sexual activity: Not on file   Other Topics Concern   • Not on file   Social  History Narrative   • Not on file     Social Determinants of Health     Financial Resource Strain: Not on file   Food Insecurity: Not on file   Transportation Needs: Not on file   Physical Activity: Not on file   Stress: Not on file   Social Connections: Not on file   Intimate Partner Violence: Not on file   Housing Stability: Not on file        Medications:    Current Facility-Administered Medications:   •  atorvastatin (LIPITOR) tablet 40 mg, 40 mg, Oral, Q EVENING, Melvin Kong, A.P.R.N.  •  clopidogrel (PLAVIX) tablet 75 mg, 75 mg, Oral, DAILY, Melvin Kong, A.P.R.N., 75 mg at 05/10/22 1425  •  senna-docusate (PERICOLACE or SENOKOT S) 8.6-50 MG per tablet 2 Tablet, 2 Tablet, Oral, BID **AND** polyethylene glycol/lytes (MIRALAX) PACKET 1 Packet, 1 Packet, Oral, QDAY PRN **AND** magnesium hydroxide (MILK OF MAGNESIA) suspension 30 mL, 30 mL, Oral, QDAY PRN **AND** bisacodyl (DULCOLAX) suppository 10 mg, 10 mg, Rectal, QDAY PRN, Blake Sweeney M.D.  •  Respiratory Therapy Consult, , Nebulization, Continuous RT, Blake Sweeney M.D.  •  ondansetron (ZOFRAN) syringe/vial injection 4 mg, 4 mg, Intravenous, Q4HRS PRN, Blake Sweeney M.D.  •  ondansetron (ZOFRAN ODT) dispertab 4 mg, 4 mg, Oral, Q4HRS PRN, Blake Sweeney M.D.  •  labetalol (NORMODYNE/TRANDATE) injection 10 mg, 10 mg, Intravenous, Q10 MIN PRN, Blake Sweeney M.D.  •  hydrALAZINE (APRESOLINE) injection 10 mg, 10 mg, Intravenous, Q2HRS PRN, Blake Sweeney M.D.  •  latanoprost (XALATAN) 0.005 % ophthalmic solution 1 Drop, 1 Drop, Both Eyes, QHS, Blake Sweeney M.D., 1 Drop at 05/09/22 0107  •  losartan (COZAAR) tablet 25 mg, 25 mg, Oral, DAILY, Blake Sweeney M.D.  •  metoprolol tartrate (LOPRESSOR) tablet 25 mg, 25 mg, Oral, BID, Blake Sweeney M.D.    Allergies:  No Known Allergies     Review of systems: In addition to what is detailed in the interval history above, all other  systems reviewed and are negative.    Physical Examination:   Vitals:    05/10/22 1200 05/10/22 1400 05/10/22 1500 05/10/22 1600   BP: 151/82 142/79 149/74 150/86   Pulse: (!) 57 (!) 50 (!) 48 (!) 54   Resp: (!) 22 (!) 23 19 (!) 26   Temp:       TempSrc:       SpO2: 95% 100% 99% 98%   Weight:       Height:         General: Patient in no acute distress, pleasant and cooperative.  HEENT: Normocephalic, no signs of acute trauma.   Neck: Supple, no meningeal signs or carotid bruits. There is normal range of motion. No tenderness on exam.   Chest: Regular and unlabored breaths on RA. No cough.   CV: RRR.   Skin: No signs of acute rashes or trauma.   Musculoskeletal: Joints exhibit full range of motion, without any pain to palpation. There are no signs of joint or muscle swelling. There is no tenderness to deep palpation of muscles.   Psychiatric: No hallucinatory behavior. Denies symptoms of depression or suicidal ideation. Mood and affect appear normal on exam.     NEUROLOGICAL EXAM:   Mental status, orientation: Awake, alert, following commands, and fully oriented.   Speech and language: Speech is clear with some loss of fluency c/w mild expressive aphasia. The patient is able to name, repeats with some paraphasic errors with simple sentences, and comprehends to follow all commands.   Cranial nerve exam: Pupils are 3-4 mm bilaterally and equally reactive to light. Visual fields are full. There is no nystagmus on primary or secondary gaze. Intact full EOM in all directions of gaze. Face appears symmetric. Sensation in the face is intact to light touch. Uvula is midline. Palate elevates symmetrically. Tongue is midline and without any signs of tongue biting or fasciculations. Sternocleidomastoid muscles exhibit is normal strength bilaterally. Shoulder shrug is intact bilaterally.   Motor exam: Strength is 5/5 in all extremities. Tone is normal. No abnormal movements were seen on exam.   Sensory exam: Reveals normal sense  of light touch and pinprick in all extremities.   Deep tendon reflexes:  Plantar responses are flexor. There is no clonus.   Coordination: No ataxia with normal finger-nose-finger and heel-down-shin tests. Normal rapidly alternating movements.   Gait: Deferred per patient preference.     NIH Stroke Scale  5/10/22    1a. Level of Consciousness (Alert, drowsy, etc): 0= Alert    1b. LOC Questions (Month, age): 0= Answers both correctly    1c. LOC Commands (Open/close eyes make fist/let go): 0= Obeys both correctly    2.   Best Gaze (Eyes open - patient follows examiner's finger on face): 0= Normal    3.   Visual Fields (introduce visual stimulus/threat to patient's field quadrants): 0= No visual loss    4.   Facial Paresis (Show teeth, raise eyebrows and squeeze eyes shut): 0= Normal     5a. Motor Arm - Left (Elevate arm to 90 degrees if patient is sitting, 45 degrees if  supine): 0= No drift    5b. Motor Arm - Right (Elevate arm to 90 degrees if patient is sitting, 45 degrees if supine): 0= No drift    6a. Motor Leg - Left (Elevate leg 30 degrees with patient supine): 0= No drift    6b. Motor Leg - Right  (Elevate leg 30 degrees with patient supine): 0= No drift    7.   Limb Ataxia (Finger-nose, heel down shin): 0= No ataxia    8.   Sensory (Pin prick to face, arm, trunk and leg - compare side to side): 0= Normal    9.  Best Language (Name item, describe a picture and read sentences): 1= Mild to moderate aphasia    10. Dysarthria (Evaluate speech clarity by patient repeating listed words): 0= Normal articulation    11. Extinction and Inattention (Use information from prior testing to identify neglect or  double simultaneous stimuli testing): 0= No neglect    Total NIH Score: 1      Ancillary Data Reviewed:    Labs:  Lab Results   Component Value Date/Time    PROTHROMBTM 12.3 05/09/2022 12:24 PM    INR 0.99 05/09/2022 12:24 PM      Lab Results   Component Value Date/Time    WBC 7.9 05/10/2022 04:04 AM    RBC 3.91 (L)  05/10/2022 04:04 AM    HEMOGLOBIN 12.8 (L) 05/10/2022 04:04 AM    HEMATOCRIT 38.4 (L) 05/10/2022 04:04 AM    MCV 98.2 (H) 05/10/2022 04:04 AM    MCH 32.7 05/10/2022 04:04 AM    MCHC 33.3 (L) 05/10/2022 04:04 AM    MPV 10.2 05/10/2022 04:04 AM    NEUTSPOLYS 62.60 05/10/2022 04:04 AM    LYMPHOCYTES 25.90 05/10/2022 04:04 AM    MONOCYTES 8.50 05/10/2022 04:04 AM    EOSINOPHILS 1.60 05/10/2022 04:04 AM    BASOPHILS 0.60 05/10/2022 04:04 AM      Lab Results   Component Value Date/Time    SODIUM 145 05/10/2022 04:04 AM    POTASSIUM 4.0 05/10/2022 04:04 AM    CHLORIDE 108 05/10/2022 04:04 AM    CO2 27 05/10/2022 04:04 AM    GLUCOSE 100 (H) 05/10/2022 04:04 AM    BUN 12 05/10/2022 04:04 AM    CREATININE 0.92 05/10/2022 04:04 AM    BUNCREATRAT 15 11/02/2021 05:45 AM      Lab Results   Component Value Date/Time    CHOLSTRLTOT 113 05/10/2022 04:04 AM    LDL 40 05/10/2022 04:04 AM    HDL 52 05/10/2022 04:04 AM    TRIGLYCERIDE 103 05/10/2022 04:04 AM       Lab Results   Component Value Date/Time    ALKPHOSPHAT 69 05/09/2022 12:24 PM    ASTSGOT 19 05/09/2022 12:24 PM    ALTSGPT 22 05/09/2022 12:24 PM    TBILIRUBIN 0.5 05/09/2022 12:24 PM        Imaging/Testing:    I interpreted and/or reviewed the patient's neuroimaging    MR-BRAIN-W/O   Final Result      1.  A tiny area of acute infarct in the left posterior insular cortex.   2.  Chronic infarcts in the left frontal and right posterior temporal lobes.   3.  Mild chronic microvascular ischemic disease.   4.  Mild cerebral volume mass.      EC-ECHOCARDIOGRAM COMPLETE W/O CONT    (Results Pending)         Presumed Mechanism by TOAST:  Unknown- Embolic (athero- vs cardio-)        Assessment and Plan:     Jan Rivas is a 72 y.o. male with relevant history of hypertension, hyperlipidemia, CAD s/p stent, MI, and FRANK who presented as transfer from [facility] to [facility] on 5/9/22 for slurred speech and word salad and neurology was consulted for acute stroke.  Upon arrival to [facility], NIHSS was reported to be 9 significant for dysarthria, aphasia, left facial droop, and LUE and LLE drift. CT head at [facility] showed no acute hemorrhage, mass, with encephalomalacia of the left frontal lobe. CTA head and neck at [facility] revealed partial occlusion in a tiny, distal left parietal cortical branch artery not amendable to endovascular clot-retrieval given distal location and subocclusion. CTP at [facility] reveals a perfusion defect to the left MCA territory with a small core and mismatch volume of 27ml of ischemic penumbra. IV-tenecteplase was administered at 12:50 yesterday. Patient was then transferred to [facility] for post-thrombolytic care. Neurological exam currently appears to be improving (NIHSS now 1) significant for improving expressive aphasia with mild difficulty with repeating phrases, otherwise dysarthria and confusion appear resolved with patient to near baseline per wife at bedside.  MRI brain wo contrast reveals a small acute ischemic infarct of the left insula without hemorrhagic transformation, and chronic infarcts of the superior MCA territory in the left frontal lobe and left PCA territory per my personal read. Etiology is most likely embolic (athero- vs cardio-), pending further stroke diagnostics as below.     Plan:     -Neuro assessment and VS per post tNK protocol   -May decrease to q4hr neurological assessment and VS once post-thrombolytic protocol completed   -SBP goal < 180/105, gradually normalizing to long term goal 110-130/60-80. Antihypertensives per primary team.   -Plavix 75mg PO daily, first dose now (switching from home ASA) given no hemorrhagic transformation on MRI  -Telemetry; currently SR. Screen for Afib/arrhythmia.   -Obtain TTE   -Outpatient extended cardiac event monitoring for Afib (eg. Zio patch). Placement prior to discharge.   -Continue home Atorvastatin 40 mg PO q HS for LDL goal <70. Note LDL 40, within goal   -Note hemoglobin A1c is  6.2, within goal <7%   -PT/OT/SLP eval and treat.   -Follow up outpatient at [facility]. Referral placed  -DVT PPX: SCDs and lovenox SQ daily    The evaluation of the patient, and recommended management, was discussed with Dr. Alaniz, Dr. Sanchez, and bedside RN. I have performed a physical exam and reviewed and updated ROS and Plan today (5/10/2022). In review of yesterday's note (5/9/2022), there are no changes except as documented above.    Melvin Kong, MSN [facility]  Nurse Practitioner  [facility]  (t) [phone]

## 2022-05-10 NOTE — FLOWSHEET NOTE
05/09/22 2213   Events/Summary/Plan   Events/Summary/Plan pt refused the cpap stating that he is okay on the nasal cannula   Skin Inspection Respiratory Device Intact   Vital Signs   Pulse (!) 54   Respiration 19   Pulse Oximetry 97 %   Respiratory Assessment   Respiratory Pattern Within Normal Limits   Level of Consciousness Alert   Chest Exam   Work Of Breathing / Effort Within Normal Limits   Breath Sounds   RUL Breath Sounds Clear   RML Breath Sounds Clear   RLL Breath Sounds Clear   BARBARA Breath Sounds Clear   LLL Breath Sounds Clear   Oxygen   O2 (LPM) 2   O2 Delivery Device Silicone Nasal Cannula

## 2022-05-10 NOTE — PROGRESS NOTES
[facility] ICU Progress Note      Admit Date: 5/9/2022    Resident(s): Zia Huddleston M.D.  Attending: ELIZABETH WAYNE/ Dr. Sanchez    Date & Time:   5/10/2022   11:24 AM       Patient ID:    Name:             Jan Rivas   YOB: 1950  Age:                 72 y.o.  male   MRN:               [id number]    HPI:  Patient is a 72-year-old male with past medical history of hypertension, dyslipidemia, CAD with prior MI and PCI to the LAD, severe FRANK on CPAP, who was last seen well at 930 yesterday morning.  The wife was concerned when she noticed her  speech garbled.  Patient was immediately taken to [facility].  CT imaging revealed no acute pathology, but evidence of a perfusion deficit and decreased flow and a tiny arterial branch of the left parietal region.  This was not amenable to endovascular clot retrieval.  Patient received IV TN K at 12:50 PM yesterday.  Patient was transferred to the ICU here for postthrombolytic care.    Hospital Course:  5/10: Patient appears stable.  NIHSS score of 2 for dysarthria and left facial droop    Consultants:  Neurology  ICU      Procedures:  None      Interval Events:  None    Review of Systems   Constitutional: Negative for chills, diaphoresis, fever, malaise/fatigue and weight loss.   HENT: Negative for congestion, ear discharge, ear pain, hearing loss, nosebleeds, sinus pain, sore throat and tinnitus.    Eyes: Negative for blurred vision, double vision, photophobia, pain, discharge and redness.   Respiratory: Negative for cough, hemoptysis, sputum production, shortness of breath, wheezing and stridor.    Cardiovascular: Negative for chest pain, palpitations, orthopnea, claudication, leg swelling and PND.   Gastrointestinal: Negative for abdominal pain, blood in stool, constipation, diarrhea, heartburn, melena, nausea and vomiting.   Musculoskeletal: Negative for myalgias.   Skin: Negative for itching and rash.   Neurological: Positive for speech  "change and weakness. Negative for dizziness, tingling, tremors, sensory change, focal weakness, seizures, loss of consciousness and headaches.       PHYSICAL EXAM  Vitals:    05/10/22 0700 05/10/22 0800 05/10/22 0900 05/10/22 1000   BP: (!) 163/87 150/86 148/76 128/74   Pulse: (!) 44 (!) 48 (!) 48 (!) 47   Resp: 15 19 16 17   Temp:  36.6 °C (97.9 °F)     TempSrc:  Temporal     SpO2: 96% 96% 96% 96%   Weight:       Height:         Body mass index is 30.45 kg/m².  /74   Pulse (!) 47   Temp 36.6 °C (97.9 °F) (Temporal)   Resp 17   Ht 1.854 m (6' 1\")   Wt 105 kg (230 lb 13.2 oz)   SpO2 96%   BMI 30.45 kg/m²   O2 therapy: Pulse Oximetry: 96 %, O2 (LPM): 2, O2 Delivery Device: Silicone Nasal Cannula    Physical Exam  Constitutional:       Appearance: Normal appearance.   HENT:      Head: Normocephalic and atraumatic.      Mouth/Throat:      Mouth: Mucous membranes are moist.   Eyes:      Extraocular Movements: Extraocular movements intact.      Pupils: Pupils are equal, round, and reactive to light.   Cardiovascular:      Rate and Rhythm: Normal rate and regular rhythm.      Pulses: Normal pulses.      Heart sounds: Normal heart sounds. No murmur heard.    No friction rub. No gallop.   Pulmonary:      Effort: Pulmonary effort is normal.      Breath sounds: Normal breath sounds.   Abdominal:      General: Abdomen is flat. There is no distension.      Palpations: Abdomen is soft. There is no mass.      Tenderness: There is no abdominal tenderness. There is no right CVA tenderness, left CVA tenderness, guarding or rebound.      Hernia: No hernia is present.   Musculoskeletal:         General: No swelling, tenderness, deformity or signs of injury. Normal range of motion.      Right lower leg: No edema.      Left lower leg: No edema.   Neurological:      Mental Status: He is alert and oriented to person, place, and time.      Comments: Look at full neuro exam below   Psychiatric:         Mood and Affect: Mood " normal.         Behavior: Behavior normal.         Respiratory:     Respiration: 17, Pulse Oximetry: 96 %    Chest Tube Drains:          HemoDynamics:  Pulse: (!) 47 Blood Pressure : 128/74      Neuro:  NEUROLOGICAL EXAM:      Mental status: Patient is A&O x4, unable to follow simple commands   Speech and language: Dysarthric speech with loss of fluency with mixed aphasia.  Expressive greater than receptive.  He is able to repeat simple words and follows simple commands  Cranial nerve exam: PERRLA, visual fields intact.  Extraocular muscles intact.  Sensation in the face is intact to light touch.  Palate elevates symmetrically.  Tongue is midline  Motor exam: Strength 5/5 in all extremities without any drift.  Normal tone.  No abnormal movements.    Sensory exam: No sensory deficits identified. No neglect with double stimuli.   Deep tendon reflexes:  2+ and symmetric. Toes down-going bilaterally.  Coordination: No ataxia with a normal finger-nose-finger. Normal rapidly alternating movements.        NIH Stroke Scale:     1a. Level of Consciousness (Alert, drowsy, etc): 0= Alert     1b. LOC Questions (Month, age): 0= Answers both correctly     1c. LOC Commands (Open/close eyes make fist/let go): 1= Obeys one correctly     2.   Best Gaze (Eyes open - patient follows examiner's finger on face): 0= Normal     3.   Visual Fields (introduce visual stimulus/threat to patient's field quadrants): 0= No visual loss  4.   Facial Paresis (Show teeth, raise eyebrows and squeeze eyes shut): 0= Normal             5a. Motor Arm - Left (Elevate arm to 90 degrees if patient is sitting, 45 degrees if  supine): 0= No drift     5b. Motor Arm - Right (Elevate arm to 90 degrees if patient is sitting, 45 degrees if supine): 0= No drift     6a. Motor Leg - Left (Elevate leg 30 degrees with patient supine): 0= No drift     6b. Motor Leg - Right  (Elevate leg 30 degrees with patient supine): 0= No drift     7.   Limb Ataxia (Finger-nose, heel  down shin): 0= No ataxia     8.   Sensory (Pin prick to face, arm, trunk and leg - compare side to side): 0= Normal     9.  Best Language (Name item, describe a picture and read sentences): 1= Mild to moderate aphasia     10. Dysarthria (Evaluate speech clarity by patient repeating listed words): 1= Mild to moderate slurring     11. Extinction and Inattention (Use information from prior testing to identify neglect or  double simultaneous stimuli testing): 0= No neglect     Total NIH Score: 3          Intake/Output Summary (Last 24 hours) at 5/10/2022 1111  Last data filed at 5/10/2022 0600  Gross per 24 hour   Intake 728.33 ml   Output 1750 ml   Net -1021.67 ml       Weight: 105 kg (230 lb 13.2 oz)  Body mass index is 30.45 kg/m².    Recent Labs     22  1224 05/10/22  0404   SODIUM 142 145   POTASSIUM 3.9 4.0   CHLORIDE 101 108   CO2 29 27   BUN 16 12   CREATININE 1.15 0.92   MAGNESIUM  --  1.9   PHOSPHORUS  --  3.1   CALCIUM 9.2 8.6       GI/Nutrition:  Recent Labs     22  1224 05/10/22  0404   ALTSGPT 22  --    ASTSGOT 19  --    ALKPHOSPHAT 69  --    TBILIRUBIN 0.5  --    GLUCOSE 101* 100*       Heme:  Recent Labs     22  1224 05/10/22  0404   RBC 4.19* 3.91*   HEMOGLOBIN 13.6* 12.8*   HEMATOCRIT 41.8* 38.4*   PLATELETCT 217 203   PROTHROMBTM 12.3  --    APTT 25.8  --    INR 0.99  --        Infectious Disease:  Temp  Av.2 °C (97.1 °F)  Min: 35.8 °C (96.5 °F)  Max: 36.6 °C (97.9 °F)  Recent Labs     22  1224 05/10/22  0404   WBC 8.5 7.9   NEUTSPOLYS 59.60 62.60   LYMPHOCYTES 28.50 25.90   MONOCYTES 9.10 8.50   EOSINOPHILS 1.40 1.60   BASOPHILS 0.70 0.60   ASTSGOT 19  --    ALTSGPT 22  --    ALKPHOSPHAT 69  --    TBILIRUBIN 0.5  --        Meds:  • magnesium sulfate  1 g     • senna-docusate  2 Tablet      And   • polyethylene glycol/lytes  1 Packet      And   • magnesium hydroxide  30 mL      And   • bisacodyl  10 mg     • Respiratory Therapy Consult       • ondansetron  4 mg     •  ondansetron  4 mg     • labetalol  10 mg     • hydrALAZINE  10 mg     • niCARdipine infusion  0-15 mg/hr Stopped (05/09/22 1530)   • atorvastatin  80 mg     • latanoprost  1 Drop     • losartan  25 mg     • metoprolol tartrate  25 mg              Imaging:  EC-ECHOCARDIOGRAM COMPLETE W/O CONT    (Results Pending)   MR-BRAIN-W/O    (Results Pending)   CT-HEAD W/O    (Results Pending)       Assessment and Plan:      * Acute ischemic stroke (HCC)- (present on admission)  Assessment & Plan  Characterized by speech difficulties  CT head without acute pathology  CT perfusion study with evidence of ischemia  CTA of head/neck without large vessel occlusion  S/P TNKase  High intensity statin  Check echocardiogram, glycohemoglobin, lipid panel  Strict blood pressure control with goal BP less than 180/105  Neuro checks every hour  MRI brain  Okay to start Plavix if MRI brain shows no evidence of hemorrhagic conversion  Patient will be transferred to the floor at noon today (after 24 hrs of ICU monitoring)     FRANK on CPAP- (present on admission)  Assessment & Plan  AHI 87.6  Continue home CPAP at 10 cm of water    PVD (peripheral vascular disease) (HCC)- (present on admission)  Assessment & Plan  Continue statin    Hypertension- (present on admission)  Assessment & Plan  Goal BP less than 180/105    CAD (coronary artery disease)- (present on admission)  Assessment & Plan  History of CAD with prior MI and PCI to the LAD  High intensity statin      DISPO: Floor status    CODE STATUS: Full code      Quality Measures:  Feeding:  Regular diet after bedside swallow  Analgesia: None  Sedation:  None  Thromboprophylaxis: SCDs  Head of bed: Keep head of bed elevated  Ulcer prophylaxis: None   Glycemic control: Not applicable  Bowel care: Milk of magnesium  Indwelling lines: Peripheral IV  Deescalation of antibiotics: Not applicable

## 2022-05-10 NOTE — THERAPY
"Speech Language Pathology   Clinical Swallow Evaluation     Patient Name: Jan Rivas  AGE:  72 y.o., SEX:  male  Medical Record #: [id number]  Today's Date: 5/10/2022          HPI:  71 y/o admitted on 5/9 for speech difficulties. CT of head found \" No acute intracranial abnormality. No evidence of acute intracranial hemorrhage or mass lesion.\" CTA of head found \"There is decreased flow in a tiny arterial branch in the left parietal region, concerning for partial occlusion.\" Dx chest found \"Pulmonary edema with enlargement of the cardiac silhouette.\" Not seen by SLP before at [facility].      PMHx:  primary HTN, dyslipidemia, CAD with prior MI, severe FRANK      Level of Consciousness: Alert  Affect/Behavior: Appropriate, Cooperative  Follows Directives: Yes  Orientation: Oriented x 4  Hearing: Functional hearing  Vision: Functional vision      Prior Living Situation & Level of Function:  Pt lives at home independently with his spouse. He is a retired golf Pro and denies any hx of dysphagia, but endorsed avoiding some hard foods at baseline 2/2 some missing dentition. Pt endorsed speech was at baseline, but suspect intermittent word finding deficits.      Oral Mechanism Evaluation  Facial Symmetry: Equal  Facial Sensation: Equal  Labial Observations: WFL  Lingual Observations: slight R sided deviation  Dentition: Scattered dentition  Comments:      Voice  Quality: WFL  Resonance: WFL  Intensity: Appropriate  Cough: WFL  Comments:      Current Method of Nutrition   NPO until cleared by speech pathology         Assessment  Positioning: Couch's (60-90 degrees)  Bolus Administration: Patient  Oxygen Requirements: 2 L Nasal Cannula  Factor(s) Affecting Performance: None    Swallowing Trials  Ice: Not tested  Thin Liquid (TN0): WFL  Mildly Thick Liquid (MT2): Not tested  Liquidised (LQ3): Not tested  Pureed (PU4): WFL  Minced & Moist (MM5): Not tested  Soft & Bite Sized (SB6): WFL  Easy to Chew (EC7): Not tested  Regular " "(RG7): WFL    Comments:  Appropriate oral acceptance and containment. No overt s/sx of aspiration. Mastication and swallow trigger timely. Clear vocal quality after the swallow.       Clinical Impressions  Pt is presenting with a functional oropharyngeal swallow.       Recommendations  1.  Regular/thin liquid diet  2.  Instrumentation: None indicated at this time  3.  Swallowing Instructions & Precautions:   Supervision: Independent  Positioning: Fully upright and midline during oral intake  Medication: Whole with liquid  Strategies: None  Oral Care: BID    Plan    Recommend Speech Therapy 2 times per week until therapy goals are met for the following treatments:  Dysphagia Training and Patient / Family / Caregiver Education.    Discharge Recommendations: (P) Anticipate that the patient will have no further speech therapy needs after discharge from the hospital       Objective       05/10/22 0887   Initial Contact Note    Initial Contact Note  Order Received and Verified, Speech Therapy Evaluation in Progress with Full Report to Follow.   Vitals   O2 (LPM) 2   O2 Delivery Device Silicone Nasal Cannula   Pain 0 - 10 Group   Therapist Pain Assessment Post Activity Pain Same as Prior to Activity;Nurse Notified;0   Prior Living Situation   Lives with - Patient's Self Care Capacity Spouse   Prior Level Of Function   Communication Within Functional Limits   Swallow Within Functional Limits   Dentition Intact   Hearing Within Functional Limits for Evaluation   Vision Within Functional Limits for Evaluation   Patient's Primary Language English   Occupation (Pre-Hospital Vocational) Retired Due To Age  (retired Golf pro)   Patient / Family Goals   Patient / Family Goal #1 \"I'm ready to go home\"   Short Term Goals   Short Term Goal # 1 Pt will consume a regular/thin liquid diet with no overt s/sx of aspiration   Education Group   Education Provided Dysphagia   Dysphagia Patient Response Patient;Acceptance;Explanation;Verbal " Demonstration;Reinforcement Needed   Anticipated Discharge Needs   Discharge Recommendations Anticipate that the patient will have no further speech therapy needs after discharge from the hospital   Therapy Recommendations Upon DC Not Indicated   Interdisciplinary Plan of Care Collaboration   IDT Collaboration with  Nursing;Physical Therapist   Patient Position at End of Therapy Seated;In Bed;Call Light within Reach;Tray Table within Reach;Phone within Reach

## 2022-05-10 NOTE — CONSULTS
Hospital Medicine Consultation    Date of Service  5/10/2022    Referring Physician  Yanelis Sanchez MD    Consulting Physician  Dio Locke M.D.    Reason for Consultation  Continuation of medical care    History of Presenting Illness  72 y.o. male who presented 5/9/2022 with aphasia. Patient with history of HTN, dyslipidemia, CAD s/p PCI to LAD, FRANK, who presented for garbled speech. Patient found to have perfusion defect on CT scan with decreased flow to left parietal brain, not amenable to mechanical thrombectomy. Patient given TNK and transferred to ICU for post thrombolytic care. Patient has done well with improved symptoms. He is neurologically intact, with minor dysarthria and left facial droop, but appears close to his baseline. He is stable for transfer out of ICU. MRI brain and TTE pending. Patient likely to start plavix after MRI results reviewed, as he has failed outpatient aspirin therapy. Neurology following.    Review of Systems  Review of Systems   Constitutional: Negative for chills and fever.   Eyes: Negative for blurred vision and pain.   Respiratory: Negative for cough and shortness of breath.    Cardiovascular: Negative for chest pain, palpitations and leg swelling.   Gastrointestinal: Negative for abdominal pain, nausea and vomiting.   Genitourinary: Negative for dysuria and urgency.   Musculoskeletal: Negative for back pain.   Skin: Negative for itching and rash.   Neurological: Positive for headaches. Negative for dizziness, sensory change, focal weakness and loss of consciousness.   Psychiatric/Behavioral: Negative for substance abuse. The patient does not have insomnia.        Past Medical History   has a past medical history of Apnea, sleep, CAD (coronary artery disease) (stenLCA 2011), Chickenpox, Danish measles, Heart attack (HCC), Hyperlipidemia, Hypertension, Mumps, Sleep apnea, Snoring, and Wears glasses.    Surgical History   has a past surgical history that includes zzz cardiac  cath (2/21/15); zzz cardiac cath (5/4/10); and cyst excision.    Family History  family history includes Cancer in his father, mother, and sister; Heart Attack in his mother.    Social History   reports that he quit smoking about 12 years ago. His smoking use included cigarettes. He has a 21.00 pack-year smoking history. He has never used smokeless tobacco. He reports current alcohol use. He reports previous drug use. Drugs: Marijuana and Inhaled.    Medications  Prior to Admission Medications   Prescriptions Last Dose Informant Patient Reported? Taking?   atorvastatin (LIPITOR) 40 MG Tab UNKNOWN at UNKNOWN TIME Patient's Home Pharmacy No No   Sig: Take 1 Tablet by mouth 1/2 hour before dinner.   latanoprost (XALATAN) 0.005 % Solution UNKNOWN at UNKNOWN TIME Patient's Home Pharmacy Yes No   Sig: Administer 1 Drop into both eyes at bedtime.   losartan (COZAAR) 25 MG Tab UNKNOWN at UNKNOWN TIME Patient's Home Pharmacy No No   Sig: Take 1 Tablet by mouth every day.   metoprolol tartrate (LOPRESSOR) 25 MG Tab UNKNOWN at UNKNOWN TIME Patient's Home Pharmacy No No   Sig: Take 1 Tablet by mouth 2 times a day. TAKE 1 TAB BY MOUTH 2 TIMES A DAY.   minocycline (MINOCIN) 100 MG Cap UNKNOWN at UNKNOWN TIME Patient's Home Pharmacy Yes No   Sig: Take 100 mg by mouth 2 times a day.   pimecrolimus (ELIDEL) 1 % cream UNKNOWN at UNKNOWN TIME Patient's Home Pharmacy Yes No   Sig: Apply 1 Application topically 2 times a day.   traZODone (DESYREL) 50 MG Tab UNKNOWN at UNKNOWN TIME Patient's Home Pharmacy No No   Sig: Take 1-2 Tablets by mouth at bedtime as needed for Sleep.      Facility-Administered Medications: None       Allergies  No Known Allergies    Physical Exam  Temp:  [35.8 °C (96.5 °F)-36.6 °C (97.9 °F)] 36.6 °C (97.9 °F)  Pulse:  [44-78] 57  Resp:  [13-26] 22  BP: (123-190)/(60-93) 151/82  SpO2:  [93 %-98 %] 95 %    Physical Exam  Constitutional:       General: He is not in acute distress.     Appearance: He is not  ill-appearing.   HENT:      Head: Normocephalic and atraumatic.      Right Ear: External ear normal.      Left Ear: External ear normal.      Mouth/Throat:      Pharynx: No oropharyngeal exudate or posterior oropharyngeal erythema.   Eyes:      Extraocular Movements: Extraocular movements intact.      Pupils: Pupils are equal, round, and reactive to light.   Cardiovascular:      Rate and Rhythm: Normal rate and regular rhythm.      Pulses: Normal pulses.      Heart sounds: Normal heart sounds.   Pulmonary:      Effort: Pulmonary effort is normal. No respiratory distress.      Breath sounds: Normal breath sounds. No wheezing or rales.   Abdominal:      General: Bowel sounds are normal. There is no distension.      Palpations: Abdomen is soft.      Tenderness: There is no abdominal tenderness. There is no guarding.   Musculoskeletal:         General: No swelling or tenderness.      Cervical back: Normal range of motion and neck supple.   Skin:     General: Skin is warm and dry.   Neurological:      General: No focal deficit present.      Mental Status: He is oriented to person, place, and time.      Cranial Nerves: Cranial nerve deficit present.      Sensory: No sensory deficit.      Motor: No weakness.      Comments: Mild dysarthria, mild resting left facial droop   Psychiatric:         Mood and Affect: Mood normal.         Behavior: Behavior normal.         Fluids      Laboratory  Recent Labs     05/09/22  1224 05/10/22  0404   WBC 8.5 7.9   RBC 4.19* 3.91*   HEMOGLOBIN 13.6* 12.8*   HEMATOCRIT 41.8* 38.4*   MCV 99.8* 98.2*   MCH 32.5 32.7   MCHC 32.5* 33.3*   RDW 48.3 47.8   PLATELETCT 217 203   MPV 10.0 10.2     Recent Labs     05/09/22  1224 05/10/22  0404   SODIUM 142 145   POTASSIUM 3.9 4.0   CHLORIDE 101 108   CO2 29 27   GLUCOSE 101* 100*   BUN 16 12   CREATININE 1.15 0.92   CALCIUM 9.2 8.6     Recent Labs     05/09/22  1224   APTT 25.8   INR 0.99          Recent Labs     05/10/22  0404   TRIGLYCERIDE 103    HDL 52   LDL 40        Imaging  EC-ECHOCARDIOGRAM COMPLETE W/O CONT    (Results Pending)   MR-BRAIN-W/O    (Results Pending)       Assessment/Plan  * Acute ischemic stroke (HCC)- (present on admission)  Assessment & Plan  Characterized by speech difficulties  CT head without acute pathology  CT perfusion study with evidence of ischemia  CTA of head/neck without large vessel occlusion  S/P TNKase  High intensity statin  Check echocardiogram, glycohemoglobin, lipid panel  Strict blood pressure control with goal BP less than 180/105  MRI brain  Okay to start Plavix if MRI brain shows no evidence of hemorrhagic conversion  Neurology following    FRANK on CPAP- (present on admission)  Assessment & Plan  Continue home CPAP    PVD (peripheral vascular disease) (HCC)- (present on admission)  Assessment & Plan  Continue statin    Hypertension- (present on admission)  Assessment & Plan  Permissive HTN, goal SBP <180 for 48 hours    CAD (coronary artery disease)- (present on admission)  Assessment & Plan  History of, s/p PCI to LAD  Was on statin and aspirin at home

## 2022-05-10 NOTE — THERAPY
Physical Therapy Contact Note    Patient Name: Jan Rivas  Age:  72 y.o., Sex:  male  Medical Record #: [id number]  Today's Date: 5/10/2022       05/10/22 0717   Interdisciplinary Plan of Care Collaboration   Collaboration Comments PT consult received.  Per chart review pt with activity orders beginning 5/10 at 1506.  Will HOLD and attempt PT eval when pt cleared to participate in therapy as able/appropriate.     Carolyne Lerner, PT, DPT

## 2022-05-10 NOTE — ASSESSMENT & PLAN NOTE
Characterized by speech difficulties  CT head without acute pathology  CT perfusion study with evidence of ischemia  CTA of head/neck without large vessel occlusion  S/P TNKase  High intensity statin  Check echocardiogram, glycohemoglobin, lipid panel  Strict blood pressure control with goal BP less than 180/105  MRI brain  Okay to start Plavix if MRI brain shows no evidence of hemorrhagic conversion  Neurology following

## 2022-05-11 ENCOUNTER — PHARMACY VISIT (OUTPATIENT)
Dept: PHARMACY | Facility: MEDICAL CENTER | Age: 72
End: 2022-05-11
Payer: COMMERCIAL

## 2022-05-11 ENCOUNTER — APPOINTMENT (OUTPATIENT)
Dept: CARDIOLOGY | Facility: MEDICAL CENTER | Age: 72
DRG: 065 | End: 2022-05-11
Attending: INTERNAL MEDICINE
Payer: MEDICARE

## 2022-05-11 VITALS
TEMPERATURE: 96.8 F | HEART RATE: 54 BPM | SYSTOLIC BLOOD PRESSURE: 147 MMHG | HEIGHT: 73 IN | OXYGEN SATURATION: 95 % | WEIGHT: 230.38 LBS | BODY MASS INDEX: 30.53 KG/M2 | DIASTOLIC BLOOD PRESSURE: 75 MMHG | RESPIRATION RATE: 24 BRPM

## 2022-05-11 LAB
ANION GAP SERPL CALC-SCNC: 13 MMOL/L (ref 7–16)
BASOPHILS # BLD AUTO: 0.6 % (ref 0–1.8)
BASOPHILS # BLD: 0.05 K/UL (ref 0–0.12)
BUN SERPL-MCNC: 9 MG/DL (ref 8–22)
CALCIUM SERPL-MCNC: 9.3 MG/DL (ref 8.5–10.5)
CHLORIDE SERPL-SCNC: 102 MMOL/L (ref 96–112)
CO2 SERPL-SCNC: 25 MMOL/L (ref 20–33)
CREAT SERPL-MCNC: 0.99 MG/DL (ref 0.5–1.4)
EOSINOPHIL # BLD AUTO: 0.18 K/UL (ref 0–0.51)
EOSINOPHIL NFR BLD: 2.1 % (ref 0–6.9)
ERYTHROCYTE [DISTWIDTH] IN BLOOD BY AUTOMATED COUNT: 47.1 FL (ref 35.9–50)
GFR SERPLBLD CREATININE-BSD FMLA CKD-EPI: 82 ML/MIN/1.73 M 2
GLUCOSE SERPL-MCNC: 107 MG/DL (ref 65–99)
HCT VFR BLD AUTO: 42.6 % (ref 42–52)
HGB BLD-MCNC: 14.1 G/DL (ref 14–18)
IMM GRANULOCYTES # BLD AUTO: 0.05 K/UL (ref 0–0.11)
IMM GRANULOCYTES NFR BLD AUTO: 0.6 % (ref 0–0.9)
LV EJECT FRACT  99904: 60
LV EJECT FRACT MOD 2C 99903: 70.28
LV EJECT FRACT MOD 4C 99902: 56.18
LV EJECT FRACT MOD BP 99901: 60.48
LYMPHOCYTES # BLD AUTO: 1.73 K/UL (ref 1–4.8)
LYMPHOCYTES NFR BLD: 20.2 % (ref 22–41)
MAGNESIUM SERPL-MCNC: 2.1 MG/DL (ref 1.5–2.5)
MCH RBC QN AUTO: 32.7 PG (ref 27–33)
MCHC RBC AUTO-ENTMCNC: 33.1 G/DL (ref 33.7–35.3)
MCV RBC AUTO: 98.8 FL (ref 81.4–97.8)
MONOCYTES # BLD AUTO: 0.81 K/UL (ref 0–0.85)
MONOCYTES NFR BLD AUTO: 9.5 % (ref 0–13.4)
NEUTROPHILS # BLD AUTO: 5.75 K/UL (ref 1.82–7.42)
NEUTROPHILS NFR BLD: 67 % (ref 44–72)
NRBC # BLD AUTO: 0 K/UL
NRBC BLD-RTO: 0 /100 WBC
PHOSPHATE SERPL-MCNC: 4 MG/DL (ref 2.5–4.5)
PLATELET # BLD AUTO: 209 K/UL (ref 164–446)
PMV BLD AUTO: 9.8 FL (ref 9–12.9)
POTASSIUM SERPL-SCNC: 4.2 MMOL/L (ref 3.6–5.5)
RBC # BLD AUTO: 4.31 M/UL (ref 4.7–6.1)
SODIUM SERPL-SCNC: 140 MMOL/L (ref 135–145)
WBC # BLD AUTO: 8.6 K/UL (ref 4.8–10.8)

## 2022-05-11 PROCEDURE — 83735 ASSAY OF MAGNESIUM: CPT

## 2022-05-11 PROCEDURE — 700102 HCHG RX REV CODE 250 W/ 637 OVERRIDE(OP): Performed by: NURSE PRACTITIONER

## 2022-05-11 PROCEDURE — A9270 NON-COVERED ITEM OR SERVICE: HCPCS | Performed by: NURSE PRACTITIONER

## 2022-05-11 PROCEDURE — RXMED WILLOW AMBULATORY MEDICATION CHARGE: Performed by: HOSPITALIST

## 2022-05-11 PROCEDURE — 700117 HCHG RX CONTRAST REV CODE 255: Performed by: INTERNAL MEDICINE

## 2022-05-11 PROCEDURE — 99239 HOSP IP/OBS DSCHRG MGMT >30: CPT | Performed by: HOSPITALIST

## 2022-05-11 PROCEDURE — 84100 ASSAY OF PHOSPHORUS: CPT

## 2022-05-11 PROCEDURE — 85025 COMPLETE CBC W/AUTO DIFF WBC: CPT

## 2022-05-11 PROCEDURE — 99232 SBSQ HOSP IP/OBS MODERATE 35: CPT | Performed by: NURSE PRACTITIONER

## 2022-05-11 PROCEDURE — 80048 BASIC METABOLIC PNL TOTAL CA: CPT

## 2022-05-11 PROCEDURE — 93306 TTE W/DOPPLER COMPLETE: CPT

## 2022-05-11 PROCEDURE — 97165 OT EVAL LOW COMPLEX 30 MIN: CPT

## 2022-05-11 PROCEDURE — 700102 HCHG RX REV CODE 250 W/ 637 OVERRIDE(OP): Performed by: INTERNAL MEDICINE

## 2022-05-11 PROCEDURE — 99223 1ST HOSP IP/OBS HIGH 75: CPT | Performed by: PHYSICAL MEDICINE & REHABILITATION

## 2022-05-11 PROCEDURE — A9270 NON-COVERED ITEM OR SERVICE: HCPCS | Performed by: INTERNAL MEDICINE

## 2022-05-11 PROCEDURE — 97162 PT EVAL MOD COMPLEX 30 MIN: CPT

## 2022-05-11 PROCEDURE — 93306 TTE W/DOPPLER COMPLETE: CPT | Mod: 26 | Performed by: INTERNAL MEDICINE

## 2022-05-11 RX ORDER — CLOPIDOGREL BISULFATE 75 MG/1
75 TABLET ORAL DAILY
Qty: 90 TABLET | Refills: 0 | Status: SHIPPED | OUTPATIENT
Start: 2022-05-12 | End: 2022-08-10

## 2022-05-11 RX ADMIN — LOSARTAN POTASSIUM 25 MG: 25 TABLET, FILM COATED ORAL at 06:15

## 2022-05-11 RX ADMIN — CLOPIDOGREL BISULFATE 75 MG: 75 TABLET ORAL at 06:15

## 2022-05-11 RX ADMIN — SENNOSIDES AND DOCUSATE SODIUM 2 TABLET: 50; 8.6 TABLET ORAL at 06:16

## 2022-05-11 RX ADMIN — HUMAN ALBUMIN MICROSPHERES AND PERFLUTREN 3 ML: 10; .22 INJECTION, SOLUTION INTRAVENOUS at 11:38

## 2022-05-11 RX ADMIN — METOPROLOL TARTRATE 25 MG: 25 TABLET, FILM COATED ORAL at 06:15

## 2022-05-11 ASSESSMENT — COGNITIVE AND FUNCTIONAL STATUS - GENERAL
CLIMB 3 TO 5 STEPS WITH RAILING: A LITTLE
SUGGESTED CMS G CODE MODIFIER DAILY ACTIVITY: CH
SUGGESTED CMS G CODE MODIFIER MOBILITY: CJ
MOBILITY SCORE: 20
MOVING TO AND FROM BED TO CHAIR: A LITTLE
STANDING UP FROM CHAIR USING ARMS: A LITTLE
WALKING IN HOSPITAL ROOM: A LITTLE
DAILY ACTIVITIY SCORE: 24

## 2022-05-11 ASSESSMENT — GAIT ASSESSMENTS
GAIT LEVEL OF ASSIST: SUPERVISED
DEVIATION: NO DEVIATION
DISTANCE (FEET): 400

## 2022-05-11 ASSESSMENT — ACTIVITIES OF DAILY LIVING (ADL): TOILETING: INDEPENDENT

## 2022-05-11 NOTE — DISCHARGE PLANNING
Meds-to-Beds: Discharge prescription orders listed below delivered to patient's bedside. RN Kelsey notified. Patient counseled. Patient elected to have co-payment paid with cash.    Reviewed signs and symptoms of bleeding and when to seek medical attention. Reviewed potential drug-drug interactions.    Current Outpatient Medications   Medication Sig Dispense Refill   • [START ON 5/12/2022] clopidogrel (PLAVIX) 75 MG Tab Take 1 Tablet by mouth every day for 90 days. 90 Tablet 0      Julia Soto, PharmD

## 2022-05-11 NOTE — PROGRESS NOTES
Pt d/c'd home with spouse via private car. All questions and concerns addressed. Education provided on stroke dx, diet and lifestyle and follow up appointments. Pt sent home with isaac murdock, meds to beds and all personal belongings.

## 2022-05-11 NOTE — DISCHARGE PLANNING
Per physiatry patient is currently functioning near his level of baseline, no need for post acute rehab at this time. TCC will no longer follow.

## 2022-05-11 NOTE — CONSULTS
Physical Medicine and Rehabilitation Consultation              Date of initial consultation: 5/11/2022  Requesting provider: Blake Sweeney MD   Consulting provider: Angelia Amor D.O.  Reason for consultation: assess for acute inpatient rehab appropriateness  LOS: 2 Day(s)    Chief complaint: new onset garbled speech     HPI: The patient is a 72 y.o.  male with a past medical history of hypertension, dyslipidemia, CAD status post PCI to LAD, and obstructive sleep apnea;  who presented on 5/9/2022  1:17 PM with new onset garbled speech.  Patient's last known well was at 9:30 AM on 5/9 when patient's wife left the house.  Upon returning to the house the patient was found down with slurred garbled speech.  Patient was evaluated in the emergency department original NIHSS was 9, significant for dysarthria, aphasia, left-sided facial droop, left upper extremity and left lower extremity weakness.  CT of the head was obtained at [facility] which showed no acute hemorrhage.  CTA of the head and neck were obtained which revealed partial occlusion and a tiny, distal left parietal cortical branch artery.  CT perfusion scan was obtained which revealed a defect in the left MCA territory with a small core mismatch volume of 27 mm of ischemic penumbra.  Neurology consulted and patient was transferred to her [facility] after patient had IV-tenecteplase administered.  Upon arrival to [facility] NIHSS 3. MRI brain completed, there was evidence of an acute infarct in the left posterior insular cortex and chronic infarcts in the left frontal and right posterior temporal lobes.     Patient seen and examined at bedside, son at bedside.  Patient reports he feels back to his baseline.  Patient denies any changes in his voice.  Patient's son is able to confirm that he has no changes in his voice and is back to his baseline with his speech.  Patient denies lightheadedness, dizziness, headache, changes in  vision, chest pain or shortness of breath denies coughing.  Patient denies any numbness tingling or weakness.  Patient reports he did have a headache but it is resolved now.    Social Hx:  Patient lives with his wife in a first-floor condo.  There are no stairs to enter no stairs inside.  Patient's wife can provide supervision at home.  0 PAPI  At prior level of function patient was independent with mobility and ADLs.    Tobacco: Denies  Alcohol: Denies  Drugs: Denies    THERAPY:  Restrictions: Fall risk  PT: Functional mobility   PT note pending    OT: ADLs   OT note: Supervision for bed mobility, supervision for upper body and lower body dressing,     SLP:   5/10 SLP note: Cleared for regular and thins.    IMAGIN/11 MRI brain  IMPRESSION:     1.  A tiny area of acute infarct in the left posterior insular cortex.  2.  Chronic infarcts in the left frontal and right posterior temporal lobes.  3.  Mild chronic microvascular ischemic disease.  4.  Mild cerebral volume mass.     CT head  IMPRESSION:        1. No acute intracranial abnormality. No evidence of acute intracranial hemorrhage or mass lesion.     2. Encephalomalacia in the left frontal lobe.  PROCEDURES:  None    PMH:  Past Medical History:   Diagnosis Date   • Apnea, sleep    • CAD (coronary artery disease) stenLCA    • Chickenpox    • [place] measles    • Heart attack (HCC)    • Hyperlipidemia    • Hypertension    • Mumps    • Sleep apnea    • Snoring    • Wears glasses        PSH:  Past Surgical History:   Procedure Laterality Date   • [facility] CARDIAC CATH  2/21/15    Cath at [place].  Patent stent and no obstructive CAD.   • [facility] CARDIAC CATH  5/4/10    Taxus stent to LAD   • CYST EXCISION      posterior neck       FHX:  Family History   Problem Relation Age of Onset   • Heart Attack Mother    • Cancer Mother    • Cancer Father    • Cancer Sister        Medications:  Current Facility-Administered Medications   Medication Dose   • atorvastatin  "(LIPITOR) tablet 40 mg  40 mg   • clopidogrel (PLAVIX) tablet 75 mg  75 mg   • senna-docusate (PERICOLACE or SENOKOT S) 8.6-50 MG per tablet 2 Tablet  2 Tablet    And   • polyethylene glycol/lytes (MIRALAX) PACKET 1 Packet  1 Packet    And   • magnesium hydroxide (MILK OF MAGNESIA) suspension 30 mL  30 mL    And   • bisacodyl (DULCOLAX) suppository 10 mg  10 mg   • Respiratory Therapy Consult     • ondansetron (ZOFRAN) syringe/vial injection 4 mg  4 mg   • ondansetron (ZOFRAN ODT) dispertab 4 mg  4 mg   • labetalol (NORMODYNE/TRANDATE) injection 10 mg  10 mg   • hydrALAZINE (APRESOLINE) injection 10 mg  10 mg   • latanoprost (XALATAN) 0.005 % ophthalmic solution 1 Drop  1 Drop   • losartan (COZAAR) tablet 25 mg  25 mg   • metoprolol tartrate (LOPRESSOR) tablet 25 mg  25 mg       Allergies:  No Known Allergies    Physical Exam:  Vitals: /77   Pulse (!) 55   Temp 36.6 °C (97.8 °F) (Temporal)   Resp 16   Ht 1.854 m (6' 1\")   Wt 104 kg (230 lb 6.1 oz)   SpO2 97%   Gen: NAD, seated comfortably in recliner, son at bedside  Head:  NC/AT  Eyes/ Nose/ Mouth: PERRLA, moist mucous membranes  Cardio: good distal perfusion, warm extremities  Pulm: normal respiratory effort, no cyanosis , no witnessed wheezes or coughing  Abd: Soft NTND, negative borborygmi   Ext: No peripheral edema. No calf tenderness. No clubbing.    Mental status:  A&Ox4 (person, place, date, situation) answers questions appropriately follows commands  Speech: fluent, no aphasia or dysarthria, son able to confirm patient is at baseline for speech    CRANIAL NERVES:  2,3: visual acuity grossly intact, PERRL  3,4,6: EOMI bilaterally, no nystagmus or diplopia  5: sensation intact to light touch bilaterally and symmetric  7: no facial asymmetry  8: hearing grossly intact  9,10: symmetric palate elevation  11: SCM/Trapezius strength 5/5 bilaterally  12: tongue protrudes midline      Motor:      Upper Extremity  Myotome R L   Shoulder flexion C5 5/5 5/5 "   Elbow flexion C5 5/5 5/5   Wrist extension C6 5/5 5/5   Elbow extension C7 5/5 5/5   Finger flexion C8 5/5 5/5   Finger abduction T1 5/5 5/5     Lower Extremity Myotome R L   Hip flexion L2 5/5 5/5   Knee extension L3 5/5 5/5   Ankle dorsiflexion L4 5/5 5/5   Toe extension L5 5/5 5/5   Ankle plantarflexion S1 5/5 5/5       Negative Pronator drift bilaterally    Sensory:   intact to light touch through out bilateral upper and lower extremities      Tone: no spasticity noted, no cogwheeling noted    Coordination:   intact finger to nose bilaterally  intact fine motor with fingers bilaterally      Labs: Reviewed and significant for   Recent Labs     05/09/22  1224 05/10/22  0404 05/11/22  0600   RBC 4.19* 3.91* 4.31*   HEMOGLOBIN 13.6* 12.8* 14.1   HEMATOCRIT 41.8* 38.4* 42.6   PLATELETCT 217 203 209   PROTHROMBTM 12.3  --   --    APTT 25.8  --   --    INR 0.99  --   --      Recent Labs     05/09/22  1224 05/10/22  0404   SODIUM 142 145   POTASSIUM 3.9 4.0   CHLORIDE 101 108   CO2 29 27   GLUCOSE 101* 100*   BUN 16 12   CREATININE 1.15 0.92   CALCIUM 9.2 8.6     Recent Results (from the past 24 hour(s))   CBC WITH DIFFERENTIAL    Collection Time: 05/11/22  6:00 AM   Result Value Ref Range    WBC 8.6 4.8 - 10.8 K/uL    RBC 4.31 (L) 4.70 - 6.10 M/uL    Hemoglobin 14.1 14.0 - 18.0 g/dL    Hematocrit 42.6 42.0 - 52.0 %    MCV 98.8 (H) 81.4 - 97.8 fL    MCH 32.7 27.0 - 33.0 pg    MCHC 33.1 (L) 33.7 - 35.3 g/dL    RDW 47.1 35.9 - 50.0 fL    Platelet Count 209 164 - 446 K/uL    MPV 9.8 9.0 - 12.9 fL    Neutrophils-Polys 67.00 44.00 - 72.00 %    Lymphocytes 20.20 (L) 22.00 - 41.00 %    Monocytes 9.50 0.00 - 13.40 %    Eosinophils 2.10 0.00 - 6.90 %    Basophils 0.60 0.00 - 1.80 %    Immature Granulocytes 0.60 0.00 - 0.90 %    Nucleated RBC 0.00 /100 WBC    Neutrophils (Absolute) 5.75 1.82 - 7.42 K/uL    Lymphs (Absolute) 1.73 1.00 - 4.80 K/uL    Monos (Absolute) 0.81 0.00 - 0.85 K/uL    Eos (Absolute) 0.18 0.00 - 0.51 K/uL     Baso (Absolute) 0.05 0.00 - 0.12 K/uL    Immature Granulocytes (abs) 0.05 0.00 - 0.11 K/uL    NRBC (Absolute) 0.00 K/uL         ASSESSMENT:  Patient is a 72 y.o. male admitted with garbled speech found to have an acute CVA, has returned to baseline      Additional Recommendations:  Left posterior insular cortex CVA  - Greatest deficits for original onset garbled and slurred speech, has now resolved  - Patient was administered TNKase, had resolution of symptoms  - CTA head without evidence of hemorrhage, CT perfusion scan with evidence of mismatch, MRI with evidence of infarct in left posterior insular cortex  - Echocardiogram pending  - Neurology consulted, patient currently on secondary stroke ppx with statin and plavix  -continue with PT/OT/SLP     Dispo  - Patient is currently functioning near is level of baseline, no need for post acute rehab at this time   - recommend outpatient PT/OT and follow up with outpatient PM&R with Dr. Barajas  - PM&R to sign off at this time       Medical Complexity:  Acute CVA   Hypertension  CAD  PVD  Obstructive sleep apnea on CPAP      DVT PPX: SCDs      Thank you for allowing us to participate in the care of this patient.     Patient was seen for 88 minutes on unit/floor of which > 50% of time was spent on counseling and coordination of care regarding the above, including prognosis, risk reduction, benefits of treatment, and options for next stage of care.    Angelia Amor D.O.   Physical Medicine and Rehabilitation     Please note that this dictation was created using voice recognition software. I have made every reasonable attempt to correct obvious errors, but there may be errors of grammar and possibly content that I did not discover before finalizing the note.

## 2022-05-11 NOTE — CARE PLAN
Problem: Knowledge Deficit - Stroke Education  Goal: Patient's knowledge of stroke and risk factors will improve  Outcome: Progressing     Problem: Neuro Status  Goal: Neuro status will remain stable or improve  Outcome: Progressing     Problem: Respiratory - Stroke Patient  Goal: Patient will achieve/maintain optimum respiratory rate/effort  Outcome: Progressing     Problem: Dysphagia  Goal: Dysphagia will improve  Outcome: Progressing   The patient is Stable - Low risk of patient condition declining or worsening    Shift Goals  Clinical Goals: Echo, joshua, d/c  Patient Goals: d/c home  Family Goals: safety    Progress made toward(s) clinical / shift goals:      Patient is not progressing towards the following goals:

## 2022-05-11 NOTE — DISCHARGE INSTRUCTIONS
Follow up at [facility] after you discharge from the hospital. The clinic will contact you at the [phone] to schedule an appointment for 14 days after your discharge date.       [facility]  [facility]  [address] [place] [id number] [phone]    Discharge Instructions    Discharged to home by car with relative. Discharged via wheelchair, hospital escort: Yes.  Special equipment needed: Not Applicable    Be sure to schedule a follow-up appointment with your primary care doctor or any specialists as instructed.     Discharge Plan:   Diet Plan: Discussed  Activity Level: Discussed  Confirmed Follow up Appointment: Patient to Call and Schedule Appointment  Confirmed Symptoms Management: Discussed  Medication Reconciliation Updated: Yes    I understand that a diet low in cholesterol, fat, and sodium is recommended for good health. Unless I have been given specific instructions below for another diet, I accept this instruction as my diet prescription.   Other diet:     Special Instructions: None    Is patient discharged on Warfarin / Coumadin?   No     Depression / Suicide Risk    As you are discharged from this [email or web address]], it is important to learn how to keep safe from harming yourself.    Recognize the warning signs:  Abrupt changes in personality, positive or negative- including increase in energy   Giving away possessions  Change in eating patterns- significant weight changes-  positive or negative  Change in sleeping patterns- unable to sleep or sleeping all the time   Unwillingness or inability to communicate  Depression  Unusual sadness, discouragement and loneliness  Talk of wanting to die  Neglect of personal appearance   Rebelliousness- reckless behavior  Withdrawal from people/activities they love  Confusion- inability to concentrate     If you or a loved one observes any of these behaviors or has concerns about self-harm, here's what you can  do:  Talk about it- your feelings and reasons for harming yourself  Remove any means that you might use to hurt yourself (examples: pills, rope, extension cords, firearm)  Get professional help from the community (Mental Health, Substance Abuse, psychological counseling)  Do not be alone:Call your Safe Contact- someone whom you trust who will be there for you.  Call your local CRISIS HOTLINE 442-5743 or [phone]  Call your local Children's Mobile Crisis Response Team [place] (004) 746-7901 or www.Reframed.tv  Call the toll free National Suicide Prevention Hotlines   National Suicide Prevention Lifeline 079-786-FLIQ (4340)  National Hope Line Network 800-SUICIDE (916-5649)

## 2022-05-11 NOTE — THERAPY
Occupational Therapy   Initial Evaluation     Patient Name: Jan Rivas  Age:  72 y.o., Sex:  male  Medical Record #: [id number]  Today's Date: 5/11/2022          Assessment  Patient is 72 y.o. male admitted for acute ischemic CVA s/p TNKase, pt normally independent with all functional mobility and ADLs at baseline living in a [facility] with spouse who is able to assist as needed. Pt able to complete all functional mobility and ADLs with supervision, no AD needed, pt presented with some slight flexor syngery patterns to LUE when not completing functional activity but otherwise no deficits noted, anticipate pt is at his functional baseline, will complete OT order at this time.    Plan    Recommend Occupational Therapy for Evaluation only.    DC Equipment Recommendations: (P) None  Discharge Recommendations: (P) Anticipate that the patient will have no further occupational therapy needs after discharge from the hospital     Objective       05/11/22 0878   Prior Living Situation   Prior Services Home-Independent   Housing / Facility 1 Story House  (stays on 1st floor)   Bathroom Set up Walk In Shower   Equipment Owned None   Lives with - Patient's Self Care Capacity Spouse   Comments Spouse able to assist   Prior Level of ADL Function   Self Feeding Independent   Grooming / Hygiene Independent   Bathing Independent   Dressing Independent   Toileting Independent   Prior Level of IADL Function   Medication Management Independent   Laundry Independent   Kitchen Mobility Independent   Finances Independent   Home Management Independent   Shopping Independent   Prior Level Of Mobility Independent Without Device in Community   Driving / Transportation Driving Independent   Occupation (Pre-Hospital Vocational) Retired Due To Age   History of Falls   History of Falls No   Pain 0 - 10 Group   Therapist Pain Assessment Post Activity Pain Same as Prior to Activity;Nurse Notified   Cognition    Cognition / Consciousness WDL   Level  of Consciousness Alert   Comments Pleasant, cooperative, receptive to therapy/education   Active ROM Upper Body   Active ROM Upper Body  WDL   Dominant Hand Right   Strength Upper Body   Upper Body Strength  WDL   Sensation Upper Body   Upper Extremity Sensation  WDL   Upper Body Muscle Tone   Upper Body Muscle Tone  WDL   Neurological Concerns   Neurological Concerns No   Coordination Upper Body   Coordination WDL   Balance Assessment   Sitting Balance (Static) Good   Sitting Balance (Dynamic) Good   Standing Balance (Static) Good   Standing Balance (Dynamic) Good   Weight Shift Sitting Good   Weight Shift Standing Good   Comments no AD   Bed Mobility    Supine to Sit Supervised   Scooting Supervised   Rolling Supervised   Comments up in chair at end of session   ADL Assessment   Eating Independent   Grooming Independent   Upper Body Dressing Supervision   Lower Body Dressing Supervision   Toileting   (NT-refused need)   How much help from another person does the patient currently need...   Putting on and taking off regular lower body clothing? 4   Bathing (including washing, rinsing, and drying)? 4   Toileting, which includes using a toilet, bedpan, or urinal? 4   Putting on and taking off regular upper body clothing? 4   Taking care of personal grooming such as brushing teeth? 4   Eating meals? 4   6 Clicks Daily Activity Score 24   Modified [place] (mRS)   Modified [place] Score 1   Functional Mobility   Sit to Stand Supervised   Bed, Chair, Wheelchair Transfer Supervised   Toilet Transfers   (NT-refused need)   Transfer Method Stand Step   Mobility bed mobility, hallway mobility, up to chair   Comments no AD   ICU Target Mobility Level   ICU Mobility - Targeted Level Level 4   Visual Perception   Visual Perception  WDL   Activity Tolerance   Sitting in Chair left seated in chair   Sitting Edge of Bed 5 min   Standing 10 min   Education Group   Education Provided Role of Occupational Therapist;Stroke   Role of  Occupational Therapist Patient Response Patient;Acceptance;Explanation   Stroke Patient Response Patient;Acceptance;Explanation   Problem List   Problem List None   Anticipated Discharge Equipment and Recommendations   DC Equipment Recommendations None   Discharge Recommendations Anticipate that the patient will have no further occupational therapy needs after discharge from the hospital   Interdisciplinary Plan of Care Collaboration   IDT Collaboration with  Nursing;Physical Therapist   Patient Position at End of Therapy Seated;Chair Alarm On;Call Light within Reach;Tray Table within Reach;Phone within Reach   Collaboration Comments RN updated

## 2022-05-11 NOTE — PROGRESS NOTES
Neurology Progress Note  [facility]    Referring Physician: Dio Cary M.D.    Chief Complaint   Patient presents with   • Possible Stroke       HPI: Refer to initial documented Neurology H&P, as detailed in the patient's chart.    Interval History 2022: No acute events overnight. Patient is currently sitting up in chair, awake, alert, fully oriented, and following commands. Expressive aphasia continues to improve with only mild paraphasic errors with complex sentences. Otherwise no focal deficits and denies headache, vision changes, facial or focal weakness, slurred speech, numbness, tingling, abnormal movements, LOC, confusion, or memory loss.    Past Medical History:   Past Medical History:   Diagnosis Date   • Apnea, sleep    • CAD (coronary artery disease) stenLCA    • Chickenpox    • Telugu measles    • Heart attack (HCC)    • Hyperlipidemia    • Hypertension    • Mumps    • Sleep apnea    • Snoring    • Wears glasses         FHx:  Family History   Problem Relation Age of Onset   • Heart Attack Mother    • Cancer Mother    • Cancer Father    • Cancer Sister         SHx:  Social History     Socioeconomic History   • Marital status:      Spouse name: Not on file   • Number of children: Not on file   • Years of education: Not on file   • Highest education level: Not on file   Occupational History   • Not on file   Tobacco Use   • Smoking status: Former Smoker     Packs/day: 0.50     Years: 42.00     Pack years: 21.00     Types: Cigarettes     Quit date: 2010     Years since quittin.0   • Smokeless tobacco: Never Used   Vaping Use   • Vaping Use: Former   Substance and Sexual Activity   • Alcohol use: Yes     Comment: 2 drinks per day    • Drug use: Not Currently     Types: Marijuana, Inhaled     Comment: Every other day marijuana   • Sexual activity: Not on file   Other Topics Concern   • Not on file   Social History Narrative   • Not on file     Social Determinants of  Health     Financial Resource Strain: Not on file   Food Insecurity: Not on file   Transportation Needs: Not on file   Physical Activity: Not on file   Stress: Not on file   Social Connections: Not on file   Intimate Partner Violence: Not on file   Housing Stability: Not on file        Medications:    Current Facility-Administered Medications:   •  atorvastatin (LIPITOR) tablet 40 mg, 40 mg, Oral, Q EVENING, Melvin Kong, A.P.R.N., 40 mg at 05/10/22 1751  •  clopidogrel (PLAVIX) tablet 75 mg, 75 mg, Oral, DAILY, Melvin Kong, A.P.R.N., 75 mg at 05/11/22 0615  •  senna-docusate (PERICOLACE or SENOKOT S) 8.6-50 MG per tablet 2 Tablet, 2 Tablet, Oral, BID, 2 Tablet at 05/11/22 0616 **AND** polyethylene glycol/lytes (MIRALAX) PACKET 1 Packet, 1 Packet, Oral, QDAY PRN **AND** magnesium hydroxide (MILK OF MAGNESIA) suspension 30 mL, 30 mL, Oral, QDAY PRN **AND** bisacodyl (DULCOLAX) suppository 10 mg, 10 mg, Rectal, QDAY PRN, Blake Sweeney M.D.  •  Respiratory Therapy Consult, , Nebulization, Continuous RT, Blake Sweeney M.D.  •  ondansetron (ZOFRAN) syringe/vial injection 4 mg, 4 mg, Intravenous, Q4HRS PRN, Blake Sweeney M.D.  •  ondansetron (ZOFRAN ODT) dispertab 4 mg, 4 mg, Oral, Q4HRS PRN, Blake Sweeney M.D.  •  labetalol (NORMODYNE/TRANDATE) injection 10 mg, 10 mg, Intravenous, Q10 MIN PRN, Blake Sweeney M.D.  •  hydrALAZINE (APRESOLINE) injection 10 mg, 10 mg, Intravenous, Q2HRS PRN, Blake Sweeney M.D.  •  latanoprost (XALATAN) 0.005 % ophthalmic solution 1 Drop, 1 Drop, Both Eyes, QHS, Blake Sweeney M.D., 1 Drop at 05/10/22 2131  •  losartan (COZAAR) tablet 25 mg, 25 mg, Oral, DAILY, Blake Sweeney M.D., 25 mg at 05/11/22 0615  •  metoprolol tartrate (LOPRESSOR) tablet 25 mg, 25 mg, Oral, BID, Blake Sweeney M.D., 25 mg at 05/11/22 0615    Allergies:  No Known Allergies     Review of systems: In addition to what is detailed in  the interval history above, all other systems reviewed and are negative.    Physical Examination:   Vitals:    05/11/22 0300 05/11/22 0400 05/11/22 0500 05/11/22 0600   BP: 134/79 133/69 133/69 144/77   Pulse: (!) 53 (!) 48 63 (!) 55   Resp: 16 12 (!) 11 16   Temp:  36.5 °C (97.7 °F)  36.6 °C (97.8 °F)   TempSrc:  Temporal  Temporal   SpO2: 97% 98% 96% 97%   Weight:       Height:         General: Patient in no acute distress, pleasant and cooperative.  HEENT: Normocephalic, no signs of acute trauma.   Neck: Supple, no meningeal signs or carotid bruits. There is normal range of motion. No tenderness on exam.   Chest: Regular and unlabored breaths on RA. No cough.   CV: RRR.   Skin: No signs of acute rashes or trauma.   Musculoskeletal: Joints exhibit full range of motion, without any pain to palpation. There are no signs of joint or muscle swelling. There is no tenderness to deep palpation of muscles.   Psychiatric: No hallucinatory behavior. Denies symptoms of depression or suicidal ideation. Mood and affect appear normal on exam.      NEUROLOGICAL EXAM:   Mental status, orientation: Awake, alert, following commands, and fully oriented.   Speech and language: Speech is clear with some loss of fluency c/w mild expressive aphasia. The patient is able to name, repeats with few paraphasic errors with complex sentences, and comprehends to follow all commands.   Cranial nerve exam: Pupils are 3-4 mm bilaterally and equally reactive to light. Visual fields are full. There is no nystagmus on primary or secondary gaze. Intact full EOM in all directions of gaze. Face appears symmetric. Sensation in the face is intact to light touch. Uvula is midline. Palate elevates symmetrically. Tongue is midline and without any signs of tongue biting or fasciculations. Sternocleidomastoid muscles exhibit is normal strength bilaterally. Shoulder shrug is intact bilaterally.   Motor exam: Strength is 5/5 in all extremities. Tone is normal.  No abnormal movements were seen on exam.   Sensory exam: Reveals normal sense of light touch and pinprick in all extremities.   Deep tendon reflexes:  Plantar responses are flexor. There is no clonus.   Coordination: No ataxia with normal finger-nose-finger test. Normal rapidly alternating movements.   Gait: Deferred per patient preference.      NIH Stroke Scale  5/11/22     1a. Level of Consciousness (Alert, drowsy, etc): 0= Alert     1b. LOC Questions (Month, age): 0= Answers both correctly     1c. LOC Commands (Open/close eyes make fist/let go): 0= Obeys both correctly     2.   Best Gaze (Eyes open - patient follows examiner's finger on face): 0= Normal     3.   Visual Fields (introduce visual stimulus/threat to patient's field quadrants): 0= No visual loss     4.   Facial Paresis (Show teeth, raise eyebrows and squeeze eyes shut): 0= Normal             5a. Motor Arm - Left (Elevate arm to 90 degrees if patient is sitting, 45 degrees if  supine): 0= No drift     5b. Motor Arm - Right (Elevate arm to 90 degrees if patient is sitting, 45 degrees if supine): 0= No drift     6a. Motor Leg - Left (Elevate leg 30 degrees with patient supine): 0= No drift     6b. Motor Leg - Right  (Elevate leg 30 degrees with patient supine): 0= No drift     7.   Limb Ataxia (Finger-nose, heel down shin): 0= No ataxia     8.   Sensory (Pin prick to face, arm, trunk and leg - compare side to side): 0= Normal     9.  Best Language (Name item, describe a picture and read sentences): 1= Mild to moderate aphasia     10. Dysarthria (Evaluate speech clarity by patient repeating listed words): 0= Normal articulation     11. Extinction and Inattention (Use information from prior testing to identify neglect or  double simultaneous stimuli testing): 0= No neglect     Total NIH Score: 1      Ancillary Data Reviewed:    Labs:  Lab Results   Component Value Date/Time    PROTHROMBTM 12.3 05/09/2022 12:24 PM    INR 0.99 05/09/2022 12:24 PM      Lab  Results   Component Value Date/Time    WBC 8.6 05/11/2022 06:00 AM    RBC 4.31 (L) 05/11/2022 06:00 AM    HEMOGLOBIN 14.1 05/11/2022 06:00 AM    HEMATOCRIT 42.6 05/11/2022 06:00 AM    MCV 98.8 (H) 05/11/2022 06:00 AM    MCH 32.7 05/11/2022 06:00 AM    MCHC 33.1 (L) 05/11/2022 06:00 AM    MPV 9.8 05/11/2022 06:00 AM    NEUTSPOLYS 67.00 05/11/2022 06:00 AM    LYMPHOCYTES 20.20 (L) 05/11/2022 06:00 AM    MONOCYTES 9.50 05/11/2022 06:00 AM    EOSINOPHILS 2.10 05/11/2022 06:00 AM    BASOPHILS 0.60 05/11/2022 06:00 AM      Lab Results   Component Value Date/Time    SODIUM 145 05/10/2022 04:04 AM    POTASSIUM 4.0 05/10/2022 04:04 AM    CHLORIDE 108 05/10/2022 04:04 AM    CO2 27 05/10/2022 04:04 AM    GLUCOSE 100 (H) 05/10/2022 04:04 AM    BUN 12 05/10/2022 04:04 AM    CREATININE 0.92 05/10/2022 04:04 AM    BUNCREATRAT 15 11/02/2021 05:45 AM      Lab Results   Component Value Date/Time    CHOLSTRLTOT 113 05/10/2022 04:04 AM    LDL 40 05/10/2022 04:04 AM    HDL 52 05/10/2022 04:04 AM    TRIGLYCERIDE 103 05/10/2022 04:04 AM       Lab Results   Component Value Date/Time    ALKPHOSPHAT 69 05/09/2022 12:24 PM    ASTSGOT 19 05/09/2022 12:24 PM    ALTSGPT 22 05/09/2022 12:24 PM    TBILIRUBIN 0.5 05/09/2022 12:24 PM        Imaging/Testing:    I interpreted and/or reviewed the patient's neuroimaging    MR-BRAIN-W/O   Final Result      1.  A tiny area of acute infarct in the left posterior insular cortex.   2.  Chronic infarcts in the left frontal and right posterior temporal lobes.   3.  Mild chronic microvascular ischemic disease.   4.  Mild cerebral volume mass.      EC-ECHOCARDIOGRAM COMPLETE W/O CONT    (Results Pending)     Presumed Mechanism by TOAST:  Embolic (athero- vs cardio-)        Assessment and Plan:     Jan Rivas is a 72 y.o. male with relevant history of hypertension, hyperlipidemia, CAD s/p stent, MI, and FRANK who presented as transfer from [facility] to [facility] on 5/9/22 for slurred  speech and word salad and neurology was consulted for acute stroke. Upon arrival to [facility], NIHSS was reported to be 9 significant for dysarthria, aphasia, left facial droop, and LUE and LLE drift. CT head at [facility] showed no acute hemorrhage, mass, with encephalomalacia of the left frontal lobe. CTA head and neck at [facility] revealed partial occlusion in a tiny, distal left parietal cortical branch artery not amendable to endovascular clot-retrieval given distal location and subocclusion. CTP at [facility] reveals a perfusion defect to the left MCA territory with a small core and mismatch volume of 27ml of ischemic penumbra. IV-tenecteplase was administered at 12:50 on 5/9/22. Patient was then transferred to [facility] for post-thrombolytic care. Neurological exam currently appears to be improving (NIHSS now 1) significant for improving expressive aphasia with mild difficulty with repeating phrases, otherwise no focal deficits with patient to near baseline per wife at bedside.  MRI brain wo contrast reveals a tiny acute ischemic infarct of the left posterior external capsule without hemorrhagic transformation, and chronic infarcts of the superior MCA territory in the left frontal lobe, left PCA territory, and right posterior temporal lobes per my personal read. Etiology is most likely embolic (athero- vs cardio-) pending further stroke diagnostics as below.     Plan:     -q4hr neurological assessment and VS   -Gradually normalize to long term goal 110-130/60-80. Antihypertensives per primary team.   -Telemetry; currently SR. Screen for Afib/arrhythmia.   -TTE with EF 60% with mild LV hypertrophy with normal regional wall motion, otherwise grossly normal including normal left atrial size and volume index.  -Outpatient extended cardiac event monitoring for Afib (eg. Zio patch). Placement prior to discharge.  -Continue Plavix 75mg PO daily   -Continue home Atorvastatin 40 mg PO q HS for LDL goal <70. Note LDL 40, within  goal   -Note hemoglobin A1c is 6.2, within goal <7%   -PT/OT/SLP eval and treat. SLP anticipating no further therapy needs on discharge. PT and OT evals not yet completed, but unlikely needed at this time.  -Follow up outpatient at [facility]. Referral placed  -DVT PPX: SCDs and lovenox SQ daily    No further recommendations or further studies from a neurological standpoint at this time. Please re-consult if you have further questions or there is a change in status.    The evaluation of the patient, and recommended management, was discussed with Dr. Alaniz and bedside RN. I have performed a physical exam and reviewed and updated ROS and Plan today (5/11/2022). In review of yesterday's note (5/10/2022), there are no changes except as documented above.    Melvin Kong, MSN [facility]-BC  Nurse Practitioner  [facility]  (t) [phone]

## 2022-05-11 NOTE — THERAPY
Physical Therapy   Initial Evaluation     Patient Name: Jan Rivas  Age:  72 y.o., Sex:  male  Medical Record #: [id number]  Today's Date: 5/11/2022    Assessment  Patient is 72 y.o. male admitted with garbled speech, found to have small acute CVA.  PMH includes HTN, DLD, CAD s/p PCI, and FRANK.  Today patient demonstrated all functional mobility with SPV, mobilizing as detailed below.  He ambulated with steady pace and no deficits or LOB.  Patient politely declined stairs, stating he does not need to access 2nd floor of home.  Patient stated his wife will be able to assist as needed.  No further acute PT needs, recommend outpatient PT for higher level deficits.    Plan    Recommend Physical Therapy for Evaluation only.    DC Equipment Recommendations: None  Discharge Recommendations: Recommend outpatient physical therapy services to address higher level deficits     Objective     05/11/22 0823   Prior Living Situation   Prior Services Home-Independent   Housing / Facility 2 Story House (stays on 1st floor)   Steps Into Home 0   Equipment Owned None   Lives with - Patient's Self Care Capacity Spouse   Comments Pt stated spouse able to help as needed   Prior Level of Functional Mobility   Bed Mobility Independent   Transfer Status Independent   Ambulation Independent   Distance Ambulation (Feet) (community)   Assistive Devices Used None   Stairs Independent   Cognition    Cognition / Consciousness WDL   Level of Consciousness Alert   Comments Pleasant & receptive   Active ROM Lower Body    Active ROM Lower Body  WDL   Strength Lower Body   Lower Body Strength  WDL   Comments B LE grossly 4+/5   Sensation Lower Body   Lower Extremity Sensation   WDL   Comments Denied numbness/tingling   Balance Assessment   Sitting Balance (Static) Good   Sitting Balance (Dynamic) Good   Standing Balance (Static) Good   Standing Balance (Dynamic) Good   Weight Shift Sitting Good   Weight Shift Standing Good   Comments no AD    Gait Analysis   Gait Level Of Assist Supervised   Assistive Device None   Distance (Feet) 400   # of Times Distance was Traveled 1   Deviation No deviation   # of Stairs Climbed 0   Weight Bearing Status No restrictions   Bed Mobility    Supine to Sit Supervised   Sit to Supine (NT, left up in chair)   Scooting Supervised   Rolling Supervised   Functional Mobility   Sit to Stand Supervised   Bed, Chair, Wheelchair Transfer Supervised   Transfer Method Stand Step   Mobility bed mobility, ambulation in hallway   Activity Tolerance   Sitting in Chair Post session   Sitting Edge of Bed 5 min   Standing 10 min   Anticipated Discharge Equipment and Recommendations   DC Equipment Recommendations None   Discharge Recommendations Recommend outpatient physical therapy services to address higher level deficits

## 2022-05-11 NOTE — DISCHARGE PLANNING
Anticipated Discharge Disposition: Home    Action: Pt lives with his wife in a first floor condo. There are no stairs to enter or navigate inside the home. His wife is able to assist and provide supervision at home. PT recommends outpatient physical therapy to continue working on higher level deficits. Patient has a home CPAP device that he uses. Neuro checks and vital signs monitored. Labs stable.     Barriers to Discharge: medical and neurology clearance.    Plan: f/u with medical, neurology, and nursing teams regarding treatment plan, discharge needs, and potential barriers. F/u with patient regarding discharge needs and barriers.

## 2022-06-03 ENCOUNTER — TELEPHONE (OUTPATIENT)
Dept: CARDIOLOGY | Facility: MEDICAL CENTER | Age: 72
End: 2022-06-03
Payer: MEDICARE

## 2022-06-06 PROCEDURE — 93248 EXT ECG>7D<15D REV&INTERPJ: CPT | Performed by: INTERNAL MEDICINE

## 2022-06-21 ENCOUNTER — OFFICE VISIT (OUTPATIENT)
Dept: CARDIOLOGY | Facility: MEDICAL CENTER | Age: 72
End: 2022-06-21
Payer: MEDICARE

## 2022-06-21 VITALS
RESPIRATION RATE: 18 BRPM | DIASTOLIC BLOOD PRESSURE: 64 MMHG | HEART RATE: 55 BPM | WEIGHT: 244 LBS | OXYGEN SATURATION: 97 % | HEIGHT: 72 IN | BODY MASS INDEX: 33.05 KG/M2 | SYSTOLIC BLOOD PRESSURE: 110 MMHG

## 2022-06-21 DIAGNOSIS — I65.23 BILATERAL CAROTID ARTERY STENOSIS: ICD-10-CM

## 2022-06-21 DIAGNOSIS — E78.00 PURE HYPERCHOLESTEROLEMIA: ICD-10-CM

## 2022-06-21 DIAGNOSIS — I25.10 CORONARY ARTERY DISEASE INVOLVING NATIVE CORONARY ARTERY OF NATIVE HEART WITHOUT ANGINA PECTORIS: ICD-10-CM

## 2022-06-21 DIAGNOSIS — I10 PRIMARY HYPERTENSION: ICD-10-CM

## 2022-06-21 DIAGNOSIS — G47.33 OSA ON CPAP: ICD-10-CM

## 2022-06-21 DIAGNOSIS — I63.9 ACUTE ISCHEMIC STROKE (HCC): ICD-10-CM

## 2022-06-21 DIAGNOSIS — Z95.5 STENTED CORONARY ARTERY: ICD-10-CM

## 2022-06-21 PROCEDURE — 99214 OFFICE O/P EST MOD 30 MIN: CPT | Performed by: NURSE PRACTITIONER

## 2022-06-21 RX ORDER — DOXYCYCLINE HYCLATE 100 MG/1
100 CAPSULE ORAL 2 TIMES DAILY
COMMUNITY
Start: 2022-06-08 | End: 2023-02-10

## 2022-06-21 ASSESSMENT — ENCOUNTER SYMPTOMS
PALPITATIONS: 0
LOSS OF CONSCIOUSNESS: 0
PND: 0
FEVER: 0
BRUISES/BLEEDS EASILY: 0
SHORTNESS OF BREATH: 0
INSOMNIA: 0
ABDOMINAL PAIN: 0
DIZZINESS: 0
HEADACHES: 0
NAUSEA: 0
MYALGIAS: 0
COUGH: 0
CHILLS: 0
ORTHOPNEA: 0

## 2022-06-21 ASSESSMENT — FIBROSIS 4 INDEX: FIB4 SCORE: 1.36

## 2022-06-21 NOTE — PROGRESS NOTES
Chief Complaint   Patient presents with   • Hospital Follow-up   • Possible Stroke   • Coronary Artery Disease   • Carotid Artery Stenosis   • HTN (Controlled)   • Hyperlipidemia       Subjective     New Rivas is a 72 y.o. male who presents today for hospital follow-up of stroke.    New is a 72 year old male with history of CAD, status post remote PC/MANJEET to the LAD in 2010 (in [place], [place]), mild carotid stenosis, HTN, hyperlipidemia, and FRANK, normally followed by Dr. Anne, and last seen in December 2021.    In May 2022, he presented to [facility] ER with difficulty speaking; he was transferred to [facility] for possible stroke. MRI confirmed acute infarct of left posterior insular cortex. Echocardiogram showed normal LV size and function, and no valvular abnormalities; CTA of the neck showed mild stenosis bilaterally. He did have a ZioPatch done as well, which showed sinus rhythm and short runs of SVT, but no AFib.    He is here today for follow-up. He sees the [facility]roke [facility] next Monday. No further stroke symptoms. No chest pain, pressure or discomfort; no palpitations; no shortness of breath, orthopnea or PND; no dizziness or syncope; no LE edema. BP is stable. He uses a CPAP for FRANK.    Past Medical History:   Diagnosis Date   • Acute stroke due to ischemia (HCC) 05/2022    MRI with acute infarct of left posterior insular cortex.   • Apnea, sleep     Uses CPAP   • CAD (coronary artery disease) 05/2010    Acute apical infarct. PCI/MANJEET (Taxus 3.5 x 32mm) to the LAD. 2015: [facility] with patent stent.   • Chickenpox    • Japanese measles    • Heart attack (HCC)    • Hyperlipidemia    • Hypertension    • Mumps    • Sleep apnea    • Snoring    • Wears glasses      Past Surgical History:   Procedure Laterality Date   • [facility] CARDIAC CATH  2/21/15    Cath at [place].  Patent stent and no obstructive CAD.   • Z CARDIAC CATH  5/4/10    Taxus stent to LAD   • CYST EXCISION      posterior neck     Family History   Problem  Relation Age of Onset   • Heart Attack Mother    • Cancer Mother    • Cancer Father    • Cancer Sister      Social History     Socioeconomic History   • Marital status:      Spouse name: Not on file   • Number of children: Not on file   • Years of education: Not on file   • Highest education level: Not on file   Occupational History   • Not on file   Tobacco Use   • Smoking status: Former Smoker     Packs/day: 0.50     Years: 42.00     Pack years: 21.00     Types: Cigarettes     Quit date: 2010     Years since quittin.1   • Smokeless tobacco: Never Used   Vaping Use   • Vaping Use: Former   Substance and Sexual Activity   • Alcohol use: Yes     Alcohol/week: 8.4 oz     Types: 14 Standard drinks or equivalent per week     Comment: 2 drinks per day    • Drug use: Yes     Types: Marijuana, Inhaled     Comment: Every other day marijuana   • Sexual activity: Not on file   Other Topics Concern   • Not on file   Social History Narrative   • Not on file     Social Determinants of Health     Financial Resource Strain: Not on file   Food Insecurity: Not on file   Transportation Needs: Not on file   Physical Activity: Not on file   Stress: Not on file   Social Connections: Not on file   Intimate Partner Violence: Not on file   Housing Stability: Not on file     No Known Allergies  Outpatient Encounter Medications as of 2022   Medication Sig Dispense Refill   • doxycycline (VIBRAMYCIN) 100 MG Cap Take 100 mg by mouth 2 times a day.     • clopidogrel (PLAVIX) 75 MG Tab Take 1 Tablet by mouth every day for 90 days. 90 Tablet 0   • metoprolol tartrate (LOPRESSOR) 25 MG Tab Take 1 Tablet by mouth 2 times a day. TAKE 1 TAB BY MOUTH 2 TIMES A DAY. 180 Tablet 3   • losartan (COZAAR) 25 MG Tab Take 1 Tablet by mouth every day. 90 Tablet 3   • atorvastatin (LIPITOR) 40 MG Tab Take 1 Tablet by mouth 1/2 hour before dinner. 90 Tablet 3   • traZODone (DESYREL) 50 MG Tab Take 1-2 Tablets by mouth at bedtime as needed  for Sleep. 180 Tablet 3   • pimecrolimus (ELIDEL) 1 % cream Apply 1 Application topically 2 times a day.     • latanoprost (XALATAN) 0.005 % Solution Administer 1 Drop into both eyes at bedtime.     • [DISCONTINUED] minocycline (MINOCIN) 100 MG Cap Take 100 mg by mouth 2 times a day. (Patient not taking: Reported on 6/21/2022)       No facility-administered encounter medications on file as of 6/21/2022.     Review of Systems   Constitutional: Negative for chills and fever.   HENT: Negative for congestion.    Respiratory: Negative for cough and shortness of breath.    Cardiovascular: Negative for chest pain, palpitations, orthopnea, leg swelling and PND.   Gastrointestinal: Negative for abdominal pain and nausea.   Musculoskeletal: Negative for myalgias.   Skin: Negative for rash.   Neurological: Negative for dizziness, loss of consciousness and headaches.   Endo/Heme/Allergies: Does not bruise/bleed easily.   Psychiatric/Behavioral: The patient does not have insomnia.               Objective     /64 (BP Location: Left arm, Patient Position: Sitting, BP Cuff Size: Adult)   Pulse (!) 55   Resp 18   Ht 1.829 m (6')   Wt 111 kg (244 lb)   SpO2 97%   BMI 33.09 kg/m²     Physical Exam  Constitutional:       Appearance: He is well-developed.   HENT:      Head: Normocephalic.   Neck:      Vascular: No JVD.   Cardiovascular:      Rate and Rhythm: Normal rate and regular rhythm.      Heart sounds: Normal heart sounds.   Pulmonary:      Effort: Pulmonary effort is normal. No respiratory distress.      Breath sounds: Normal breath sounds. No wheezing or rales.   Abdominal:      General: Bowel sounds are normal. There is no distension.      Palpations: Abdomen is soft.      Tenderness: There is no abdominal tenderness.   Musculoskeletal:         General: Normal range of motion.      Cervical back: Normal range of motion and neck supple.   Skin:     General: Skin is warm and dry.      Findings: No rash.    Neurological:      Mental Status: He is alert and oriented to person, place, and time.     ZIO PATCH REPORT (05/11/22-05/25/22):   Zio Patch study showing predominately sinus rhythm with maximum rate of 169 bpm, minimum rate of 28 bpm, average of 62 bpm.   Atrial fibrillation: None.   Supraventricular tachycardia: 14 episodes of supraventricular tachycardia/atrial tachycardia with longest and fastest interval lasting 13 beats with a max rate of 169 bpm noted.   Pauses: None.   Heart block: Second degree atrioventricular block, possible mobitz type II block, with brief 2:1, minimum hear rate of 28 bpm.   Ventricular tachycardia: None.   <1% burden of premature ventricular contractions and <1% burden of premature atrial contractions.     CONCLUSIONS OF ECHOCARDIOGRAM OF 5/9/2022:  The left ventricular ejection fraction is visually estimated to be 60%.  Unable to estimate right ventricular systolic pressure due to an   inadequate tricuspid regurgitant jet.    IMPRESSION OF MRI OF 5/9/2022:  1.  A tiny area of acute infarct in the left posterior insular cortex.  2.  Chronic infarcts in the left frontal and right posterior temporal lobes.  3.  Mild chronic microvascular ischemic disease.  4.  Mild cerebral volume mass.    IMPRESSION OF CTA OF 5/9/2022:  1. No evidence of flow-limiting stenosis in the cervical carotid or cervical vertebral arteries.    CARDIAC CATHETERIZATION CONCLUSIONS [place], [place] (05/04/10):  Cardiac catheterization with placement of 3.5 x 32 mm Taxus drug eluding stent to left anterior descending artery.     Lab Results   Component Value Date/Time    CHOLSTRLTOT 113 05/10/2022 04:04 AM    LDL 40 05/10/2022 04:04 AM    HDL 52 05/10/2022 04:04 AM    TRIGLYCERIDE 103 05/10/2022 04:04 AM       Lab Results   Component Value Date/Time    SODIUM 140 05/11/2022 06:00 AM    POTASSIUM 4.2 05/11/2022 06:00 AM    CHLORIDE 102 05/11/2022 06:00 AM    CO2 25 05/11/2022 06:00 AM    GLUCOSE 107 (H) 05/11/2022  06:00 AM    BUN 9 05/11/2022 06:00 AM    CREATININE 0.99 05/11/2022 06:00 AM    BUNCREATRAT 15 11/02/2021 05:45 AM     Lab Results   Component Value Date/Time    ALKPHOSPHAT 69 05/09/2022 12:24 PM    ASTSGOT 19 05/09/2022 12:24 PM    ALTSGPT 22 05/09/2022 12:24 PM    TBILIRUBIN 0.5 05/09/2022 12:24 PM               Assessment & Plan     1. Acute ischemic stroke (HCC)     2. Bilateral carotid artery stenosis     3. Coronary artery disease involving native coronary artery of native heart without angina pectoris     4. Stented coronary artery     5. Primary hypertension     6. Pure hypercholesterolemia     7. FRANK on CPAP         Medical Decision Making: Today's Assessment/Status/Plan:       1. Hospitalization for acute stroke, with no recurrence of symptoms. He remains on Plavix, Metoprolol, Losartan and Lipitor.    2. Mild bilateral carotid stenosis, stable. He is on Plavix and statin.    3. CAD, status post remote stent to the LAD in 2010, patent on [facility] in 2015. Echocardiogram in May 2022 showed normal LV function. He remains on Plavix, BB, ARB and statin.    4. Hypertension, treated. BP is stable.    5. Hyperlipidemia, treated with Lipitor. LDL is at goal at 40.    6. FRANK, treated with CPAP.    Same medications for now. Keep follow-up with other providers. To follow-up with Dr. Anne in December 2022, sooner if clinical condition changes.

## 2022-06-27 ENCOUNTER — OFFICE VISIT (OUTPATIENT)
Dept: NEUROLOGY | Facility: MEDICAL CENTER | Age: 72
End: 2022-06-27
Attending: NURSE PRACTITIONER
Payer: MEDICARE

## 2022-06-27 VITALS
OXYGEN SATURATION: 96 % | RESPIRATION RATE: 14 BRPM | DIASTOLIC BLOOD PRESSURE: 64 MMHG | BODY MASS INDEX: 32.46 KG/M2 | HEIGHT: 73 IN | HEART RATE: 51 BPM | TEMPERATURE: 97.2 F | WEIGHT: 244.93 LBS | SYSTOLIC BLOOD PRESSURE: 106 MMHG

## 2022-06-27 DIAGNOSIS — R13.10 DYSPHAGIA, UNSPECIFIED TYPE: ICD-10-CM

## 2022-06-27 DIAGNOSIS — G47.33 OSA ON CPAP: ICD-10-CM

## 2022-06-27 DIAGNOSIS — I69.30 LATE EFFECT OF STROKE: ICD-10-CM

## 2022-06-27 DIAGNOSIS — I10 PRIMARY HYPERTENSION: ICD-10-CM

## 2022-06-27 PROCEDURE — 99215 OFFICE O/P EST HI 40 MIN: CPT | Performed by: NURSE PRACTITIONER

## 2022-06-27 PROCEDURE — 99212 OFFICE O/P EST SF 10 MIN: CPT | Performed by: NURSE PRACTITIONER

## 2022-06-27 ASSESSMENT — ENCOUNTER SYMPTOMS
WEAKNESS: 0
DOUBLE VISION: 0
FOCAL WEAKNESS: 0
SHORTNESS OF BREATH: 0
BLURRED VISION: 0
DIZZINESS: 0
DEPRESSION: 0
NAUSEA: 0
BLOOD IN STOOL: 0
NERVOUS/ANXIOUS: 0
HEADACHES: 0
BRUISES/BLEEDS EASILY: 0
COUGH: 0
FALLS: 0
TINGLING: 0
SPEECH CHANGE: 0
HEARTBURN: 0
PALPITATIONS: 0
VOMITING: 0
SENSORY CHANGE: 0

## 2022-06-27 ASSESSMENT — FIBROSIS 4 INDEX: FIB4 SCORE: 1.36

## 2022-06-27 ASSESSMENT — PATIENT HEALTH QUESTIONNAIRE - PHQ9: CLINICAL INTERPRETATION OF PHQ2 SCORE: 0

## 2022-06-27 NOTE — PROGRESS NOTES
Subjective     HPI    New Rivas  is a 72 y.o.  Right handed male who presents to [facility] for evaluation of left posterior insular cortex infarct.    He presented to [facility] on 5/9/0222 with complaints of slurred speech and non-sensical speech. NIHSS on admission 9. He was given IV tenecteplase and transferred to [facility].. NIHSS at time of neurology consult was 3.       Discharged on Plavix 75mg (ASA 81mg was stopped).      He is here alone today, he states that his wife has noticed that he gags and chokes when he eats, she has noted this for a long time.       PMH :  HTN, HLD, CAD s/p stent, FRANK (on CPAP)  Social History:  Cigarette smoking ½ ppd x 42 years, quit 2010,  drinks a couple of mixed drinks nightly (2 shots, 2 glasses of wine, 2 beers)  smokes marijuana      Review of Systems   HENT: Negative for nosebleeds.    Eyes: Negative for blurred vision and double vision.   Respiratory: Negative for cough and shortness of breath.    Cardiovascular: Negative for chest pain and palpitations.   Gastrointestinal: Negative for blood in stool, heartburn, nausea and vomiting.   Genitourinary: Negative for hematuria.   Musculoskeletal: Negative for falls.   Neurological: Negative for dizziness, tingling, sensory change, speech change, focal weakness, weakness and headaches.   Endo/Heme/Allergies: Does not bruise/bleed easily.   Psychiatric/Behavioral: Negative for depression. The patient is not nervous/anxious.            Objective      I personally reviewed imaging below and agree with the findings    MRI brain 5/10/2022  1. A tiny area of acute infarct in the left posterior insular cortex.  2.  Chronic infarcts in the left frontal and right posterior temporal lobes.  3.  Mild chronic microvascular ischemic disease.  4.  Mild cerebral volume mass.    CT Cerebral perfusion 5/9/2022   1. blood flow less than 30% likely representing completed remote infarct = 8 mL.     2.  T Max  "more than 6 seconds likely = 35 mL.     3.  Mismatched volume = 27     4.Please note that the cerebral perfusion was performed on the limited brain tissue around the basal ganglia region. Infarct/ischemia outside the CT perfusion sections can be missed in this study.  CTA head 5/9/2022  The posterior circulation shows the distal vertebral arteries to be patent. The vertebrobasilar confluence is intact. The basilar artery is patent. No aneurysm or occlusive lesion is evident.     The anterior circulation shows no stenotic or occlusive lesion in the major vessels. No aneurysm is evident about the [place].     There is decreased flow in a tiny arterial branch in the left parietal region (image 139 series 8).     No acute intracranial hemorrhage.    CTA neck 5/9/2022  Atherosclerotic plaque of aorta  <50% stenosis of ICAs bilaterally  No significant stenosis    Stroke Labs:   Creat. 0.99, LDL 40, A1C 5.6        TTE 5/11/2022  LVEF 60%, mild concentric LVH, LA size WNL, MESFIN 22  ZIO Patch 5/11/2022-5/25/2022       Negative for Afib.  2nd degree (Mobitz II) block noted      Encounter Vitals  Standard Vitals  Vitals  Blood Pressure : 106/64  Temperature: 36.2 °C (97.2 °F)  Temp src: Temporal  Pulse: (!) 51  Respiration: 14  Pulse Oximetry: 96 %  Height: 185.4 cm (6' 1\")  Weight: 111 kg (244 lb 14.9 oz)  Encounter Vitals  Temperature: 36.2 °C (97.2 °F)  Temp src: Temporal  Blood Pressure : 106/64  Pulse: (!) 51  Respiration: 14  Pulse Oximetry: 96 %  Weight: 111 kg (244 lb 14.9 oz)  Height: 185.4 cm (6' 1\")  BMI (Calculated): 32.31        PHYSICAL ASSESSMENT  Constitutional:  Alert, no apparent distress,  Psych:   mood and affect WNL  Muskuloskeletal:  Moves all extremities equally, strength 5/5  BUE/BLE flexors/extensors, no drift  NEUROLOGICAL ASSESSMENT  Oriented X 4, speech fluent, naming and memory intact  CN II: Visual fields are full to confrontation. Fundoscopic exam is normal with sharp discs and no " vascular changes. Pupils are 3 mm and briskly reactive to light.   CN III: IV, VI  EOMs intact, no ptosis  CN V: Facial sensation is intact to pinprick in all 3 divisions bilaterally. Corneal responses are intact.  CN VII: Face is symmetric with normal eye closure and smile.  CN VIII Hearing is normal to rubbing fingers  CN IX, X: Palate elevates symmetrically. Phonation is normal.  CN XI: Head turning and shoulder shrug are intact  CN XII: Tongue is midline with normal movements and no atrophy.                           Sensation to PP equal bilaterally                 No limb ataxia with finger to nose and heel to shin                 Ambulates with steady gait.                 Rhomberg negative                Biceps,brachioradialis, tricep, and patellar reflexes all 2+     Cardiovascular:    S1S2, no abnormal rhythm auscultated, no peripheral edema  Neck:                     No carotid bruits noted   Pulmonary:            Respirations easy, lungs clear to auscultation all fields.     Skin:                     No obvious rashes.    Iniital NIHSS 3      Current NIHSS    1a. LOC: 0  1b. LOC Questions: 0  1c. LOC Commands: 0  2. Best Gaze:0  3. Visual Fields: 0  4. Facial Paresis: 0  5a. Motor arm left: 0  5b. Motor arm right: 0  6a. Motor leg left:0   6b. Motor leg right0:   7. Sensory: 0  8. Best Language: 0  9. Limb Ataxia: 0  10. Dysarthria: 0  11. Extinction/Inattention: 0    Total Score Current  0          Current mRS  0        Assessment & Plan     1. Late effect of stroke  Left insular cortex infarct with remote cortical infarcts of left frontal and posterior right temporal lobes. Suspicious for cardioembolic cause given risk factors for atrial fibrillation.     No residual symptoms.     Presumed Mechanism by TOAST:   __  Large Artery Atherosclerosis  __  Small Vessel (lacunar)  __   Cardioembolic  __   Other (Sickle cell, vasculitis, hypercoagulable)  __XX   Unknown       Needs Loop recorder-referral sent  back to cardiology      Afib risk factors:  FRANK 2nd degree heart block, HTN, BMI over 30, heavy alcohol use, history of cigarette smoking    Stroke risk factors:  HTN, CAD, HLD, FRANK    Continue Plavix 75mg PO daily for stroke prevention, discussed signs of bleeding. From a neurology standpoint, ASA 81mg would be sufficient. This stroke as well as remote strokes were embolic, changing anti-platelet medication would probably not be beneficial. If cardiology prefers ASA 81mg over Plavix 75mg, I have no issue.        Recommend decreasing or stopping alcohol due to risk of atrial fibrillation and risk of bleeding when taking anti-platelet drugs.      LDL goal < 70, current 40 continue Atorvastatin 40mg,  discussed medication side effects, will need follow up with primary care evaluate liver function at intervals and refill  Exercise at least 30 minutes daily, avoid/minimize red meat, fried foods, butter, cheese.   Eat 5-6 servings of vegetables and fruits daily, choose lean white meat without skin (chicken, turkey, white fish)--baked, broiled or grilled.        2. Primary hypertension  Blood pressure goal less than 130/80, currently 106/64       3. FRANK on CPAP      Compliant     4. Dysphagia        Referral to speech therapy    If any new signs of stroke:  sudden weakness, numbness, speech difficulty (slurring or difficulty finding words), imbalance, incoordination, worse headache of life or vision loss occur, call 911.      Take all medications as prescribed unless instructed by your provider.      I spent a total of 41 minutes caring for patient,  my time includes counseling, review of systems, HPI and assessment, review of images, labs and testing as above.  I reviewed the hospital records, PMH, social and family history.   I have counseled patient on stroke prevention strategies, stroke symptoms and mimics.  Diet and exercise modifications.  We discussed medication side effects and instructions.       I have provided  patient a written personalized stroke prevention plan,  it is filed under the media tab under ‘[facility]”.      Follow up around 8/9/2022 for 90 day post TNK evaluation.

## 2022-06-27 NOTE — PATIENT INSTRUCTIONS
If any new signs of stroke:  sudden weakness, numbness, speech difficulty (slurring or difficulty finding words), imbalance, incoordination, worse headache of life or vision loss occur, call 911.      Take all medications as prescribed unless instructed by your provider.

## 2022-07-06 ENCOUNTER — SPEECH THERAPY (OUTPATIENT)
Dept: SPEECH THERAPY | Facility: REHABILITATION | Age: 72
End: 2022-07-06
Attending: NURSE PRACTITIONER
Payer: MEDICARE

## 2022-07-06 DIAGNOSIS — R13.10 DYSPHAGIA, UNSPECIFIED TYPE: ICD-10-CM

## 2022-07-06 PROCEDURE — 92610 EVALUATE SWALLOWING FUNCTION: CPT

## 2022-07-06 NOTE — OP THERAPY EVALUATION
Outpatient Speech Therapy  INITIAL EVALUATION    [address]  [address]  [place] [address]  Phone:  [phone]  Fax:  [phone]    Date of Evaluation: 07/06/2022    Patient: New Rivas  YOB: 1950  MRN: [id number]     Referring Provider: TOBY Kelly [address] [address]   Referring Diagnosis Dysphagia, unspecified type [R13.10]     Time Calculation    Start time: 0930  Stop time: 1013 Time Calculation (min): 43 minutes           Chief Complaint: Other (Decreased dentition)    Visit Diagnoses     ICD-10-CM   1. Dysphagia, unspecified type  R13.10     Subjective:   Reason for Therapy:     Reason For Evaluation:  Dysphagia    Onset Date:  5/9/2022  Social Support:     Patient Mental Status:  Alert and Responsive  Therapy History:     Current Diet:  Regular Diet, Mechanical Soft Foods and Thin Liquids  Additional Subjective Comments:      Patient is a 72 y.o. male with history of CVAs, most recently on 5/9/22.  Patient reported that his wife feels he has been having difficulty swallowing, but he does not feel that he is having any problems.  He does report some difficulty swallowing very large pills, but they go down with extra water.  Patient does report that he is missing several teeth, including many molars, which impacts how he is eating.  He is conscious of the fact that it takes him longer to eat because he has to take smaller bites and chew more thoroughly before swallowing.  Patient reported that he typically chooses softer foods to eat, but he is able to eat any consistency if he uses precautions.  Patient was referred to outpatient speech therapy for a clinical swallow evaluation.    Past Medical History:   Diagnosis Date   • Acute stroke due to ischemia (HCC) 05/2022    MRI with acute infarct of left posterior insular cortex.   • Apnea, sleep     Uses CPAP   • CAD (coronary artery disease) 05/2010     Acute apical infarct. PCI/MANJEET (Taxus 3.5 x 32mm) to the LAD. 2015: [facility] with patent stent.   • Chickenpox    • [place] measles    • Heart attack (HCC)    • Hyperlipidemia    • Hypertension    • Mumps    • Sleep apnea    • Snoring    • Wears glasses      Past Surgical History:   Procedure Laterality Date   • [facility] CARDIAC CATH  2/21/15    Cath at [place].  Patent stent and no obstructive CAD.   • [facility] CARDIAC CATH  5/4/10    Taxus stent to LAD   • CYST EXCISION      posterior neck     Objective:   Other Treatment Interventions:  Clinical Swallow Evaluation  Treatment Intervention tool(s) used:  Bhumi Swallow, EAT-10, SAFE        Objective Details:  The [place] Swallow Protocol, Eating Assessment Tool (EAT-10), Swallowing Ability and Function Evaluation (SAFE) were administered to the patient with the following results:    The Bhumi Swallow requires the patient to drink 3 oz water in sequential swallows without stopping, with assessment of interrupted drinking and coughing or choking during or immediately after completion of drinking.    Bhumi: Pass--Patient was able to consecutively swallow 3 oz water with no pausing, coughing, or change in vocal quality      The Eating Assessment Tool (EAT-10) is a patient administered, self-assessment of possible difficulties that may be encountered as a result of difficulty in swallow.  Scores greater than 3 can be indicative of changes in swallow consistent with dysphagia.     EAT-10 score: 1/40     The Swallowing Ability and Function Evaluation is used to identify specific problems occurring during the oral and pharyngeal phases of swallowing.    SAFE:  (Subscale, Raw Score, Stanine, Severity)  Physical Evaluation 78;9  WNL  Oral Phase  19;7 Mild --prolonged mastication due to missing dentition  Pharyngeal Phase  21;9  WNL        Speech Therapy Assessment:     Oral Motor Status:     Oral motor status comments:      -Dentition: incomplete, missing several molars  -Labial: WFL   -Lingual:  WFL   -Palatal Elevation: WFL   -Jaw:  WFL  -Laryngeal Elevation: Adequate   -Volitional Cough: Strong  -Vocal Quality: Strong, clear   - Circumlaryngeal Palpation: No pain or edema       Oral Phase Assessment:     Oral phase comments: Patient was observed during PO trials of 3 oz water, puree, and lay cracker.  Patient demonstrated adequate lip seal and oral prep with prolonged mastication due to decreased dentition, and timely A/P transfer. No significant oral residuals or pocketing were observed. Multiple swallows were not required to clear oral cavity. No report of nasal regurgitation.      Pharyngeal Phase Assessment:     Pharyngeal phase comments: No delay in pharyngeal swallow was appreciated via palpation with adequate laryngeal elevation.  No coughing or change in vocal quality was observed with all trials. Patient did not report any food stuck throughout assessment, and does not feel that this is a problem with meals.      Speech Therapy Plan :   Prognosis & Recommendations  Impression Summary:  Patient presents with mild oral dysphagia as a result of missing dentition, specifically several molars.  Patient reported he takes care with eating to make sure he is taking smaller bites, and needs to chew his food longer before swallowing.  Patient reported that he is currently looking for a dentist in the area, and is considering implants.  He feels that his teeth are his first priority, and  once they are taken care of, chewing may become easier.  No skilled therapy is warranted at this time. Patient was instructed to contact doctor if new symptoms arise for new speech therapy referral.    Therapy Recommendations  Recommendation:  No further speech therapy indicated at this time,        Referring provider co-signature:  I have reviewed this plan of care and my co-signature certifies the need for services.    Certification Period: 07/06/2022 to  07/13/22    Physician Signature:  ________________________________ Date: ______________

## 2022-07-11 ENCOUNTER — APPOINTMENT (OUTPATIENT)
Dept: SPEECH THERAPY | Facility: REHABILITATION | Age: 72
End: 2022-07-11
Attending: NURSE PRACTITIONER
Payer: MEDICARE

## 2022-07-13 ENCOUNTER — APPOINTMENT (OUTPATIENT)
Dept: SPEECH THERAPY | Facility: REHABILITATION | Age: 72
End: 2022-07-13
Attending: NURSE PRACTITIONER
Payer: MEDICARE

## 2022-07-20 ENCOUNTER — APPOINTMENT (OUTPATIENT)
Dept: SPEECH THERAPY | Facility: REHABILITATION | Age: 72
End: 2022-07-20
Attending: NURSE PRACTITIONER
Payer: MEDICARE

## 2022-07-27 ENCOUNTER — APPOINTMENT (OUTPATIENT)
Dept: SPEECH THERAPY | Facility: REHABILITATION | Age: 72
End: 2022-07-27
Attending: NURSE PRACTITIONER
Payer: MEDICARE

## 2022-08-01 ENCOUNTER — APPOINTMENT (OUTPATIENT)
Dept: SPEECH THERAPY | Facility: REHABILITATION | Age: 72
End: 2022-08-01
Attending: NURSE PRACTITIONER
Payer: MEDICARE

## 2022-08-03 ENCOUNTER — APPOINTMENT (OUTPATIENT)
Dept: SPEECH THERAPY | Facility: REHABILITATION | Age: 72
End: 2022-08-03
Attending: NURSE PRACTITIONER
Payer: MEDICARE

## 2022-08-03 ENCOUNTER — APPOINTMENT (RX ONLY)
Dept: URBAN - METROPOLITAN AREA CLINIC 4 | Facility: CLINIC | Age: 72
Setting detail: DERMATOLOGY
End: 2022-08-03

## 2022-08-03 DIAGNOSIS — Z71.89 OTHER SPECIFIED COUNSELING: ICD-10-CM

## 2022-08-03 DIAGNOSIS — L30.9 DERMATITIS, UNSPECIFIED: ICD-10-CM

## 2022-08-03 PROCEDURE — 99213 OFFICE O/P EST LOW 20 MIN: CPT

## 2022-08-03 PROCEDURE — ? COUNSELING

## 2022-08-03 PROCEDURE — ? ADDITIONAL NOTES

## 2022-08-03 PROCEDURE — ? SEPARATE AND IDENTIFIABLE DOCUMENTATION

## 2022-08-03 PROCEDURE — ? MEDICATION COUNSELING

## 2022-08-03 PROCEDURE — ? TREATMENT REGIMEN

## 2022-08-03 NOTE — PROCEDURE: ADDITIONAL NOTES
Detail Level: Generalized
Render Risk Assessment In Note?: no
Additional Notes: Recommended for pt to apply 100 spf or long sleeve shirt to help with sun sensitivity from the doxycycline. Patient otherwise not completely clear but much improved and currently happy with the treatment.

## 2022-08-03 NOTE — PROCEDURE: TREATMENT REGIMEN
Continue Regimen: Doxycycline 100 mg twice daily\\nAntihistamine twice a day\\nClobetasol when having a flared area BID then take a break when they’re is no flares.\\nOTC creams qd
Detail Level: Zone
Plan: Patient presents with flares on both arms. Patient states IM injection helped with itching for 3-4 weeks that patient noticed. Discussed with patient side effects of having to much of cortisone steroid, will hold off on injection today and patient will call office if patient needs another IM injection.

## 2022-08-03 NOTE — PROCEDURE: MEDICATION COUNSELING
Include Pregnancy/Lactation Warning?: No
Topical Clindamycin Counseling: Patient counseled that this medication may cause skin irritation or allergic reactions.  In the event of skin irritation, the patient was advised to reduce the amount of the drug applied or use it less frequently.   The patient verbalized understanding of the proper use and possible adverse effects of clindamycin.  All of the patient's questions and concerns were addressed.
Azelaic Acid Counseling: Patient counseled that medicine may cause skin irritation and to avoid applying near the eyes.  In the event of skin irritation, the patient was advised to reduce the amount of the drug applied or use it less frequently.   The patient verbalized understanding of the proper use and possible adverse effects of azelaic acid.  All of the patient's questions and concerns were addressed.
Fluconazole Counseling:  Patient counseled regarding adverse effects of fluconazole including but not limited to headache, diarrhea, nausea, upset stomach, liver function test abnormalities, taste disturbance, and stomach pain.  There is a rare possibility of liver failure that can occur when taking fluconazole.  The patient understands that monitoring of LFTs and kidney function test may be required, especially at baseline. The patient verbalized understanding of the proper use and possible adverse effects of fluconazole.  All of the patient's questions and concerns were addressed.
Qbrexza Pregnancy And Lactation Text: There is no available data on Qbrexza use in pregnant women.  There is no available data on Qbrexza use in lactation.
Terbinafine Pregnancy And Lactation Text: This medication is Pregnancy Category B and is considered safe during pregnancy. It is also excreted in breast milk and breast feeding isn't recommended.
Thalidomide Pregnancy And Lactation Text: This medication is Pregnancy Category X and is absolutely contraindicated during pregnancy. It is unknown if it is excreted in breast milk.
Cyclophosphamide Counseling:  I discussed with the patient the risks of cyclophosphamide including but not limited to hair loss, hormonal abnormalities, decreased fertility, abdominal pain, diarrhea, nausea and vomiting, bone marrow suppression and infection. The patient understands that monitoring is required while taking this medication.
Cimzia Pregnancy And Lactation Text: This medication crosses the placenta but can be considered safe in certain situations. Cimzia may be excreted in breast milk.
Infliximab Pregnancy And Lactation Text: This medication is Pregnancy Category B and is considered safe during pregnancy. It is unknown if this medication is excreted in breast milk.
Eucrisa Counseling: Patient may experience a mild burning sensation during topical application. Eucrisa is not approved in children less than 2 years of age.
Acitretin Pregnancy And Lactation Text: This medication is Pregnancy Category X and should not be given to women who are pregnant or may become pregnant in the future. This medication is excreted in breast milk.
Simponi Counseling:  I discussed with the patient the risks of golimumab including but not limited to myelosuppression, immunosuppression, autoimmune hepatitis, demyelinating diseases, lymphoma, and serious infections.  The patient understands that monitoring is required including a PPD at baseline and must alert us or the primary physician if symptoms of infection or other concerning signs are noted.
Minocycline Pregnancy And Lactation Text: This medication is Pregnancy Category D and not consider safe during pregnancy. It is also excreted in breast milk.
Propranolol Counseling:  I discussed with the patient the risks of propranolol including but not limited to low heart rate, low blood pressure, low blood sugar, restlessness and increased cold sensitivity. They should call the office if they experience any of these side effects.
Mirvaso Counseling: Mirvaso is a topical medication which can decrease superficial blood flow where applied. Side effects are uncommon and include stinging, redness and allergic reactions.
Glycopyrrolate Counseling:  I discussed with the patient the risks of glycopyrrolate including but not limited to skin rash, drowsiness, dry mouth, difficulty urinating, and blurred vision.
Dupixent Counseling: I discussed with the patient the risks of dupilumab including but not limited to eye infection and irritation, cold sores, injection site reactions, worsening of asthma, allergic reactions and increased risk of parasitic infection.  Live vaccines should be avoided while taking dupilumab. Dupilumab will also interact with certain medications such as warfarin and cyclosporine. The patient understands that monitoring is required and they must alert us or the primary physician if symptoms of infection or other concerning signs are noted.
Zyclara Counseling:  I discussed with the patient the risks of imiquimod including but not limited to erythema, scaling, itching, weeping, crusting, and pain.  Patient understands that the inflammatory response to imiquimod is variable from person to person and was educated regarded proper titration schedule.  If flu-like symptoms develop, patient knows to discontinue the medication and contact us.
Ivermectin Counseling:  Patient instructed to take medication on an empty stomach with a full glass of water.  Patient informed of potential adverse effects including but not limited to nausea, diarrhea, dizziness, itching, and swelling of the extremities or lymph nodes.  The patient verbalized understanding of the proper use and possible adverse effects of ivermectin.  All of the patient's questions and concerns were addressed.
Cyclophosphamide Pregnancy And Lactation Text: This medication is Pregnancy Category D and it isn't considered safe during pregnancy. This medication is excreted in breast milk.
Tranexamic Acid Counseling:  Patient advised of the small risk of bleeding problems with tranexamic acid. They were also instructed to call if they developed any nausea, vomiting or diarrhea. All of the patient's questions and concerns were addressed.
Eucrisa Pregnancy And Lactation Text: This medication has not been assigned a Pregnancy Risk Category but animal studies failed to show danger with the topical medication. It is unknown if the medication is excreted in breast milk.
Bexarotene Counseling:  I discussed with the patient the risks of bexarotene including but not limited to hair loss, dry lips/skin/eyes, liver abnormalities, hyperlipidemia, pancreatitis, depression/suicidal ideation, photosensitivity, drug rash/allergic reactions, hypothyroidism, anemia, leukopenia, infection, cataracts, and teratogenicity.  Patient understands that they will need regular blood tests to check lipid profile, liver function tests, white blood cell count, thyroid function tests and pregnancy test if applicable.
Clindamycin Counseling: I counseled the patient regarding use of clindamycin as an antibiotic for prophylactic and/or therapeutic purposes. Clindamycin is active against numerous classes of bacteria, including skin bacteria. Side effects may include nausea, diarrhea, gastrointestinal upset, rash, hives, yeast infections, and in rare cases, colitis.
Colchicine Counseling:  Patient counseled regarding adverse effects including but not limited to stomach upset (nausea, vomiting, stomach pain, or diarrhea).  Patient instructed to limit alcohol consumption while taking this medication.  Colchicine may reduce blood counts especially with prolonged use.  The patient understands that monitoring of kidney function and blood counts may be required, especially at baseline. The patient verbalized understanding of the proper use and possible adverse effects of colchicine.  All of the patient's questions and concerns were addressed.
Nsaids Pregnancy And Lactation Text: These medications are considered safe up to 30 weeks gestation. It is excreted in breast milk.
Xeljanz Counseling: I discussed with the patient the risks of Xeljanz therapy including increased risk of infection, liver issues, headache, diarrhea, or cold symptoms. Live vaccines should be avoided. They were instructed to call if they have any problems.
Cantharidin Counseling: Calcipotriene Counseling:  I discussed with the patient the risks of calcipotriene including but not limited to erythema, scaling, itching, and irritation.
Cimetidine Counseling:  I discussed with the patient the risks of Cimetidine including but not limited to gynecomastia, headache, diarrhea, nausea, drowsiness, arrhythmias, pancreatitis, skin rashes, psychosis, bone marrow suppression and kidney toxicity.
Rhofade Counseling: Rhofade is a topical medication which can decrease superficial blood flow where applied. Side effects are uncommon and include stinging, redness and allergic reactions.
Fluconazole Pregnancy And Lactation Text: This medication is Pregnancy Category C and it isn't know if it is safe during pregnancy. It is also excreted in breast milk.
Colchicine Pregnancy And Lactation Text: This medication is Pregnancy Category C and isn't considered safe during pregnancy. It is excreted in breast milk.
Odomzo Counseling- I discussed with the patient the risks of Odomzo including but not limited to nausea, vomiting, diarrhea, constipation, weight loss, changes in the sense of taste, decreased appetite, muscle spasms, and hair loss.  The patient verbalized understanding of the proper use and possible adverse effects of Odomzo.  All of the patient's questions and concerns were addressed.
Clindamycin Pregnancy And Lactation Text: This medication can be used in pregnancy if certain situations. Clindamycin is also present in breast milk.
Simponi Pregnancy And Lactation Text: The risk during pregnancy and breastfeeding is uncertain with this medication.
Quinolones Counseling:  I discussed with the patient the risks of fluoroquinolones including but not limited to GI upset, allergic reaction, drug rash, diarrhea, dizziness, photosensitivity, yeast infections, liver function test abnormalities, tendonitis/tendon rupture.
Topical Clindamycin Pregnancy And Lactation Text: This medication is Pregnancy Category B and is considered safe during pregnancy. It is unknown if it is excreted in breast milk.
Propranolol Pregnancy And Lactation Text: This medication is Pregnancy Category C and it isn't known if it is safe during pregnancy. It is excreted in breast milk.
Cosentyx Counseling:  I discussed with the patient the risks of Cosentyx including but not limited to worsening of Crohn's disease, immunosuppression, allergic reactions and infections.  The patient understands that monitoring is required including a PPD at baseline and must alert us or the primary physician if symptoms of infection or other concerning signs are noted.
Olumiant Counseling: I discussed with the patient the risks of Olumiant therapy including but not limited to upper respiratory tract infections, shingles, cold sores, and nausea. Live vaccines should be avoided.  This medication has been linked to serious infections; higher rate of mortality; malignancy and lymphoproliferative disorders; major adverse cardiovascular events; thrombosis; gastrointestinal perforations; neutropenia; lymphopenia; anemia; liver enzyme elevations; and lipid elevations.
Azelaic Acid Pregnancy And Lactation Text: This medication is considered safe during pregnancy and breast feeding.
Cyclosporine Counseling:  I discussed with the patient the risks of cyclosporine including but not limited to hypertension, gingival hyperplasia,myelosuppression, immunosuppression, liver damage, kidney damage, neurotoxicity, lymphoma, and serious infections. The patient understands that monitoring is required including baseline blood pressure, CBC, CMP, lipid panel and uric acid, and then 1-2 times monthly CMP and blood pressure.
Skyrizi Counseling: I discussed with the patient the risks of risankizumab-rzaa including but not limited to immunosuppression, and serious infections.  The patient understands that monitoring is required including a PPD at baseline and must alert us or the primary physician if symptoms of infection or other concerning signs are noted.
Topical Ketoconazole Counseling: Patient counseled that this medication may cause skin irritation or allergic reactions.  In the event of skin irritation, the patient was advised to reduce the amount of the drug applied or use it less frequently.   The patient verbalized understanding of the proper use and possible adverse effects of ketoconazole.  All of the patient's questions and concerns were addressed.
Hydroquinone Counseling:  Patient advised that medication may result in skin irritation, lightening (hypopigmentation), dryness, and burning.  In the event of skin irritation, the patient was advised to reduce the amount of the drug applied or use it less frequently.  Rarely, spots that are treated with hydroquinone can become darker (pseudoochronosis).  Should this occur, patient instructed to stop medication and call the office. The patient verbalized understanding of the proper use and possible adverse effects of hydroquinone.  All of the patient's questions and concerns were addressed.
Dupixent Pregnancy And Lactation Text: This medication likely crosses the placenta but the risk for the fetus is uncertain. This medication is excreted in breast milk.
Ivermectin Pregnancy And Lactation Text: This medication is Pregnancy Category C and it isn't known if it is safe during pregnancy. It is also excreted in breast milk.
Zyclara Pregnancy And Lactation Text: This medication is Pregnancy Category C. It is unknown if this medication is excreted in breast milk.
Mirvaso Pregnancy And Lactation Text: This medication has not been assigned a Pregnancy Risk Category. It is unknown if the medication is excreted in breast milk.
Xelallisonz Pregnancy And Lactation Text: This medication is Pregnancy Category D and is not considered safe during pregnancy.  The risk during breast feeding is also uncertain.
Tranexamic Acid Pregnancy And Lactation Text: It is unknown if this medication is safe during pregnancy or breast feeding.
Glycopyrrolate Pregnancy And Lactation Text: This medication is Pregnancy Category B and is considered safe during pregnancy. It is unknown if it is excreted breast milk.
Olumiant Pregnancy And Lactation Text: Based on animal studies, Olumiant may cause embryo-fetal harm when administered to pregnant women.  The medication should not be used in pregnancy.  Breastfeeding is not recommended during treatment.
Dapsone Counseling: I discussed with the patient the risks of dapsone including but not limited to hemolytic anemia, agranulocytosis, rashes, methemoglobinemia, kidney failure, peripheral neuropathy, headaches, GI upset, and liver toxicity.  Patients who start dapsone require monitoring including baseline LFTs and weekly CBCs for the first month, then every month thereafter.  The patient verbalized understanding of the proper use and possible adverse effects of dapsone.  All of the patient's questions and concerns were addressed.
Opzelura Counseling:  I discussed with the patient the risks of Opzelura including but not limited to nasopharngitis, bronchitis, ear infection, eosinophila, hives, diarrhea, folliculitis, tonsillitis, and rhinorrhea.  Taken orally, this medication has been linked to serious infections; higher rate of mortality; malignancy and lymphoproliferative disorders; major adverse cardiovascular events; thrombosis; thrombocytopenia, anemia, and neutropenia; and lipid elevations.
Bexarotene Pregnancy And Lactation Text: This medication is Pregnancy Category X and should not be given to women who are pregnant or may become pregnant. This medication should not be used if you are breast feeding.
Griseofulvin Counseling:  I discussed with the patient the risks of griseofulvin including but not limited to photosensitivity, cytopenia, liver damage, nausea/vomiting and severe allergy.  The patient understands that this medication is best absorbed when taken with a fatty meal (e.g., ice cream or french fries).
Birth Control Pills Counseling: Birth Control Pill Counseling: I discussed with the patient the potential side effects of OCPs including but not limited to increased risk of stroke, heart attack, thrombophlebitis, deep venous thrombosis, hepatic adenomas, breast changes, GI upset, headaches, and depression.  The patient verbalized understanding of the proper use and possible adverse effects of OCPs. All of the patient's questions and concerns were addressed.
Benzoyl Peroxide Counseling: Patient counseled that medicine may cause skin irritation and bleach clothing.  In the event of skin irritation, the patient was advised to reduce the amount of the drug applied or use it less frequently.   The patient verbalized understanding of the proper use and possible adverse effects of benzoyl peroxide.  All of the patient's questions and concerns were addressed.
Enbrel Counseling:  I discussed with the patient the risks of etanercept including but not limited to myelosuppression, immunosuppression, autoimmune hepatitis, demyelinating diseases, lymphoma, and infections.  The patient understands that monitoring is required including a PPD at baseline and must alert us or the primary physician if symptoms of infection or other concerning signs are noted.
Cantharidin Pregnancy And Lactation Text: The use of this medication during pregnancy or lactation is not recommended as there is insufficient data.
Doxycycline Counseling:  Patient counseled regarding possible photosensitivity and increased risk for sunburn.  Patient instructed to avoid sunlight, if possible.  When exposed to sunlight, patients should wear protective clothing, sunglasses, and sunscreen.  The patient was instructed to call the office immediately if the following severe adverse effects occur:  hearing changes, easy bruising/bleeding, severe headache, or vision changes.  The patient verbalized understanding of the proper use and possible adverse effects of doxycycline.  All of the patient's questions and concerns were addressed.
Isotretinoin Counseling: Patient should get monthly blood tests, not donate blood, not drive at night if vision affected, not share medication, and not undergo elective surgery for 6 months after tx completed. Side effects reviewed, pt to contact office should one occur.
Griseofulvin Pregnancy And Lactation Text: This medication is Pregnancy Category X and is known to cause serious birth defects. It is unknown if this medication is excreted in breast milk but breast feeding should be avoided.
Hydroxychloroquine Counseling:  I discussed with the patient that a baseline ophthalmologic exam is needed at the start of therapy and every year thereafter while on therapy. A CBC may also be warranted for monitoring.  The side effects of this medication were discussed with the patient, including but not limited to agranulocytosis, aplastic anemia, seizures, rashes, retinopathy, and liver toxicity. Patient instructed to call the office should any adverse effect occur.  The patient verbalized understanding of the proper use and possible adverse effects of Plaquenil.  All the patient's questions and concerns were addressed.
Birth Control Pills Pregnancy And Lactation Text: This medication should be avoided if pregnant and for the first 30 days post-partum.
Rifampin Counseling: I discussed with the patient the risks of rifampin including but not limited to liver damage, kidney damage, red-orange body fluids, nausea/vomiting and severe allergy.
Benzoyl Peroxide Pregnancy And Lactation Text: This medication is Pregnancy Category C. It is unknown if benzoyl peroxide is excreted in breast milk.
5-Fu Counseling: 5-Fluorouracil Counseling:  I discussed with the patient the risks of 5-fluorouracil including but not limited to erythema, scaling, itching, weeping, crusting, and pain.
Xolair Counseling:  Patient informed of potential adverse effects including but not limited to fever, muscle aches, rash and allergic reactions.  The patient verbalized understanding of the proper use and possible adverse effects of Xolair.  All of the patient's questions and concerns were addressed.
Cyclosporine Pregnancy And Lactation Text: This medication is Pregnancy Category C and it isn't know if it is safe during pregnancy. This medication is excreted in breast milk.
Valtrex Counseling: I discussed with the patient the risks of valacyclovir including but not limited to kidney damage, nausea, vomiting and severe allergy.  The patient understands that if the infection seems to be worsening or is not improving, they are to call.
Opzelura Pregnancy And Lactation Text: There is insufficient data to evaluate drug-associated risk for major birth defects, miscarriage, or other adverse maternal or fetal outcomes.  There is a pregnancy registry that monitors pregnancy outcomes in pregnant persons exposed to the medication during pregnancy.  It is unknown if this medication is excreted in breast milk.  Do not breastfeed during treatment and for about 4 weeks after the last dose.
Xolair Pregnancy And Lactation Text: This medication is Pregnancy Category B and is considered safe during pregnancy. This medication is excreted in breast milk.
Valtrex Pregnancy And Lactation Text: this medication is Pregnancy Category B and is considered safe during pregnancy. This medication is not directly found in breast milk but it's metabolite acyclovir is present.
Methotrexate Counseling:  Patient counseled regarding adverse effects of methotrexate including but not limited to nausea, vomiting, abnormalities in liver function tests. Patients may develop mouth sores, rash, diarrhea, and abnormalities in blood counts. The patient understands that monitoring is required including LFT's and blood counts.  There is a rare possibility of scarring of the liver and lung problems that can occur when taking methotrexate. Persistent nausea, loss of appetite, pale stools, dark urine, cough, and shortness of breath should be reported immediately. Patient advised to discontinue methotrexate treatment at least three months before attempting to become pregnant.  I discussed the need for folate supplements while taking methotrexate.  These supplements can decrease side effects during methotrexate treatment. The patient verbalized understanding of the proper use and possible adverse effects of methotrexate.  All of the patient's questions and concerns were addressed.
Doxycycline Pregnancy And Lactation Text: This medication is Pregnancy Category D and not consider safe during pregnancy. It is also excreted in breast milk but is considered safe for shorter treatment courses.
Rinvoq Counseling: I discussed with the patient the risks of Rinvoq therapy including but not limited to upper respiratory tract infections, shingles, cold sores, bronchitis, nausea, cough, fever, acne, and headache. Live vaccines should be avoided.  This medication has been linked to serious infections; higher rate of mortality; malignancy and lymphoproliferative disorders; major adverse cardiovascular events; thrombosis; thrombocytopenia, anemia, and neutropenia; lipid elevations; liver enzyme elevations; and gastrointestinal perforations.
Dapsone Pregnancy And Lactation Text: This medication is Pregnancy Category C and is not considered safe during pregnancy or breast feeding.
Hydroxychloroquine Pregnancy And Lactation Text: This medication has been shown to cause fetal harm but it isn't assigned a Pregnancy Risk Category. There are small amounts excreted in breast milk.
Oral Minoxidil Counseling- I discussed with the patient the risks of oral minoxidil including but not limited to shortness of breath, swelling of the feet or ankles, dizziness, lightheadedness, unwanted hair growth and allergic reaction.  The patient verbalized understanding of the proper use and possible adverse effects of oral minoxidil.  All of the patient's questions and concerns were addressed.
Opioid Counseling: I discussed with the patient the potential side effects of opioids including but not limited to addiction, altered mental status, and depression. I stressed avoiding alcohol, benzodiazepines, muscle relaxants and sleep aids unless specifically okayed by a physician. The patient verbalized understanding of the proper use and possible adverse effects of opioids. All of the patient's questions and concerns were addressed. They were instructed to flush the remaining pills down the toilet if they did not need them for pain.
Doxepin Counseling:  Patient advised that the medication is sedating and not to drive a car after taking this medication. Patient informed of potential adverse effects including but not limited to dry mouth, urinary retention, and blurry vision.  The patient verbalized understanding of the proper use and possible adverse effects of doxepin.  All of the patient's questions and concerns were addressed.
Solaraze Counseling:  I discussed with the patient the risks of Solaraze including but not limited to erythema, scaling, itching, weeping, crusting, and pain.
Azithromycin Counseling:  I discussed with the patient the risks of azithromycin including but not limited to GI upset, allergic reaction, drug rash, diarrhea, and yeast infections.
Opioid Pregnancy And Lactation Text: These medications can lead to premature delivery and should be avoided during pregnancy. These medications are also present in breast milk in small amounts.
Erythromycin Counseling:  I discussed with the patient the risks of erythromycin including but not limited to GI upset, allergic reaction, drug rash, diarrhea, increase in liver enzymes, and yeast infections.
Itraconazole Counseling:  I discussed with the patient the risks of itraconazole including but not limited to liver damage, nausea/vomiting, neuropathy, and severe allergy.  The patient understands that this medication is best absorbed when taken with acidic beverages such as non-diet cola or ginger ale.  The patient understands that monitoring is required including baseline LFTs and repeat LFTs at intervals.  The patient understands that they are to contact us or the primary physician if concerning signs are noted.
Rinvoq Pregnancy And Lactation Text: Based on animal studies, Rinvoq may cause embryo-fetal harm when administered to pregnant women.  The medication should not be used in pregnancy.  Breastfeeding is not recommended during treatment and for 6 days after the last dose.
Solaraze Pregnancy And Lactation Text: This medication is Pregnancy Category B and is considered safe. There is some data to suggest avoiding during the third trimester. It is unknown if this medication is excreted in breast milk.
Adbry Counseling: I discussed with the patient the risks of tralokinumab including but not limited to eye infection and irritation, cold sores, injection site reactions, worsening of asthma, allergic reactions and increased risk of parasitic infection.  Live vaccines should be avoided while taking tralokinumab. The patient understands that monitoring is required and they must alert us or the primary physician if symptoms of infection or other concerning signs are noted.
5-Fu Pregnancy And Lactation Text: This medication is Pregnancy Category X and contraindicated in pregnancy and in women who may become pregnant. It is unknown if this medication is excreted in breast milk.
Dutasteride Male Counseling: Dustasteride Counseling:  I discussed with the patient the risks of use of dutasteride including but not limited to decreased libido, decreased ejaculate volume, and gynecomastia. Women who can become pregnant should not handle medication.  All of the patient's questions and concerns were addressed.
Stelara Counseling:  I discussed with the patient the risks of ustekinumab including but not limited to immunosuppression, malignancy, posterior leukoencephalopathy syndrome, and serious infections.  The patient understands that monitoring is required including a PPD at baseline and must alert us or the primary physician if symptoms of infection or other concerning signs are noted.
Topical Sulfur Applications Counseling: Topical Sulfur Counseling: Patient counseled that this medication may cause skin irritation or allergic reactions.  In the event of skin irritation, the patient was advised to reduce the amount of the drug applied or use it less frequently.   The patient verbalized understanding of the proper use and possible adverse effects of topical sulfur application.  All of the patient's questions and concerns were addressed.
Isotretinoin Pregnancy And Lactation Text: This medication is Pregnancy Category X and is considered extremely dangerous during pregnancy. It is unknown if it is excreted in breast milk.
Imiquimod Counseling:  I discussed with the patient the risks of imiquimod including but not limited to erythema, scaling, itching, weeping, crusting, and pain.  Patient understands that the inflammatory response to imiquimod is variable from person to person and was educated regarded proper titration schedule.  If flu-like symptoms develop, patient knows to discontinue the medication and contact us.
Carac Counseling:  I discussed with the patient the risks of Carac including but not limited to erythema, scaling, itching, weeping, crusting, and pain.
Rifampin Pregnancy And Lactation Text: This medication is Pregnancy Category C and it isn't know if it is safe during pregnancy. It is also excreted in breast milk and should not be used if you are breast feeding.
Spironolactone Counseling: Patient advised regarding risks of diarrhea, abdominal pain, hyperkalemia, birth defects (for female patients), liver toxicity and renal toxicity. The patient may need blood work to monitor liver and kidney function and potassium levels while on therapy. The patient verbalized understanding of the proper use and possible adverse effects of spironolactone.  All of the patient's questions and concerns were addressed.
Humira Counseling:  I discussed with the patient the risks of adalimumab including but not limited to myelosuppression, immunosuppression, autoimmune hepatitis, demyelinating diseases, lymphoma, and serious infections.  The patient understands that monitoring is required including a PPD at baseline and must alert us or the primary physician if symptoms of infection or other concerning signs are noted.
Methotrexate Pregnancy And Lactation Text: This medication is Pregnancy Category X and is known to cause fetal harm. This medication is excreted in breast milk.
High Dose Vitamin A Counseling: Side effects reviewed, pt to contact office should one occur.
Topical Sulfur Applications Pregnancy And Lactation Text: This medication is Pregnancy Category C and has an unknown safety profile during pregnancy. It is unknown if this topical medication is excreted in breast milk.
Picato Counseling:  I discussed with the patient the risks of Picato including but not limited to erythema, scaling, itching, weeping, crusting, and pain.
Oral Minoxidil Pregnancy And Lactation Text: This medication should only be used when clearly needed if you are pregnant, attempting to become pregnant or breast feeding.
Doxepin Pregnancy And Lactation Text: This medication is Pregnancy Category C and it isn't known if it is safe during pregnancy. It is also excreted in breast milk and breast feeding isn't recommended.
Azithromycin Pregnancy And Lactation Text: This medication is considered safe during pregnancy and is also secreted in breast milk.
Libtayo Counseling- I discussed with the patient the risks of Libtayo including but not limited to nausea, vomiting, diarrhea, and bone or muscle pain.  The patient verbalized understanding of the proper use and possible adverse effects of Libtayo.  All of the patient's questions and concerns were addressed.
Hydroxyzine Counseling: Patient advised that the medication is sedating and not to drive a car after taking this medication.  Patient informed of potential adverse effects including but not limited to dry mouth, urinary retention, and blurry vision.  The patient verbalized understanding of the proper use and possible adverse effects of hydroxyzine.  All of the patient's questions and concerns were addressed.
Dutasteride Pregnancy And Lactation Text: This medication is absolutely contraindicated in women, especially during pregnancy and breast feeding. Feminization of male fetuses is possible if taking while pregnant.
Topical Retinoid counseling:  Patient advised to apply a pea-sized amount only at bedtime and wait 30 minutes after washing their face before applying.  If too drying, patient may add a non-comedogenic moisturizer. The patient verbalized understanding of the proper use and possible adverse effects of retinoids.  All of the patient's questions and concerns were addressed.
Drysol Counseling:  I discussed with the patient the risks of drysol/aluminum chloride including but not limited to skin rash, itching, irritation, burning.
Otezla Counseling: The side effects of Otezla were discussed with the patient, including but not limited to worsening or new depression, weight loss, diarrhea, nausea, upper respiratory tract infection, and headache. Patient instructed to call the office should any adverse effect occur.  The patient verbalized understanding of the proper use and possible adverse effects of Otezla.  All the patient's questions and concerns were addressed.
Sarecycline Counseling: Patient advised regarding possible photosensitivity and discoloration of the teeth, skin, lips, tongue and gums.  Patient instructed to avoid sunlight, if possible.  When exposed to sunlight, patients should wear protective clothing, sunglasses, and sunscreen.  The patient was instructed to call the office immediately if the following severe adverse effects occur:  hearing changes, easy bruising/bleeding, severe headache, or vision changes.  The patient verbalized understanding of the proper use and possible adverse effects of sarecycline.  All of the patient's questions and concerns were addressed.
Spironolactone Pregnancy And Lactation Text: This medication can cause feminization of the male fetus and should be avoided during pregnancy. The active metabolite is also found in breast milk.
Azathioprine Counseling:  I discussed with the patient the risks of azathioprine including but not limited to myelosuppression, immunosuppression, hepatotoxicity, lymphoma, and infections.  The patient understands that monitoring is required including baseline LFTs, Creatinine, possible TPMP genotyping and weekly CBCs for the first month and then every 2 weeks thereafter.  The patient verbalized understanding of the proper use and possible adverse effects of azathioprine.  All of the patient's questions and concerns were addressed.
Adbry Pregnancy And Lactation Text: It is unknown if this medication will adversely affect pregnancy or breast feeding.
Rituxan Counseling:  I discussed with the patient the risks of Rituxan infusions. Side effects can include infusion reactions, severe drug rashes including mucocutaneous reactions, reactivation of latent hepatitis and other infections and rarely progressive multifocal leukoencephalopathy.  All of the patient's questions and concerns were addressed.
Erythromycin Pregnancy And Lactation Text: This medication is Pregnancy Category B and is considered safe during pregnancy. It is also excreted in breast milk.
Prednisone Counseling:  I discussed with the patient the risks of prolonged use of prednisone including but not limited to weight gain, insomnia, osteoporosis, mood changes, diabetes, susceptibility to infection, glaucoma and high blood pressure.  In cases where prednisone use is prolonged, patients should be monitored with blood pressure checks, serum glucose levels and an eye exam.  Additionally, the patient may need to be placed on GI prophylaxis, PCP prophylaxis, and calcium and vitamin D supplementation and/or a bisphosphonate.  The patient verbalized understanding of the proper use and the possible adverse effects of prednisone.  All of the patient's questions and concerns were addressed.
Klisyri Counseling:  I discussed with the patient the risks of Klisyri including but not limited to erythema, scaling, itching, weeping, crusting, and pain.
Wartpeel Counseling:  I discussed with the patient the risks of Wartpeel including but not limited to erythema, scaling, itching, weeping, crusting, and pain.
High Dose Vitamin A Pregnancy And Lactation Text: High dose vitamin A therapy is contraindicated during pregnancy and breast feeding.
Taltz Counseling: I discussed with the patient the risks of ixekizumab including but not limited to immunosuppression, serious infections, worsening of inflammatory bowel disease and drug reactions.  The patient understands that monitoring is required including a PPD at baseline and must alert us or the primary physician if symptoms of infection or other concerning signs are noted.
SSKI Counseling:  I discussed with the patient the risks of SSKI including but not limited to thyroid abnormalities, metallic taste, GI upset, fever, headache, acne, arthralgias, paraesthesias, lymphadenopathy, easy bleeding, arrhythmias, and allergic reaction.
Libtayo Pregnancy And Lactation Text: This medication is contraindicated in pregnancy and when breast feeding.
Azathioprine Pregnancy And Lactation Text: This medication is Pregnancy Category D and isn't considered safe during pregnancy. It is unknown if this medication is excreted in breast milk.
Bactrim Counseling:  I discussed with the patient the risks of sulfa antibiotics including but not limited to GI upset, allergic reaction, drug rash, diarrhea, dizziness, photosensitivity, and yeast infections.  Rarely, more serious reactions can occur including but not limited to aplastic anemia, agranulocytosis, methemoglobinemia, blood dyscrasias, liver or kidney failure, lung infiltrates or desquamative/blistering drug rashes.
Arava Counseling:  Patient counseled regarding adverse effects of Arava including but not limited to nausea, vomiting, abnormalities in liver function tests. Patients may develop mouth sores, rash, diarrhea, and abnormalities in blood counts. The patient understands that monitoring is required including LFTs and blood counts.  There is a rare possibility of scarring of the liver and lung problems that can occur when taking methotrexate. Persistent nausea, loss of appetite, pale stools, dark urine, cough, and shortness of breath should be reported immediately. Patient advised to discontinue Arava treatment and consult with a physician prior to attempting conception. The patient will have to undergo a treatment to eliminate Arava from the body prior to conception.
Otezla Pregnancy And Lactation Text: This medication is Pregnancy Category C and it isn't known if it is safe during pregnancy. It is unknown if it is excreted in breast milk.
Niacinamide Counseling: I recommended taking niacin or niacinamide, also know as vitamin B3, twice daily. Recent evidence suggests that taking vitamin B3 (500 mg twice daily) can reduce the risk of actinic keratoses and non-melanoma skin cancers. Side effects of vitamin B3 include flushing and headache.
Bactrim Pregnancy And Lactation Text: This medication is Pregnancy Category D and is known to cause fetal risk.  It is also excreted in breast milk.
Protopic Counseling: Patient may experience a mild burning sensation during topical application. Protopic is not approved in children less than 2 years of age. There have been case reports of hematologic and skin malignancies in patients using topical calcineurin inhibitors although causality is questionable.
Rituxan Pregnancy And Lactation Text: This medication is Pregnancy Category C and it isn't know if it is safe during pregnancy. It is unknown if this medication is excreted in breast milk but similar antibodies are known to be excreted.
Erivedge Counseling- I discussed with the patient the risks of Erivedge including but not limited to nausea, vomiting, diarrhea, constipation, weight loss, changes in the sense of taste, decreased appetite, muscle spasms, and hair loss.  The patient verbalized understanding of the proper use and possible adverse effects of Erivedge.  All of the patient's questions and concerns were addressed.
Cibinqo Counseling: I discussed with the patient the risks of Cibinqo therapy including but not limited to common cold, nausea, headache, cold sores, increased blood CPK levels, dizziness, UTIs, fatigue, acne, and vomitting. Live vaccines should be avoided.  This medication has been linked to serious infections; higher rate of mortality; malignancy and lymphoproliferative disorders; major adverse cardiovascular events; thrombosis; thrombocytopenia and lymphopenia; lipid elevations; and retinal detachment.
Hydroxyzine Pregnancy And Lactation Text: This medication is not safe during pregnancy and should not be taken. It is also excreted in breast milk and breast feeding isn't recommended.
Ilumya Counseling: I discussed with the patient the risks of tildrakizumab including but not limited to immunosuppression, malignancy, posterior leukoencephalopathy syndrome, and serious infections.  The patient understands that monitoring is required including a PPD at baseline and must alert us or the primary physician if symptoms of infection or other concerning signs are noted.
Ketoconazole Counseling:   Patient counseled regarding improving absorption with orange juice.  Adverse effects include but are not limited to breast enlargement, headache, diarrhea, nausea, upset stomach, liver function test abnormalities, taste disturbance, and stomach pain.  There is a rare possibility of liver failure that can occur when taking ketoconazole. The patient understands that monitoring of LFTs may be required, especially at baseline. The patient verbalized understanding of the proper use and possible adverse effects of ketoconazole.  All of the patient's questions and concerns were addressed.
Metronidazole Counseling:  I discussed with the patient the risks of metronidazole including but not limited to seizures, nausea/vomiting, a metallic taste in the mouth, nausea/vomiting and severe allergy.
Cibinqo Pregnancy And Lactation Text: It is unknown if this medication will adversely affect pregnancy or breast feeding.  You should not take this medication if you are currently pregnant or planning a pregnancy or while breastfeeding.
Calcipotriene Pregnancy And Lactation Text: This medication has not been proven safe during pregnancy. It is unknown if this medication is excreted in breast milk.
Tazorac Counseling:  Patient advised that medication is irritating and drying.  Patient may need to apply sparingly and wash off after an hour before eventually leaving it on overnight.  The patient verbalized understanding of the proper use and possible adverse effects of tazorac.  All of the patient's questions and concerns were addressed.
Ketoconazole Pregnancy And Lactation Text: This medication is Pregnancy Category C and it isn't know if it is safe during pregnancy. It is also excreted in breast milk and breast feeding isn't recommended.
Elidel Counseling: Patient may experience a mild burning sensation during topical application. Elidel is not approved in children less than 2 years of age. There have been case reports of hematologic and skin malignancies in patients using topical calcineurin inhibitors although causality is questionable.
Klisyri Pregnancy And Lactation Text: It is unknown if this medication can harm a developing fetus or if it is excreted in breast milk.
Tetracycline Counseling: Patient counseled regarding possible photosensitivity and increased risk for sunburn.  Patient instructed to avoid sunlight, if possible.  When exposed to sunlight, patients should wear protective clothing, sunglasses, and sunscreen.  The patient was instructed to call the office immediately if the following severe adverse effects occur:  hearing changes, easy bruising/bleeding, severe headache, or vision changes.  The patient verbalized understanding of the proper use and possible adverse effects of tetracycline.  All of the patient's questions and concerns were addressed. Patient understands to avoid pregnancy while on therapy due to potential birth defects.
Sski Pregnancy And Lactation Text: This medication is Pregnancy Category D and isn't considered safe during pregnancy. It is excreted in breast milk.
Cellcept Counseling:  I discussed with the patient the risks of mycophenolate mofetil including but not limited to infection/immunosuppression, GI upset, hypokalemia, hypercholesterolemia, bone marrow suppression, lymphoproliferative disorders, malignancy, GI ulceration/bleed/perforation, colitis, interstitial lung disease, kidney failure, progressive multifocal leukoencephalopathy, and birth defects.  The patient understands that monitoring is required including a baseline creatinine and regular CBC testing. In addition, patient must alert us immediately if symptoms of infection or other concerning signs are noted.
Finasteride Pregnancy And Lactation Text: This medication is absolutely contraindicated during pregnancy. It is unknown if it is excreted in breast milk.
Protopic Pregnancy And Lactation Text: This medication is Pregnancy Category C. It is unknown if this medication is excreted in breast milk when applied topically.
Clofazimine Counseling:  I discussed with the patient the risks of clofazimine including but not limited to skin and eye pigmentation, liver damage, nausea/vomiting, gastrointestinal bleeding and allergy.
Niacinamide Pregnancy And Lactation Text: These medications are considered safe during pregnancy.
Metronidazole Pregnancy And Lactation Text: This medication is Pregnancy Category B and considered safe during pregnancy.  It is also excreted in breast milk.
Siliq Counseling:  I discussed with the patient the risks of Siliq including but not limited to new or worsening depression, suicidal thoughts and behavior, immunosuppression, malignancy, posterior leukoencephalopathy syndrome, and serious infections.  The patient understands that monitoring is required including a PPD at baseline and must alert us or the primary physician if symptoms of infection or other concerning signs are noted. There is also a special program designed to monitor depression which is required with Siliq.
Oxybutynin Counseling:  I discussed with the patient the risks of oxybutynin including but not limited to skin rash, drowsiness, dry mouth, difficulty urinating, and blurred vision.
Aklief counseling:  Patient advised to apply a pea-sized amount only at bedtime and wait 30 minutes after washing their face before applying.  If too drying, patient may add a non-comedogenic moisturizer.  The most commonly reported side effects including irritation, redness, scaling, dryness, stinging, burning, itching, and increased risk of sunburn.  The patient verbalized understanding of the proper use and possible adverse effects of retinoids.  All of the patient's questions and concerns were addressed.
Cephalexin Counseling: I counseled the patient regarding use of cephalexin as an antibiotic for prophylactic and/or therapeutic purposes. Cephalexin (commonly prescribed under brand name Keflex) is a cephalosporin antibiotic which is active against numerous classes of bacteria, including most skin bacteria. Side effects may include nausea, diarrhea, gastrointestinal upset, rash, hives, yeast infections, and in rare cases, hepatitis, kidney disease, seizures, fever, confusion, neurologic symptoms, and others. Patients with severe allergies to penicillin medications are cautioned that there is about a 10% incidence of cross-reactivity with cephalosporins. When possible, patients with penicillin allergies should use alternatives to cephalosporins for antibiotic therapy.
Thalidomide Counseling: I discussed with the patient the risks of thalidomide including but not limited to birth defects, anxiety, weakness, chest pain, dizziness, cough and severe allergy.
Detail Level: Simple
Acitretin Counseling:  I discussed with the patient the risks of acitretin including but not limited to hair loss, dry lips/skin/eyes, liver damage, hyperlipidemia, depression/suicidal ideation, photosensitivity.  Serious rare side effects can include but are not limited to pancreatitis, pseudotumor cerebri, bony changes, clot formation/stroke/heart attack.  Patient understands that alcohol is contraindicated since it can result in liver toxicity and significantly prolong the elimination of the drug by many years.
Tazorac Pregnancy And Lactation Text: This medication is not safe during pregnancy. It is unknown if this medication is excreted in breast milk.
Terbinafine Counseling: Patient counseling regarding adverse effects of terbinafine including but not limited to headache, diarrhea, rash, upset stomach, liver function test abnormalities, itching, taste/smell disturbance, nausea, abdominal pain, and flatulence.  There is a rare possibility of liver failure that can occur when taking terbinafine.  The patient understands that a baseline LFT and kidney function test may be required. The patient verbalized understanding of the proper use and possible adverse effects of terbinafine.  All of the patient's questions and concerns were addressed.
Gabapentin Counseling: I discussed with the patient the risks of gabapentin including but not limited to dizziness, somnolence, fatigue and ataxia.
Minocycline Counseling: Patient advised regarding possible photosensitivity and discoloration of the teeth, skin, lips, tongue and gums.  Patient instructed to avoid sunlight, if possible.  When exposed to sunlight, patients should wear protective clothing, sunglasses, and sunscreen.  The patient was instructed to call the office immediately if the following severe adverse effects occur:  hearing changes, easy bruising/bleeding, severe headache, or vision changes.  The patient verbalized understanding of the proper use and possible adverse effects of minocycline.  All of the patient's questions and concerns were addressed.
Finasteride Male Counseling: Finasteride Counseling:  I discussed with the patient the risks of use of finasteride including but not limited to decreased libido, decreased ejaculate volume, gynecomastia, and depression. Women should not handle medication.  All of the patient's questions and concerns were addressed.
Tremfya Counseling: I discussed with the patient the risks of guselkumab including but not limited to immunosuppression, serious infections, worsening of inflammatory bowel disease and drug reactions.  The patient understands that monitoring is required including a PPD at baseline and must alert us or the primary physician if symptoms of infection or other concerning signs are noted.
Winlevi Counseling:  I discussed with the patient the risks of topical clascoterone including but not limited to erythema, scaling, itching, and stinging. Patient voiced their understanding.
Cimzia Counseling:  I discussed with the patient the risks of Cimzia including but not limited to immunosuppression, allergic reactions and infections.  The patient understands that monitoring is required including a PPD at baseline and must alert us or the primary physician if symptoms of infection or other concerning signs are noted.
Minoxidil Counseling: Minoxidil is a topical medication which can increase blood flow where it is applied. It is uncertain how this medication increases hair growth. Side effects are uncommon and include stinging and allergic reactions.
Albendazole Counseling:  I discussed with the patient the risks of albendazole including but not limited to cytopenia, kidney damage, nausea/vomiting and severe allergy.  The patient understands that this medication is being used in an off-label manner.
Nsaids Counseling: NSAID Counseling: I discussed with the patient that NSAIDs should be taken with food. Prolonged use of NSAIDs can result in the development of stomach ulcers.  Patient advised to stop taking NSAIDs if abdominal pain occurs.  The patient verbalized understanding of the proper use and possible adverse effects of NSAIDs.  All of the patient's questions and concerns were addressed.
Winlevi Pregnancy And Lactation Text: This medication is considered safe during pregnancy and breastfeeding.
Infliximab Counseling:  I discussed with the patient the risks of infliximab including but not limited to myelosuppression, immunosuppression, autoimmune hepatitis, demyelinating diseases, lymphoma, and serious infections.  The patient understands that monitoring is required including a PPD at baseline and must alert us or the primary physician if symptoms of infection or other concerning signs are noted.
Cephalexin Pregnancy And Lactation Text: This medication is Pregnancy Category B and considered safe during pregnancy.  It is also excreted in breast milk but can be used safely for shorter doses.
Qbrexza Counseling:  I discussed with the patient the risks of Qbrexza including but not limited to headache, mydriasis, blurred vision, dry eyes, nasal dryness, dry mouth, dry throat, dry skin, urinary hesitation, and constipation.  Local skin reactions including erythema, burning, stinging, and itching can also occur.
Aklief Pregnancy And Lactation Text: It is unknown if this medication is safe to use during pregnancy.  It is unknown if this medication is excreted in breast milk.  Breastfeeding women should use the topical cream on the smallest area of the skin for the shortest time needed while breastfeeding.  Do not apply to nipple and areola.

## 2022-08-09 ENCOUNTER — APPOINTMENT (OUTPATIENT)
Dept: SPEECH THERAPY | Facility: REHABILITATION | Age: 72
End: 2022-08-09
Attending: NURSE PRACTITIONER
Payer: MEDICARE

## 2022-08-11 ENCOUNTER — APPOINTMENT (OUTPATIENT)
Dept: SPEECH THERAPY | Facility: REHABILITATION | Age: 72
End: 2022-08-11
Attending: NURSE PRACTITIONER
Payer: MEDICARE

## 2022-08-15 ENCOUNTER — OFFICE VISIT (OUTPATIENT)
Dept: NEUROLOGY | Facility: MEDICAL CENTER | Age: 72
End: 2022-08-15
Attending: NURSE PRACTITIONER
Payer: MEDICARE

## 2022-08-15 VITALS
BODY MASS INDEX: 33 KG/M2 | OXYGEN SATURATION: 98 % | TEMPERATURE: 97.4 F | DIASTOLIC BLOOD PRESSURE: 70 MMHG | HEIGHT: 72 IN | HEART RATE: 70 BPM | WEIGHT: 243.61 LBS | SYSTOLIC BLOOD PRESSURE: 122 MMHG | RESPIRATION RATE: 16 BRPM

## 2022-08-15 DIAGNOSIS — I10 PRIMARY HYPERTENSION: ICD-10-CM

## 2022-08-15 DIAGNOSIS — I69.30 LATE EFFECT OF STROKE: ICD-10-CM

## 2022-08-15 DIAGNOSIS — G47.33 OSA ON CPAP: ICD-10-CM

## 2022-08-15 PROBLEM — R13.10 DYSPHAGIA: Status: ACTIVE | Noted: 2022-08-15

## 2022-08-15 PROCEDURE — 99211 OFF/OP EST MAY X REQ PHY/QHP: CPT | Performed by: NURSE PRACTITIONER

## 2022-08-15 PROCEDURE — 99214 OFFICE O/P EST MOD 30 MIN: CPT | Performed by: NURSE PRACTITIONER

## 2022-08-15 RX ORDER — CLOPIDOGREL BISULFATE 75 MG/1
75 TABLET ORAL DAILY
Status: ON HOLD | COMMUNITY
Start: 2022-08-10 | End: 2023-05-27

## 2022-08-15 ASSESSMENT — ENCOUNTER SYMPTOMS
BLURRED VISION: 0
BLOOD IN STOOL: 0
COUGH: 0
BRUISES/BLEEDS EASILY: 0
VOMITING: 0
HEADACHES: 0
TINGLING: 0
SENSORY CHANGE: 0
DOUBLE VISION: 0
FOCAL WEAKNESS: 0
DEPRESSION: 0
SPEECH CHANGE: 0
NERVOUS/ANXIOUS: 0
BACK PAIN: 1
PALPITATIONS: 0
DIZZINESS: 0
WEAKNESS: 0

## 2022-08-15 ASSESSMENT — FIBROSIS 4 INDEX: FIB4 SCORE: 1.36

## 2022-08-15 NOTE — PROGRESS NOTES
30Subjective     HPI  New Rivas  is a 72 y.o. right handed male who presents for follow up today.    I last saw him [facility] for evaluation of left posterior insular cortex infarct on 6/27/2022.     He presented to [facility] on 5/9/0222 with complaints of slurred speech and non-sensical speech. NIHSS on admission 9. He was given IV tenecteplase and transferred to [facility]. NIHSS at time of neurology consult was 3.         Discharged on Plavix 75mg (ASA 81mg was stopped).        He is here alone today.   He saw speech therapy after last visit, they recommended techniques to promote swallowing and did not recommend further speech therapy.He feels like he is at baseline.  He does not check blood pressure.        PMH :  HTN, HLD, CAD s/p stent, FRANK (on CPAP)  Social History:  Cigarette smoking ½ ppd x 42 years, quit 2010,  drinks a couple of mixed drinks nightly (2 shots, 2 glasses of wine, 2 beers)  smokes marijuana    HE works out on treadmill, bike and weights 4-5 days per week, he swims on the other days.        Review of Systems   HENT:  Negative for nosebleeds.    Eyes:  Negative for blurred vision and double vision.   Respiratory:  Negative for cough.    Cardiovascular:  Negative for chest pain and palpitations.   Gastrointestinal:  Negative for blood in stool and vomiting.   Genitourinary:  Negative for hematuria.   Musculoskeletal:  Positive for back pain.        Right sided back pain   Neurological:  Negative for dizziness, tingling, sensory change, speech change, focal weakness, weakness and headaches.   Endo/Heme/Allergies:  Does not bruise/bleed easily.   Psychiatric/Behavioral:  Negative for depression. The patient is not nervous/anxious.        Current Outpatient Medications on File Prior to Visit   Medication Sig Dispense Refill    doxycycline (VIBRAMYCIN) 100 MG Cap Take 100 mg by mouth 2 times a day.      metoprolol tartrate (LOPRESSOR) 25 MG Tab Take 1  Tablet by mouth 2 times a day. TAKE 1 TAB BY MOUTH 2 TIMES A DAY. 180 Tablet 3    losartan (COZAAR) 25 MG Tab Take 1 Tablet by mouth every day. 90 Tablet 3    atorvastatin (LIPITOR) 40 MG Tab Take 1 Tablet by mouth 1/2 hour before dinner. 90 Tablet 3    traZODone (DESYREL) 50 MG Tab Take 1-2 Tablets by mouth at bedtime as needed for Sleep. 180 Tablet 3    pimecrolimus (ELIDEL) 1 % cream Apply 1 Application topically 2 times a day.      latanoprost (XALATAN) 0.005 % Solution Administer 1 Drop into both eyes at bedtime.      clopidogrel (PLAVIX) 75 MG Tab TAKE 1 TABLET BY MOUTH EVERY DAY FOR 90 DAYS       No current facility-administered medications on file prior to visit.           Objective       I personally reviewed imaging below and agree with the findings     MRI brain 5/10/2022  1. A tiny area of acute infarct in the left posterior insular cortex.  2.  Chronic infarcts in the left frontal and right posterior temporal lobes.  3.  Mild chronic microvascular ischemic disease.  4.  Mild cerebral volume mass.     CT Cerebral perfusion 5/9/2022   1. blood flow less than 30% likely representing completed remote infarct = 8 mL.     2.  T Max more than 6 seconds likely = 35 mL.     3.  Mismatched volume = 27     4.Please note that the cerebral perfusion was performed on the limited brain tissue around the basal ganglia region. Infarct/ischemia outside the CT perfusion sections can be missed in this study.      CTA head 5/9/2022  The posterior circulation shows the distal vertebral arteries to be patent. The vertebrobasilar confluence is intact. The basilar artery is patent. No aneurysm or occlusive lesion is evident.     The anterior circulation shows no stenotic or occlusive lesion in the major vessels. No aneurysm is evident about the [place].     There is decreased flow in a tiny arterial branch in the left parietal region (image 139 series 8).     No acute intracranial hemorrhage.     CTA neck  5/9/2022  Atherosclerotic plaque of aorta  <50% stenosis of ICAs bilaterally  No significant stenosis     Stroke Labs:   Creat. 0.99, LDL 40, A1C 5.6           TTE 5/11/2022  LVEF 60%, mild concentric LVH, LA size WNL, MESFIN 22  ZIO Patch 5/11/2022-5/25/2022       Negative for Afib.  2nd degree (Mobitz II) block noted    Encounter Vitals  Standard Vitals  Vitals  Blood Pressure : 122/70  Temperature: 36.3 °C (97.4 °F)  Pulse: 70  Respiration: 18  Pulse Oximetry: 98 %  Height: 182.9 cm (6')  Weight: 110 kg (243 lb 9.7 oz)  Encounter Vitals  Temperature: 36.3 °C (97.4 °F)  Blood Pressure : 122/70  Pulse: 70  Respiration: 18  Pulse Oximetry: 98 %  Weight: 110 kg (243 lb 9.7 oz)  Height: 182.9 cm (6')  BMI (Calculated): 33.04        PHYSICAL ASSESSMENT  Constitutional:  Alert, no apparent distress,  Psych:   mood and affect WNL  Muskuloskeletal:  Moves all extremities equally, strength 5/5  BUE/BLE flexors/extensors, no drift  NEUROLOGICAL ASSESSMENT  Oriented X 4, speech fluent, naming and memory intact  CN II: Visual fields are full to confrontation. Fundoscopic exam is normal with sharp discs and no vascular changes. Pupils are 3 mm and briskly reactive to light.   CN III: IV, VI  EOMs intact, no ptosis  CN V: Facial sensation is intact to pinprick in all 3 divisions bilaterally. Corneal responses are intact.  CN VII: Face is symmetric with normal eye closure and smile.  CN VIII Hearing is normal to rubbing fingers  CN IX, X: Palate elevates symmetrically. Phonation is normal.  CN XI: Head turning and shoulder shrug are intact  CN XII: Tongue is midline with normal movements and no atrophy.                           Sensation to PP equal bilaterally                 No limb ataxia with finger to nose and heel to shin                 Ambulates with steady gait.                 Rhomberg negative                 Cardiovascular:    S1S2, no abnormal rhythm auscultated, no peripheral edema  Neck:                     No  carotid bruits noted   Pulmonary:            Respirations easy, lungs clear to auscultation all fields.     Skin:                     No obvious rashes.        Iniital NIHSS 3      Current NIHSS    1a. LOC: 0  1b. LOC Questions: 0  1c. LOC Commands: 0  2. Best Gaze:0  3. Visual Fields: 0  4. Facial Paresis: 0  5a. Motor arm left: 0  5b. Motor arm right: 0  6a. Motor leg left: 0  6b. Motor leg right: 0  7. Sensory: 0  8. Best Language: 0  9. Limb Ataxia: 0  10. Dysarthria: 0  11. Extinction/Inattention: 0    Total Score Current 0        Current mRS 0        Assessment & Plan        1. Late effect of stroke  Left insular cortex infarct with remote cortical infarcts of left frontal and posterior right temporal lobes. Suspicious for cardioembolic cause given risk factors for atrial fibrillation.      No residual symptoms.      Presumed Mechanism by TOAST:   __  Large Artery Atherosclerosis  __  Small Vessel (lacunar)  __   Cardioembolic  __   Other (Sickle cell, vasculitis, hypercoagulable)  __XX   Unknown        Recommend loop recorder, no other obvious cause of stroke or remote cortical strokes.       Afib risk factors:  FRANK 2nd degree heart block, HTN, BMI over 30, heavy alcohol use, history of cigarette smoking     Stroke risk factors:  HTN, CAD, HLD, FRANK     Continue Plavix 75mg PO daily for stroke prevention, discussed signs of bleeding. From a neurology standpoint, ASA 81mg would be sufficient. This stroke as well as remote strokes were embolic, changing anti-platelet medication would probably not be beneficial. If cardiology prefers ASA 81mg over Plavix 75mg, I have no issue.           Recommend decreasing or stopping alcohol due to risk of atrial fibrillation and risk of bleeding when taking anti-platelet drugs.        LDL goal < 70, current 40 continue Atorvastatin 40mg,  discussed medication side effects, will need follow up with primary care evaluate liver function at intervals and refill  Exercise at least  30 minutes daily, avoid/minimize red meat, fried foods, butter, cheese.   Eat 5-6 servings of vegetables and fruits daily, choose lean white meat without skin (chicken, turkey, white fish)--baked, broiled or grilled.           2. Primary hypertension  Blood pressure goal less than 130/80, currently 122/70.         3. FRANK on CPAP      Compliant         If any new signs of stroke:  sudden weakness, numbness, speech difficulty (slurring or difficulty finding words), imbalance, incoordination, worse headache of life or vision loss occur, call 911.       Take all medications as prescribed unless instructed by your provider.        I spent a total of 30 minutes caring for patient,  my time includes counseling, review of systems, HPI and assessment, review of images, labs and testing as above.  I reviewed the hospital records, PMH, social and family history.   I have counseled patient on stroke prevention strategies, stroke symptoms and mimics.  Diet and exercise modifications.  We discussed medication side effects and instructions.             Follow up PRN

## 2022-09-06 ENCOUNTER — PATIENT MESSAGE (OUTPATIENT)
Dept: SLEEP MEDICINE | Facility: MEDICAL CENTER | Age: 72
End: 2022-09-06
Payer: MEDICARE

## 2022-09-06 DIAGNOSIS — G47.33 OSA (OBSTRUCTIVE SLEEP APNEA): ICD-10-CM

## 2022-10-12 NOTE — PROGRESS NOTES
[facility] Follow-up Visit    Date of Visit: 10/13/2022     CC: Follow-up for FRANK management      HPI:  Jan Rivas is a very pleasant 72 y.o. year old male former smoker (21 pack-years, quit in 2010), with a PMHx of FRANK on CPAP, elevated BMI, HTN, PVD, CAD with stents who presented to the [facility] for a regular follow up. Last seen in the office on 8/24/2021 with Dr. Delgado.     Patient presents for his yearly compliance check.  He states that he notices a difference with his CPAP usage.  He denies any morning headaches, daytime/driving drowsy, issues falling asleep, snoring, gasping, apneas, palpitations, dry mouth, aerophagia, mask leak, skin irritations, or any symptoms of RLS, narcolepsy or any nightmares, sleep walking or acting out of dreams.  He is going to bed between 11-11:30 PM and waking up between 730-8:00 AM.  He awakens once at night to use the bathroom but does not have any issues while back asleep.  He will occasionally nap for about 30 minutes in the afternoon.    DME provider: Miguel  Device: Cloud Sherpas 2 autoCPAP  Settings: CPAP at 10 CWP  When: December 2021  Mask: Fullface  Chin strap: No     Cleaning regimen: 1-2 times a week with Clorox wipes    Compliance:  Compliance data reviewed showing 100% usage > 4hours in last 30 days. Average AHI 1.7 events/hour. 95% leaks 0 sec. Patient continues to use and benefit from machine.      Sleep History:  Diagnosed with FRANK on 11/27/2018 with a AHI was 87.6/hr and the best tolerated pressure was CPAP 10 cm. The AHI improved to 1.6/hr.    Patient Active Problem List    Diagnosis Date Noted    FRANK on CPAP 12/06/2019    Dysphagia 08/15/2022    Late effect of stroke 06/27/2022    Acute ischemic stroke (HCC) 05/09/2022    Ear itching 11/11/2020    Male erectile disorder 11/11/2020    Hair loss 11/11/2020    BMI 34.0-34.9,adult 12/06/2019    Low back pain with radiation 07/31/2019    Elevated hemoglobin A1c 07/31/2019     Dermatitis 2019    High risk medication use 2018    PVD (peripheral vascular disease) ([facility]) 02/10/2017    Carotid stenosis 2014    Stented coronary artery 10/31/2013    Primary hypertension 10/31/2013    Pure hypercholesterolemia 10/31/2013    CAD (coronary artery disease) 10/22/2013     Past Medical History:   Diagnosis Date    Acute stroke due to ischemia ([facility]) 2022    MRI with acute infarct of left posterior insular cortex.    Apnea, sleep     Uses CPAP    CAD (coronary artery disease) 2010    Acute apical infarct. PCI/MANJEET (Taxus 3.5 x 32mm) to the LAD. 2015: [facility] with patent stent.    Chickenpox     Ma[place] measles     Heart attack ([facility])     Hyperlipidemia     Hypertension     Mumps     Sleep apnea     Snoring     Wears glasses       Past Surgical History:   Procedure Laterality Date    [facility] CARDIAC CATH  2/21/15    Cath at [place].  Patent stent and no obstructive CAD.    [facility] CARDIAC CATH  5/4/10    Taxus stent to LAD    CYST EXCISION      posterior neck     Family History   Problem Relation Age of Onset    Heart Attack Mother     Cancer Mother     Cancer Father     Cancer Sister      Social History     Socioeconomic History    Marital status:      Spouse name: Not on file    Number of children: Not on file    Years of education: Not on file    Highest education level: Not on file   Occupational History    Not on file   Tobacco Use    Smoking status: Former     Packs/day: 0.50     Years: 42.00     Pack years: 21.00     Types: Cigarettes     Quit date: 2010     Years since quittin.4    Smokeless tobacco: Never   Vaping Use    Vaping Use: Former   Substance and Sexual Activity    Alcohol use: Yes     Alcohol/week: 8.4 oz     Types: 14 Standard drinks or equivalent per week     Comment: 2 drinks per day     Drug use: Yes     Types: Marijuana, Inhaled     Comment: Every other day marijuana    Sexual activity: Not on file   Other Topics Concern    Not on file   Social History  Narrative    Not on file     Social Determinants of Health     Financial Resource Strain: Not on file   Food Insecurity: Not on file   Transportation Needs: Not on file   Physical Activity: Not on file   Stress: Not on file   Social Connections: Not on file   Intimate Partner Violence: Not on file   Housing Stability: Not on file     Current Outpatient Medications   Medication Sig Dispense Refill    clopidogrel (PLAVIX) 75 MG Tab TAKE 1 TABLET BY MOUTH EVERY DAY FOR 90 DAYS      doxycycline (VIBRAMYCIN) 100 MG Cap Take 100 mg by mouth 2 times a day.      metoprolol tartrate (LOPRESSOR) 25 MG Tab Take 1 Tablet by mouth 2 times a day. TAKE 1 TAB BY MOUTH 2 TIMES A DAY. 180 Tablet 3    losartan (COZAAR) 25 MG Tab Take 1 Tablet by mouth every day. 90 Tablet 3    atorvastatin (LIPITOR) 40 MG Tab Take 1 Tablet by mouth 1/2 hour before dinner. 90 Tablet 3    traZODone (DESYREL) 50 MG Tab Take 1-2 Tablets by mouth at bedtime as needed for Sleep. 180 Tablet 3    pimecrolimus (ELIDEL) 1 % cream Apply 1 Application topically 2 times a day.      latanoprost (XALATAN) 0.005 % Solution Administer 1 Drop into both eyes at bedtime.       No current facility-administered medications for this visit.      ALLERGIES: Patient has no allergy information on record.    ROS:  Constitutional: Denies fever, chills, sweats,  weight loss, fatigue  Cardiovascular: Denies chest pain, tightness, palpitations, swelling in legs/feet  Respiratory: Denies shortness of breath, cough, sputum, wheezing, painful breathing   Sleep: per HPI  Gastrointestinal: Denies  difficulty swallowing, nausea, abdominal pain, diarrhea, constipation, heartburn.  Musculoskeletal: Denies painful joints, sore muscles,       PHYSICAL EXAM:  /74 (BP Location: Right arm, Patient Position: Sitting, BP Cuff Size: Large adult)   Pulse (!) 55   Resp 16   Ht 1.829 m (6')   Wt 110 kg (242 lb)   SpO2 97% Comment: room air at rest  BMI 32.82 kg/m²   Appearance:  Well-nourished, well-developed, no acute distress  Eyes:  No scleral icterus , EOMI  ENMT: No redness of the oropharynx  Lung auscultation:  No wheezes rhonchi rubs or rales  Cardiac: No murmurs, rubs, or gallops; regular rhythm, normal rate; no edema  Musculoskeletal:  Grossly normal; gait and station normal; digits and nails normal  Skin:  No rashes, petechiae, cyanosis  Neurologic: without focal signs; oriented to person, time, place, and purpose; judgement intact  Psychiatric:  No depression, anxiety, agitation      Assessment and Plan:    The medical record was reviewed.    Diagnostic and titration nocturnal polysomnogram's, home sleep apnea tests, continuous nocturnal oximetry results, multiple sleep latency tests, and compliance reports reviewed.    Problem List Items Addressed This Visit       FRANK on CPAP     Sleep Apnea:    The pathophysiology of sleep anea and the increased risk of cardiovascular morbidity from untreated sleep apnea is discussed in detail with the patient.  Urged to avoid supine sleep, weight gain and alcoholic beverages since all of these can worsen sleep apnea. Cautioned against drowsy driving. If feeling sleepy while driving, pull over for a break/nap, rather than persist on the road, in the interest of own safety and that of others on the road.      Compliance download was reviewed and discussed with the patient.     - Order placed for mask and supplies   - Compliance was reinforced  - Advised patient against using Clorox wipes and to just use dish soap and warm water  - Advised patient to reach out via MyChart if any questions or concerns should arise.   - Equipment replacement schedule:  Mask cushion every month  Mask every 6 months  Head gear every 6 months  Tubing every 3 months  Ultra-fine filters 2 times per month  Foam filter every 6 months  Humidifier chamber every 6 months    Has been advised to continue the current  CPAP at 10 CWP, clean equipment frequently, and get new mask  and supplies as allowed by insurance and DME. Recommend an earlier appointment, if significant treatment barriers develop.    The risks of untreated sleep apnea were discussed with the patient at length. Patients with FRANK are at increased risk of cardiovascular disease including coronary artery disease, systemic arterial hypertension, pulmonary arterial hypertension, cardiac arrythmias, and stroke. The patient was advised to avoid driving a motor vehicle when drowsy.    Positive airway pressure will favorably impact many of the adverse conditions and effects provoked by FRANK.         Relevant Orders    DME Mask and Supplies       Have advised the patient to follow up with the appropriate healthcare practitioners for all other medical problems and issues.    Return in about 1 year (around 10/13/2023), or if symptoms worsen or fail to improve.      Please note portions of this record was created using voice recognition software. I have made every reasonable attempt to correct obvious errors, but I expect that there are errors of grammar and possibly content I did not discover before finalizing the note.    Time spent in record review prior to patient arrival, reviewing results, and in face-to-face encounter totaled 21 min.  __________  DANIELA Watkins  Pulmonary & Sleep Medicine  [facility]

## 2022-10-13 ENCOUNTER — OFFICE VISIT (OUTPATIENT)
Dept: SLEEP MEDICINE | Facility: MEDICAL CENTER | Age: 72
End: 2022-10-13
Payer: MEDICARE

## 2022-10-13 VITALS
RESPIRATION RATE: 16 BRPM | HEIGHT: 72 IN | DIASTOLIC BLOOD PRESSURE: 74 MMHG | BODY MASS INDEX: 32.78 KG/M2 | WEIGHT: 242 LBS | HEART RATE: 55 BPM | SYSTOLIC BLOOD PRESSURE: 122 MMHG | OXYGEN SATURATION: 97 %

## 2022-10-13 DIAGNOSIS — G47.33 OSA ON CPAP: ICD-10-CM

## 2022-10-13 PROCEDURE — 99213 OFFICE O/P EST LOW 20 MIN: CPT

## 2022-10-13 ASSESSMENT — FIBROSIS 4 INDEX: FIB4 SCORE: 1.36

## 2022-10-13 NOTE — ASSESSMENT & PLAN NOTE
Sleep Apnea:    The pathophysiology of sleep anea and the increased risk of cardiovascular morbidity from untreated sleep apnea is discussed in detail with the patient.  Urged to avoid supine sleep, weight gain and alcoholic beverages since all of these can worsen sleep apnea. Cautioned against drowsy driving. If feeling sleepy while driving, pull over for a break/nap, rather than persist on the road, in the interest of own safety and that of others on the road.      Compliance download was reviewed and discussed with the patient.     - Order placed for mask and supplies   - Compliance was reinforced  - Advised patient against using Clorox wipes and to just use dish soap and warm water  - Advised patient to reach out via [employer] if any questions or concerns should arise.   - Equipment replacement schedule:  Mask cushion every month  Mask every 6 months  Head gear every 6 months  Tubing every 3 months  Ultra-fine filters 2 times per month  Foam filter every 6 months  Humidifier chamber every 6 months    Has been advised to continue the current  CPAP at 10 CWP, clean equipment frequently, and get new mask and supplies as allowed by insurance and DME. Recommend an earlier appointment, if significant treatment barriers develop.    The risks of untreated sleep apnea were discussed with the patient at length. Patients with FRANK are at increased risk of cardiovascular disease including coronary artery disease, systemic arterial hypertension, pulmonary arterial hypertension, cardiac arrythmias, and stroke. The patient was advised to avoid driving a motor vehicle when drowsy.    Positive airway pressure will favorably impact many of the adverse conditions and effects provoked by FRANK.

## 2022-11-10 ENCOUNTER — PATIENT MESSAGE (OUTPATIENT)
Dept: HEALTH INFORMATION MANAGEMENT | Facility: OTHER | Age: 72
End: 2022-11-10

## 2022-11-30 ENCOUNTER — TELEPHONE (OUTPATIENT)
Dept: CARDIOLOGY | Facility: MEDICAL CENTER | Age: 72
End: 2022-11-30
Payer: MEDICARE

## 2022-11-30 NOTE — TELEPHONE ENCOUNTER
RO    Called Patient to reschedule appointment on 12/28/22. Patient did not answer therefore left a voicemail - 1st attempt.

## 2022-12-27 RX ORDER — DOXYCYCLINE HYCLATE 100 MG/1
1 CAPSULE, GELATIN COATED ORAL BID
Qty: 60 | Refills: 6 | Status: ERX

## 2022-12-30 ENCOUNTER — TELEPHONE (OUTPATIENT)
Dept: CARDIOLOGY | Facility: MEDICAL CENTER | Age: 72
End: 2022-12-30

## 2022-12-30 ENCOUNTER — OFFICE VISIT (OUTPATIENT)
Dept: CARDIOLOGY | Facility: MEDICAL CENTER | Age: 72
End: 2022-12-30
Payer: MEDICARE

## 2022-12-30 VITALS
WEIGHT: 241.8 LBS | SYSTOLIC BLOOD PRESSURE: 122 MMHG | HEART RATE: 55 BPM | DIASTOLIC BLOOD PRESSURE: 60 MMHG | RESPIRATION RATE: 16 BRPM | BODY MASS INDEX: 32.79 KG/M2 | OXYGEN SATURATION: 94 %

## 2022-12-30 DIAGNOSIS — I63.9 ACUTE ISCHEMIC STROKE (HCC): ICD-10-CM

## 2022-12-30 DIAGNOSIS — E78.01 FAMILIAL HYPERCHOLESTEROLEMIA: ICD-10-CM

## 2022-12-30 DIAGNOSIS — I10 PRIMARY HYPERTENSION: ICD-10-CM

## 2022-12-30 DIAGNOSIS — I73.9 PVD (PERIPHERAL VASCULAR DISEASE) (HCC): ICD-10-CM

## 2022-12-30 DIAGNOSIS — E78.00 PURE HYPERCHOLESTEROLEMIA: ICD-10-CM

## 2022-12-30 DIAGNOSIS — E78.5 HYPERLIPIDEMIA, UNSPECIFIED HYPERLIPIDEMIA TYPE: ICD-10-CM

## 2022-12-30 DIAGNOSIS — I10 BENIGN ESSENTIAL HTN: ICD-10-CM

## 2022-12-30 DIAGNOSIS — Z95.5 STENTED CORONARY ARTERY: ICD-10-CM

## 2022-12-30 DIAGNOSIS — G47.33 OSA ON CPAP: ICD-10-CM

## 2022-12-30 DIAGNOSIS — I25.10 CORONARY ARTERY DISEASE INVOLVING NATIVE CORONARY ARTERY OF NATIVE HEART WITHOUT ANGINA PECTORIS: ICD-10-CM

## 2022-12-30 DIAGNOSIS — R73.09 ELEVATED HEMOGLOBIN A1C: ICD-10-CM

## 2022-12-30 DIAGNOSIS — I10 ESSENTIAL HYPERTENSION: ICD-10-CM

## 2022-12-30 DIAGNOSIS — Z79.899 HIGH RISK MEDICATION USE: ICD-10-CM

## 2022-12-30 DIAGNOSIS — I65.23 BILATERAL CAROTID ARTERY STENOSIS: ICD-10-CM

## 2022-12-30 PROCEDURE — 99215 OFFICE O/P EST HI 40 MIN: CPT | Performed by: INTERNAL MEDICINE

## 2022-12-30 RX ORDER — LOSARTAN POTASSIUM 25 MG/1
25 TABLET ORAL DAILY
Qty: 90 TABLET | Refills: 3 | Status: SHIPPED | OUTPATIENT
Start: 2022-12-30 | End: 2023-11-21 | Stop reason: SDUPTHER

## 2022-12-30 RX ORDER — ATORVASTATIN CALCIUM 40 MG/1
40 TABLET, FILM COATED ORAL
Qty: 90 TABLET | Refills: 3 | Status: SHIPPED | OUTPATIENT
Start: 2022-12-30 | End: 2023-11-21 | Stop reason: SDUPTHER

## 2022-12-30 ASSESSMENT — ENCOUNTER SYMPTOMS
MUSCULOSKELETAL NEGATIVE: 1
CLAUDICATION: 0
GASTROINTESTINAL NEGATIVE: 1
ORTHOPNEA: 0
LOSS OF CONSCIOUSNESS: 0
CHILLS: 0
PND: 0
CONSTITUTIONAL NEGATIVE: 1
FEVER: 0
SORE THROAT: 0
HEMOPTYSIS: 0
BRUISES/BLEEDS EASILY: 0
STRIDOR: 0
WHEEZING: 0
SHORTNESS OF BREATH: 0
SPUTUM PRODUCTION: 0
RESPIRATORY NEGATIVE: 1
COUGH: 0
WEAKNESS: 0
DIZZINESS: 0
EYES NEGATIVE: 1
PALPITATIONS: 0
CARDIOVASCULAR NEGATIVE: 1
NEUROLOGICAL NEGATIVE: 1

## 2022-12-30 ASSESSMENT — FIBROSIS 4 INDEX: FIB4 SCORE: 1.36

## 2022-12-30 NOTE — TELEPHONE ENCOUNTER
Ac Jackson,      Patient was informed you will be reaching out to schedule: CL-IMPLANTABLE LOOP RECORDER [[id number]]. Ordered per , Thank you.

## 2022-12-30 NOTE — PROGRESS NOTES
Chief Complaint   Patient presents with    Coronary Artery Disease     F/V dX: Coronary artery disease involving native coronary artery of native heart without angina pectoris        Subjective:   [place] is a 71 y.o. male who presents today as a follow-up for his hypertension hyperlipidemia and carotid stenosis.  Since he was last seen he continues on the medication with no changes.  Is having no chest pain palpitations or PND.  Last LDL was at goal.  Last time he had his carotids imaged was in 2013.    Since he was last seen he was admitted to the hospital for symptoms of his CVA.  He had an MRI showing multiple CVAs in the past.  No etiology was found.  He did wear a 14-day event recorder that showed short nonsustained runs of SVT.  He was started on Plavix.  He is otherwise been on other medications as well.  Is having no chest pain or shortness of breath.  He is leaving next week to go on vacation.    Past Medical History:   Diagnosis Date    Acute stroke due to ischemia (HCC) 05/2022    MRI with acute infarct of left posterior insular cortex.    Apnea, sleep     Uses CPAP    CAD (coronary artery disease) 05/2010    Acute apical infarct. PCI/MANJEET (Taxus 3.5 x 32mm) to the LAD. 2015: [facility] with patent stent.    Chickenpox     [place] measles     Heart attack (HCC)     Hyperlipidemia     Hypertension     Mumps     Sleep apnea     Snoring     Wears glasses      Past Surgical History:   Procedure Laterality Date    [facility] CARDIAC CATH  2/21/15    Cath at [facility].  Patent stent and no obstructive CAD.    [facility] CARDIAC CATH  5/4/10    Taxus stent to LAD    CYST EXCISION      posterior neck     Family History   Problem Relation Age of Onset    Heart Attack Mother     Cancer Mother     Cancer Father     Cancer Sister      Social History     Socioeconomic History    Marital status:      Spouse name: Not on file    Number of children: Not on file    Years of education: Not on file    Highest education level:  Not on file   Occupational History    Not on file   Tobacco Use    Smoking status: Former     Packs/day: 0.50     Years: 42.00     Pack years: 21.00     Types: Cigarettes     Quit date: 2010     Years since quittin.6    Smokeless tobacco: Never   Vaping Use    Vaping Use: Former   Substance and Sexual Activity    Alcohol use: Yes     Alcohol/week: 8.4 oz     Types: 14 Standard drinks or equivalent per week     Comment: 2 drinks per day     Drug use: Yes     Types: Marijuana, Inhaled     Comment: Every other day marijuana    Sexual activity: Not on file   Other Topics Concern    Not on file   Social History Narrative    Not on file     Social Determinants of Health     Financial Resource Strain: Not on file   Food Insecurity: Not on file   Transportation Needs: Not on file   Physical Activity: Not on file   Stress: Not on file   Social Connections: Not on file   Intimate Partner Violence: Not on file   Housing Stability: Not on file     Not on File  Outpatient Encounter Medications as of 2022   Medication Sig Dispense Refill    atorvastatin (LIPITOR) 40 MG Tab Take 1 Tablet by mouth 1/2 hour before dinner. 90 Tablet 3    losartan (COZAAR) 25 MG Tab Take 1 Tablet by mouth every day. 90 Tablet 3    metoprolol tartrate (LOPRESSOR) 25 MG Tab Take 1 Tablet by mouth 2 times a day. TAKE 1 TAB BY MOUTH 2 TIMES A DAY. 180 Tablet 3    clopidogrel (PLAVIX) 75 MG Tab TAKE 1 TABLET BY MOUTH EVERY DAY FOR 90 DAYS      doxycycline (VIBRAMYCIN) 100 MG Cap Take 100 mg by mouth 2 times a day.      traZODone (DESYREL) 50 MG Tab Take 1-2 Tablets by mouth at bedtime as needed for Sleep. 180 Tablet 3    pimecrolimus (ELIDEL) 1 % cream Apply 1 Application topically 2 times a day.      latanoprost (XALATAN) 0.005 % Solution Administer 1 Drop into both eyes at bedtime.      [DISCONTINUED] metoprolol tartrate (LOPRESSOR) 25 MG Tab Take 1 Tablet by mouth 2 times a day. TAKE 1 TAB BY MOUTH 2 TIMES A DAY. 180 Tablet 3     "[DISCONTINUED] losartan (COZAAR) 25 MG Tab Take 1 Tablet by mouth every day. 90 Tablet 3    [DISCONTINUED] atorvastatin (LIPITOR) 40 MG Tab Take 1 Tablet by mouth 1/2 hour before dinner. 90 Tablet 3     No facility-administered encounter medications on file as of 12/30/2022.     Review of Systems   Constitutional: Negative.  Negative for chills, fever and malaise/fatigue.   HENT: Negative.  Negative for sore throat.    Eyes: Negative.    Respiratory: Negative.  Negative for cough, hemoptysis, sputum production, shortness of breath, wheezing and stridor.    Cardiovascular: Negative.  Negative for chest pain, palpitations, orthopnea, claudication, leg swelling and PND.   Gastrointestinal: Negative.    Genitourinary: Negative.    Musculoskeletal: Negative.    Skin: Negative.    Neurological: Negative.  Negative for dizziness, loss of consciousness and weakness.   Endo/Heme/Allergies: Negative.  Does not bruise/bleed easily.   All other systems reviewed and are negative.     Objective:   BP (P) 122/60 (BP Location: Left arm, Patient Position: Sitting, BP Cuff Size: Adult)   Pulse (!) 55   Resp 16   Ht (P) 1.854 m (6' 1\")   Wt 110 kg (241 lb 12.8 oz)   SpO2 94%   BMI (P) 31.90 kg/m²     Physical Exam  Vitals and nursing note reviewed.   Constitutional:       General: He is not in acute distress.     Appearance: He is well-developed. He is not diaphoretic.   HENT:      Head: Normocephalic and atraumatic.      Right Ear: External ear normal.      Left Ear: External ear normal.      Nose: Nose normal.      Mouth/Throat:      Pharynx: No oropharyngeal exudate.   Eyes:      General: No scleral icterus.        Right eye: No discharge.         Left eye: No discharge.      Conjunctiva/sclera: Conjunctivae normal.      Pupils: Pupils are equal, round, and reactive to light.   Neck:      Vascular: No JVD.   Cardiovascular:      Rate and Rhythm: Normal rate and regular rhythm.      Heart sounds: No murmur heard.    No " friction rub. No gallop.   Pulmonary:      Effort: Pulmonary effort is normal. No respiratory distress.      Breath sounds: No stridor. No wheezing or rales.   Chest:      Chest wall: No tenderness.   Abdominal:      General: There is no distension.      Palpations: Abdomen is soft.      Tenderness: There is no guarding.   Musculoskeletal:         General: No tenderness or deformity. Normal range of motion.      Cervical back: Neck supple.   Skin:     General: Skin is warm and dry.      Coloration: Skin is not pale.      Findings: No erythema or rash.   Neurological:      Mental Status: He is alert.      Cranial Nerves: No cranial nerve deficit.      Motor: No abnormal muscle tone.      Coordination: Coordination normal.      Deep Tendon Reflexes: Reflexes are normal and symmetric. Reflexes normal.   Psychiatric:         Behavior: Behavior normal.         Thought Content: Thought content normal.         Judgment: Judgment normal.       Assessment:     1. FRANK on CPAP  losartan (COZAAR) 25 MG Tab      2. Elevated hemoglobin A1c        3. Acute ischemic stroke (HCC)        4. PVD (peripheral vascular disease) (HCC)  atorvastatin (LIPITOR) 40 MG Tab    losartan (COZAAR) 25 MG Tab      5. Bilateral carotid artery stenosis  CL-IMPLANTABLE LOOP RECORDER      6. Pure hypercholesterolemia  losartan (COZAAR) 25 MG Tab    CL-IMPLANTABLE LOOP RECORDER      7. Primary hypertension  CL-IMPLANTABLE LOOP RECORDER      8. Stented coronary artery  atorvastatin (LIPITOR) 40 MG Tab    CL-IMPLANTABLE LOOP RECORDER      9. Coronary artery disease involving native coronary artery of native heart without angina pectoris        10. High risk medication use  losartan (COZAAR) 25 MG Tab      11. Essential hypertension  atorvastatin (LIPITOR) 40 MG Tab    losartan (COZAAR) 25 MG Tab      12. Familial hypercholesterolemia  atorvastatin (LIPITOR) 40 MG Tab      13. Hyperlipidemia, unspecified hyperlipidemia type  metoprolol tartrate (LOPRESSOR)  25 MG Tab      14. Benign essential HTN  metoprolol tartrate (LOPRESSOR) 25 MG Tab          Medical Decision Making:  Today's Assessment / Status / Plan:   71-year-old male with hypertension hyperlipidemia CAD and peripheral vascular disease.  I refilled all his medications.  Of asked him to call us if he has any issues getting his medications refilled early.  Otherwise I discussed with him his stroke his results of his bio tell as well as the risk benefits and alternatives of placing a loop recorder.  He agrees to proceed.  We will see him back in 6 months.

## 2022-12-30 NOTE — TELEPHONE ENCOUNTER
LM for pt to c/b to schedule Loop recorder implant before he leaves for [place] for 1 month. Per Dr. Anne can be done when he gets back.

## 2023-02-06 ENCOUNTER — APPOINTMENT (RX ONLY)
Dept: URBAN - METROPOLITAN AREA CLINIC 4 | Facility: CLINIC | Age: 73
Setting detail: DERMATOLOGY
End: 2023-02-06

## 2023-02-06 DIAGNOSIS — Z71.89 OTHER SPECIFIED COUNSELING: ICD-10-CM

## 2023-02-06 DIAGNOSIS — L30.9 DERMATITIS, UNSPECIFIED: ICD-10-CM

## 2023-02-06 PROCEDURE — 99214 OFFICE O/P EST MOD 30 MIN: CPT

## 2023-02-06 PROCEDURE — ? ADDITIONAL NOTES

## 2023-02-06 PROCEDURE — ? SEPARATE AND IDENTIFIABLE DOCUMENTATION

## 2023-02-06 PROCEDURE — ? COUNSELING

## 2023-02-06 PROCEDURE — ? PRESCRIPTION

## 2023-02-06 PROCEDURE — ? TREATMENT REGIMEN

## 2023-02-06 PROCEDURE — ? MEDICATION COUNSELING

## 2023-02-06 RX ORDER — MINOCYCLINE HYDROCHLORIDE 100 MG/1
1 CAPSULE ORAL QD
Qty: 60 | Refills: 5 | Status: ERX | COMMUNITY
Start: 2023-02-06

## 2023-02-06 RX ADMIN — MINOCYCLINE HYDROCHLORIDE 1: 100 CAPSULE ORAL at 00:00

## 2023-02-06 ASSESSMENT — LOCATION DETAILED DESCRIPTION DERM: LOCATION DETAILED: RIGHT MID-UPPER BACK

## 2023-02-06 ASSESSMENT — LOCATION SIMPLE DESCRIPTION DERM: LOCATION SIMPLE: RIGHT UPPER BACK

## 2023-02-06 ASSESSMENT — LOCATION ZONE DERM: LOCATION ZONE: TRUNK

## 2023-02-06 NOTE — PROCEDURE: TREATMENT REGIMEN
Continue Regimen: Antihistamine twice a day\\nClobetasol when having a flared area BID then take a break when they’re is no flares.\\nOTC creams qd
Initiate Treatment: Minocycline 100 mg twice daily
Detail Level: Zone
Plan: Patient presents with no flares today. Patient mentions he is doing better since finishing prednisone pack from pcp. Discussed with patient that it’s possible to take prednisone when needed for flares. Discussed that we can’t use IM tac or prednisone as precaution medications only as needed for chronic flares. Also discussed the possibility of starting a different class of antibiotics if minocycline doesn’t help or getting a reaction from the sun.

## 2023-02-06 NOTE — PROCEDURE: MEDICATION COUNSELING
Otezla Counseling: The side effects of Otezla were discussed with the patient, including but not limited to worsening or new depression, weight loss, diarrhea, nausea, upper respiratory tract infection, and headache. Patient instructed to call the office should any adverse effect occur.  The patient verbalized understanding of the proper use and possible adverse effects of Otezla.  All the patient's questions and concerns were addressed.
Skyrizi Pregnancy And Lactation Text: The risk during pregnancy and breastfeeding is uncertain with this medication.
Zyclara Pregnancy And Lactation Text: This medication is Pregnancy Category C. It is unknown if this medication is excreted in breast milk.
Clindamycin Counseling: I counseled the patient regarding use of clindamycin as an antibiotic for prophylactic and/or therapeutic purposes. Clindamycin is active against numerous classes of bacteria, including skin bacteria. Side effects may include nausea, diarrhea, gastrointestinal upset, rash, hives, yeast infections, and in rare cases, colitis.
Erivedge Counseling- I discussed with the patient the risks of Erivedge including but not limited to nausea, vomiting, diarrhea, constipation, weight loss, changes in the sense of taste, decreased appetite, muscle spasms, and hair loss.  The patient verbalized understanding of the proper use and possible adverse effects of Erivedge.  All of the patient's questions and concerns were addressed.
Rhofade Counseling: Rhofade is a topical medication which can decrease superficial blood flow where applied. Side effects are uncommon and include stinging, redness and allergic reactions.
Fluconazole Counseling:  Patient counseled regarding adverse effects of fluconazole including but not limited to headache, diarrhea, nausea, upset stomach, liver function test abnormalities, taste disturbance, and stomach pain.  There is a rare possibility of liver failure that can occur when taking fluconazole.  The patient understands that monitoring of LFTs and kidney function test may be required, especially at baseline. The patient verbalized understanding of the proper use and possible adverse effects of fluconazole.  All of the patient's questions and concerns were addressed.
Acitretin Pregnancy And Lactation Text: This medication is Pregnancy Category X and should not be given to women who are pregnant or may become pregnant in the future. This medication is excreted in breast milk.
Cantharidin Pregnancy And Lactation Text: The use of this medication during pregnancy or lactation is not recommended as there is insufficient data.
Wartpeel Counseling:  I discussed with the patient the risks of Wartpeel including but not limited to erythema, scaling, itching, weeping, crusting, and pain.
Olumiant Counseling: I discussed with the patient the risks of Olumiant therapy including but not limited to upper respiratory tract infections, shingles, cold sores, and nausea. Live vaccines should be avoided.  This medication has been linked to serious infections; higher rate of mortality; malignancy and lymphoproliferative disorders; major adverse cardiovascular events; thrombosis; gastrointestinal perforations; neutropenia; lymphopenia; anemia; liver enzyme elevations; and lipid elevations.
Include Pregnancy/Lactation Warning?: No
Finasteride Pregnancy And Lactation Text: This medication is absolutely contraindicated during pregnancy. It is unknown if it is excreted in breast milk.
Infliximab Counseling:  I discussed with the patient the risks of infliximab including but not limited to myelosuppression, immunosuppression, autoimmune hepatitis, demyelinating diseases, lymphoma, and serious infections.  The patient understands that monitoring is required including a PPD at baseline and must alert us or the primary physician if symptoms of infection or other concerning signs are noted.
Low Dose Naltrexone Pregnancy And Lactation Text: Naltrexone is pregnancy category C.  There have been no adequate and well-controlled studies in pregnant women.  It should be used in pregnancy only if the potential benefit justifies the potential risk to the fetus.   Limited data indicates that naltrexone is minimally excreted into breastmilk.
Tazorac Pregnancy And Lactation Text: This medication is not safe during pregnancy. It is unknown if this medication is excreted in breast milk.
Minocycline Pregnancy And Lactation Text: This medication is Pregnancy Category D and not consider safe during pregnancy. It is also excreted in breast milk.
Cyclophosphamide Counseling:  I discussed with the patient the risks of cyclophosphamide including but not limited to hair loss, hormonal abnormalities, decreased fertility, abdominal pain, diarrhea, nausea and vomiting, bone marrow suppression and infection. The patient understands that monitoring is required while taking this medication.
Albendazole Counseling:  I discussed with the patient the risks of albendazole including but not limited to cytopenia, kidney damage, nausea/vomiting and severe allergy.  The patient understands that this medication is being used in an off-label manner.
Cosentyx Pregnancy And Lactation Text: This medication is Pregnancy Category B and is considered safe during pregnancy. It is unknown if this medication is excreted in breast milk.
Terbinafine Pregnancy And Lactation Text: This medication is Pregnancy Category B and is considered safe during pregnancy. It is also excreted in breast milk and breast feeding isn't recommended.
Hydroquinone Counseling:  Patient advised that medication may result in skin irritation, lightening (hypopigmentation), dryness, and burning.  In the event of skin irritation, the patient was advised to reduce the amount of the drug applied or use it less frequently.  Rarely, spots that are treated with hydroquinone can become darker (pseudoochronosis).  Should this occur, patient instructed to stop medication and call the office. The patient verbalized understanding of the proper use and possible adverse effects of hydroquinone.  All of the patient's questions and concerns were addressed.
Dapsone Counseling: I discussed with the patient the risks of dapsone including but not limited to hemolytic anemia, agranulocytosis, rashes, methemoglobinemia, kidney failure, peripheral neuropathy, headaches, GI upset, and liver toxicity.  Patients who start dapsone require monitoring including baseline LFTs and weekly CBCs for the first month, then every month thereafter.  The patient verbalized understanding of the proper use and possible adverse effects of dapsone.  All of the patient's questions and concerns were addressed.
Mirvaso Counseling: Mirvaso is a topical medication which can decrease superficial blood flow where applied. Side effects are uncommon and include stinging, redness and allergic reactions.
Tranexamic Acid Counseling:  Patient advised of the small risk of bleeding problems with tranexamic acid. They were also instructed to call if they developed any nausea, vomiting or diarrhea. All of the patient's questions and concerns were addressed.
Stelara Counseling:  I discussed with the patient the risks of ustekinumab including but not limited to immunosuppression, malignancy, posterior leukoencephalopathy syndrome, and serious infections.  The patient understands that monitoring is required including a PPD at baseline and must alert us or the primary physician if symptoms of infection or other concerning signs are noted.
Cephalexin Pregnancy And Lactation Text: This medication is Pregnancy Category B and considered safe during pregnancy.  It is also excreted in breast milk but can be used safely for shorter doses.
Otezla Pregnancy And Lactation Text: This medication is Pregnancy Category C and it isn't known if it is safe during pregnancy. It is unknown if it is excreted in breast milk.
Aklief Pregnancy And Lactation Text: It is unknown if this medication is safe to use during pregnancy.  It is unknown if this medication is excreted in breast milk.  Breastfeeding women should use the topical cream on the smallest area of the skin for the shortest time needed while breastfeeding.  Do not apply to nipple and areola.
Acitretin Counseling:  I discussed with the patient the risks of acitretin including but not limited to hair loss, dry lips/skin/eyes, liver damage, hyperlipidemia, depression/suicidal ideation, photosensitivity.  Serious rare side effects can include but are not limited to pancreatitis, pseudotumor cerebri, bony changes, clot formation/stroke/heart attack.  Patient understands that alcohol is contraindicated since it can result in liver toxicity and significantly prolong the elimination of the drug by many years.
Rhofade Pregnancy And Lactation Text: This medication has not been assigned a Pregnancy Risk Category. It is unknown if the medication is excreted in breast milk.
Cantharidin Counseling: Calcipotriene Counseling:  I discussed with the patient the risks of calcipotriene including but not limited to erythema, scaling, itching, and irritation.
Erivedge Pregnancy And Lactation Text: This medication is Pregnancy Category X and is absolutely contraindicated during pregnancy. It is unknown if it is excreted in breast milk.
Detail Level: Simple
Birth Control Pills Counseling: Birth Control Pill Counseling: I discussed with the patient the potential side effects of OCPs including but not limited to increased risk of stroke, heart attack, thrombophlebitis, deep venous thrombosis, hepatic adenomas, breast changes, GI upset, headaches, and depression.  The patient verbalized understanding of the proper use and possible adverse effects of OCPs. All of the patient's questions and concerns were addressed.
Cibinqo Pregnancy And Lactation Text: It is unknown if this medication will adversely affect pregnancy or breast feeding.  You should not take this medication if you are currently pregnant or planning a pregnancy or while breastfeeding.
Niacinamide Counseling: I recommended taking niacin or niacinamide, also know as vitamin B3, twice daily. Recent evidence suggests that taking vitamin B3 (500 mg twice daily) can reduce the risk of actinic keratoses and non-melanoma skin cancers. Side effects of vitamin B3 include flushing and headache.
Topical Clindamycin Counseling: Patient counseled that this medication may cause skin irritation or allergic reactions.  In the event of skin irritation, the patient was advised to reduce the amount of the drug applied or use it less frequently.   The patient verbalized understanding of the proper use and possible adverse effects of clindamycin.  All of the patient's questions and concerns were addressed.
Minocycline Counseling: Patient advised regarding possible photosensitivity and discoloration of the teeth, skin, lips, tongue and gums.  Patient instructed to avoid sunlight, if possible.  When exposed to sunlight, patients should wear protective clothing, sunglasses, and sunscreen.  The patient was instructed to call the office immediately if the following severe adverse effects occur:  hearing changes, easy bruising/bleeding, severe headache, or vision changes.  The patient verbalized understanding of the proper use and possible adverse effects of minocycline.  All of the patient's questions and concerns were addressed.
Wartpeel Pregnancy And Lactation Text: This medication is Pregnancy Category X and contraindicated in pregnancy and in women who may become pregnant. It is unknown if this medication is excreted in breast milk.
Albendazole Pregnancy And Lactation Text: This medication is Pregnancy Category C and it isn't known if it is safe during pregnancy. It is also excreted in breast milk.
Opioid Counseling: I discussed with the patient the potential side effects of opioids including but not limited to addiction, altered mental status, and depression. I stressed avoiding alcohol, benzodiazepines, muscle relaxants and sleep aids unless specifically okayed by a physician. The patient verbalized understanding of the proper use and possible adverse effects of opioids. All of the patient's questions and concerns were addressed. They were instructed to flush the remaining pills down the toilet if they did not need them for pain.
Dupixent Counseling: I discussed with the patient the risks of dupilumab including but not limited to eye infection and irritation, cold sores, injection site reactions, worsening of asthma, allergic reactions and increased risk of parasitic infection.  Live vaccines should be avoided while taking dupilumab. Dupilumab will also interact with certain medications such as warfarin and cyclosporine. The patient understands that monitoring is required and they must alert us or the primary physician if symptoms of infection or other concerning signs are noted.
Terbinafine Counseling: Patient counseling regarding adverse effects of terbinafine including but not limited to headache, diarrhea, rash, upset stomach, liver function test abnormalities, itching, taste/smell disturbance, nausea, abdominal pain, and flatulence.  There is a rare possibility of liver failure that can occur when taking terbinafine.  The patient understands that a baseline LFT and kidney function test may be required. The patient verbalized understanding of the proper use and possible adverse effects of terbinafine.  All of the patient's questions and concerns were addressed.
Cyclophosphamide Pregnancy And Lactation Text: This medication is Pregnancy Category D and it isn't considered safe during pregnancy. This medication is excreted in breast milk.
Eucrisa Pregnancy And Lactation Text: This medication has not been assigned a Pregnancy Risk Category but animal studies failed to show danger with the topical medication. It is unknown if the medication is excreted in breast milk.
Tranexamic Acid Pregnancy And Lactation Text: It is unknown if this medication is safe during pregnancy or breast feeding.
Dapsone Pregnancy And Lactation Text: This medication is Pregnancy Category C and is not considered safe during pregnancy or breast feeding.
Aklief counseling:  Patient advised to apply a pea-sized amount only at bedtime and wait 30 minutes after washing their face before applying.  If too drying, patient may add a non-comedogenic moisturizer.  The most commonly reported side effects including irritation, redness, scaling, dryness, stinging, burning, itching, and increased risk of sunburn.  The patient verbalized understanding of the proper use and possible adverse effects of retinoids.  All of the patient's questions and concerns were addressed.
Cephalexin Counseling: I counseled the patient regarding use of cephalexin as an antibiotic for prophylactic and/or therapeutic purposes. Cephalexin (commonly prescribed under brand name Keflex) is a cephalosporin antibiotic which is active against numerous classes of bacteria, including most skin bacteria. Side effects may include nausea, diarrhea, gastrointestinal upset, rash, hives, yeast infections, and in rare cases, hepatitis, kidney disease, seizures, fever, confusion, neurologic symptoms, and others. Patients with severe allergies to penicillin medications are cautioned that there is about a 10% incidence of cross-reactivity with cephalosporins. When possible, patients with penicillin allergies should use alternatives to cephalosporins for antibiotic therapy.
Calcipotriene Pregnancy And Lactation Text: This medication has not been proven safe during pregnancy. It is unknown if this medication is excreted in breast milk.
Libtayo Counseling- I discussed with the patient the risks of Libtayo including but not limited to nausea, vomiting, diarrhea, and bone or muscle pain.  The patient verbalized understanding of the proper use and possible adverse effects of Libtayo.  All of the patient's questions and concerns were addressed.
Oxybutynin Counseling:  I discussed with the patient the risks of oxybutynin including but not limited to skin rash, drowsiness, dry mouth, difficulty urinating, and blurred vision.
Birth Control Pills Pregnancy And Lactation Text: This medication should be avoided if pregnant and for the first 30 days post-partum.
Solaraze Counseling:  I discussed with the patient the risks of Solaraze including but not limited to erythema, scaling, itching, weeping, crusting, and pain.
Cibinqo Counseling: I discussed with the patient the risks of Cibinqo therapy including but not limited to common cold, nausea, headache, cold sores, increased blood CPK levels, dizziness, UTIs, fatigue, acne, and vomitting. Live vaccines should be avoided.  This medication has been linked to serious infections; higher rate of mortality; malignancy and lymphoproliferative disorders; major adverse cardiovascular events; thrombosis; thrombocytopenia and lymphopenia; lipid elevations; and retinal detachment.
Rituxan Counseling:  I discussed with the patient the risks of Rituxan infusions. Side effects can include infusion reactions, severe drug rashes including mucocutaneous reactions, reactivation of latent hepatitis and other infections and rarely progressive multifocal leukoencephalopathy.  All of the patient's questions and concerns were addressed.
Metronidazole Pregnancy And Lactation Text: This medication is Pregnancy Category B and considered safe during pregnancy.  It is also excreted in breast milk.
Ivermectin Counseling:  Patient instructed to take medication on an empty stomach with a full glass of water.  Patient informed of potential adverse effects including but not limited to nausea, diarrhea, dizziness, itching, and swelling of the extremities or lymph nodes.  The patient verbalized understanding of the proper use and possible adverse effects of ivermectin.  All of the patient's questions and concerns were addressed.
Niacinamide Pregnancy And Lactation Text: These medications are considered safe during pregnancy.
Winlevi Counseling:  I discussed with the patient the risks of topical clascoterone including but not limited to erythema, scaling, itching, and stinging. Patient voiced their understanding.
Dupixent Pregnancy And Lactation Text: This medication likely crosses the placenta but the risk for the fetus is uncertain. This medication is excreted in breast milk.
Ketoconazole Pregnancy And Lactation Text: This medication is Pregnancy Category C and it isn't know if it is safe during pregnancy. It is also excreted in breast milk and breast feeding isn't recommended.
Opioid Pregnancy And Lactation Text: These medications can lead to premature delivery and should be avoided during pregnancy. These medications are also present in breast milk in small amounts.
Cyclosporine Counseling:  I discussed with the patient the risks of cyclosporine including but not limited to hypertension, gingival hyperplasia,myelosuppression, immunosuppression, liver damage, kidney damage, neurotoxicity, lymphoma, and serious infections. The patient understands that monitoring is required including baseline blood pressure, CBC, CMP, lipid panel and uric acid, and then 1-2 times monthly CMP and blood pressure.
Tetracycline Counseling: Patient counseled regarding possible photosensitivity and increased risk for sunburn.  Patient instructed to avoid sunlight, if possible.  When exposed to sunlight, patients should wear protective clothing, sunglasses, and sunscreen.  The patient was instructed to call the office immediately if the following severe adverse effects occur:  hearing changes, easy bruising/bleeding, severe headache, or vision changes.  The patient verbalized understanding of the proper use and possible adverse effects of tetracycline.  All of the patient's questions and concerns were addressed. Patient understands to avoid pregnancy while on therapy due to potential birth defects.
Eucrisa Counseling: Patient may experience a mild burning sensation during topical application. Eucrisa is not approved in children less than 2 years of age.
Xelallisonz Pregnancy And Lactation Text: This medication is Pregnancy Category D and is not considered safe during pregnancy.  The risk during breast feeding is also uncertain.
Topical Clindamycin Pregnancy And Lactation Text: This medication is Pregnancy Category B and is considered safe during pregnancy. It is unknown if it is excreted in breast milk.
Protopic Pregnancy And Lactation Text: This medication is Pregnancy Category C. It is unknown if this medication is excreted in breast milk when applied topically.
Gabapentin Counseling: I discussed with the patient the risks of gabapentin including but not limited to dizziness, somnolence, fatigue and ataxia.
Valtrex Counseling: I discussed with the patient the risks of valacyclovir including but not limited to kidney damage, nausea, vomiting and severe allergy.  The patient understands that if the infection seems to be worsening or is not improving, they are to call.
Doxepin Counseling:  Patient advised that the medication is sedating and not to drive a car after taking this medication. Patient informed of potential adverse effects including but not limited to dry mouth, urinary retention, and blurry vision.  The patient verbalized understanding of the proper use and possible adverse effects of doxepin.  All of the patient's questions and concerns were addressed.
Taltz Counseling: I discussed with the patient the risks of ixekizumab including but not limited to immunosuppression, serious infections, worsening of inflammatory bowel disease and drug reactions.  The patient understands that monitoring is required including a PPD at baseline and must alert us or the primary physician if symptoms of infection or other concerning signs are noted.
Bactrim Pregnancy And Lactation Text: This medication is Pregnancy Category D and is known to cause fetal risk.  It is also excreted in breast milk.
Libtayo Pregnancy And Lactation Text: This medication is contraindicated in pregnancy and when breast feeding.
Spironolactone Counseling: Patient advised regarding risks of diarrhea, abdominal pain, hyperkalemia, birth defects (for female patients), liver toxicity and renal toxicity. The patient may need blood work to monitor liver and kidney function and potassium levels while on therapy. The patient verbalized understanding of the proper use and possible adverse effects of spironolactone.  All of the patient's questions and concerns were addressed.
Rituxan Pregnancy And Lactation Text: This medication is Pregnancy Category C and it isn't know if it is safe during pregnancy. It is unknown if this medication is excreted in breast milk but similar antibodies are known to be excreted.
Solaraze Pregnancy And Lactation Text: This medication is Pregnancy Category B and is considered safe. There is some data to suggest avoiding during the third trimester. It is unknown if this medication is excreted in breast milk.
Metronidazole Counseling:  I discussed with the patient the risks of metronidazole including but not limited to seizures, nausea/vomiting, a metallic taste in the mouth, nausea/vomiting and severe allergy.
Winlevi Pregnancy And Lactation Text: This medication is considered safe during pregnancy and breastfeeding.
Nsaids Counseling: NSAID Counseling: I discussed with the patient that NSAIDs should be taken with food. Prolonged use of NSAIDs can result in the development of stomach ulcers.  Patient advised to stop taking NSAIDs if abdominal pain occurs.  The patient verbalized understanding of the proper use and possible adverse effects of NSAIDs.  All of the patient's questions and concerns were addressed.
High Dose Vitamin A Pregnancy And Lactation Text: High dose vitamin A therapy is contraindicated during pregnancy and breast feeding.
Ketoconazole Counseling:   Patient counseled regarding improving absorption with orange juice.  Adverse effects include but are not limited to breast enlargement, headache, diarrhea, nausea, upset stomach, liver function test abnormalities, taste disturbance, and stomach pain.  There is a rare possibility of liver failure that can occur when taking ketoconazole. The patient understands that monitoring of LFTs may be required, especially at baseline. The patient verbalized understanding of the proper use and possible adverse effects of ketoconazole.  All of the patient's questions and concerns were addressed.
Xeljanz Counseling: I discussed with the patient the risks of Xeljanz therapy including increased risk of infection, liver issues, headache, diarrhea, or cold symptoms. Live vaccines should be avoided. They were instructed to call if they have any problems.
Topical Ketoconazole Counseling: Patient counseled that this medication may cause skin irritation or allergic reactions.  In the event of skin irritation, the patient was advised to reduce the amount of the drug applied or use it less frequently.   The patient verbalized understanding of the proper use and possible adverse effects of ketoconazole.  All of the patient's questions and concerns were addressed.
Cyclosporine Pregnancy And Lactation Text: This medication is Pregnancy Category C and it isn't know if it is safe during pregnancy. This medication is excreted in breast milk.
Enbrel Counseling:  I discussed with the patient the risks of etanercept including but not limited to myelosuppression, immunosuppression, autoimmune hepatitis, demyelinating diseases, lymphoma, and infections.  The patient understands that monitoring is required including a PPD at baseline and must alert us or the primary physician if symptoms of infection or other concerning signs are noted.
Gabapentin Pregnancy And Lactation Text: This medication is Pregnancy Category C and isn't considered safe during pregnancy. It is excreted in breast milk.
Valtrex Pregnancy And Lactation Text: this medication is Pregnancy Category B and is considered safe during pregnancy. This medication is not directly found in breast milk but it's metabolite acyclovir is present.
Protopic Counseling: Patient may experience a mild burning sensation during topical application. Protopic is not approved in children less than 2 years of age. There have been case reports of hematologic and skin malignancies in patients using topical calcineurin inhibitors although causality is questionable.
Doxepin Pregnancy And Lactation Text: This medication is Pregnancy Category C and it isn't known if it is safe during pregnancy. It is also excreted in breast milk and breast feeding isn't recommended.
Minoxidil Counseling: Minoxidil is a topical medication which can increase blood flow where it is applied. It is uncertain how this medication increases hair growth. Side effects are uncommon and include stinging and allergic reactions.
Bactrim Counseling:  I discussed with the patient the risks of sulfa antibiotics including but not limited to GI upset, allergic reaction, drug rash, diarrhea, dizziness, photosensitivity, and yeast infections.  Rarely, more serious reactions can occur including but not limited to aplastic anemia, agranulocytosis, methemoglobinemia, blood dyscrasias, liver or kidney failure, lung infiltrates or desquamative/blistering drug rashes.
Odomzo Counseling- I discussed with the patient the risks of Odomzo including but not limited to nausea, vomiting, diarrhea, constipation, weight loss, changes in the sense of taste, decreased appetite, muscle spasms, and hair loss.  The patient verbalized understanding of the proper use and possible adverse effects of Odomzo.  All of the patient's questions and concerns were addressed.
Propranolol Counseling:  I discussed with the patient the risks of propranolol including but not limited to low heart rate, low blood pressure, low blood sugar, restlessness and increased cold sensitivity. They should call the office if they experience any of these side effects.
Arava Counseling:  Patient counseled regarding adverse effects of Arava including but not limited to nausea, vomiting, abnormalities in liver function tests. Patients may develop mouth sores, rash, diarrhea, and abnormalities in blood counts. The patient understands that monitoring is required including LFTs and blood counts.  There is a rare possibility of scarring of the liver and lung problems that can occur when taking methotrexate. Persistent nausea, loss of appetite, pale stools, dark urine, cough, and shortness of breath should be reported immediately. Patient advised to discontinue Arava treatment and consult with a physician prior to attempting conception. The patient will have to undergo a treatment to eliminate Arava from the body prior to conception.
Topical Retinoid counseling:  Patient advised to apply a pea-sized amount only at bedtime and wait 30 minutes after washing their face before applying.  If too drying, patient may add a non-comedogenic moisturizer. The patient verbalized understanding of the proper use and possible adverse effects of retinoids.  All of the patient's questions and concerns were addressed.
Spironolactone Pregnancy And Lactation Text: This medication can cause feminization of the male fetus and should be avoided during pregnancy. The active metabolite is also found in breast milk.
Siliq Counseling:  I discussed with the patient the risks of Siliq including but not limited to new or worsening depression, suicidal thoughts and behavior, immunosuppression, malignancy, posterior leukoencephalopathy syndrome, and serious infections.  The patient understands that monitoring is required including a PPD at baseline and must alert us or the primary physician if symptoms of infection or other concerning signs are noted. There is also a special program designed to monitor depression which is required with Siliq.
Erythromycin Pregnancy And Lactation Text: This medication is Pregnancy Category B and is considered safe during pregnancy. It is also excreted in breast milk.
VTAMA Counseling: I discussed with the patient that VTAMA is not for use in the eyes, mouth or mouth. They should call the office if they develop any signs of allergic reactions to VTAMA. The patient verbalized understanding of the proper use and possible adverse effects of VTAMA.  All of the patient's questions and concerns were addressed.
High Dose Vitamin A Counseling: Side effects reviewed, pt to contact office should one occur.
Itraconazole Pregnancy And Lactation Text: This medication is Pregnancy Category C and it isn't know if it is safe during pregnancy. It is also excreted in breast milk.
Nsaids Pregnancy And Lactation Text: These medications are considered safe up to 30 weeks gestation. It is excreted in breast milk.
Elidel Counseling: Patient may experience a mild burning sensation during topical application. Elidel is not approved in children less than 2 years of age. There have been case reports of hematologic and skin malignancies in patients using topical calcineurin inhibitors although causality is questionable.
Methotrexate Counseling:  Patient counseled regarding adverse effects of methotrexate including but not limited to nausea, vomiting, abnormalities in liver function tests. Patients may develop mouth sores, rash, diarrhea, and abnormalities in blood counts. The patient understands that monitoring is required including LFT's and blood counts.  There is a rare possibility of scarring of the liver and lung problems that can occur when taking methotrexate. Persistent nausea, loss of appetite, pale stools, dark urine, cough, and shortness of breath should be reported immediately. Patient advised to discontinue methotrexate treatment at least three months before attempting to become pregnant.  I discussed the need for folate supplements while taking methotrexate.  These supplements can decrease side effects during methotrexate treatment. The patient verbalized understanding of the proper use and possible adverse effects of methotrexate.  All of the patient's questions and concerns were addressed.
Sarecycline Counseling: Patient advised regarding possible photosensitivity and discoloration of the teeth, skin, lips, tongue and gums.  Patient instructed to avoid sunlight, if possible.  When exposed to sunlight, patients should wear protective clothing, sunglasses, and sunscreen.  The patient was instructed to call the office immediately if the following severe adverse effects occur:  hearing changes, easy bruising/bleeding, severe headache, or vision changes.  The patient verbalized understanding of the proper use and possible adverse effects of sarecycline.  All of the patient's questions and concerns were addressed.
Sotyktu Pregnancy And Lactation Text: There is insufficient data to evaluate whether or not Sotyktu is safe to use during pregnancy.   It is not known if Sotyktu passes into breast milk and whether or not it is safe to use when breastfeeding.  
Glycopyrrolate Counseling:  I discussed with the patient the risks of glycopyrrolate including but not limited to skin rash, drowsiness, dry mouth, difficulty urinating, and blurred vision.
Hydroxyzine Counseling: Patient advised that the medication is sedating and not to drive a car after taking this medication.  Patient informed of potential adverse effects including but not limited to dry mouth, urinary retention, and blurry vision.  The patient verbalized understanding of the proper use and possible adverse effects of hydroxyzine.  All of the patient's questions and concerns were addressed.
Adbry Counseling: I discussed with the patient the risks of tralokinumab including but not limited to eye infection and irritation, cold sores, injection site reactions, worsening of asthma, allergic reactions and increased risk of parasitic infection.  Live vaccines should be avoided while taking tralokinumab. The patient understands that monitoring is required and they must alert us or the primary physician if symptoms of infection or other concerning signs are noted.
Klisyri Pregnancy And Lactation Text: It is unknown if this medication can harm a developing fetus or if it is excreted in breast milk.
Tremfya Counseling: I discussed with the patient the risks of guselkumab including but not limited to immunosuppression, serious infections, worsening of inflammatory bowel disease and drug reactions.  The patient understands that monitoring is required including a PPD at baseline and must alert us or the primary physician if symptoms of infection or other concerning signs are noted.
Azithromycin Pregnancy And Lactation Text: This medication is considered safe during pregnancy and is also secreted in breast milk.
Propranolol Pregnancy And Lactation Text: This medication is Pregnancy Category C and it isn't known if it is safe during pregnancy. It is excreted in breast milk.
Erythromycin Counseling:  I discussed with the patient the risks of erythromycin including but not limited to GI upset, allergic reaction, drug rash, diarrhea, increase in liver enzymes, and yeast infections.
Vtama Pregnancy And Lactation Text: It is unknown if this medication can cause problems during pregnancy and breastfeeding.
Carac Counseling:  I discussed with the patient the risks of Carac including but not limited to erythema, scaling, itching, weeping, crusting, and pain.
Isotretinoin Pregnancy And Lactation Text: This medication is Pregnancy Category X and is considered extremely dangerous during pregnancy. It is unknown if it is excreted in breast milk.
Itraconazole Counseling:  I discussed with the patient the risks of itraconazole including but not limited to liver damage, nausea/vomiting, neuropathy, and severe allergy.  The patient understands that this medication is best absorbed when taken with acidic beverages such as non-diet cola or ginger ale.  The patient understands that monitoring is required including baseline LFTs and repeat LFTs at intervals.  The patient understands that they are to contact us or the primary physician if concerning signs are noted.
Methotrexate Pregnancy And Lactation Text: This medication is Pregnancy Category X and is known to cause fetal harm. This medication is excreted in breast milk.
Olanzapine Counseling- I discussed with the patient the common side effects of olanzapine including but are not limited to: lack of energy, dry mouth, increased appetite, sleepiness, tremor, constipation, dizziness, changes in behavior, or restlessness.  Explained that teenagers are more likely to experience headaches, abdominal pain, pain in the arms or legs, tiredness, and sleepiness.  Serious side effects include but are not limited: increased risk of death in elderly patients who are confused, have memory loss, or dementia-related psychosis; hyperglycemia; increased cholesterol and triglycerides; and weight gain.
Drysol Pregnancy And Lactation Text: This medication is considered safe during pregnancy and breast feeding.
Topical Steroids Counseling: I discussed with the patient that prolonged use of topical steroids can result in the increased appearance of superficial blood vessels (telangiectasias), lightening (hypopigmentation) and thinning of the skin (atrophy).  Patient understands to avoid using high potency steroids in skin folds, the groin or the face.  The patient verbalized understanding of the proper use and possible adverse effects of topical steroids.  All of the patient's questions and concerns were addressed.
Humira Counseling:  I discussed with the patient the risks of adalimumab including but not limited to myelosuppression, immunosuppression, autoimmune hepatitis, demyelinating diseases, lymphoma, and serious infections.  The patient understands that monitoring is required including a PPD at baseline and must alert us or the primary physician if symptoms of infection or other concerning signs are noted.
Rifampin Pregnancy And Lactation Text: This medication is Pregnancy Category C and it isn't know if it is safe during pregnancy. It is also excreted in breast milk and should not be used if you are breast feeding.
Sotyktu Counseling:  I discussed the most common side effects of Sotyktu including: common cold, sore throat, sinus infections, cold sores, canker sores, folliculitis, and acne.  I also discussed more serious side effects of Sotyktu including but not limited to: serious allergic reactions; increased risk for infections such as TB; cancers such as lymphomas; rhabdomyolysis and elevated CPK; and elevated triglycerides and liver enzymes. 
Glycopyrrolate Pregnancy And Lactation Text: This medication is Pregnancy Category B and is considered safe during pregnancy. It is unknown if it is excreted breast milk.
Klisyri Counseling:  I discussed with the patient the risks of Klisyri including but not limited to erythema, scaling, itching, weeping, crusting, and pain.
Picato Counseling:  I discussed with the patient the risks of Picato including but not limited to erythema, scaling, itching, weeping, crusting, and pain.
Azathioprine Counseling:  I discussed with the patient the risks of azathioprine including but not limited to myelosuppression, immunosuppression, hepatotoxicity, lymphoma, and infections.  The patient understands that monitoring is required including baseline LFTs, Creatinine, possible TPMP genotyping and weekly CBCs for the first month and then every 2 weeks thereafter.  The patient verbalized understanding of the proper use and possible adverse effects of azathioprine.  All of the patient's questions and concerns were addressed.
Adbry Pregnancy And Lactation Text: It is unknown if this medication will adversely affect pregnancy or breast feeding.
SSKI Counseling:  I discussed with the patient the risks of SSKI including but not limited to thyroid abnormalities, metallic taste, GI upset, fever, headache, acne, arthralgias, paraesthesias, lymphadenopathy, easy bleeding, arrhythmias, and allergic reaction.
Azithromycin Counseling:  I discussed with the patient the risks of azithromycin including but not limited to GI upset, allergic reaction, drug rash, diarrhea, and yeast infections.
Clofazimine Counseling:  I discussed with the patient the risks of clofazimine including but not limited to skin and eye pigmentation, liver damage, nausea/vomiting, gastrointestinal bleeding and allergy.
Simponi Counseling:  I discussed with the patient the risks of golimumab including but not limited to myelosuppression, immunosuppression, autoimmune hepatitis, demyelinating diseases, lymphoma, and serious infections.  The patient understands that monitoring is required including a PPD at baseline and must alert us or the primary physician if symptoms of infection or other concerning signs are noted.
Benzoyl Peroxide Pregnancy And Lactation Text: This medication is Pregnancy Category C. It is unknown if benzoyl peroxide is excreted in breast milk.
Doxycycline Pregnancy And Lactation Text: This medication is Pregnancy Category D and not consider safe during pregnancy. It is also excreted in breast milk but is considered safe for shorter treatment courses.
Zoryve Counseling:  I discussed with the patient that Zoryve is not for use in the eyes, mouth or vagina. The most commonly reported side effects include diarrhea, headache, insomnia, application site pain, upper respiratory tract infections, and urinary tract infections.  All of the patient's questions and concerns were addressed.
Isotretinoin Counseling: Patient should get monthly blood tests, not donate blood, not drive at night if vision affected, not share medication, and not undergo elective surgery for 6 months after tx completed. Side effects reviewed, pt to contact office should one occur.
Drysol Counseling:  I discussed with the patient the risks of drysol/aluminum chloride including but not limited to skin rash, itching, irritation, burning.
Topical Steroids Applications Pregnancy And Lactation Text: Most topical steroids are considered safe to use during pregnancy and lactation.  Any topical steroid applied to the breast or nipple should be washed off before breastfeeding.
Olanzapine Pregnancy And Lactation Text: This medication is pregnancy category C.   There are no adequate and well controlled trials with olanzapine in pregnant females.  Olanzapine should be used during pregnancy only if the potential benefit justifies the potential risk to the fetus.   In a study in lactating healthy women, olanzapine was excreted in breast milk.  It is recommended that women taking olanzapine should not breast feed.
Griseofulvin Pregnancy And Lactation Text: This medication is Pregnancy Category X and is known to cause serious birth defects. It is unknown if this medication is excreted in breast milk but breast feeding should be avoided.
Dutasteride Male Counseling: Dustasteride Counseling:  I discussed with the patient the risks of use of dutasteride including but not limited to decreased libido, decreased ejaculate volume, and gynecomastia. Women who can become pregnant should not handle medication.  All of the patient's questions and concerns were addressed.
Rinvoq Pregnancy And Lactation Text: Based on animal studies, Rinvoq may cause embryo-fetal harm when administered to pregnant women.  The medication should not be used in pregnancy.  Breastfeeding is not recommended during treatment and for 6 days after the last dose.
Prednisone Counseling:  I discussed with the patient the risks of prolonged use of prednisone including but not limited to weight gain, insomnia, osteoporosis, mood changes, diabetes, susceptibility to infection, glaucoma and high blood pressure.  In cases where prednisone use is prolonged, patients should be monitored with blood pressure checks, serum glucose levels and an eye exam.  Additionally, the patient may need to be placed on GI prophylaxis, PCP prophylaxis, and calcium and vitamin D supplementation and/or a bisphosphonate.  The patient verbalized understanding of the proper use and the possible adverse effects of prednisone.  All of the patient's questions and concerns were addressed.
Hydroxychloroquine Counseling:  I discussed with the patient that a baseline ophthalmologic exam is needed at the start of therapy and every year thereafter while on therapy. A CBC may also be warranted for monitoring.  The side effects of this medication were discussed with the patient, including but not limited to agranulocytosis, aplastic anemia, seizures, rashes, retinopathy, and liver toxicity. Patient instructed to call the office should any adverse effect occur.  The patient verbalized understanding of the proper use and possible adverse effects of Plaquenil.  All the patient's questions and concerns were addressed.
Rifampin Counseling: I discussed with the patient the risks of rifampin including but not limited to liver damage, kidney damage, red-orange body fluids, nausea/vomiting and severe allergy.
Opzelura Pregnancy And Lactation Text: There is insufficient data to evaluate drug-associated risk for major birth defects, miscarriage, or other adverse maternal or fetal outcomes.  There is a pregnancy registry that monitors pregnancy outcomes in pregnant persons exposed to the medication during pregnancy.  It is unknown if this medication is excreted in breast milk.  Do not breastfeed during treatment and for about 4 weeks after the last dose.
Azathioprine Pregnancy And Lactation Text: This medication is Pregnancy Category D and isn't considered safe during pregnancy. It is unknown if this medication is excreted in breast milk.
Xolair Counseling:  Patient informed of potential adverse effects including but not limited to fever, muscle aches, rash and allergic reactions.  The patient verbalized understanding of the proper use and possible adverse effects of Xolair.  All of the patient's questions and concerns were addressed.
Sski Pregnancy And Lactation Text: This medication is Pregnancy Category D and isn't considered safe during pregnancy. It is excreted in breast milk.
Cimzia Counseling:  I discussed with the patient the risks of Cimzia including but not limited to immunosuppression, allergic reactions and infections.  The patient understands that monitoring is required including a PPD at baseline and must alert us or the primary physician if symptoms of infection or other concerning signs are noted.
Benzoyl Peroxide Counseling: Patient counseled that medicine may cause skin irritation and bleach clothing.  In the event of skin irritation, the patient was advised to reduce the amount of the drug applied or use it less frequently.   The patient verbalized understanding of the proper use and possible adverse effects of benzoyl peroxide.  All of the patient's questions and concerns were addressed.
Qbrexza Counseling:  I discussed with the patient the risks of Qbrexza including but not limited to headache, mydriasis, blurred vision, dry eyes, nasal dryness, dry mouth, dry throat, dry skin, urinary hesitation, and constipation.  Local skin reactions including erythema, burning, stinging, and itching can also occur.
Doxycycline Counseling:  Patient counseled regarding possible photosensitivity and increased risk for sunburn.  Patient instructed to avoid sunlight, if possible.  When exposed to sunlight, patients should wear protective clothing, sunglasses, and sunscreen.  The patient was instructed to call the office immediately if the following severe adverse effects occur:  hearing changes, easy bruising/bleeding, severe headache, or vision changes.  The patient verbalized understanding of the proper use and possible adverse effects of doxycycline.  All of the patient's questions and concerns were addressed.
Oral Minoxidil Counseling- I discussed with the patient the risks of oral minoxidil including but not limited to shortness of breath, swelling of the feet or ankles, dizziness, lightheadedness, unwanted hair growth and allergic reaction.  The patient verbalized understanding of the proper use and possible adverse effects of oral minoxidil.  All of the patient's questions and concerns were addressed.
Bexarotene Pregnancy And Lactation Text: This medication is Pregnancy Category X and should not be given to women who are pregnant or may become pregnant. This medication should not be used if you are breast feeding.
Griseofulvin Counseling:  I discussed with the patient the risks of griseofulvin including but not limited to photosensitivity, cytopenia, liver damage, nausea/vomiting and severe allergy.  The patient understands that this medication is best absorbed when taken with a fatty meal (e.g., ice cream or french fries).
Ilumya Counseling: I discussed with the patient the risks of tildrakizumab including but not limited to immunosuppression, malignancy, posterior leukoencephalopathy syndrome, and serious infections.  The patient understands that monitoring is required including a PPD at baseline and must alert us or the primary physician if symptoms of infection or other concerning signs are noted.
Rinvoq Counseling: I discussed with the patient the risks of Rinvoq therapy including but not limited to upper respiratory tract infections, shingles, cold sores, bronchitis, nausea, cough, fever, acne, and headache. Live vaccines should be avoided.  This medication has been linked to serious infections; higher rate of mortality; malignancy and lymphoproliferative disorders; major adverse cardiovascular events; thrombosis; thrombocytopenia, anemia, and neutropenia; lipid elevations; liver enzyme elevations; and gastrointestinal perforations.
Topical Sulfur Applications Counseling: Topical Sulfur Counseling: Patient counseled that this medication may cause skin irritation or allergic reactions.  In the event of skin irritation, the patient was advised to reduce the amount of the drug applied or use it less frequently.   The patient verbalized understanding of the proper use and possible adverse effects of topical sulfur application.  All of the patient's questions and concerns were addressed.
Dutasteride Pregnancy And Lactation Text: This medication is absolutely contraindicated in women, especially during pregnancy and breast feeding. Feminization of male fetuses is possible if taking while pregnant.
Cellcept Counseling:  I discussed with the patient the risks of mycophenolate mofetil including but not limited to infection/immunosuppression, GI upset, hypokalemia, hypercholesterolemia, bone marrow suppression, lymphoproliferative disorders, malignancy, GI ulceration/bleed/perforation, colitis, interstitial lung disease, kidney failure, progressive multifocal leukoencephalopathy, and birth defects.  The patient understands that monitoring is required including a baseline creatinine and regular CBC testing. In addition, patient must alert us immediately if symptoms of infection or other concerning signs are noted.
Imiquimod Counseling:  I discussed with the patient the risks of imiquimod including but not limited to erythema, scaling, itching, weeping, crusting, and pain.  Patient understands that the inflammatory response to imiquimod is variable from person to person and was educated regarded proper titration schedule.  If flu-like symptoms develop, patient knows to discontinue the medication and contact us.
Opzelura Counseling:  I discussed with the patient the risks of Opzelura including but not limited to nasopharngitis, bronchitis, ear infection, eosinophila, hives, diarrhea, folliculitis, tonsillitis, and rhinorrhea.  Taken orally, this medication has been linked to serious infections; higher rate of mortality; malignancy and lymphoproliferative disorders; major adverse cardiovascular events; thrombosis; thrombocytopenia, anemia, and neutropenia; and lipid elevations.
Hydroxychloroquine Pregnancy And Lactation Text: This medication has been shown to cause fetal harm but it isn't assigned a Pregnancy Risk Category. There are small amounts excreted in breast milk.
Cimzia Pregnancy And Lactation Text: This medication crosses the placenta but can be considered safe in certain situations. Cimzia may be excreted in breast milk.
Xolair Pregnancy And Lactation Text: This medication is Pregnancy Category B and is considered safe during pregnancy. This medication is excreted in breast milk.
Colchicine Counseling:  Patient counseled regarding adverse effects including but not limited to stomach upset (nausea, vomiting, stomach pain, or diarrhea).  Patient instructed to limit alcohol consumption while taking this medication.  Colchicine may reduce blood counts especially with prolonged use.  The patient understands that monitoring of kidney function and blood counts may be required, especially at baseline. The patient verbalized understanding of the proper use and possible adverse effects of colchicine.  All of the patient's questions and concerns were addressed.
Hydroxyzine Pregnancy And Lactation Text: This medication is not safe during pregnancy and should not be taken. It is also excreted in breast milk and breast feeding isn't recommended.
Cimetidine Counseling:  I discussed with the patient the risks of Cimetidine including but not limited to gynecomastia, headache, diarrhea, nausea, drowsiness, arrhythmias, pancreatitis, skin rashes, psychosis, bone marrow suppression and kidney toxicity.
Thalidomide Counseling: I discussed with the patient the risks of thalidomide including but not limited to birth defects, anxiety, weakness, chest pain, dizziness, cough and severe allergy.
Skyrizi Counseling: I discussed with the patient the risks of risankizumab-rzaa including but not limited to immunosuppression, and serious infections.  The patient understands that monitoring is required including a PPD at baseline and must alert us or the primary physician if symptoms of infection or other concerning signs are noted.
Oral Minoxidil Pregnancy And Lactation Text: This medication should only be used when clearly needed if you are pregnant, attempting to become pregnant or breast feeding.
Clindamycin Pregnancy And Lactation Text: This medication can be used in pregnancy if certain situations. Clindamycin is also present in breast milk.
Zyclara Counseling:  I discussed with the patient the risks of imiquimod including but not limited to erythema, scaling, itching, weeping, crusting, and pain.  Patient understands that the inflammatory response to imiquimod is variable from person to person and was educated regarded proper titration schedule.  If flu-like symptoms develop, patient knows to discontinue the medication and contact us.
Qbrexza Pregnancy And Lactation Text: There is no available data on Qbrexza use in pregnant women.  There is no available data on Qbrexza use in lactation.
Bexarotene Counseling:  I discussed with the patient the risks of bexarotene including but not limited to hair loss, dry lips/skin/eyes, liver abnormalities, hyperlipidemia, pancreatitis, depression/suicidal ideation, photosensitivity, drug rash/allergic reactions, hypothyroidism, anemia, leukopenia, infection, cataracts, and teratogenicity.  Patient understands that they will need regular blood tests to check lipid profile, liver function tests, white blood cell count, thyroid function tests and pregnancy test if applicable.
Quinolones Counseling:  I discussed with the patient the risks of fluoroquinolones including but not limited to GI upset, allergic reaction, drug rash, diarrhea, dizziness, photosensitivity, yeast infections, liver function test abnormalities, tendonitis/tendon rupture.
Olumiant Pregnancy And Lactation Text: Based on animal studies, Olumiant may cause embryo-fetal harm when administered to pregnant women.  The medication should not be used in pregnancy.  Breastfeeding is not recommended during treatment.
Topical Sulfur Applications Pregnancy And Lactation Text: This medication is Pregnancy Category C and has an unknown safety profile during pregnancy. It is unknown if this topical medication is excreted in breast milk.
5-Fu Counseling: 5-Fluorouracil Counseling:  I discussed with the patient the risks of 5-fluorouracil including but not limited to erythema, scaling, itching, weeping, crusting, and pain.
Finasteride Male Counseling: Finasteride Counseling:  I discussed with the patient the risks of use of finasteride including but not limited to decreased libido, decreased ejaculate volume, gynecomastia, and depression. Women should not handle medication.  All of the patient's questions and concerns were addressed.
Tazorac Counseling:  Patient advised that medication is irritating and drying.  Patient may need to apply sparingly and wash off after an hour before eventually leaving it on overnight.  The patient verbalized understanding of the proper use and possible adverse effects of tazorac.  All of the patient's questions and concerns were addressed.
Low Dose Naltrexone Counseling- I discussed with the patient the potential risks and side effects of low dose naltrexone including but not limited to: more vivid dreams, headaches, nausea, vomiting, abdominal pain, fatigue, dizziness, and anxiety.
Cosentyx Counseling:  I discussed with the patient the risks of Cosentyx including but not limited to worsening of Crohn's disease, immunosuppression, allergic reactions and infections.  The patient understands that monitoring is required including a PPD at baseline and must alert us or the primary physician if symptoms of infection or other concerning signs are noted.
Azelaic Acid Counseling: Patient counseled that medicine may cause skin irritation and to avoid applying near the eyes.  In the event of skin irritation, the patient was advised to reduce the amount of the drug applied or use it less frequently.   The patient verbalized understanding of the proper use and possible adverse effects of azelaic acid.  All of the patient's questions and concerns were addressed.

## 2023-02-07 ENCOUNTER — PATIENT MESSAGE (OUTPATIENT)
Dept: CARDIOLOGY | Facility: MEDICAL CENTER | Age: 73
End: 2023-02-07
Payer: MEDICARE

## 2023-02-07 NOTE — TELEPHONE ENCOUNTER
LM for patient to see if he was back from [place] and wanted to get the loop recorder implant scheduled.

## 2023-02-08 NOTE — TELEPHONE ENCOUNTER
Patient is scheduled on 2-14-23 for a Loop recorder implant with . Patient to check in at 3:00 for a 4:00 procedure. Ollie with [employer] notified to be at procedure.

## 2023-02-09 ENCOUNTER — APPOINTMENT (OUTPATIENT)
Dept: RADIOLOGY | Facility: MEDICAL CENTER | Age: 73
End: 2023-02-09
Attending: NURSE PRACTITIONER
Payer: MEDICARE

## 2023-02-10 ENCOUNTER — PRE-ADMISSION TESTING (OUTPATIENT)
Dept: ADMISSIONS | Facility: MEDICAL CENTER | Age: 73
End: 2023-02-10
Attending: INTERNAL MEDICINE
Payer: MEDICARE

## 2023-02-10 ASSESSMENT — FIBROSIS 4 INDEX: FIB4 SCORE: 1.36

## 2023-02-14 ENCOUNTER — HOSPITAL ENCOUNTER (OUTPATIENT)
Facility: MEDICAL CENTER | Age: 73
End: 2023-02-14
Attending: INTERNAL MEDICINE | Admitting: INTERNAL MEDICINE
Payer: MEDICARE

## 2023-02-14 ENCOUNTER — APPOINTMENT (OUTPATIENT)
Dept: CARDIOLOGY | Facility: MEDICAL CENTER | Age: 73
End: 2023-02-14
Attending: INTERNAL MEDICINE
Payer: MEDICARE

## 2023-02-14 VITALS
HEIGHT: 72 IN | TEMPERATURE: 98.4 F | SYSTOLIC BLOOD PRESSURE: 138 MMHG | WEIGHT: 241.62 LBS | RESPIRATION RATE: 18 BRPM | DIASTOLIC BLOOD PRESSURE: 67 MMHG | HEART RATE: 56 BPM | BODY MASS INDEX: 32.73 KG/M2 | OXYGEN SATURATION: 95 %

## 2023-02-14 DIAGNOSIS — E78.00 PURE HYPERCHOLESTEROLEMIA: ICD-10-CM

## 2023-02-14 DIAGNOSIS — I65.23 BILATERAL CAROTID ARTERY STENOSIS: ICD-10-CM

## 2023-02-14 DIAGNOSIS — Z95.5 STENTED CORONARY ARTERY: ICD-10-CM

## 2023-02-14 DIAGNOSIS — I10 PRIMARY HYPERTENSION: ICD-10-CM

## 2023-02-14 PROCEDURE — 160002 HCHG RECOVERY MINUTES (STAT)

## 2023-02-14 PROCEDURE — 33285 INSJ SUBQ CAR RHYTHM MNTR: CPT | Performed by: INTERNAL MEDICINE

## 2023-02-14 PROCEDURE — 33285 INSJ SUBQ CAR RHYTHM MNTR: CPT

## 2023-02-14 PROCEDURE — 160046 HCHG PACU - 1ST 60 MINS PHASE II

## 2023-02-14 RX ORDER — LIDOCAINE HYDROCHLORIDE AND EPINEPHRINE 10; 10 MG/ML; UG/ML
INJECTION, SOLUTION INFILTRATION; PERINEURAL
Status: DISCONTINUED
Start: 2023-02-14 | End: 2023-02-14 | Stop reason: HOSPADM

## 2023-02-14 ASSESSMENT — FIBROSIS 4 INDEX: FIB4 SCORE: 1.36

## 2023-02-15 NOTE — OP REPORT
PROCEDURE PERFORMED: Implantable Loop Recorder    DATE OF SERVICE: 2/14/2023    : Damian Yanes MD    ASSISTANT: None    ANESTHESIA: Local    EBL: <5 cc    SPECIMENS: None    STATEMENT OF MEDICAL NECESSITY:  CVA, cryptogenic    DESCRIPTION OF PROCEDURE:  After informed written consent, the patient was brought to the cath lab pre/post procedure area. The patient was prepped and draped in the usual sterile fashion. The procedure was performed with local anesthetic. Using the supplied incision tool, a <1 cm incision was made in the skin about 2 cm leftward and lateral to the sternal border. Using the supplied insertion tool, a tunnel was made in the subcutaneous tissue at a 45 degree angle to the sternum and the device was inserted with the supplied plunger. Manual compression was used until hemostasis achieved. A single 4-0 vicryl suture was used to appose the skin. Dermabond was applied to the incision site. Sterile dressing was applied.    IMPLANTED DEVICE INFORMATION:  Model: [employer] [id number]  Serial number:  [id number]    IMPRESSIONS:  1. Successful implantable loop recorder implantation    RECOMMENDATIONS:  1. Routine follow-up and device interrogation

## 2023-02-15 NOTE — OR NURSING
Pt's VSS; denies N/V; denies pain. Dressing CDI to mid chest. D/c orders received. Pt changed into clothing with assistance. Pt up and ambulated to BR, steady gait, voided adequately. Discharge instructions given; pt and family verbalized understanding and questions answered. Patient states ready to d/c home. No prescriptions given. Pt dc'd in w/c with RN in stable condition.

## 2023-02-15 NOTE — DISCHARGE INSTRUCTIONS
Keep dressing clean and dry for 7 days    Implantable Loop Recorder Placement, Care After  This sheet gives you information about how to care for yourself after your procedure. Your health care provider may also give you more specific instructions. If you have problems or questions, contact your health care provider.  What can I expect after the procedure?  After the procedure, it is common to have:  Soreness or discomfort near the incision.  Some swelling or bruising near the incision.  Follow these instructions at home:  Incision care  Follow instructions from your health care provider about how to take care of your incision. Make sure you:  Wash your hands with soap and water before you change your bandage (dressing). If soap and water are not available, use hand .  Change your dressing as told by your health care provider.  Keep your dressing dry.  Leave stitches (sutures), skin glue, or adhesive strips in place. These skin closures may need to stay in place for 2 weeks or longer. If adhesive strip edges start to loosen and curl up, you may trim the loose edges. Do not remove adhesive strips completely unless your health care provider tells you to do that.  Check your incision area every day for signs of infection. Check for:  Redness, swelling, or pain.  Fluid or blood.  Warmth.  Pus or a bad smell.  Do not take baths, swim, or use a hot tub until your health care provider approves. Ask your health care provider if you can take showers.  Activity  Return to your normal activities as told by your health care provider. Ask your health care provider what activities are safe for you.  Do not drive for 24 hours if you were given a sedative during your procedure.  General instructions  Follow instructions from your health care provider about how to manage your implantable loop recorder and transmit the information. Learn how to activate a recording if this is necessary for your type of device.  Do not go  through a metal detection gate, and do not let someone hold a metal detector over your chest. Show your ID card.  Do not have an MRI unless you check with your health care provider first.  Take over-the-counter and prescription medicines only as told by your health care provider.  Keep all follow-up visits as told by your health care provider. This is important.  Contact a health care provider if:  You have redness, swelling, or pain around your incision.  You have a fever.  You have pain that is not relieved by your pain medicine.  You have triggered your device because of fainting (syncope) or because of a heartbeat that feels like it is racing, slow, fluttering, or skipping (palpitations).  Get help right away if you have:  Chest pain.  Difficulty breathing.  Summary  After the procedure, it is common to have soreness or discomfort near the incision.  Change your dressing as told by your health care provider.  Follow instructions from your health care provider about how to manage your implantable loop recorder and transmit the information.  Keep all follow-up visits as told by your health care provider. This is important.  This information is not intended to replace advice given to you by your health care provider. Make sure you discuss any questions you have with your health care provider.

## 2023-02-21 ENCOUNTER — NON-PROVIDER VISIT (OUTPATIENT)
Dept: CARDIOLOGY | Facility: MEDICAL CENTER | Age: 73
End: 2023-02-21
Payer: MEDICARE

## 2023-02-21 ENCOUNTER — NON-PROVIDER VISIT (OUTPATIENT)
Dept: CARDIOLOGY | Facility: MEDICAL CENTER | Age: 73
End: 2023-02-21

## 2023-02-21 DIAGNOSIS — Z95.818 STATUS POST PLACEMENT OF IMPLANTABLE LOOP RECORDER: ICD-10-CM

## 2023-02-21 DIAGNOSIS — I63.9 CRYPTOGENIC STROKE (HCC): ICD-10-CM

## 2023-02-21 PROCEDURE — 93291 INTERROG DEV EVAL SCRMS IP: CPT | Performed by: INTERNAL MEDICINE

## 2023-02-24 NOTE — CARDIAC REMOTE MONITOR - SCAN
Device transmission reviewed. Device demonstrated appropriate function.       Electronically Signed by: Obdulio Joseph M.D.    3/6/2023  9:51 AM

## 2023-03-18 ENCOUNTER — NON-PROVIDER VISIT (OUTPATIENT)
Dept: CARDIOLOGY | Facility: MEDICAL CENTER | Age: 73
End: 2023-03-18
Payer: MEDICARE

## 2023-03-18 PROCEDURE — 93298 REM INTERROG DEV EVAL SCRMS: CPT | Performed by: INTERNAL MEDICINE

## 2023-03-20 NOTE — CARDIAC REMOTE MONITOR - SCAN
Device transmission reviewed. Device demonstrated appropriate function.       Electronically Signed by: Obdulio Joseph M.D.    3/29/2023  12:39 PM

## 2023-03-23 ENCOUNTER — OFFICE VISIT (OUTPATIENT)
Dept: CARDIOLOGY | Facility: MEDICAL CENTER | Age: 73
End: 2023-03-23
Payer: MEDICARE

## 2023-03-23 ENCOUNTER — TELEPHONE (OUTPATIENT)
Dept: CARDIOLOGY | Facility: MEDICAL CENTER | Age: 73
End: 2023-03-23

## 2023-03-23 VITALS
OXYGEN SATURATION: 99 % | RESPIRATION RATE: 16 BRPM | HEART RATE: 52 BPM | HEIGHT: 73 IN | WEIGHT: 244 LBS | BODY MASS INDEX: 32.34 KG/M2 | SYSTOLIC BLOOD PRESSURE: 126 MMHG | DIASTOLIC BLOOD PRESSURE: 70 MMHG

## 2023-03-23 DIAGNOSIS — I25.10 CORONARY ARTERY DISEASE INVOLVING NATIVE CORONARY ARTERY OF NATIVE HEART WITHOUT ANGINA PECTORIS: ICD-10-CM

## 2023-03-23 DIAGNOSIS — Z01.810 PREOP CARDIOVASCULAR EXAM: ICD-10-CM

## 2023-03-23 PROCEDURE — 99214 OFFICE O/P EST MOD 30 MIN: CPT | Performed by: INTERNAL MEDICINE

## 2023-03-23 ASSESSMENT — ENCOUNTER SYMPTOMS
PALPITATIONS: 0
SYNCOPE: 0
PND: 0
ORTHOPNEA: 0
NEAR-SYNCOPE: 0
DYSPNEA ON EXERTION: 0
HEMATOCHEZIA: 0
RESPIRATORY NEGATIVE: 1

## 2023-03-23 ASSESSMENT — FIBROSIS 4 INDEX: FIB4 SCORE: 1.36

## 2023-03-23 NOTE — LETTER
PROCEDURE/SURGERY CLEARANCE FORM      Encounter Date: 3/23/2023    Patient: Jan Rivas  YOB: 1950    CARDIOLOGIST:  Dr. Laura Lake   REFERRING DOCTOR:  Dr. Kel Su  The above patient is cleared to have the following procedure/surgery: Oral surgery/tooth extraction.                                           Additional comments: Patient appears to be low cardiac ischemic risk and can proceed with oral surgery as planned.  In terms of stoppage of clopidogrel, I recommend he stop it 10 days prior to planned surgery unless his oral surgeon tells him something different resume as soon as possible.            Electronically signed     MD Signature   Dr. Laura Lake

## 2023-03-23 NOTE — PROGRESS NOTES
Cardiology Follow Up Note    Date of note: 3/23/2023    Primary Care Provider: TOBY Hemphill    Patient Name: Jan Rivas  YOB: 1950  MRN:              [id number]    Reason for Followup: Routine follow-up, former patient of Dr. Anne, preoperative assessment prior to oral surgery    History of Present Illness: Mr. Jan Rivas is a 72 y.o. male whose current medical problems include coronary artery disease, status post stent to the left anterior descending artery in 2010, mild carotid artery stenosis, hypertension, hyperlipidemia obstructive sleep apnea, use CPAP, history of loop recorder implant I assume for occult A-fib after his stroke, who is here for cardiac for follow up.    Last loop recorder check under scan is 3-18/2023 which showed no episodes with normal device function.    He is having dental issues.  Will need his teeth pulled and have implants placed. He wanted to talk to cardiology if it is okay.     Plays golf, but no lately.  He does walk.  He will go to the gym once in a while and some weight lifting.  Went to work out yesterday, no problems.  He denies any chest pain, no shortness of breath, no orthopnea, no PND, no lightheadedness, dizziness, palpitations, or syncope.  Of note, he was previously just on aspirin for his prior history of stents, but when he had the stroke, aspirin was changed over to clopidogrel.    Cardiovascular Risk Factors:  1. Smoking status:   Tobacco Use: Medium Risk    Smoking Tobacco Use: Former    Smokeless Tobacco Use: Never    Passive Exposure: Never     2. Type II Diabetes Mellitus: Not on medication  Lab Results   Component Value Date/Time    HBA1C 5.6 05/10/2022 04:04 AM    HBA1C 6.2 (H) 11/02/2021 05:45 AM    HBA1C 5.8 (H) 02/09/2021 05:50 AM    HBA1C 6.0 (H) 10/22/2013 07:30 PM     3. Hypertension: Yes on losartan 25 mg p.o. daily, metoprolol tartrate 25 mg p.o. twice daily  4. Dyslipidemia: Yes on atorvastatin 40 mg p.o.  "daily  Cholesterol,Tot   Date Value Ref Range Status   05/10/2022 113 100 - 199 mg/dL Final     LDL   Date Value Ref Range Status   05/10/2022 40 <100 mg/dL Final     HDL   Date Value Ref Range Status   05/10/2022 52 >=40 mg/dL Final     Triglycerides   Date Value Ref Range Status   05/10/2022 103 0 - 149 mg/dL Final     No problems updated.     Past Surgical History:   Procedure Laterality Date    [facility] CARDIAC CATH  02/21/2015    Cath at [facility].  Patent stent and no obstructive CAD.    [facility] CARDIAC CATH  05/04/2010    [place] stent to LAD    OTHER CARDIAC SURGERY  05/04/2010    Heart Stent    CYST EXCISION      posterior neck        Current Outpatient Medications   Medication Sig Dispense Refill    atorvastatin (LIPITOR) 40 MG Tab Take 1 Tablet by mouth 1/2 hour before dinner. 90 Tablet 3    losartan (COZAAR) 25 MG Tab Take 1 Tablet by mouth every day. 90 Tablet 3    metoprolol tartrate (LOPRESSOR) 25 MG Tab Take 1 Tablet by mouth 2 times a day. TAKE 1 TAB BY MOUTH 2 TIMES A DAY. 180 Tablet 3    clopidogrel (PLAVIX) 75 MG Tab TAKE 1 TABLET BY MOUTH EVERY DAY FOR 90 DAYS      traZODone (DESYREL) 50 MG Tab Take 1-2 Tablets by mouth at bedtime as needed for Sleep. 180 Tablet 3    pimecrolimus (ELIDEL) 1 % cream Apply 1 Application topically 2 times a day.      latanoprost (XALATAN) 0.005 % Solution Administer 1 Drop into both eyes at bedtime.       No current facility-administered medications for this visit.     No Known Allergies    Review of Systems   Cardiovascular:  Negative for chest pain, dyspnea on exertion, leg swelling, near-syncope, orthopnea, palpitations, paroxysmal nocturnal dyspnea and syncope.   Respiratory: Negative.     Gastrointestinal:  Negative for hematochezia and melena.     Physical Exam:  Ambulatory Vitals  /70 (BP Location: Left arm, Patient Position: Sitting, BP Cuff Size: Adult)   Pulse (!) 52   Resp 16   Ht 1.854 m (6' 1\")   Wt 111 kg (244 lb)   SpO2 99%    Oxygen Therapy:  Pulse " Oximetry: 99 %  BP Readings from Last 4 Encounters:   03/23/23 126/70   02/14/23 138/67   12/30/22 122/60   10/13/22 122/74     Weight/BMI:   Body mass index is 32.19 kg/m².  Wt Readings from Last 2 Encounters:   03/23/23 111 kg (244 lb)   02/14/23 110 kg (241 lb 10 oz)     Physical Exam  Constitutional:       Appearance: Normal appearance.   Eyes:      Extraocular Movements: Extraocular movements intact.      Conjunctiva/sclera: Conjunctivae normal.   Neck:      Vascular: No carotid bruit or JVD.   Cardiovascular:      Rate and Rhythm: Normal rate and regular rhythm.      Pulses:           Radial pulses are 1+ on the right side and 1+ on the left side.        Posterior tibial pulses are 1+ on the right side and 1+ on the left side.      Heart sounds: Normal heart sounds. No murmur heard.    No friction rub. No gallop.   Pulmonary:      Effort: Pulmonary effort is normal. No respiratory distress.      Breath sounds: Normal breath sounds. No wheezing or rales.   Musculoskeletal:      Cervical back: Neck supple.      Right lower leg: No edema.      Left lower leg: No edema.   Skin:     General: Skin is warm and dry.   Neurological:      Mental Status: He is alert and oriented to person, place, and time. Mental status is at baseline.   Psychiatric:         Mood and Affect: Mood normal.        Lab Data Review:  Lab Results   Component Value Date/Time    SODIUM 140 05/11/2022 06:00 AM    POTASSIUM 4.2 05/11/2022 06:00 AM    CHLORIDE 102 05/11/2022 06:00 AM    CO2 25 05/11/2022 06:00 AM    GLUCOSE 107 (H) 05/11/2022 06:00 AM    BUN 9 05/11/2022 06:00 AM    CREATININE 0.99 05/11/2022 06:00 AM    BUNCREATRAT 15 11/02/2021 05:45 AM     Lab Results   Component Value Date/Time    ALKPHOSPHAT 69 05/09/2022 12:24 PM    ASTSGOT 19 05/09/2022 12:24 PM    ALTSGPT 22 05/09/2022 12:24 PM    TBILIRUBIN 0.5 05/09/2022 12:24 PM      Lab Results   Component Value Date/Time    WBC 8.6 05/11/2022 06:00 AM     Lab Results   Component Value  Date/Time    AYE 2.280 10/22/2013 05:30 PM       Cardiac Imaging and Procedures Review:    EKG dated 5/9/2022: My personal interpretation is sinus rhythm, 61 bpm, left axis deviation, consider left anterior fascicular block, poor R wave progression, cannot rule out anterior infarct, age undetermined, similar in appearance to prior ECG of 10/22/13    Echo dated 5/11/2022: My personal interpretation is mildly challenging study, overall normal left ventricular systolic function.  Grossly normal right ventricular systolic function.  Normal left and right atrial sizes.  No significant valvular stenosis or regurgitation.  Aortic root size borderline at 3.9 cm but normalized for body surface area.    Carotid ultrasound dated 11/5/13: Mild plaque at the bifurcation of the right internal carotid artery.  Mild plaque of the left carotid artery at the bifurcation.    Nuclear stress test dated 10/23/13: Not able to find the study nor the final results    Assessment & Plan     1.  Preoperative cardiac assessment prior to oral surgery.  Clinically, his cardiac exam is normal.  He can do 4 METS without any symptoms.  ECG from 5/9/2 was reviewed and no significant changes appears to be similar.  Patient appears to be low cardiac ischemic risk and can proceed with planned surgery.  - Can proceed with oral surgery as planned (pulling teeth it appears)  - In terms of stoppage of clopidogrel, I recommend he stop it 10 days prior to planned surgery unless his oral surgeon tells him something different resume as soon as possible.    2.  Coronary artery disease status post stent to the LAD in the remote past 2010.  Clinically he has been stable.  Continue secondary intervention.  Aspirin was changed to clopidogrel due to his stroke and can continue the clopidogrel.  We will continue atorvastatin.    3.  History of mild carotid artery disease, I do not hear a significant bruit on today's exam.  He has no symptoms.    4.  He has  history of embolic strokes, recently had a loop recorder implanted, recent interrogation in March shows no events.  Of note, his pulse always runs around 50s, he has no symptoms, he is on metoprolol, I do not think there is any need to change dose at this point in time.    Shared Medical Decision Making:  All of patient's questions were answered to the best of my knowledge and to patient's satisfaction. It was a pleasure seeing Mr. Jan Rivas in my clinic today. Return if symptoms worsen or fail to improve. Patient is aware to call the cardiology clinic with any questions or concerns.    Laura Lake MD  [facility]  [address]  [place] [address]  Phone:  [phone]  Fax:  [phone]    Please note that this dictation was created using voice recognition software. I have made every reasonable attempt to correct obvious errors, but it is possible there are errors of grammar and possibly content that I did not discover before finalizing the note.

## 2023-03-24 NOTE — TELEPHONE ENCOUNTER
----- Message from [facility] sent at 3/23/2023  4:37 PM PDT -----  Ac daley, can you prep the letter with instructions please. If you need me to mail out I will be happy to do so thank you!  ----- Message -----  From: Laura Lake M.D.  Sent: 3/23/2023   4:35 PM PDT  To: Rashad Finley/Wing    Can you prepare preop letter for patient and have him hold clopidogrel 10 days prior.    Called pt to confirm where he is going for oral surgery. Called [facility] for fax number to send clearance letter.   Stratification/clearance letter sent to pt via [employer] and electronically faxed to Dr. Kel Su with [facility]. Fax number [phone]

## 2023-03-30 ENCOUNTER — TELEPHONE (OUTPATIENT)
Dept: HEALTH INFORMATION MANAGEMENT | Facility: OTHER | Age: 73
End: 2023-03-30
Payer: MEDICARE

## 2023-04-18 ENCOUNTER — NON-PROVIDER VISIT (OUTPATIENT)
Dept: CARDIOLOGY | Facility: MEDICAL CENTER | Age: 73
End: 2023-04-18
Payer: MEDICARE

## 2023-04-18 PROCEDURE — 93298 REM INTERROG DEV EVAL SCRMS: CPT | Performed by: INTERNAL MEDICINE

## 2023-04-20 NOTE — CARDIAC REMOTE MONITOR - SCAN
Device transmission reviewed. Device demonstrated appropriate function.       Electronically Signed by: Damian Yanes M.D.    4/20/2023  3:33 PM

## 2023-05-19 ENCOUNTER — NON-PROVIDER VISIT (OUTPATIENT)
Dept: CARDIOLOGY | Facility: MEDICAL CENTER | Age: 73
End: 2023-05-19
Payer: MEDICARE

## 2023-05-19 PROCEDURE — 93298 REM INTERROG DEV EVAL SCRMS: CPT | Performed by: INTERNAL MEDICINE

## 2023-05-22 NOTE — CARDIAC REMOTE MONITOR - SCAN
Device transmission reviewed. Device demonstrated appropriate function.       Electronically Signed by: Obdulio Joseph M.D.    5/24/2023  2:28 PM

## 2023-05-25 ENCOUNTER — APPOINTMENT (OUTPATIENT)
Dept: CARDIOLOGY | Facility: MEDICAL CENTER | Age: 73
DRG: 175 | End: 2023-05-25
Attending: INTERNAL MEDICINE
Payer: MEDICARE

## 2023-05-25 ENCOUNTER — APPOINTMENT (OUTPATIENT)
Dept: RADIOLOGY | Facility: MEDICAL CENTER | Age: 73
DRG: 175 | End: 2023-05-25
Attending: EMERGENCY MEDICINE
Payer: MEDICARE

## 2023-05-25 ENCOUNTER — HOSPITAL ENCOUNTER (INPATIENT)
Facility: MEDICAL CENTER | Age: 73
LOS: 2 days | DRG: 175 | End: 2023-05-27
Attending: EMERGENCY MEDICINE | Admitting: HOSPITALIST
Payer: MEDICARE

## 2023-05-25 ENCOUNTER — APPOINTMENT (OUTPATIENT)
Dept: RADIOLOGY | Facility: MEDICAL CENTER | Age: 73
DRG: 175 | End: 2023-05-25
Attending: HOSPITALIST
Payer: MEDICARE

## 2023-05-25 ENCOUNTER — OFFICE VISIT (OUTPATIENT)
Dept: URGENT CARE | Facility: CLINIC | Age: 73
End: 2023-05-25
Payer: MEDICARE

## 2023-05-25 VITALS
HEART RATE: 74 BPM | HEIGHT: 72 IN | DIASTOLIC BLOOD PRESSURE: 70 MMHG | TEMPERATURE: 97.9 F | OXYGEN SATURATION: 91 % | WEIGHT: 239 LBS | SYSTOLIC BLOOD PRESSURE: 116 MMHG | BODY MASS INDEX: 32.37 KG/M2 | RESPIRATION RATE: 18 BRPM

## 2023-05-25 DIAGNOSIS — R06.02 SHORTNESS OF BREATH: ICD-10-CM

## 2023-05-25 DIAGNOSIS — G47.33 OSA ON CPAP: ICD-10-CM

## 2023-05-25 DIAGNOSIS — I26.09 OTHER PULMONARY EMBOLISM WITH ACUTE COR PULMONALE, UNSPECIFIED CHRONICITY (HCC): ICD-10-CM

## 2023-05-25 DIAGNOSIS — I63.9 ACUTE ISCHEMIC STROKE (HCC): ICD-10-CM

## 2023-05-25 DIAGNOSIS — J96.01 ACUTE HYPOXEMIC RESPIRATORY FAILURE (HCC): ICD-10-CM

## 2023-05-25 DIAGNOSIS — I26.99 OTHER ACUTE PULMONARY EMBOLISM WITHOUT ACUTE COR PULMONALE (HCC): ICD-10-CM

## 2023-05-25 PROBLEM — R79.89 ELEVATED TROPONIN: Status: ACTIVE | Noted: 2023-05-25

## 2023-05-25 PROBLEM — R79.89 ELEVATED D-DIMER: Status: ACTIVE | Noted: 2023-05-25

## 2023-05-25 LAB
ALBUMIN SERPL BCP-MCNC: 4.4 G/DL (ref 3.2–4.9)
ALBUMIN/GLOB SERPL: 1.5 G/DL
ALP SERPL-CCNC: 69 U/L (ref 30–99)
ALT SERPL-CCNC: 15 U/L (ref 2–50)
ANION GAP SERPL CALC-SCNC: 13 MMOL/L (ref 7–16)
APTT PPP: 31.7 SEC (ref 24.7–36)
AST SERPL-CCNC: 18 U/L (ref 12–45)
BASOPHILS # BLD AUTO: 0.5 % (ref 0–1.8)
BASOPHILS # BLD: 0.05 K/UL (ref 0–0.12)
BILIRUB SERPL-MCNC: 0.5 MG/DL (ref 0.1–1.5)
BUN SERPL-MCNC: 15 MG/DL (ref 8–22)
CALCIUM ALBUM COR SERPL-MCNC: 9.7 MG/DL (ref 8.5–10.5)
CALCIUM SERPL-MCNC: 10 MG/DL (ref 8.4–10.2)
CHLORIDE SERPL-SCNC: 102 MMOL/L (ref 96–112)
CO2 SERPL-SCNC: 25 MMOL/L (ref 20–33)
CREAT SERPL-MCNC: 0.92 MG/DL (ref 0.5–1.4)
D DIMER PPP IA.FEU-MCNC: 7.05 UG/ML (FEU) (ref 0–0.5)
EKG IMPRESSION: NORMAL
EOSINOPHIL # BLD AUTO: 0.1 K/UL (ref 0–0.51)
EOSINOPHIL NFR BLD: 1 % (ref 0–6.9)
ERYTHROCYTE [DISTWIDTH] IN BLOOD BY AUTOMATED COUNT: 42.7 FL (ref 35.9–50)
FLUAV RNA SPEC QL NAA+PROBE: NEGATIVE
FLUBV RNA SPEC QL NAA+PROBE: NEGATIVE
GFR SERPLBLD CREATININE-BSD FMLA CKD-EPI: 89 ML/MIN/1.73 M 2
GLOBULIN SER CALC-MCNC: 3 G/DL (ref 1.9–3.5)
GLUCOSE SERPL-MCNC: 106 MG/DL (ref 65–99)
HCT VFR BLD AUTO: 42.1 % (ref 42–52)
HGB BLD-MCNC: 14.3 G/DL (ref 14–18)
IMM GRANULOCYTES # BLD AUTO: 0.02 K/UL (ref 0–0.11)
IMM GRANULOCYTES NFR BLD AUTO: 0.2 % (ref 0–0.9)
INR PPP: 1.18 (ref 0.87–1.13)
LV EJECT FRACT  99904: 65
LV EJECT FRACT MOD 2C 99903: 69.87
LV EJECT FRACT MOD 4C 99902: 54.56
LV EJECT FRACT MOD BP 99901: 63.04
LYMPHOCYTES # BLD AUTO: 1.45 K/UL (ref 1–4.8)
LYMPHOCYTES NFR BLD: 15.1 % (ref 22–41)
MCH RBC QN AUTO: 32.4 PG (ref 27–33)
MCHC RBC AUTO-ENTMCNC: 34 G/DL (ref 32.3–36.5)
MCV RBC AUTO: 95.2 FL (ref 81.4–97.8)
MONOCYTES # BLD AUTO: 1.04 K/UL (ref 0–0.85)
MONOCYTES NFR BLD AUTO: 10.8 % (ref 0–13.4)
NEUTROPHILS # BLD AUTO: 6.94 K/UL (ref 1.82–7.42)
NEUTROPHILS NFR BLD: 72.4 % (ref 44–72)
NRBC # BLD AUTO: 0 K/UL
NRBC BLD-RTO: 0 /100 WBC (ref 0–0.2)
NT-PROBNP SERPL IA-MCNC: 1723 PG/ML (ref 0–125)
PLATELET # BLD AUTO: 209 K/UL (ref 164–446)
PMV BLD AUTO: 9.9 FL (ref 9–12.9)
POTASSIUM SERPL-SCNC: 4.2 MMOL/L (ref 3.6–5.5)
PROT SERPL-MCNC: 7.4 G/DL (ref 6–8.2)
PROTHROMBIN TIME: 14.8 SEC (ref 12–14.6)
RBC # BLD AUTO: 4.42 M/UL (ref 4.7–6.1)
RSV RNA SPEC QL NAA+PROBE: NEGATIVE
SARS-COV-2 RNA RESP QL NAA+PROBE: NOTDETECTED
SODIUM SERPL-SCNC: 140 MMOL/L (ref 135–145)
SPECIMEN SOURCE: NORMAL
TROPONIN T SERPL-MCNC: 32 NG/L (ref 6–19)
TROPONIN T SERPL-MCNC: 33 NG/L (ref 6–19)
TROPONIN T SERPL-MCNC: 46 NG/L (ref 6–19)
UFH PPP CHRO-ACNC: 0.1 IU/ML
WBC # BLD AUTO: 9.6 K/UL (ref 4.8–10.8)

## 2023-05-25 PROCEDURE — 85025 COMPLETE CBC W/AUTO DIFF WBC: CPT

## 2023-05-25 PROCEDURE — 3078F DIAST BP <80 MM HG: CPT | Performed by: PHYSICIAN ASSISTANT

## 2023-05-25 PROCEDURE — 84484 ASSAY OF TROPONIN QUANT: CPT

## 2023-05-25 PROCEDURE — 36415 COLL VENOUS BLD VENIPUNCTURE: CPT

## 2023-05-25 PROCEDURE — 71275 CT ANGIOGRAPHY CHEST: CPT

## 2023-05-25 PROCEDURE — 3074F SYST BP LT 130 MM HG: CPT | Performed by: PHYSICIAN ASSISTANT

## 2023-05-25 PROCEDURE — A9270 NON-COVERED ITEM OR SERVICE: HCPCS | Performed by: EMERGENCY MEDICINE

## 2023-05-25 PROCEDURE — 85520 HEPARIN ASSAY: CPT

## 2023-05-25 PROCEDURE — 83880 ASSAY OF NATRIURETIC PEPTIDE: CPT

## 2023-05-25 PROCEDURE — 0241U HCHG SARS-COV-2 COVID-19 NFCT DS RESP RNA 4 TRGT MIC: CPT

## 2023-05-25 PROCEDURE — 99285 EMERGENCY DEPT VISIT HI MDM: CPT

## 2023-05-25 PROCEDURE — 93306 TTE W/DOPPLER COMPLETE: CPT | Mod: 26 | Performed by: INTERNAL MEDICINE

## 2023-05-25 PROCEDURE — 85379 FIBRIN DEGRADATION QUANT: CPT

## 2023-05-25 PROCEDURE — 770020 HCHG ROOM/CARE - TELE (206)

## 2023-05-25 PROCEDURE — 94760 N-INVAS EAR/PLS OXIMETRY 1: CPT

## 2023-05-25 PROCEDURE — 93306 TTE W/DOPPLER COMPLETE: CPT

## 2023-05-25 PROCEDURE — 99215 OFFICE O/P EST HI 40 MIN: CPT | Performed by: PHYSICIAN ASSISTANT

## 2023-05-25 PROCEDURE — 700117 HCHG RX CONTRAST REV CODE 255: Performed by: HOSPITALIST

## 2023-05-25 PROCEDURE — 700117 HCHG RX CONTRAST REV CODE 255: Performed by: INTERNAL MEDICINE

## 2023-05-25 PROCEDURE — 700111 HCHG RX REV CODE 636 W/ 250 OVERRIDE (IP): Performed by: EMERGENCY MEDICINE

## 2023-05-25 PROCEDURE — 700111 HCHG RX REV CODE 636 W/ 250 OVERRIDE (IP): Performed by: HOSPITALIST

## 2023-05-25 PROCEDURE — 99222 1ST HOSP IP/OBS MODERATE 55: CPT | Performed by: INTERNAL MEDICINE

## 2023-05-25 PROCEDURE — A9270 NON-COVERED ITEM OR SERVICE: HCPCS | Performed by: HOSPITALIST

## 2023-05-25 PROCEDURE — 99223 1ST HOSP IP/OBS HIGH 75: CPT | Mod: AI | Performed by: HOSPITALIST

## 2023-05-25 PROCEDURE — 71045 X-RAY EXAM CHEST 1 VIEW: CPT

## 2023-05-25 PROCEDURE — 700102 HCHG RX REV CODE 250 W/ 637 OVERRIDE(OP): Performed by: HOSPITALIST

## 2023-05-25 PROCEDURE — 96374 THER/PROPH/DIAG INJ IV PUSH: CPT

## 2023-05-25 PROCEDURE — 80053 COMPREHEN METABOLIC PANEL: CPT

## 2023-05-25 PROCEDURE — 93005 ELECTROCARDIOGRAM TRACING: CPT | Performed by: EMERGENCY MEDICINE

## 2023-05-25 PROCEDURE — 700102 HCHG RX REV CODE 250 W/ 637 OVERRIDE(OP): Performed by: EMERGENCY MEDICINE

## 2023-05-25 PROCEDURE — 85610 PROTHROMBIN TIME: CPT

## 2023-05-25 PROCEDURE — 85730 THROMBOPLASTIN TIME PARTIAL: CPT

## 2023-05-25 RX ORDER — ACETAMINOPHEN 325 MG/1
650 TABLET ORAL EVERY 6 HOURS PRN
Status: DISCONTINUED | OUTPATIENT
Start: 2023-05-25 | End: 2023-05-27 | Stop reason: HOSPADM

## 2023-05-25 RX ORDER — LATANOPROST 50 UG/ML
1 SOLUTION/ DROPS OPHTHALMIC
Status: DISCONTINUED | OUTPATIENT
Start: 2023-05-25 | End: 2023-05-27 | Stop reason: HOSPADM

## 2023-05-25 RX ORDER — ASPIRIN 81 MG/1
324 TABLET, CHEWABLE ORAL ONCE
Status: COMPLETED | OUTPATIENT
Start: 2023-05-25 | End: 2023-05-25

## 2023-05-25 RX ORDER — NITROGLYCERIN 0.4 MG/1
0.4 TABLET SUBLINGUAL
Status: DISCONTINUED | OUTPATIENT
Start: 2023-05-25 | End: 2023-05-27 | Stop reason: HOSPADM

## 2023-05-25 RX ORDER — TRAZODONE HYDROCHLORIDE 50 MG/1
100 TABLET ORAL NIGHTLY
Status: DISCONTINUED | OUTPATIENT
Start: 2023-05-25 | End: 2023-05-27 | Stop reason: HOSPADM

## 2023-05-25 RX ORDER — ENOXAPARIN SODIUM 100 MG/ML
40 INJECTION SUBCUTANEOUS DAILY
Status: DISCONTINUED | OUTPATIENT
Start: 2023-05-25 | End: 2023-05-25

## 2023-05-25 RX ORDER — LOSARTAN POTASSIUM 25 MG/1
25 TABLET ORAL DAILY
Status: DISCONTINUED | OUTPATIENT
Start: 2023-05-26 | End: 2023-05-27 | Stop reason: HOSPADM

## 2023-05-25 RX ORDER — ATORVASTATIN CALCIUM 40 MG/1
40 TABLET, FILM COATED ORAL
Status: DISCONTINUED | OUTPATIENT
Start: 2023-05-25 | End: 2023-05-27 | Stop reason: HOSPADM

## 2023-05-25 RX ORDER — HYDROMORPHONE HYDROCHLORIDE 1 MG/ML
0.25 INJECTION, SOLUTION INTRAMUSCULAR; INTRAVENOUS; SUBCUTANEOUS
Status: DISCONTINUED | OUTPATIENT
Start: 2023-05-25 | End: 2023-05-27 | Stop reason: HOSPADM

## 2023-05-25 RX ORDER — OXYCODONE HYDROCHLORIDE 5 MG/1
2.5 TABLET ORAL
Status: DISCONTINUED | OUTPATIENT
Start: 2023-05-25 | End: 2023-05-27 | Stop reason: HOSPADM

## 2023-05-25 RX ORDER — OXYCODONE HYDROCHLORIDE 5 MG/1
5 TABLET ORAL
Status: DISCONTINUED | OUTPATIENT
Start: 2023-05-25 | End: 2023-05-27 | Stop reason: HOSPADM

## 2023-05-25 RX ORDER — AMOXICILLIN 250 MG
2 CAPSULE ORAL 2 TIMES DAILY
Status: DISCONTINUED | OUTPATIENT
Start: 2023-05-25 | End: 2023-05-27 | Stop reason: HOSPADM

## 2023-05-25 RX ORDER — FUROSEMIDE 10 MG/ML
20 INJECTION INTRAMUSCULAR; INTRAVENOUS ONCE
Status: COMPLETED | OUTPATIENT
Start: 2023-05-25 | End: 2023-05-25

## 2023-05-25 RX ORDER — FUROSEMIDE 10 MG/ML
20 INJECTION INTRAMUSCULAR; INTRAVENOUS
Status: DISCONTINUED | OUTPATIENT
Start: 2023-05-26 | End: 2023-05-27 | Stop reason: HOSPADM

## 2023-05-25 RX ORDER — ONDANSETRON 4 MG/1
4 TABLET, ORALLY DISINTEGRATING ORAL EVERY 4 HOURS PRN
Status: DISCONTINUED | OUTPATIENT
Start: 2023-05-25 | End: 2023-05-27 | Stop reason: HOSPADM

## 2023-05-25 RX ORDER — POLYETHYLENE GLYCOL 3350 17 G/17G
1 POWDER, FOR SOLUTION ORAL
Status: DISCONTINUED | OUTPATIENT
Start: 2023-05-25 | End: 2023-05-27 | Stop reason: HOSPADM

## 2023-05-25 RX ORDER — ASPIRIN 81 MG/1
81 TABLET ORAL DAILY
Status: DISCONTINUED | OUTPATIENT
Start: 2023-05-26 | End: 2023-05-27 | Stop reason: HOSPADM

## 2023-05-25 RX ORDER — HEPARIN SODIUM 5000 [USP'U]/100ML
0-30 INJECTION, SOLUTION INTRAVENOUS CONTINUOUS
Status: DISPENSED | OUTPATIENT
Start: 2023-05-25 | End: 2023-05-26

## 2023-05-25 RX ORDER — BISACODYL 10 MG
10 SUPPOSITORY, RECTAL RECTAL
Status: DISCONTINUED | OUTPATIENT
Start: 2023-05-25 | End: 2023-05-27 | Stop reason: HOSPADM

## 2023-05-25 RX ORDER — TRAZODONE HYDROCHLORIDE 50 MG/1
100 TABLET ORAL NIGHTLY
COMMUNITY

## 2023-05-25 RX ORDER — HEPARIN SODIUM 1000 [USP'U]/ML
40 INJECTION, SOLUTION INTRAVENOUS; SUBCUTANEOUS PRN
Status: DISCONTINUED | OUTPATIENT
Start: 2023-05-25 | End: 2023-05-27

## 2023-05-25 RX ORDER — CLOPIDOGREL BISULFATE 75 MG/1
75 TABLET ORAL DAILY
Status: DISCONTINUED | OUTPATIENT
Start: 2023-05-26 | End: 2023-05-26

## 2023-05-25 RX ORDER — ONDANSETRON 2 MG/ML
4 INJECTION INTRAMUSCULAR; INTRAVENOUS EVERY 4 HOURS PRN
Status: DISCONTINUED | OUTPATIENT
Start: 2023-05-25 | End: 2023-05-27 | Stop reason: HOSPADM

## 2023-05-25 RX ADMIN — ATORVASTATIN CALCIUM 40 MG: 40 TABLET, FILM COATED ORAL at 18:00

## 2023-05-25 RX ADMIN — HEPARIN SODIUM 18 UNITS/KG/HR: 5000 INJECTION, SOLUTION INTRAVENOUS at 22:37

## 2023-05-25 RX ADMIN — HEPARIN SODIUM 3600 UNITS: 1000 INJECTION, SOLUTION INTRAVENOUS; SUBCUTANEOUS at 22:31

## 2023-05-25 RX ADMIN — ENOXAPARIN SODIUM 40 MG: 40 INJECTION SUBCUTANEOUS at 18:00

## 2023-05-25 RX ADMIN — LATANOPROST 1 DROP: 50 SOLUTION OPHTHALMIC at 22:44

## 2023-05-25 RX ADMIN — TRAZODONE HYDROCHLORIDE 100 MG: 50 TABLET ORAL at 22:43

## 2023-05-25 RX ADMIN — METOPROLOL TARTRATE 25 MG: 25 TABLET, FILM COATED ORAL at 18:00

## 2023-05-25 RX ADMIN — ASPIRIN 81 MG 324 MG: 81 TABLET ORAL at 10:38

## 2023-05-25 RX ADMIN — IOHEXOL 82 ML: 350 INJECTION, SOLUTION INTRAVENOUS at 22:25

## 2023-05-25 RX ADMIN — HUMAN ALBUMIN MICROSPHERES AND PERFLUTREN 3 ML: 10; .22 INJECTION, SOLUTION INTRAVENOUS at 16:54

## 2023-05-25 RX ADMIN — FUROSEMIDE 20 MG: 10 INJECTION, SOLUTION INTRAMUSCULAR; INTRAVENOUS at 13:08

## 2023-05-25 ASSESSMENT — LIFESTYLE VARIABLES
EVER FELT BAD OR GUILTY ABOUT YOUR DRINKING: NO
DO YOU DRINK ALCOHOL: YES
AVERAGE NUMBER OF DAYS PER WEEK YOU HAVE A DRINK CONTAINING ALCOHOL: 0
HAVE YOU EVER FELT YOU SHOULD CUT DOWN ON YOUR DRINKING: NO
HOW MANY TIMES IN THE PAST YEAR HAVE YOU HAD 5 OR MORE DRINKS IN A DAY: 0
TOTAL SCORE: 0
HAVE YOU EVER FELT YOU SHOULD CUT DOWN ON YOUR DRINKING: NO
ALCOHOL_USE: NO
EVER HAD A DRINK FIRST THING IN THE MORNING TO STEADY YOUR NERVES TO GET RID OF A HANGOVER: NO
TOTAL SCORE: 0
CONSUMPTION TOTAL: NEGATIVE
TOTAL SCORE: 0
ON A TYPICAL DAY WHEN YOU DRINK ALCOHOL HOW MANY DRINKS DO YOU HAVE: 0
HAVE PEOPLE ANNOYED YOU BY CRITICIZING YOUR DRINKING: NO

## 2023-05-25 ASSESSMENT — ENCOUNTER SYMPTOMS
COUGH: 0
HEMOPTYSIS: 0
FOCAL WEAKNESS: 0
FEVER: 0
WEAKNESS: 0
MYALGIAS: 0
VOMITING: 0
SHORTNESS OF BREATH: 1
SHORTNESS OF BREATH: 1
EYE REDNESS: 0
COUGH: 0
ABDOMINAL PAIN: 0
FOCAL WEAKNESS: 0
STRIDOR: 0
BRUISES/BLEEDS EASILY: 0
SPUTUM PRODUCTION: 0
NERVOUS/ANXIOUS: 0
PALPITATIONS: 0
FLANK PAIN: 0
CHILLS: 0
EYE DISCHARGE: 0
FEVER: 0
CHILLS: 0

## 2023-05-25 ASSESSMENT — COGNITIVE AND FUNCTIONAL STATUS - GENERAL
MOBILITY SCORE: 24
DAILY ACTIVITIY SCORE: 24
SUGGESTED CMS G CODE MODIFIER MOBILITY: CH
SUGGESTED CMS G CODE MODIFIER DAILY ACTIVITY: CH

## 2023-05-25 ASSESSMENT — FIBROSIS 4 INDEX
FIB4 SCORE: 1.58
FIB4 SCORE: 1.38
FIB4 SCORE: 1.38

## 2023-05-25 ASSESSMENT — PATIENT HEALTH QUESTIONNAIRE - PHQ9
1. LITTLE INTEREST OR PLEASURE IN DOING THINGS: NOT AT ALL
2. FEELING DOWN, DEPRESSED, IRRITABLE, OR HOPELESS: NOT AT ALL
SUM OF ALL RESPONSES TO PHQ9 QUESTIONS 1 AND 2: 0

## 2023-05-25 NOTE — ASSESSMENT & PLAN NOTE
- CTA with: Large right main pulmonary embolus extending into right upper, middle, and lower lobe segmental and subsegmental branches, left upper, lower, and lingular segmental and subsegmental pulmonary emboli, RV strain  - Transition from heparin drip to Eliquis tonight, pt still requiring O2, potential discharge tmrw if he remains stable  - likely provoked by long train ride in March without getting up  - Discussed Eliquis, pt amenable, will stop Plavix which pt was taking for stroke one year ago, no cardiac stents within the last year.  - Discussed with pt the need to ensure he is up to date with his cancer screenings with PCP   - Will send the hypercoag tests we can obtain while on heparin drip  - Transition to Eliquis tonight

## 2023-05-25 NOTE — ED PROVIDER NOTES
ED Provider Note    CHIEF COMPLAINT  Chief Complaint   Patient presents with    Shortness of Breath     Onset 2 days ago of SOB, unable to sleep. He reports he got up in the am, bent over to make the bed and felt suddenly very SOB. In triage O2 applied and pt states this helps immensely. Now nasal congestion.       EXTERNAL RECORDS REVIEWED  Outpatient Notes -patient seen in urgent care earlier today for shortness of breath.  He was transferred to the ER for higher level of care.    HPI/ROS  LIMITATION TO HISTORY   Select: : None  OUTSIDE HISTORIAN(S):  Significant other -wife at bedside states that the patient has been having increasing shortness of breath    Jan Rivas is a 73 y.o. male who presents with a chief complaint of shortness of breath that started 2 days ago while he was making his bed.  He notes that he bent over to fold a corner of the bed sheets and developed intense shortness of breath.  It took about 10 minutes for him to regain his his breath and since that time he has had worsening shortness of breath with exertion and when he lies flat.  He does not use supplemental oxygen at baseline.  He does not smoke cigarettes.  He has had an MI in the past in 2010 and is now s/p stenting.  He denies any chest, jaw, or arm pain that were his anginal symptoms when he had his heart attack in 2010.  That lying down flat makes his shortness of breath worse but he denies any leg swelling.  He has never had a DVT or PE, denies recent surgeries (he had a brief procedure to install a loop recorder about 1 week ago which took approximately 3 minutes and he did not require intubation or sedation), exogenous hormone use, unilateral leg swelling, hemoptysis.  He denies any cough but does have some nasal congestion.  He denies any fevers.    PAST MEDICAL HISTORY   has a past medical history of Acute stroke due to ischemia (HCC) (05/2022), Apnea, sleep, CAD (coronary artery disease) (05/2010), Chickenpox, Bulgarian  measles, H/O heart artery stent (2010), Heart attack (HCC), Hyperlipidemia, Hypertension, Mumps, Sleep apnea, Snoring, Stroke (HCC), and Wears glasses.    SURGICAL HISTORY   has a past surgical history that includes zzz cardiac cath (2015); z cardiac cath (2010); cyst excision; and other cardiac surgery (2010).    FAMILY HISTORY  Family History   Problem Relation Age of Onset    Heart Attack Mother     Cancer Mother     Cancer Father     Cancer Sister        SOCIAL HISTORY  Social History     Tobacco Use    Smoking status: Former     Packs/day: 0.50     Years: 42.00     Pack years: 21.00     Types: Cigarettes     Quit date: 2010     Years since quittin.0     Passive exposure: Never    Smokeless tobacco: Never   Vaping Use    Vaping Use: Never used   Substance and Sexual Activity    Alcohol use: Yes     Alcohol/week: 8.4 oz     Types: 14 Standard drinks or equivalent per week     Comment: 2 drinks per day     Drug use: Yes     Types: Marijuana, Inhaled     Comment: Every other day marijuana    Sexual activity: Not on file       CURRENT MEDICATIONS  Home Medications       Reviewed by Rolanda Sales R.N. (Registered Nurse) on 23 at 0909  Med List Status: Not Addressed     Medication Last Dose Status   atorvastatin (LIPITOR) 40 MG Tab  Active   clopidogrel (PLAVIX) 75 MG Tab  Active   latanoprost (XALATAN) 0.005 % Solution  Active   losartan (COZAAR) 25 MG Tab  Active   metoprolol tartrate (LOPRESSOR) 25 MG Tab  Active   pimecrolimus (ELIDEL) 1 % cream  Active   traZODone (DESYREL) 50 MG Tab  Active                    ALLERGIES  No Known Allergies    PHYSICAL EXAM  VITAL SIGNS: /87   Pulse 78   Temp 36.7 °C (98.1 °F) (Temporal)   Resp (!) 22   Ht 1.829 m (6')   Wt 107 kg (236 lb 8.9 oz)   SpO2 97%   BMI 32.08 kg/m²    Physical Exam  Vitals and nursing note reviewed.   Constitutional:       Appearance: He is well-developed.   HENT:      Head: Normocephalic and  atraumatic.      Mouth/Throat:      Mouth: Mucous membranes are moist.   Eyes:      Extraocular Movements: Extraocular movements intact.      Pupils: Pupils are equal, round, and reactive to light.   Cardiovascular:      Rate and Rhythm: Normal rate and regular rhythm.   Pulmonary:      Breath sounds: Normal breath sounds.      Comments: Slightly increased work of breathing with tachypnea.  Nasal cannula in place.  Abdominal:      Palpations: Abdomen is soft.      Tenderness: There is no abdominal tenderness.   Musculoskeletal:         General: Normal range of motion.      Cervical back: Normal range of motion and neck supple.      Right lower leg: No edema.      Left lower leg: No edema.   Skin:     General: Skin is warm and dry.   Neurological:      General: No focal deficit present.      Mental Status: He is alert and oriented to person, place, and time.   Psychiatric:         Mood and Affect: Mood normal.         Behavior: Behavior normal.       DIAGNOSTIC STUDIES / PROCEDURES  LABS  Results for orders placed or performed during the hospital encounter of 05/25/23   CBC WITH DIFFERENTIAL   Result Value Ref Range    WBC 9.6 4.8 - 10.8 K/uL    RBC 4.42 (L) 4.70 - 6.10 M/uL    Hemoglobin 14.3 14.0 - 18.0 g/dL    Hematocrit 42.1 42.0 - 52.0 %    MCV 95.2 81.4 - 97.8 fL    MCH 32.4 27.0 - 33.0 pg    MCHC 34.0 32.3 - 36.5 g/dL    RDW 42.7 35.9 - 50.0 fL    Platelet Count 209 164 - 446 K/uL    MPV 9.9 9.0 - 12.9 fL    Neutrophils-Polys 72.40 (H) 44.00 - 72.00 %    Lymphocytes 15.10 (L) 22.00 - 41.00 %    Monocytes 10.80 0.00 - 13.40 %    Eosinophils 1.00 0.00 - 6.90 %    Basophils 0.50 0.00 - 1.80 %    Immature Granulocytes 0.20 0.00 - 0.90 %    Nucleated RBC 0.00 0.00 - 0.20 /100 WBC    Neutrophils (Absolute) 6.94 1.82 - 7.42 K/uL    Lymphs (Absolute) 1.45 1.00 - 4.80 K/uL    Monos (Absolute) 1.04 (H) 0.00 - 0.85 K/uL    Eos (Absolute) 0.10 0.00 - 0.51 K/uL    Baso (Absolute) 0.05 0.00 - 0.12 K/uL    Immature  Granulocytes (abs) 0.02 0.00 - 0.11 K/uL    NRBC (Absolute) 0.00 K/uL   Comp Metabolic Panel   Result Value Ref Range    Sodium 140 135 - 145 mmol/L    Potassium 4.2 3.6 - 5.5 mmol/L    Chloride 102 96 - 112 mmol/L    Co2 25 20 - 33 mmol/L    Anion Gap 13.0 7.0 - 16.0    Glucose 106 (H) 65 - 99 mg/dL    Bun 15 8 - 22 mg/dL    Creatinine 0.92 0.50 - 1.40 mg/dL    Calcium 10.0 8.4 - 10.2 mg/dL    AST(SGOT) 18 12 - 45 U/L    ALT(SGPT) 15 2 - 50 U/L    Alkaline Phosphatase 69 30 - 99 U/L    Total Bilirubin 0.5 0.1 - 1.5 mg/dL    Albumin 4.4 3.2 - 4.9 g/dL    Total Protein 7.4 6.0 - 8.2 g/dL    Globulin 3.0 1.9 - 3.5 g/dL    A-G Ratio 1.5 g/dL   TROPONIN   Result Value Ref Range    Troponin T 46 (H) 6 - 19 ng/L   proBrain Natriuretic Peptide, NT   Result Value Ref Range    NT-proBNP 1723 (H) 0 - 125 pg/mL   CORRECTED CALCIUM   Result Value Ref Range    Correct Calcium 9.7 8.5 - 10.5 mg/dL   ESTIMATED GFR   Result Value Ref Range    GFR (CKD-EPI) 89 >60 mL/min/1.73 m 2   CoV-2, FLU A/B, and RSV by PCR (2-4 Hours CEPHEID) : Collect NP swab in VTM    Specimen: Respirate   Result Value Ref Range    Influenza virus A RNA Negative Negative    Influenza virus B, PCR Negative Negative    RSV, PCR Negative Negative    SARS-CoV-2 by PCR NotDetected     SARS-CoV-2 Source Nasal Swab    EKG   Result Value Ref Range    Report       [facility] Emergency Dept[facility]    Test Date:  2023  Pt Name:    SHAD DONALDSON                 Department: [facility]  MRN:        [id number]                      [facility]  Gender:     Male                         Technician: [facility]  :        1950                   Requested By:MANUEL VALENTINO  Order #:    [id number]                    Reading MD: Manuel Valentino MD    Measurements  Intervals                                Axis  Rate:       66                           P:          39  NJ:         187                          QRS:        -81  QRSD:       98                            T:          14  QT:         445  QTc:        467    Interpretive Statements  Sinus rhythm  Inferior infarct, old  T wave inversions in anterior lateral leads  Baseline wander in lead(s) III  Compared to ECG 05/09/2022 12:40:16  ST (T wave) deviation no longer present  Myocardial infarct finding still present  Electronically Signed On 5- 13:29:54 PDT by Noe Bain MD       RADIOLOGY  I have independently interpreted the diagnostic imaging associated with this visit and am waiting the final reading from the radiologist.   My preliminary interpretation is as follows: No pneumonia or acute cardiopulmonary abnormality.  Radiologist interpretation:   DX-CHEST-PORTABLE (1 VIEW)   Final Result         1. No acute cardiopulmonary abnormalities are identified.        COURSE & MEDICAL DECISION MAKING    ED Observation Status? No; Patient does not meet criteria for ED Observation.     INITIAL ASSESSMENT, COURSE AND PLAN  Care Narrative: This is a 73-year-old male with a history of MI s/p stenting in 2010 who is here with worsening shortness of breath.  Differential diagnosis includes, but is not limited to, ACS, PE, pneumonia, viral syndrome, valvular abnormality, heart failure/fluid overload, pulmonary hypertension.    Arrives hypoxic and tachypneic but afebrile with otherwise normal vital signs.  He was placed on supplemental oxygen which she does not use at baseline.  His lungs are clear without wheezes or rales.  Abdomen is soft and nontender.  No obvious cardiac murmurs or rubs.    Chest x-ray to my read at bedside is reassuring without acute abnormality.    CBC without anemia or leukocytosis.  Metabolic panel demonstrates normal renal and liver function as well as electrolytes.  Glucose is slightly elevated to 106.  Initial troponin elevated to 46 and BNP elevated to 1723.  EKG does demonstrate T wave inversions in the anterior lateral leads.    Patient is not tachycardic and has no risk factors for PE.  There  is no pleuritic component to his symptoms and he denies any chest pain.  He has not had any hemoptysis or lower extremity swelling.  I do not feel that CTA chest is indicated.  Viral swabs were obtained which were negative for flu, COVID, and RSV.  Chest x-ray without acute abnormality.    Patient was given an aspirin due to concern for ACS.  History and physical exam are inconsistent with aortic dissection.  He does not have any arrhythmias on telemetry.    I spoke with on-call cardiologist, Dr. Wyman, to discuss possible transfer to [facility] for cardiac catheterization.  At this point she does not feel that it is indicated and recommends 20 mg of Lasix and echocardiogram.  The Lasix was ordered.  Patient will require hospitalization for additional work-up and management.  He was discussed with the on-call hospitalist, Dr. Joshi, and admitted in guarded condition.    ADDITIONAL PROBLEM LIST  N/A  DISPOSITION AND DISCUSSIONS  I have discussed management of the patient with the following physicians and ITZEL's: Dr. Wyman, cardiology.  Dr. Joshi, hospitalist.    Discussion of management with other [facility] [place] or appropriate source(s): None     Escalation of care considered, and ultimately not performed: N/A.    Barriers to care at this time, including but not limited to:  None .     Decision tools and prescription drugs considered including, but not limited to: HEART Score 7 .    FINAL DIAGNOSIS  1.  NSTEMI     Electronically signed by: Noe Bain M.D., 5/25/2023 9:30 AM

## 2023-05-25 NOTE — CONSULTS
Cardiology Initial Consult Note    Date of note:    5/25/2023      Consulting Physician: Rome Joshi M.D.    Name:   Jan Rivas   YOB: 1950  Age:   73 y.o.  male   MRN:   [id number]      Reason for Consultation: Dyspnea, hypoxia and elevated troponin    HPI:  Jan Rivas is a 73 y.o. male with a history of CAD with prior MI and stent to LAD in 2010, mild carotid artery stenosis, hypertension, dyslipidemia, FRANK on CPAP, status post loop recorder implantation to evaluate for A-fib after stroke who presented to the ER today for ongoing dyspnea.  Wife is at bedside.    Patient states that he was in his usual state of health until 2 days ago when he was putting the sheet on the mattress and had acute onset dyspnea after bending over.  Had to sit down and rest with improvement in symptoms.  Yesterday evening, wife and him went for an evening stroll but he was unable to catch his breath and had to take multiple breaks.  Due to worsening symptoms, wife decided to bring him to the ER for further evaluation.    On presentation, he was noted to be tachypneic and hypoxic with oxygen saturation 87% on room air.  Was placed on supplemental oxygen with improvement in oxygen level.    Denies chest pain, pressure, lightheadedness, dizziness or palpitations.  No lower extremity edema or fevers, chills or nausea.    ROS  A complete ROS was performed and is negative except for pertinent positives mentioned in HPI.      Past Medical History:   Diagnosis Date    Acute stroke due to ischemia (HCC) 05/2022    MRI with acute infarct of left posterior insular cortex.    Apnea, sleep     Uses CPAP    CAD (coronary artery disease) 05/2010    Acute apical infarct. PCI/MANJEET (Taxus 3.5 x 32mm) to the LAD. 2015: [facility] with patent stent.    Chickenpox     [place] measles     H/O heart artery stent 05/04/2010    Heart attack (HCC)     Hyperlipidemia     Hypertension     Mumps     Sleep apnea     Snoring     Stroke  ([facility])     Wears glasses        Past Surgical History:   Procedure Laterality Date    [facility] CARDIAC CATH  02/21/2015    Cath at [place].  Patent stent and no obstructive CAD.    [facility] CARDIAC CATH  05/04/2010    Taxus stent to LAD    OTHER CARDIAC SURGERY  05/04/2010    Heart Stent    CYST EXCISION      posterior neck         (Not in a hospital admission)    Current Facility-Administered Medications   Medication Dose Route Frequency Provider Last Rate Last Admin    atorvastatin (LIPITOR) tablet 40 mg  40 mg Oral AC PM MEAL Asearsenio Joshi M.D.        [START ON 5/26/2023] clopidogrel (PLAVIX) tablet 75 mg  75 mg Oral DAILY Asem MIR Joshi M.D.        latanoprost (XALATAN) 0.005 % ophthalmic solution 1 Drop  1 Drop Both Eyes QHS Rome Joshi M.D.        [START ON 5/26/2023] losartan (COZAAR) tablet 25 mg  25 mg Oral DAILY Rome Joshi M.D.        metoprolol tartrate (LOPRESSOR) tablet 25 mg  25 mg Oral BID Asearsenio Joshi M.D.        traZODone (DESYREL) tablet 100 mg  100 mg Oral Nightly Asearsenio Joshi M.D.        acetaminophen (Tylenol) tablet 650 mg  650 mg Oral Q6HRS PRN Rome Joshi M.D.        senna-docusate (PERICOLACE or SENOKOT S) 8.6-50 MG per tablet 2 Tablet  2 Tablet Oral BID Rome Johsi M.D.        And    polyethylene glycol/lytes (MIRALAX) PACKET 1 Packet  1 Packet Oral QDAY PRN Rome Joshi M.D.        And    magnesium hydroxide (MILK OF MAGNESIA) suspension 30 mL  30 mL Oral QDAY PRN Rome Joshi M.D.        And    bisacodyl (DULCOLAX) suppository 10 mg  10 mg Rectal QDAY PRN Rome Joshi M.D.        [START ON 5/26/2023] aspirin EC tablet 81 mg  81 mg Oral DAILY Rome Joshi M.D.        enoxaparin (Lovenox) inj 40 mg  40 mg Subcutaneous DAILY AT 1800 Rome Joshi M.D.        Pharmacy Consult Request ...Pain Management Review 1 Each  1 Each Other PHARMACY TO DOSE Rome Joshi M.D.        oxyCODONE immediate-release (ROXICODONE) tablet 2.5 mg  2.5 mg Oral Q3HRS  PRN Asem MIR Joshi M.D.        Or    oxyCODONE immediate-release (ROXICODONE) tablet 5 mg  5 mg Oral Q3HRS PRN Asearsenio Joshi M.D.        Or    HYDROmorphone (Dilaudid) injection 0.25 mg  0.25 mg Intravenous Q3HRS PRN Asem MIR Joshi M.D.        ondansetron (ZOFRAN) syringe/vial injection 4 mg  4 mg Intravenous Q4HRS PRN Asearsenio Joshi M.D.        ondansetron (ZOFRAN ODT) dispertab 4 mg  4 mg Oral Q4HRS PRN Asearsenio Joshi M.D.        nitroglycerin (NITROSTAT) tablet 0.4 mg  0.4 mg Sublingual Q5 MIN PRN Asearsenio Joshi M.D.        [START ON 5/26/2023] furosemide (LASIX) injection 20 mg  20 mg Intravenous BID DIURETIC Asearsenio Joshi M.D.         Current Outpatient Medications   Medication Sig Dispense Refill    traZODone (DESYREL) 50 MG Tab Take 100 mg by mouth every evening.      Camphor-Eucalyptus-Menthol (VICKS VAPORUB EX) Apply 1 Application topically 1 time a day as needed (Under nose for CPAP).        Aromatic Inhalants (VICKS VAPOR INH) Inhale 1 Each 1 time a day as needed (nasal stick).      atorvastatin (LIPITOR) 40 MG Tab Take 1 Tablet by mouth 1/2 hour before dinner. 90 Tablet 3    losartan (COZAAR) 25 MG Tab Take 1 Tablet by mouth every day. 90 Tablet 3    metoprolol tartrate (LOPRESSOR) 25 MG Tab Take 1 Tablet by mouth 2 times a day. TAKE 1 TAB BY MOUTH 2 TIMES A DAY. 180 Tablet 3    clopidogrel (PLAVIX) 75 MG Tab Take 75 mg by mouth every day.      latanoprost (XALATAN) 0.005 % Solution Administer 1 Drop into both eyes at bedtime.           No Known Allergies      Family History   Problem Relation Age of Onset    Heart Attack Mother     Cancer Mother     Cancer Father     Cancer Sister        Social History     Socioeconomic History    Marital status:      Spouse name: Not on file    Number of children: Not on file    Years of education: Not on file    Highest education level: Not on file   Occupational History    Not on file   Tobacco Use    Smoking status: Former     Packs/day: 0.50      Years: 42.00     Pack years: 21.00     Types: Cigarettes     Quit date: 2010     Years since quittin.0     Passive exposure: Never    Smokeless tobacco: Never   Vaping Use    Vaping Use: Never used   Substance and Sexual Activity    Alcohol use: Yes     Alcohol/week: 8.4 oz     Types: 14 Standard drinks or equivalent per week     Comment: 2 drinks per day     Drug use: Yes     Types: Marijuana, Inhaled     Comment: Every other day marijuana    Sexual activity: Not on file   Other Topics Concern    Not on file   Social History Narrative    Not on file     Social Determinants of Health     Financial Resource Strain: Not on file   Food Insecurity: Not on file   Transportation Needs: Not on file   Physical Activity: Not on file   Stress: Not on file   Social Connections: Not on file   Intimate Partner Violence: Not on file   Housing Stability: Not on file       No intake or output data in the 24 hours ending 23 1418     Physical Exam  Body mass index is 32.08 kg/m².  /80   Pulse 66   Temp 36.7 °C (98.1 °F) (Temporal)   Resp 20   Ht 1.829 m (6')   Wt 107 kg (236 lb 8.9 oz)   SpO2 95%   Vitals:    23 1133 23 1136 23 1139 23 1232   BP:       Pulse: 66 68 65 66   Resp: 19 18 20 20   Temp:       TempSrc:       SpO2: 96% 97% 97% 95%   Weight:       Height:         Oxygen Therapy:  Pulse Oximetry: 95 %, O2 (LPM): 2, O2 Delivery Device: Nasal Cannula    General: In no acute distress, on supplemental oxygen  Eyes: nl conjunctiva  ENT: OP clear  Neck: JVP not elevated,  no carotid bruits  Lungs: Tachypneic, CTAB without wheezing or crackles  Heart: RRR, no murmurs, no rubs or gallops, no edema bilateral lower extremities.   Abdomen: soft, non tender, non distended  Extremities/MSK: no clubbing, no cyanosis  Neurological: No focal sensory deficits  Psychiatric: Appropriate affect, A/O x 3  Skin: Warm extremities      Labs (personally reviewed and notable for):   Lab Results    Component Value Date/Time    SODIUM 140 05/25/2023 10:04 AM    POTASSIUM 4.2 05/25/2023 10:04 AM    CHLORIDE 102 05/25/2023 10:04 AM    CO2 25 05/25/2023 10:04 AM    GLUCOSE 106 (H) 05/25/2023 10:04 AM    BUN 15 05/25/2023 10:04 AM    CREATININE 0.92 05/25/2023 10:04 AM    BUNCREATRAT 15 11/02/2021 05:45 AM      Lab Results   Component Value Date/Time    WBC 9.6 05/25/2023 10:04 AM    RBC 4.42 (L) 05/25/2023 10:04 AM    HEMOGLOBIN 14.3 05/25/2023 10:04 AM    HEMATOCRIT 42.1 05/25/2023 10:04 AM    MCV 95.2 05/25/2023 10:04 AM    MCH 32.4 05/25/2023 10:04 AM    MCHC 34.0 05/25/2023 10:04 AM    MPV 9.9 05/25/2023 10:04 AM    NEUTSPOLYS 72.40 (H) 05/25/2023 10:04 AM    LYMPHOCYTES 15.10 (L) 05/25/2023 10:04 AM    MONOCYTES 10.80 05/25/2023 10:04 AM    EOSINOPHILS 1.00 05/25/2023 10:04 AM    BASOPHILS 0.50 05/25/2023 10:04 AM      Lab Results   Component Value Date/Time    CHOLSTRLTOT 113 05/10/2022 04:04 AM    LDL 40 05/10/2022 04:04 AM    HDL 52 05/10/2022 04:04 AM    TRIGLYCERIDE 103 05/10/2022 04:04 AM       Lab Results   Component Value Date/Time    TROPONINT 46 (H) 05/25/2023 1004     Lab Results   Component Value Date/Time    NTPROBNP 1723 (H) 05/25/2023 1004         Cardiac Imaging and Procedures Review:    EKG dated 5/25/2023: My personal interpretation is sinus rhythm, inferior infarct    Echo dated 5/11/2022: My personal interpretation is normal LV size and function, no WMA, no valve disease    Cardiac Event Monitor (6/3/2022):   Zio Patch study showing predominately sinus rhythm with maximum rate of 169 bpm, minimum rate of 28 bpm, average of 62 bpm.   Atrial fibrillation: None.   Supraventricular tachycardia: 14 episodes of supraventricular tachycardia/atrial tachycardia with longest and fastest interval lasting 13 beats with a max rate of 169 bpm noted.   Pauses: None.   Heart block: Second degree atrioventricular block, possible mobitz type II block, with brief 2:1, minimum hear rate of 28 bpm.    Ventricular tachycardia: None.   <1% burden of premature ventricular contractions and <1% burden of premature atrial contractions      Radiology test Review:  DX-CHEST-PORTABLE (1 VIEW)   Final Result         1. No acute cardiopulmonary abnormalities are identified.      EC-ECHOCARDIOGRAM COMPLETE W/O CONT    (Results Pending)       Problem list:  Principal Problem:    Acute hypoxemic respiratory failure (HCC) (POA: Yes)  Active Problems:    Primary hypertension (POA: Yes)    Pure hypercholesterolemia (POA: Yes)    Elevated troponin (POA: Yes)    Shortness of breath (POA: Yes)  Resolved Problems:    * No resolved hospital problems. *      Recommendations:  -- Patient presented with acute respiratory failure with hypoxia.  Symptoms started acutely 2 days ago and has been worsening since.  Labs are notable for elevated NT proBNP and elevated troponin.  Concern for congestive heart failure.  Recommend IV Lasix 20 mg and monitor urine output and symptoms.  -- Obtain urgent echocardiogram to evaluate underlying cardiac structure and function.  Rule out structural or valvular heart abnormality which could explain his presentation.  -- We discussed transfer to [facility] if there is new onset congestive heart failure or presence of wall motion abnormality.  -- Continue to trend troponin and ECG.  -- Continue DAPT with aspirin and Plavix.  -- Continue metoprolol 25 mg twice daily and losartan 25 mg daily.  Blood pressure within goal.      Thank you for allowing me to participate in the care of this patient, cardiology will continue to follow.  Please contact me with any questions.      Raman Wyman M.D.  Cardiologist, [facility] [place]   [phone]    This note was generated using voice recognition software which has a small chance of producing errors of grammar and possibly content. I have made every reasonable attempt to find and correct any obvious errors, but expect that some may not be found  prior to finalization of this note.

## 2023-05-25 NOTE — ED TRIAGE NOTES
Chief Complaint   Patient presents with    Shortness of Breath     Onset 2 days ago of SOB, unable to sleep. He reports he got up in the am, bent over to make the bed and felt suddenly very SOB. In triage O2 applied and pt states this helps immensely. Now nasal congestion.    On exertion he feels very SOB.  
no

## 2023-05-25 NOTE — PROGRESS NOTES
Subjective:   Jan Rivas is a 73 y.o. male who presents today with   Chief Complaint   Patient presents with    Shortness of Breath     X 2 days, SOB, runny nose.       Shortness of Breath  This is a new problem. Episode onset: 2 days. The problem occurs constantly. The problem has been unchanged. Pertinent negatives include no chest pain, fever, hemoptysis, leg swelling or sputum production.     Patient has history of strokes and heart attack.  Patient states rapid onset of shortness of breath when he was making the bed on Tuesday and has had shortness of breath persistently since then with minimal activity.    PMH:  has a past medical history of Acute stroke due to ischemia ([facility]) (05/2022), Apnea, sleep, CAD (coronary artery disease) (05/2010), Chickenpox, [place] measles, H/O heart artery stent (05/04/2010), Heart attack ([facility]), Hyperlipidemia, Hypertension, Mumps, Sleep apnea, Snoring, Stroke ([facility]), and Wears glasses.  MEDS:   Current Outpatient Medications:     atorvastatin (LIPITOR) 40 MG Tab, Take 1 Tablet by mouth 1/2 hour before dinner., Disp: 90 Tablet, Rfl: 3    losartan (COZAAR) 25 MG Tab, Take 1 Tablet by mouth every day., Disp: 90 Tablet, Rfl: 3    metoprolol tartrate (LOPRESSOR) 25 MG Tab, Take 1 Tablet by mouth 2 times a day. TAKE 1 TAB BY MOUTH 2 TIMES A DAY., Disp: 180 Tablet, Rfl: 3    clopidogrel (PLAVIX) 75 MG Tab, TAKE 1 TABLET BY MOUTH EVERY DAY FOR 90 DAYS, Disp: , Rfl:     traZODone (DESYREL) 50 MG Tab, Take 1-2 Tablets by mouth at bedtime as needed for Sleep., Disp: 180 Tablet, Rfl: 3    pimecrolimus (ELIDEL) 1 % cream, Apply 1 Application topically 2 times a day., Disp: , Rfl:     latanoprost (XALATAN) 0.005 % Solution, Administer 1 Drop into both eyes at bedtime., Disp: , Rfl:   ALLERGIES: No Known Allergies  SURGHX:   Past Surgical History:   Procedure Laterality Date    [facility] CARDIAC CATH  02/21/2015    Cath at [place].  Patent stent and no obstructive CAD.    [facility] CARDIAC  CATH  05/04/2010    Taxus stent to LAD    OTHER CARDIAC SURGERY  05/04/2010    Heart Stent    CYST EXCISION      posterior neck     SOCHX:  reports that he quit smoking about 13 years ago. His smoking use included cigarettes. He has a 21.00 pack-year smoking history. He has never been exposed to tobacco smoke. He has never used smokeless tobacco. He reports current alcohol use of about 8.4 oz of alcohol per week. He reports current drug use. Drugs: Marijuana and Inhaled.  FH: Reviewed with patient, not pertinent to this visit.     Review of Systems   Constitutional:  Negative for chills and fever.   Respiratory:  Positive for shortness of breath. Negative for cough, hemoptysis and sputum production.    Cardiovascular:  Negative for chest pain, palpitations and leg swelling.   Neurological:  Negative for focal weakness and weakness.        Objective:   /70   Pulse 74   Temp 36.6 °C (97.9 °F) (Temporal)   Resp 18   Ht 1.829 m (6')   Wt 108 kg (239 lb)   SpO2 91%   BMI 32.41 kg/m²   Physical Exam  Vitals and nursing note reviewed.   Constitutional:       General: He is not in acute distress.     Appearance: Normal appearance. He is well-developed. He is not ill-appearing or toxic-appearing.   HENT:      Head: Normocephalic and atraumatic.      Right Ear: Hearing normal.      Left Ear: Hearing normal.   Cardiovascular:      Rate and Rhythm: Normal rate and regular rhythm.      Heart sounds: Normal heart sounds.   Pulmonary:      Effort: Pulmonary effort is normal.      Breath sounds: Normal breath sounds. No stridor. No wheezing, rhonchi or rales.      Comments: Slightly labored breathing on exam but in no acute distress.  Musculoskeletal:      Right lower leg: No edema.      Left lower leg: No edema.      Comments: Normal movement in all 4 extremities   Skin:     General: Skin is warm and dry.   Neurological:      General: No focal deficit present.      Mental Status: He is alert and oriented to person,  place, and time.      Cranial Nerves: No cranial nerve deficit.      Motor: No weakness.      Coordination: Coordination normal.   Psychiatric:         Mood and Affect: Mood normal.       Oxygen was measured at 88% and would go from 88% to 91%.    Assessment/Plan:   Assessment    1. Shortness of breath    Concern for sudden onset of shortness of breath happening over the last couple of days.  No acute neurodeficits noted on exam or any focal weakness.  Would recommend further evaluation in the emergency room setting at this time and they are agreeable with this plan.  Offered to obtain chest x-ray today but they declined and would like to go to the ER immediately at this time.  Call transfer center notified and patient elects to be taken there by private vehicle with his wife.        Please note that this dictation was created using voice recognition software. I have made every reasonable attempt to correct obvious errors, but I expect that there are errors of grammar and possibly content that I did not discover before finalizing the note.    Jose Carlos Minaya PA-C

## 2023-05-26 PROBLEM — R79.89 ELEVATED D-DIMER: Status: RESOLVED | Noted: 2023-05-25 | Resolved: 2023-05-26

## 2023-05-26 PROBLEM — I24.89 DEMAND ISCHEMIA (HCC): Status: ACTIVE | Noted: 2023-05-25

## 2023-05-26 PROBLEM — I26.99 ACUTE PULMONARY EMBOLISM (HCC): Status: ACTIVE | Noted: 2023-05-25

## 2023-05-26 LAB
ALBUMIN SERPL BCP-MCNC: 4 G/DL (ref 3.2–4.9)
ALBUMIN/GLOB SERPL: 1.4 G/DL
ALP SERPL-CCNC: 61 U/L (ref 30–99)
ALT SERPL-CCNC: 14 U/L (ref 2–50)
ANION GAP SERPL CALC-SCNC: 11 MMOL/L (ref 7–16)
AST SERPL-CCNC: 20 U/L (ref 12–45)
BILIRUB SERPL-MCNC: 0.6 MG/DL (ref 0.1–1.5)
BUN SERPL-MCNC: 15 MG/DL (ref 8–22)
CALCIUM ALBUM COR SERPL-MCNC: 9.3 MG/DL (ref 8.5–10.5)
CALCIUM SERPL-MCNC: 9.3 MG/DL (ref 8.4–10.2)
CHLORIDE SERPL-SCNC: 100 MMOL/L (ref 96–112)
CHOLEST SERPL-MCNC: 95 MG/DL (ref 100–199)
CO2 SERPL-SCNC: 25 MMOL/L (ref 20–33)
CREAT SERPL-MCNC: 1.03 MG/DL (ref 0.5–1.4)
ERYTHROCYTE [DISTWIDTH] IN BLOOD BY AUTOMATED COUNT: 43.4 FL (ref 35.9–50)
GFR SERPLBLD CREATININE-BSD FMLA CKD-EPI: 78 ML/MIN/1.73 M 2
GLOBULIN SER CALC-MCNC: 2.8 G/DL (ref 1.9–3.5)
GLUCOSE SERPL-MCNC: 122 MG/DL (ref 65–99)
HCT VFR BLD AUTO: 39.9 % (ref 42–52)
HDLC SERPL-MCNC: 48 MG/DL
HGB BLD-MCNC: 13.4 G/DL (ref 14–18)
LDLC SERPL CALC-MCNC: 31 MG/DL
MAGNESIUM SERPL-MCNC: 1.8 MG/DL (ref 1.5–2.5)
MCH RBC QN AUTO: 32.2 PG (ref 27–33)
MCHC RBC AUTO-ENTMCNC: 33.6 G/DL (ref 32.3–36.5)
MCV RBC AUTO: 95.9 FL (ref 81.4–97.8)
PLATELET # BLD AUTO: 209 K/UL (ref 164–446)
PMV BLD AUTO: 10.1 FL (ref 9–12.9)
POTASSIUM SERPL-SCNC: 4.2 MMOL/L (ref 3.6–5.5)
PROT SERPL-MCNC: 6.8 G/DL (ref 6–8.2)
RBC # BLD AUTO: 4.16 M/UL (ref 4.7–6.1)
SODIUM SERPL-SCNC: 136 MMOL/L (ref 135–145)
TRIGL SERPL-MCNC: 81 MG/DL (ref 0–149)
TROPONIN T SERPL-MCNC: 36 NG/L (ref 6–19)
UFH PPP CHRO-ACNC: 0.63 IU/ML
UFH PPP CHRO-ACNC: 0.65 IU/ML
WBC # BLD AUTO: 10.4 K/UL (ref 4.8–10.8)

## 2023-05-26 PROCEDURE — 700102 HCHG RX REV CODE 250 W/ 637 OVERRIDE(OP): Performed by: HOSPITALIST

## 2023-05-26 PROCEDURE — 81241 F5 GENE: CPT

## 2023-05-26 PROCEDURE — 84484 ASSAY OF TROPONIN QUANT: CPT

## 2023-05-26 PROCEDURE — 700102 HCHG RX REV CODE 250 W/ 637 OVERRIDE(OP): Performed by: FAMILY MEDICINE

## 2023-05-26 PROCEDURE — 86147 CARDIOLIPIN ANTIBODY EA IG: CPT

## 2023-05-26 PROCEDURE — A9270 NON-COVERED ITEM OR SERVICE: HCPCS | Performed by: FAMILY MEDICINE

## 2023-05-26 PROCEDURE — 770020 HCHG ROOM/CARE - TELE (206)

## 2023-05-26 PROCEDURE — 36415 COLL VENOUS BLD VENIPUNCTURE: CPT

## 2023-05-26 PROCEDURE — 80061 LIPID PANEL: CPT

## 2023-05-26 PROCEDURE — 85303 CLOT INHIBIT PROT C ACTIVITY: CPT

## 2023-05-26 PROCEDURE — 700111 HCHG RX REV CODE 636 W/ 250 OVERRIDE (IP): Performed by: FAMILY MEDICINE

## 2023-05-26 PROCEDURE — 85027 COMPLETE CBC AUTOMATED: CPT

## 2023-05-26 PROCEDURE — 83735 ASSAY OF MAGNESIUM: CPT

## 2023-05-26 PROCEDURE — A9270 NON-COVERED ITEM OR SERVICE: HCPCS | Performed by: HOSPITALIST

## 2023-05-26 PROCEDURE — 80053 COMPREHEN METABOLIC PANEL: CPT

## 2023-05-26 PROCEDURE — 700111 HCHG RX REV CODE 636 W/ 250 OVERRIDE (IP): Performed by: HOSPITALIST

## 2023-05-26 PROCEDURE — 94760 N-INVAS EAR/PLS OXIMETRY 1: CPT

## 2023-05-26 PROCEDURE — 99233 SBSQ HOSP IP/OBS HIGH 50: CPT | Performed by: FAMILY MEDICINE

## 2023-05-26 PROCEDURE — 85520 HEPARIN ASSAY: CPT

## 2023-05-26 RX ADMIN — APIXABAN 10 MG: 5 TABLET, FILM COATED ORAL at 21:50

## 2023-05-26 RX ADMIN — ASPIRIN 81 MG: 81 TABLET, COATED ORAL at 05:09

## 2023-05-26 RX ADMIN — LATANOPROST 1 DROP: 50 SOLUTION OPHTHALMIC at 21:49

## 2023-05-26 RX ADMIN — HEPARIN SODIUM 18 UNITS/KG/HR: 5000 INJECTION, SOLUTION INTRAVENOUS at 14:16

## 2023-05-26 RX ADMIN — TRAZODONE HYDROCHLORIDE 100 MG: 50 TABLET ORAL at 21:49

## 2023-05-26 RX ADMIN — CLOPIDOGREL BISULFATE 75 MG: 75 TABLET ORAL at 05:09

## 2023-05-26 RX ADMIN — METOPROLOL TARTRATE 25 MG: 25 TABLET, FILM COATED ORAL at 05:09

## 2023-05-26 RX ADMIN — LOSARTAN POTASSIUM 25 MG: 25 TABLET, FILM COATED ORAL at 05:09

## 2023-05-26 RX ADMIN — FUROSEMIDE 20 MG: 10 INJECTION, SOLUTION INTRAMUSCULAR; INTRAVENOUS at 18:35

## 2023-05-26 RX ADMIN — FUROSEMIDE 20 MG: 10 INJECTION, SOLUTION INTRAMUSCULAR; INTRAVENOUS at 05:08

## 2023-05-26 RX ADMIN — ATORVASTATIN CALCIUM 40 MG: 40 TABLET, FILM COATED ORAL at 18:35

## 2023-05-26 SDOH — ECONOMIC STABILITY: INCOME INSECURITY: IN THE LAST 12 MONTHS, WAS THERE A TIME WHEN YOU WERE NOT ABLE TO PAY THE MORTGAGE OR RENT ON TIME?: NO

## 2023-05-26 SDOH — ECONOMIC STABILITY: TRANSPORTATION INSECURITY
IN THE PAST 12 MONTHS, HAS LACK OF TRANSPORTATION KEPT YOU FROM MEETINGS, WORK, OR FROM GETTING THINGS NEEDED FOR DAILY LIVING?: NO

## 2023-05-26 SDOH — ECONOMIC STABILITY: FOOD INSECURITY: WITHIN THE PAST 12 MONTHS, YOU WORRIED THAT YOUR FOOD WOULD RUN OUT BEFORE YOU GOT MONEY TO BUY MORE.: NEVER TRUE

## 2023-05-26 SDOH — ECONOMIC STABILITY: HOUSING INSECURITY: IN THE LAST 12 MONTHS, HOW MANY PLACES HAVE YOU LIVED?: 1

## 2023-05-26 SDOH — ECONOMIC STABILITY: INCOME INSECURITY: HOW HARD IS IT FOR YOU TO PAY FOR THE VERY BASICS LIKE FOOD, HOUSING, MEDICAL CARE, AND HEATING?: NOT HARD AT ALL

## 2023-05-26 SDOH — ECONOMIC STABILITY: TRANSPORTATION INSECURITY
IN THE PAST 12 MONTHS, HAS THE LACK OF TRANSPORTATION KEPT YOU FROM MEDICAL APPOINTMENTS OR FROM GETTING MEDICATIONS?: NO

## 2023-05-26 SDOH — ECONOMIC STABILITY: HOUSING INSECURITY
IN THE LAST 12 MONTHS, WAS THERE A TIME WHEN YOU DID NOT HAVE A STEADY PLACE TO SLEEP OR SLEPT IN A SHELTER (INCLUDING NOW)?: NO

## 2023-05-26 SDOH — ECONOMIC STABILITY: FOOD INSECURITY: WITHIN THE PAST 12 MONTHS, THE FOOD YOU BOUGHT JUST DIDN'T LAST AND YOU DIDN'T HAVE MONEY TO GET MORE.: NEVER TRUE

## 2023-05-26 ASSESSMENT — PAIN DESCRIPTION - PAIN TYPE: TYPE: ACUTE PAIN

## 2023-05-26 ASSESSMENT — FIBROSIS 4 INDEX: FIB4 SCORE: 1.82

## 2023-05-26 NOTE — PROGRESS NOTES
Called by radiologist for update on imaging.  Patient with bilateral PEs, R > L with possible mild right heart strain.  Patient is already on heparin gtt, continue.  Continue to monitor on tele.  Continue to trend trop.  Currently, patient is hemodynamically stable with improved trop.  I do not feel he needs additional intervention or medication at this time.

## 2023-05-26 NOTE — DISCHARGE PLANNING
CM called [facility] which is patient's preferred pharmacy to check on the price of his Eliquis. The pharmacist informed CM that medication will have a $50 co-pay for a 30 day supply. MD Espinal and bedside RN were updated. CM will be available for any further dcp as needed.       Case Management Discharge Planning    Admission Date: 5/25/2023  GMLOS: 3.9  ALOS: 1    6-Clicks ADL Score: 24  6-Clicks Mobility Score: 24      Anticipated Discharge Dispo:      DME Needed: No    Action(s) Taken: Updated Provider/Nurse on Discharge Plan    Escalations Completed: None    Medically Clear: No    Barriers to Discharge: Medical clearance

## 2023-05-26 NOTE — CARE PLAN
The patient is Stable - Low risk of patient condition declining or worsening    Shift Goals  Clinical Goals: Monitor PE and pain.  Patient Goals: No SOB.    Progress made toward(s) clinical / shift goals:    Problem: Knowledge Deficit - Standard  Goal: Patient and family/care givers will demonstrate understanding of plan of care, disease process/condition, diagnostic tests and medications  Outcome: Progressing   POC discussed with pt. Pt states understanding.   Problem: Pain - Standard  Goal: Alleviation of pain or a reduction in pain to the patient’s comfort goal  Outcome: Progressing       Patient is not progressing towards the following goals:

## 2023-05-26 NOTE — PROGRESS NOTES
Hospital Medicine Daily Progress Note    Date of Service  5/26/2023    Chief Complaint  Jan Rivas is a 73 y.o. male admitted 5/25/2023 with SOB    Hospital Course  This is a 74yo gentleman with a history of CVA, CAD with stent in 2010, HLD, HTN and FRANK who presented to hospital with complaints of SOB, primarily exertional. Pt was initially evaluated by Cardiology in the ER and CTA was ordered which demonstrated bilateral PTEs with mild right heart strain.     Interval Problem Update  5/26 - No tachycardia but also on a beta blocker, no hypotension, requiring 2L O2. Mildly elevated trops are stable, COVID negative. Echo with RV dilation, PA pressure of 35 mmHg. CTA with large right main PTE with extension into the right upper, middle and lower segmental and subsegmental branches. Additionally with BARBARA, LLL and lingular subsegmental pulmonary emboli. On ASA/Plavix at home for stroke a year ago.    Pt relates being on a 10 hour train ride in March that may have precipitated this, but we did discuss making sure he's UTD with all of his cancer screenings with his PCP. Not clinically unstable, no indication for tPA or thrombectomy at this time, pt is feeling better. We will transition to Eliquis this evening, hopefully pt will be able to dc in the am if O2 demand decreased. Given the new PTE, will stop his Plavix, continue ASA and give Eliquis - he was on Plavix for stroke. Discussed with patient, no history of hemorrhagic stroke or GIB.     I have discussed this patient's plan of care and discharge plan at IDT rounds today with Case Management, Nursing, Nursing leadership, and other members of the IDT team.    Consultants/Specialty  cardiology    Code Status  Full Code    Disposition    Anticipate discharge to: home with close outpatient follow-up    I have placed the appropriate orders for post-discharge needs.    Review of Systems  ROS     Physical Exam  Temp:  [36.2 °C (97.2 °F)-37.4 °C (99.3 °F)] 36.9 °C  (98.5 °F)  Pulse:  [62-78] 64  Resp:  [16-22] 16  BP: (110-154)/(70-87) 110/76  SpO2:  [85 %-97 %] 90 %    Physical Exam  Vitals and nursing note reviewed.   Constitutional:       Appearance: Normal appearance. He is not ill-appearing.   HENT:      Head: Normocephalic and atraumatic.      Mouth/Throat:      Mouth: Mucous membranes are moist.   Cardiovascular:      Rate and Rhythm: Normal rate and regular rhythm.   Pulmonary:      Effort: Pulmonary effort is normal. No respiratory distress.      Breath sounds: Normal breath sounds. No wheezing or rales.   Abdominal:      General: Abdomen is flat. Bowel sounds are normal.      Palpations: Abdomen is soft.   Musculoskeletal:         General: No swelling.   Skin:     Coloration: Skin is not jaundiced or pale.   Neurological:      General: No focal deficit present.      Mental Status: He is alert and oriented to person, place, and time.      Comments: Mild chronic facial droop   Psychiatric:         Behavior: Behavior normal.         Fluids    Intake/Output Summary (Last 24 hours) at 5/26/2023 0800  Last data filed at 5/26/2023 0628  Gross per 24 hour   Intake 248.55 ml   Output 1960 ml   Net -1711.45 ml       Laboratory  Recent Labs     05/25/23  1004 05/26/23  0434   WBC 9.6 10.4   RBC 4.42* 4.16*   HEMOGLOBIN 14.3 13.4*   HEMATOCRIT 42.1 39.9*   MCV 95.2 95.9   MCH 32.4 32.2   MCHC 34.0 33.6   RDW 42.7 43.4   PLATELETCT 209 209   MPV 9.9 10.1     Recent Labs     05/25/23  1004 05/26/23  0434   SODIUM 140 136   POTASSIUM 4.2 4.2   CHLORIDE 102 100   CO2 25 25   GLUCOSE 106* 122*   BUN 15 15   CREATININE 0.92 1.03   CALCIUM 10.0 9.3     Recent Labs     05/25/23  1846   APTT 31.7   INR 1.18*         Recent Labs     05/26/23  0434   TRIGLYCERIDE 81   HDL 48   LDL 31       Imaging  CT-CTA CHEST PULMONARY ARTERY W/ RECONS   Final Result         1.  Large right main pulmonary embolus extending into right upper, middle, and lower lobe segmental and subsegmental branches   2.   Left upper, lower, and lingular segmental and subsegmental pulmonary emboli   3.  Asymmetric prominence of the right ventricle with slight bowing of the intraventricular septum, appearance favoring component of right heart strain.   4.  Large right upper pole renal cyst without visualized complex features      These findings were discussed with the patient's clinician, Dr. Aguilar, on 5/25/2023 10:54 PM.      EC-ECHOCARDIOGRAM COMPLETE W/ CONT   Final Result      DX-CHEST-PORTABLE (1 VIEW)   Final Result         1. No acute cardiopulmonary abnormalities are identified.           Assessment/Plan  * Acute hypoxemic respiratory failure (HCC)- (present on admission)  Assessment & Plan  Secondary to PTE    Acute pulmonary embolism (HCC) with cor pulmonale - (present on admission)  Assessment & Plan  - CTA with: Large right main pulmonary embolus extending into right upper, middle, and lower lobe segmental and subsegmental branches, left upper, lower, and lingular segmental and subsegmental pulmonary emboli, RV strain  - Transition from heparin drip to Eliquis tonight, pt still requiring O2, potential discharge tmrw if he remains stable  - likely provoked by long train ride in March without getting up  - Discussed Eliquis, pt amenable, will stop Plavix which pt was taking for stroke one year ago, no cardiac stents within the last year.  - Discussed with pt the need to ensure he is up to date with his cancer screenings with PCP   - Will send the hypercoag tests we can obtain while on heparin drip  - Transition to Eliquis tonight    Demand ischemia (HCC)- (present on admission)  Assessment & Plan  Secondary to PTE  No chest pain    Late effect of stroke- (present on admission)  Assessment & Plan  - Pt takes ASA and Plavix at home, will stop Plavix given transition to Eliquis    FRANK on CPAP- (present on admission)  Assessment & Plan  - Has CPAP here      Pure hypercholesterolemia- (present on admission)  Assessment &  Plan  Resume home atorvastatin     Primary hypertension- (present on admission)  Assessment & Plan  Resume home metoprolol & losartan with hold parameters          VTE prophylaxis: SCDs/TEDs and therapeutic anticoagulation with heparin  drip    I have performed a physical exam and reviewed and updated ROS and Plan today (5/26/2023). In review of yesterday's note (5/25/2023), there are no changes except as documented above.

## 2023-05-26 NOTE — DISCHARGE PLANNING
KATHY Rehman met with pt bedside to offer Community Care Management services.   Housing: No barriers.   Transportation: No barriers. Pt reports to be an active .  Food: No barriers.  Finances: No barriers.  PCP Follow up Appointment: Pt states he will schedule once he is discharged.    KATHY Rheman provided CCM contact information. Pt reports no needs at this time. CHW encouraged pt to call if anything was needed in the future. CHW will not continue to follow due to no needs being stated.

## 2023-05-26 NOTE — ASSESSMENT & PLAN NOTE
Markedly elevated D-dimer.  Echo suggestive of pulmonary embolism   I will start empiric therapeutic anticoagulation  I will check CT angiography of the chest

## 2023-05-26 NOTE — FLOWSHEET NOTE
4 Eyes Skin Assessment Completed by ZEYNEP Wright and ZEYNEP Pulido.    Head WDL  Ears WDL  Nose WDL  Mouth WDL  Neck WDL  Breast/Chest WDL  Shoulder Blades WDL  Spine WDL  (R) Arm/Elbow/Hand WDL  (L) Arm/Elbow/Hand WDL  Abdomen WDL  Groin WDL  Scrotum/Coccyx/Buttocks WDL  (R) Leg WDL  (L) Leg WDL  (R) Heel/Foot/Toe Redness and Blanching  (L) Heel/Foot/Toe Redness and Blanching          Devices In Places Tele Box      Interventions In Place Pillows    Possible Skin Injury No    Pictures Uploaded Into Epic N/A  Wound Consult Placed N/A  RN Wound Prevention Protocol Ordered Yes

## 2023-05-26 NOTE — PROGRESS NOTES
Telemetry Shift Summary     Rhythm: SR  HR: 62-75  Ectopy: rPAC      Measurements: .18/.10/.46    Normal Values  Rhythm: SR  HR:   Measurements: 0.12-0.20/0.08-0.10/0.30-0.52

## 2023-05-26 NOTE — PROGRESS NOTES
Charge Nurse Rounding Note    Bedside rounding completed to address quality of care and overall patient experience.    Patient Satisfaction addressed including staff responsiveness. Patient/family are aware of the POC and any questions answered. Thorough safety education completed including use of call light prior to all mobility throughout the entirety of the hospital stay.     Patient/family aware of time of next Hourly Round.    No further questions/concerns currently.     Additional Notes:

## 2023-05-27 VITALS
BODY MASS INDEX: 29.98 KG/M2 | DIASTOLIC BLOOD PRESSURE: 72 MMHG | HEART RATE: 68 BPM | HEIGHT: 72 IN | WEIGHT: 221.34 LBS | SYSTOLIC BLOOD PRESSURE: 121 MMHG | TEMPERATURE: 98.4 F | OXYGEN SATURATION: 95 % | RESPIRATION RATE: 17 BRPM

## 2023-05-27 LAB
ANION GAP SERPL CALC-SCNC: 18 MMOL/L (ref 7–16)
BASOPHILS # BLD AUTO: 0.5 % (ref 0–1.8)
BASOPHILS # BLD: 0.05 K/UL (ref 0–0.12)
BUN SERPL-MCNC: 17 MG/DL (ref 8–22)
CALCIUM SERPL-MCNC: 9.5 MG/DL (ref 8.4–10.2)
CHLORIDE SERPL-SCNC: 96 MMOL/L (ref 96–112)
CO2 SERPL-SCNC: 22 MMOL/L (ref 20–33)
CREAT SERPL-MCNC: 0.94 MG/DL (ref 0.5–1.4)
EOSINOPHIL # BLD AUTO: 0.18 K/UL (ref 0–0.51)
EOSINOPHIL NFR BLD: 1.9 % (ref 0–6.9)
ERYTHROCYTE [DISTWIDTH] IN BLOOD BY AUTOMATED COUNT: 43.7 FL (ref 35.9–50)
GFR SERPLBLD CREATININE-BSD FMLA CKD-EPI: 87 ML/MIN/1.73 M 2
GLUCOSE SERPL-MCNC: 105 MG/DL (ref 65–99)
HCT VFR BLD AUTO: 43.4 % (ref 42–52)
HGB BLD-MCNC: 14.4 G/DL (ref 14–18)
IMM GRANULOCYTES # BLD AUTO: 0.05 K/UL (ref 0–0.11)
IMM GRANULOCYTES NFR BLD AUTO: 0.5 % (ref 0–0.9)
LYMPHOCYTES # BLD AUTO: 2.34 K/UL (ref 1–4.8)
LYMPHOCYTES NFR BLD: 24.6 % (ref 22–41)
MCH RBC QN AUTO: 32.1 PG (ref 27–33)
MCHC RBC AUTO-ENTMCNC: 33.2 G/DL (ref 32.3–36.5)
MCV RBC AUTO: 96.7 FL (ref 81.4–97.8)
MONOCYTES # BLD AUTO: 1.02 K/UL (ref 0–0.85)
MONOCYTES NFR BLD AUTO: 10.7 % (ref 0–13.4)
NEUTROPHILS # BLD AUTO: 5.88 K/UL (ref 1.82–7.42)
NEUTROPHILS NFR BLD: 61.8 % (ref 44–72)
NRBC # BLD AUTO: 0 K/UL
NRBC BLD-RTO: 0 /100 WBC (ref 0–0.2)
PLATELET # BLD AUTO: 227 K/UL (ref 164–446)
PMV BLD AUTO: 10.5 FL (ref 9–12.9)
POTASSIUM SERPL-SCNC: 3.7 MMOL/L (ref 3.6–5.5)
RBC # BLD AUTO: 4.49 M/UL (ref 4.7–6.1)
SODIUM SERPL-SCNC: 136 MMOL/L (ref 135–145)
TROPONIN T SERPL-MCNC: 31 NG/L (ref 6–19)
WBC # BLD AUTO: 9.5 K/UL (ref 4.8–10.8)

## 2023-05-27 PROCEDURE — 85025 COMPLETE CBC W/AUTO DIFF WBC: CPT

## 2023-05-27 PROCEDURE — 36415 COLL VENOUS BLD VENIPUNCTURE: CPT

## 2023-05-27 PROCEDURE — 94760 N-INVAS EAR/PLS OXIMETRY 1: CPT

## 2023-05-27 PROCEDURE — 84484 ASSAY OF TROPONIN QUANT: CPT

## 2023-05-27 PROCEDURE — 80048 BASIC METABOLIC PNL TOTAL CA: CPT

## 2023-05-27 PROCEDURE — 700111 HCHG RX REV CODE 636 W/ 250 OVERRIDE (IP): Performed by: HOSPITALIST

## 2023-05-27 PROCEDURE — 700102 HCHG RX REV CODE 250 W/ 637 OVERRIDE(OP): Performed by: HOSPITALIST

## 2023-05-27 PROCEDURE — A9270 NON-COVERED ITEM OR SERVICE: HCPCS | Performed by: HOSPITALIST

## 2023-05-27 PROCEDURE — A9270 NON-COVERED ITEM OR SERVICE: HCPCS | Performed by: FAMILY MEDICINE

## 2023-05-27 PROCEDURE — 700102 HCHG RX REV CODE 250 W/ 637 OVERRIDE(OP): Performed by: FAMILY MEDICINE

## 2023-05-27 PROCEDURE — 99239 HOSP IP/OBS DSCHRG MGMT >30: CPT | Performed by: FAMILY MEDICINE

## 2023-05-27 RX ORDER — ASPIRIN 81 MG/1
81 TABLET ORAL DAILY
Qty: 30 TABLET | Refills: 0 | Status: SHIPPED | OUTPATIENT
Start: 2023-05-28 | End: 2023-05-27

## 2023-05-27 RX ADMIN — ASPIRIN 81 MG: 81 TABLET, COATED ORAL at 05:24

## 2023-05-27 RX ADMIN — APIXABAN 10 MG: 5 TABLET, FILM COATED ORAL at 05:24

## 2023-05-27 RX ADMIN — FUROSEMIDE 20 MG: 10 INJECTION, SOLUTION INTRAMUSCULAR; INTRAVENOUS at 05:24

## 2023-05-27 RX ADMIN — SENNOSIDES AND DOCUSATE SODIUM 2 TABLET: 50; 8.6 TABLET ORAL at 05:24

## 2023-05-27 ASSESSMENT — FIBROSIS 4 INDEX: FIB4 SCORE: 1.67

## 2023-05-27 NOTE — DISCHARGE PLANNING
Case Management Discharge Planning    Admission Date: 5/25/2023  GMLOS: 3.9  ALOS: 2    6-Clicks ADL Score: 24  6-Clicks Mobility Score: 24      Anticipated Discharge Dispo:      DME Needed: Yes    DME Ordered: Yes    Action(s) Taken: RNCM attended IDT rounds and reviewed chart. Per rounds discussion, pt owns CPAP and would need O2 bleed at night.    RNCM spoke to pt's wife. Per wife, pt has CPAP from [employer].    RNCM called [facility]. Per [employer] rep, they would need bleed in O2 order with liter flow, chart note with chronic lung condition, and titration report within 30 days.     MD placed order. Choice form completed and faxed to [facility].    5525: RNCM manually faxed O2 order to [employer] as requested    Received fax from [employer] stating qualifying O2 sats of 88% or less are needed.    Escalations Completed: DME Company and Leadership    Medically Clear: No    Next Steps: f/u with [facility] regarding O2 acceptance    Barriers to Discharge: Medical clearance and DME

## 2023-05-27 NOTE — FACE TO FACE
"Face to Face Note  -  Durable Medical Equipment    Franci Espinal M.D. - NPI: [id number]  I certify that this patient is under my care and that they had a durable medical equipment(DME)face to face encounter by myself that meets the physician DME face-to-face encounter requirements with this patient on:    Date of encounter:   Patient:                    MRN:                       YOB: 2021  Jan Rivas  [id number]  1950     The encounter with the patient was in whole, or in part, for the following medical condition, which is the primary reason for durable medical equipment:  Other - Pulmonary embolism and FRANK     I certify that, based on my findings, the following durable medical equipment is medically necessary:    Oxygen   HOME O2 Saturation Measurements:(Values must be present for Home Oxygen orders)  Room air sat at rest: 93  Room air sat with amb: 90    Nocturnal O2 while wearing CPAP with 2L: 83%. Nocturnal O2 while wearing CPAP with 4L O2: 92%   ,     ,    Is the patient mobile?: Yes  If patient feels more short of breath, they can go up to 6 liters per minute and contact healthcare provider.    Supporting Symptoms: The patient requires supplemental oxygen, as the following interventions have been tried with limited or no improvement: \"Incentive spirometry. The patient requires a 4L bleed in of O2 while using CPAP at night for the short term as he has bilateral pulmonary emboli that are contributing to nocturnal desaturations.    My Clinical findings support the need for the above equipment due to:  Hypoxia  "

## 2023-05-27 NOTE — PROGRESS NOTES
Telemetry Shift Summary     Rhythm SR  HR Range 66-78   Ectopy rPVC/PAC  Measurements 0.12/0.10/0.48           Normal Values  Rhythm SR  HR Range    Measurements 0.12-0.20 / 0.06-0.10  / 0.30-0.52

## 2023-05-27 NOTE — DISCHARGE PLANNING
Received choice form @: 1347  Agency/Facility name: [facility]  Sent referral per choice form @: [id number]

## 2023-05-27 NOTE — DISCHARGE INSTRUCTIONS
Diet: Diet Order Diet: Cardiac  Activity: As tolerated  Follow Up: Please follow up with your Primary Care Physician in one week to follow up on your cancer screenings and hypercoagulability labs that have not resulted yet. Please see your Cardiologist in two weeks to get a refill on your Eliquis and make sure you aren't having any issues on it. I have also referred you to [facility] in case you have a difficult time obtaining a consultation as an outpatient.   Disposition:Home  Diagnosis: Acute hypoxemic respiratory failure (HCC)    Follow up with your Primary Care Provider TOBY Hemphill as scheduled or sooner if your symptoms persist or worsen.  Return to Emergency Room for severe chest pain, shortness of breath, signs of a stroke, or any other emergencies.            Discharge Instructions    Discharged to home by car with relative. Discharged via walking, hospital escort: Refused.  Special equipment needed: Not Applicable    Be sure to schedule a follow-up appointment with your primary care doctor or any specialists as instructed.     Discharge Plan:        I understand that a diet low in cholesterol, fat, and sodium is recommended for good health. Unless I have been given specific instructions below for another diet, I accept this instruction as my diet prescription.   Other diet:     Special Instructions: None    -Is this patient being discharged with medication to prevent blood clots?  Yes, Aspirin   Aspirin, ASA oral tablets  What is this medicine?  ASPIRIN (AS pir in) is a pain reliever. It is used to treat mild pain and fever. This medicine is also used as directed by a doctor to prevent and to treat heart attacks, to prevent strokes and blood clots, and to treat arthritis or inflammation.  This medicine may be used for other purposes; ask your health care provider or pharmacist if you have questions.  COMMON BRAND NAME(S): Aspir-Low, Aspir-Mary, Aspirtab, Ksenia Advanced Aspirin, Ksenia Aspirin,  Ksenia Aspirin Extra Strength, Ksenia Aspirin Plus, Ksenia Extra Strength, Ksenia Extra Strength Plus, Ksenia Genuine Aspirin, Ksenia Womens Aspirin, Bufferin, Bufferin Extra Strength, Bufferin Low Dose  What should I tell my health care provider before I take this medicine?  They need to know if you have any of these conditions:  anemia  asthma  bleeding problems  child with chickenpox, the flu, or other viral infection  diabetes  gout  if you frequently drink alcohol containing drinks  kidney disease  liver disease  low level of vitamin K  lupus  smoke tobacco  stomach ulcers or other problems  an unusual or allergic reaction to aspirin, tartrazine dye, other medicines, dyes, or preservatives  pregnant or trying to get pregnant  breast-feeding  How should I use this medicine?  Take this medicine by mouth with a glass of water. Follow the directions on the package or prescription label. You can take this medicine with or without food. If it upsets your stomach, take it with food. Do not take your medicine more often than directed.  Talk to your pediatrician regarding the use of this medicine in children. While this drug may be prescribed for children as young as 12 years of age for selected conditions, precautions do apply. Children and teenagers should not use this medicine to treat chicken pox or flu symptoms unless directed by a doctor.  Patients over 65 years old may have a stronger reaction and need a smaller dose.  Overdosage: If you think you have taken too much of this medicine contact a poison control center or emergency room at once.  NOTE: This medicine is only for you. Do not share this medicine with others.  What if I miss a dose?  If you are taking this medicine on a regular schedule and miss a dose, take it as soon as you can. If it is almost time for your next dose, take only that dose. Do not take double or extra doses.  What may interact with this medicine?  Do not take this medicine with any of the  following medications:  cidofovir  ketorolac  probenecid  This medicine may also interact with the following medications:  alcohol  alendronate  bismuth subsalicylate  flavocoxid  herbal supplements like feverfew, garlic, maico, ginkgo biloba, horse chestnut  medicines for diabetes or glaucoma like acetazolamide, methazolamide  medicines for gout  medicines that treat or prevent blood clots like enoxaparin, heparin, ticlopidine, warfarin  other aspirin and aspirin-like medicines  NSAIDs, medicines for pain and inflammation, like ibuprofen or naproxen  pemetrexed  sulfinpyrazone  varicella live vaccine  This list may not describe all possible interactions. Give your health care provider a list of all the medicines, herbs, non-prescription drugs, or dietary supplements you use. Also tell them if you smoke, drink alcohol, or use illegal drugs. Some items may interact with your medicine.  What should I watch for while using this medicine?  If you are treating yourself for pain, tell your doctor or health care professional if the pain lasts more than 10 days, if it gets worse, or if there is a new or different kind of pain. Tell your doctor if you see redness or swelling. Also, check with your doctor if you have a fever that lasts for more than 3 days. Only take this medicine to prevent heart attacks or blood clotting if prescribed by your doctor or health care professional.  Do not take aspirin or aspirin-like medicines with this medicine. Too much aspirin can be dangerous. Always read the labels carefully.  This medicine can irritate your stomach or cause bleeding problems. Do not smoke cigarettes or drink alcohol while taking this medicine. Do not lie down for 30 minutes after taking this medicine to prevent irritation to your throat.  If you are scheduled for any medical or dental procedure, tell your healthcare provider that you are taking this medicine. You may need to stop taking this medicine before the  procedure.  This medicine may be used to treat migraines. If you take migraine medicines for 10 or more days a month, your migraines may get worse. Keep a diary of headache days and medicine use. Contact your healthcare professional if your migraine attacks occur more frequently.  What side effects may I notice from receiving this medicine?  Side effects that you should report to your doctor or health care professional as soon as possible:  allergic reactions like skin rash, itching or hives, swelling of the face, lips, or tongue  breathing problems  changes in hearing, ringing in the ears  confusion  general ill feeling or flu-like symptoms  pain on swallowing  redness, blistering, peeling or loosening of the skin, including inside the mouth or nose  signs and symptoms of bleeding such as bloody or black, tarry stools; red or dark-brown urine; spitting up blood or brown material that looks like coffee grounds; red spots on the skin; unusual bruising or bleeding from the eye, gums, or nose  trouble passing urine or change in the amount of urine  unusually weak or tired  yellowing of the eyes or skin  Side effects that usually do not require medical attention (report to your doctor or health care professional if they continue or are bothersome):  diarrhea or constipation  headache  nausea, vomiting  stomach gas, heartburn  This list may not describe all possible side effects. Call your doctor for medical advice about side effects. You may report side effects to FDA at 0-672-FDA-3637.  Where should I keep my medicine?  Keep out of the reach of children.  Store at room temperature between 15 and 30 degrees C (59 and 86 degrees F). Protect from heat and moisture. Do not use this medicine if it has a strong vinegar smell. Throw away any unused medicine after the expiration date.  NOTE: This sheet is a summary. It may not cover all possible information. If you have questions about this medicine, talk to your doctor,  pharmacist, or health care provider.  © 2020 [employer]vier/Gold Standard (2018-01-30 10:42:13)    Is patient discharged on Warfarin / Coumadin?   No

## 2023-05-27 NOTE — FLOWSHEET NOTE
Discharge instructions, follow up appointments and medications explained to patient and wife and both verbalized understanding. Patient ambulated off unit with wife and all belongings.

## 2023-05-27 NOTE — FLOWSHEET NOTE
Telemetry Shift Summary     Rhythm: SR  HR Range: 60-70  Ectopy: rPVC, rPAC  Measurements: 0.20/0.08/0.40              Normal Values  Rhythm SR  HR Range    Measurements 0.12-0.20 / 0.06-0.10  / 0.30-0.52

## 2023-05-27 NOTE — DISCHARGE SUMMARY
Discharge Summary    CHIEF COMPLAINT ON ADMISSION  Chief Complaint   Patient presents with    Shortness of Breath     Onset 2 days ago of SOB, unable to sleep. He reports he got up in the am, bent over to make the bed and felt suddenly very SOB. In triage O2 applied and pt states this helps immensely. Now nasal congestion.       Reason for Admission  Shortness of Breath     Admission Date  5/25/2023    CODE STATUS  Full Code    HPI & HOSPITAL COURSE  This is a 72yo gentleman with a history of CVA, CAD with stent in 2010, HLD, HTN and FRANK who presented to hospital with complaints of SOB, primarily exertional. Pt was initially evaluated by Cardiology in the ER and CTA was ordered which demonstrated bilateral PTEs with mild right heart strain. He was hemodynamically stable, for EKOS or thrombectomy not indicated. This appears to have been provoked by a lengthy 10 hour train trip a month or two ago, but given his history of recent CVA a year ago, and now PTE with large clot burden, I have ordered a hypercoag panel (what I can order while he is on heparin), and referred him to Heme Onc.     The patient had been on ASA and Plavix for his stroke one year ago that was embolic in nature and had a biotel, but he had not gotten an implantable loop recorder until recently. Given his new PTE, I have stopped his Plavix and proceeded with Eliquis after the risks of bleeding were discussed. He has done well since admission, and did not require O2 when ambulating this am. He did desaturate last night to 83% on 2L O2, but he has sleep apnea and was not wearing his CPAP which likely contributed.  Cardiology to supply his Eliquis in the future, and Heme Onc for further workup of his multiple clotting events. He is stable for discharge home with close outpatient follow up.      Therefore, he is discharged in good and stable condition to home with close outpatient follow-up.    The patient met 2-midnight criteria for an inpatient stay at  the time of discharge.    Discharge Date  5/27/23    FOLLOW UP ITEMS POST DISCHARGE  PCP in one week, Cardiology in two weeks, Pulmonary as scheduled, Heme Onc referral placed    DISCHARGE DIAGNOSES  Principal Problem:    Acute hypoxemic respiratory failure (HCC) (POA: Yes)  Active Problems:    Primary hypertension (POA: Yes)    Pure hypercholesterolemia (POA: Yes)    FRANK on CPAP (POA: Yes)    Late effect of stroke (POA: Yes)    Demand ischemia (HCC) (POA: Yes)    Acute pulmonary embolism (HCC) with cor pulmonale  (POA: Yes)  Resolved Problems:    Elevated d-dimer (POA: Yes)      FOLLOW UP  No future appointments.  JOSSELYN HemphillP.R.N.  [address]  [address]  [facility] [id number] [phone]    Follow up in 1 week(s)      Laura Lake M.D.  [address]  [address]  [facility] [address]  [phone]    Follow up in 2 week(s)        MEDICATIONS ON DISCHARGE     Medication List        START taking these medications        Instructions   apixaban 5mg Tabs  Start taking on: May 26, 2023  Commonly known as: ELIQUIS   Take 2 Tablets by mouth 2 times a day for 6 days, THEN 1 Tablet 2 times a day for 24 days.            CONTINUE taking these medications        Instructions   atorvastatin 40 MG Tabs  Commonly known as: LIPITOR   Take 1 Tablet by mouth 1/2 hour before dinner.  Dose: 40 mg     latanoprost 0.005 % Soln  Commonly known as: XALATAN   Administer 1 Drop into both eyes at bedtime.  Dose: 1 Drop     losartan 25 MG Tabs  Commonly known as: COZAAR   Take 1 Tablet by mouth every day.  Dose: 25 mg     metoprolol tartrate 25 MG Tabs  Commonly known as: LOPRESSOR   Take 1 Tablet by mouth 2 times a day. TAKE 1 TAB BY MOUTH 2 TIMES A DAY.  Dose: 25 mg     traZODone 50 MG Tabs  Commonly known as: DESYREL   Take 100 mg by mouth every evening.  Dose: 100 mg     VICKS VAPOR INH   Inhale 1 Each 1 time a day as needed (nasal stick).  Dose: 1 Each     VICKS VAPORUB EX   Apply 1 Application topically 1 time a day as  needed (Under nose for CPAP).  Dose: 1 Application            STOP taking these medications      clopidogrel 75 MG Tabs  Commonly known as: PLAVIX              Allergies  No Known Allergies    DIET  Orders Placed This Encounter   Procedures    Diet Order Diet: Cardiac     Standing Status:   Standing     Number of Occurrences:   1     Order Specific Question:   Diet:     Answer:   Cardiac [6]       ACTIVITY  As tolerated.  Weight bearing as tolerated    CONSULTATIONS  None    PROCEDURES  None    LABORATORY  Lab Results   Component Value Date    SODIUM 136 05/27/2023    POTASSIUM 3.7 05/27/2023    CHLORIDE 96 05/27/2023    CO2 22 05/27/2023    GLUCOSE 105 (H) 05/27/2023    BUN 17 05/27/2023    CREATININE 0.94 05/27/2023        Lab Results   Component Value Date    WBC 9.5 05/27/2023    HEMOGLOBIN 14.4 05/27/2023    HEMATOCRIT 43.4 05/27/2023    PLATELETCT 227 05/27/2023        Total time of the discharge process exceeds 41 minutes.

## 2023-05-28 NOTE — PROGRESS NOTES
Brief Cardiology Note:    I was called to discuss this patients care with Dr. Espinal. We discussed Mr. Rivas's diagnosis of acute pulmonary embolism and his initiation of eliquis. At this time, aspirin can be held (stent from 2010) due to bleeding risk of dual therapy. F/u with cardiology as needed.    At this time it was deemed no formal in person cardiology consultation was necessary, however if this changes due to changes in patient condition or abnormal test results, please re-consult cardiology.     Electronically Signed by:  Gaurang Fregoso MD  5/27/2023  9:20 PM

## 2023-05-30 LAB
CARDIOLIPIN IGA SER IA-ACNC: <10 APL
CARDIOLIPIN IGG SER IA-ACNC: <10 GPL
CARDIOLIPIN IGM SER IA-ACNC: <10 MPL
PROT C ACT/NOR PPP: 141 % (ref 83–168)

## 2023-06-01 LAB — F5 P.R506Q BLD/T QL: NEGATIVE

## 2023-06-06 ENCOUNTER — OFFICE VISIT (OUTPATIENT)
Dept: CARDIOLOGY | Facility: MEDICAL CENTER | Age: 73
End: 2023-06-06
Attending: NURSE PRACTITIONER
Payer: MEDICARE

## 2023-06-06 VITALS
BODY MASS INDEX: 29.93 KG/M2 | OXYGEN SATURATION: 94 % | HEIGHT: 72 IN | WEIGHT: 221 LBS | SYSTOLIC BLOOD PRESSURE: 124 MMHG | HEART RATE: 61 BPM | DIASTOLIC BLOOD PRESSURE: 60 MMHG | RESPIRATION RATE: 18 BRPM

## 2023-06-06 DIAGNOSIS — I73.9 PVD (PERIPHERAL VASCULAR DISEASE) (HCC): ICD-10-CM

## 2023-06-06 DIAGNOSIS — E78.00 PURE HYPERCHOLESTEROLEMIA: ICD-10-CM

## 2023-06-06 DIAGNOSIS — I25.10 CORONARY ARTERY DISEASE INVOLVING NATIVE CORONARY ARTERY OF NATIVE HEART WITHOUT ANGINA PECTORIS: ICD-10-CM

## 2023-06-06 DIAGNOSIS — Z79.899 HIGH RISK MEDICATION USE: ICD-10-CM

## 2023-06-06 DIAGNOSIS — I65.23 BILATERAL CAROTID ARTERY STENOSIS: ICD-10-CM

## 2023-06-06 DIAGNOSIS — I10 PRIMARY HYPERTENSION: ICD-10-CM

## 2023-06-06 DIAGNOSIS — I26.09 ACUTE PULMONARY EMBOLISM WITH ACUTE COR PULMONALE, UNSPECIFIED PULMONARY EMBOLISM TYPE (HCC): ICD-10-CM

## 2023-06-06 DIAGNOSIS — Z95.5 STENTED CORONARY ARTERY: ICD-10-CM

## 2023-06-06 PROCEDURE — 99212 OFFICE O/P EST SF 10 MIN: CPT | Performed by: NURSE PRACTITIONER

## 2023-06-06 PROCEDURE — 3074F SYST BP LT 130 MM HG: CPT | Performed by: NURSE PRACTITIONER

## 2023-06-06 PROCEDURE — 99214 OFFICE O/P EST MOD 30 MIN: CPT | Performed by: NURSE PRACTITIONER

## 2023-06-06 PROCEDURE — 3078F DIAST BP <80 MM HG: CPT | Performed by: NURSE PRACTITIONER

## 2023-06-06 RX ORDER — SPIRONOLACTONE 25 MG/1
25 TABLET ORAL DAILY
Qty: 90 TABLET | Refills: 3 | Status: SHIPPED | OUTPATIENT
Start: 2023-06-06

## 2023-06-06 ASSESSMENT — FIBROSIS 4 INDEX: FIB4 SCORE: 1.67

## 2023-06-06 NOTE — PROGRESS NOTES
Chief Complaint   Patient presents with    Coronary Artery Disease    Shortness of Breath       Subjective     New Rivas is a 73 y.o. male who presents today for hospital follow-up after recent hospitalization on 5/25/2023 for severe shortness of breath and acute respiratory failure found to have PE.      Patient has past medical history of CAD s/p PCI to LAD (2010), carotid artery stenosis, hypertension, dyslipidemia, FRANK on CPAP, CVA.  ILR placed post-CVA to monitor for A-fib.  Patient was last seen by Dr. Lake on 3/23/2023.    Today, patient reports MEJIA slowly improving on room air oxygen.  Patient describes NYHA class III functional activities.  Otherwise, Patient denies chest pain, shortness of breath at rest, palpitations, dizziness/lightheadedness, orthopnea, PND or Edema.  Patient appears euvolemic on exam.      We discussed his echocardiogram results per below.  We also reviewed  Implantable loop recorder report in media which did not show any A-fib burden.    Patient has hematology evaluation scheduled in September.  We will also refer patient to vascular medicine for OAC recommendations for SAMSON.  In the meantime we will optimize volume status with adding spironolactone; check renal and electrolyte function in 2 weeks and reevaluating RV in 3 months.  Patient to follow-up after echocardiogram. Patient compliant with CPAP therapy but may require nocturnal supplementation since discharge from hospital.  Encourage patient to schedule follow-up.      Past Medical History:   Diagnosis Date    Acute stroke due to ischemia (HCC) 05/2022    MRI with acute infarct of left posterior insular cortex.    Apnea, sleep     Uses CPAP    CAD (coronary artery disease) 05/2010    Acute apical infarct. PCI/MANJEET (Taxus 3.5 x 32mm) to the LAD. 2015: [facility] with patent stent.    Chickenpox     [place] measles     H/O heart artery stent 05/04/2010    Heart attack (HCC)     Hyperlipidemia     Hypertension     Mumps     Sleep  apnea     Snoring     Stroke (HCC)     Wears glasses      Past Surgical History:   Procedure Laterality Date    [facility] CARDIAC CATH  2015    Cath at [facility].  Patent stent and no obstructive CAD.    [facility] CARDIAC CATH  2010    Taxus stent to LAD    OTHER CARDIAC SURGERY  2010    Heart Stent    CYST EXCISION      posterior neck     Family History   Problem Relation Age of Onset    Heart Attack Mother     Cancer Mother     Cancer Father     Cancer Sister      Social History     Socioeconomic History    Marital status:      Spouse name: Not on file    Number of children: Not on file    Years of education: Not on file    Highest education level: Associate degree: academic program   Occupational History    Not on file   Tobacco Use    Smoking status: Former     Packs/day: 0.50     Years: 42.00     Pack years: 21.00     Types: Cigarettes     Quit date: 2010     Years since quittin.0     Passive exposure: Never    Smokeless tobacco: Never   Vaping Use    Vaping Use: Never used   Substance and Sexual Activity    Alcohol use: Yes     Alcohol/week: 8.4 oz     Types: 14 Standard drinks or equivalent per week     Comment: 2 drinks per day     Drug use: Yes     Types: Marijuana, Inhaled     Comment: Every other day marijuana    Sexual activity: Not on file   Other Topics Concern    Not on file   Social History Narrative    Not on file     Social Determinants of Health     Financial Resource Strain: Low Risk  (2023)    Overall Financial Resource Strain (CARDIA)     Difficulty of Paying Living Expenses: Not hard at all   Food Insecurity: No Food Insecurity (2023)    Hunger Vital Sign     Worried About Running Out of Food in the Last Year: Never true     Ran Out of Food in the Last Year: Never true   Transportation Needs: No Transportation Needs (2023)    PRAPARE - Transportation     Lack of Transportation (Medical): No     Lack of Transportation (Non-Medical): No   Physical Activity:  Insufficiently Active (5/25/2023)    Exercise Vital Sign     Days of Exercise per Week: 2 days     Minutes of Exercise per Session: 30 min   Stress: No Stress Concern Present (5/25/2023)    [employer] [place] [employer] - Occupational Stress Questionnaire     Feeling of Stress : Not at all   Social Connections: Moderately Integrated (5/25/2023)    Social Connection and Isolation Panel [NHANES]     Frequency of Communication with Friends and Family: More than three times a week     Frequency of Social Gatherings with Friends and Family: Twice a week     Attends [employer] Services: Never     Active Member of Clubs or Organizations: Yes     Attends Club or Organization Meetings: 1 to 4 times per year     Marital Status:    Intimate Partner Violence: Not on file   Housing Stability: Low Risk  (5/26/2023)    Housing Stability Vital Sign     Unable to Pay for Housing in the Last Year: No     Number of Places Lived in the Last Year: 1     Unstable Housing in the Last Year: No     No Known Allergies  Outpatient Encounter Medications as of 6/6/2023   Medication Sig Dispense Refill    spironolactone (ALDACTONE) 25 MG Tab Take 1 Tablet by mouth every day. 90 Tablet 3    apixaban (ELIQUIS) 5mg Tab Take 1 Tablet by mouth 2 times a day. 180 Tablet 3    apixaban (ELIQUIS) 5mg Tab Take 2 Tablets by mouth 2 times a day for 6 days, THEN 1 Tablet 2 times a day for 24 days. 72 Tablet 0    traZODone (DESYREL) 50 MG Tab Take 100 mg by mouth every evening.      Camphor-Eucalyptus-Menthol (VICKS VAPORUB EX) Apply 1 Application topically 1 time a day as needed (Under nose for CPAP).        Aromatic Inhalants (VICKS VAPOR INH) Inhale 1 Each 1 time a day as needed (nasal stick).      atorvastatin (LIPITOR) 40 MG Tab Take 1 Tablet by mouth 1/2 hour before dinner. 90 Tablet 3    losartan (COZAAR) 25 MG Tab Take 1 Tablet by mouth every day. 90 Tablet 3    metoprolol tartrate (LOPRESSOR) 25 MG Tab Take 1 Tablet by mouth 2 times  a day. TAKE 1 TAB BY MOUTH 2 TIMES A DAY. 180 Tablet 3    latanoprost (XALATAN) 0.005 % Solution Administer 1 Drop into both eyes at bedtime.      sertraline (ZOLOFT) 50 MG Tab TAKE 1 TABLET BY MOUTH EVERY DAY AT BEDTIME FOR 90 DAYS       No facility-administered encounter medications on file as of 6/6/2023.     ROS Complete review of systems negative except as noted in HPI/subjective           Objective     /60 (BP Location: Left arm, Patient Position: Sitting, BP Cuff Size: Adult)   Pulse 61   Resp 18   Ht 1.829 m (6')   Wt 100 kg (221 lb)   SpO2 94%   BMI 29.97 kg/m²     Physical Exam  Vitals reviewed.   Constitutional:       Appearance: He is well-developed.   HENT:      Head: Normocephalic and atraumatic.   Eyes:      Pupils: Pupils are equal, round, and reactive to light.   Neck:      Vascular: No JVD.   Cardiovascular:      Rate and Rhythm: Normal rate and regular rhythm.      Pulses: Normal pulses.      Heart sounds: Normal heart sounds. No murmur heard.     No friction rub. No gallop.   Pulmonary:      Effort: Pulmonary effort is normal. No respiratory distress.      Breath sounds: Normal breath sounds.   Abdominal:      General: Bowel sounds are normal. There is no distension.      Palpations: Abdomen is soft.   Musculoskeletal:      Right lower leg: No edema.      Left lower leg: No edema.   Skin:     General: Skin is warm and dry.      Findings: No erythema.   Neurological:      Mental Status: He is alert and oriented to person, place, and time.   Psychiatric:         Behavior: Behavior normal.            Lab Results   Component Value Date/Time    CHOLSTRLTOT 95 (L) 05/26/2023 04:34 AM    LDL 31 05/26/2023 04:34 AM    HDL 48 05/26/2023 04:34 AM    TRIGLYCERIDE 81 05/26/2023 04:34 AM       Lab Results   Component Value Date/Time    SODIUM 136 05/27/2023 01:54 AM    POTASSIUM 3.7 05/27/2023 01:54 AM    CHLORIDE 96 05/27/2023 01:54 AM    CO2 22 05/27/2023 01:54 AM    GLUCOSE 105 (H) 05/27/2023  "01:54 AM    BUN 17 05/27/2023 01:54 AM    CREATININE 0.94 05/27/2023 01:54 AM    BUNCREATRAT 15 11/02/2021 05:45 AM     Lab Results   Component Value Date/Time    ALKPHOSPHAT 61 05/26/2023 04:34 AM    ASTSGOT 20 05/26/2023 04:34 AM    ALTSGPT 14 05/26/2023 04:34 AM    TBILIRUBIN 0.6 05/26/2023 04:34 AM      Chest CTA (5/5/2023):  1.  Large right main pulmonary embolus extending into right upper, middle, and lower lobe segmental and subsegmental branches  2.  Left upper, lower, and lingular segmental and subsegmental pulmonary emboli  3.  Asymmetric prominence of the right ventricle with slight bowing of the intraventricular septum, appearance favoring component of right heart strain.  4.  Large right upper pole renal cyst without visualized complex features    TTE (5/25/2023)  Compared to the images of the prior study 5/11/2022, RV is now dilated   with flattened septum.  Normal left ventricular size and systolic function.  No regional wall motion abnormality noted.  Dilated right ventricle with reduced systolic function.  Flattened septum in diastole (\"D\"-shaped left ventricle) consistent   with RV volume overload.  Estimated right ventricular systolic pressure is 35 mmHg.    Assessment & Plan     1. Acute pulmonary embolism with acute cor pulmonale, unspecified pulmonary embolism type (HCC)  Referral to Vascular Medicine    spironolactone (ALDACTONE) 25 MG Tab    Basic Metabolic Panel    apixaban (ELIQUIS) 5mg Tab    EC-ECHOCARDIOGRAM COMPLETE W/O CONT    CANCELED: EC-ECHOCARDIOGRAM COMPLETE W/O CONT      2. Coronary artery disease involving native coronary artery of native heart without angina pectoris  Referral to Vascular Medicine    spironolactone (ALDACTONE) 25 MG Tab    Basic Metabolic Panel    apixaban (ELIQUIS) 5mg Tab    EC-ECHOCARDIOGRAM COMPLETE W/O CONT    CANCELED: EC-ECHOCARDIOGRAM COMPLETE W/O CONT      3. Bilateral carotid artery stenosis  Referral to Vascular Medicine    spironolactone (ALDACTONE) " 25 MG Tab    Basic Metabolic Panel    apixaban (ELIQUIS) 5mg Tab    EC-ECHOCARDIOGRAM COMPLETE W/O CONT    CANCELED: EC-ECHOCARDIOGRAM COMPLETE W/O CONT      4. High risk medication use  Referral to Vascular Medicine    spironolactone (ALDACTONE) 25 MG Tab    Basic Metabolic Panel    apixaban (ELIQUIS) 5mg Tab    EC-ECHOCARDIOGRAM COMPLETE W/O CONT    CANCELED: EC-ECHOCARDIOGRAM COMPLETE W/O CONT      5. Primary hypertension  Referral to Vascular Medicine    spironolactone (ALDACTONE) 25 MG Tab    Basic Metabolic Panel    apixaban (ELIQUIS) 5mg Tab    EC-ECHOCARDIOGRAM COMPLETE W/O CONT    CANCELED: EC-ECHOCARDIOGRAM COMPLETE W/O CONT      6. Pure hypercholesterolemia  Referral to Vascular Medicine    spironolactone (ALDACTONE) 25 MG Tab    Basic Metabolic Panel    apixaban (ELIQUIS) 5mg Tab    EC-ECHOCARDIOGRAM COMPLETE W/O CONT    CANCELED: EC-ECHOCARDIOGRAM COMPLETE W/O CONT      7. PVD (peripheral vascular disease) ([facility])  Referral to Vascular Medicine    spironolactone (ALDACTONE) 25 MG Tab    Basic Metabolic Panel    apixaban (ELIQUIS) 5mg Tab    EC-ECHOCARDIOGRAM COMPLETE W/O CONT    CANCELED: EC-ECHOCARDIOGRAM COMPLETE W/O CONT      8. Stented coronary artery  Referral to Vascular Medicine    spironolactone (ALDACTONE) 25 MG Tab    Basic Metabolic Panel    apixaban (ELIQUIS) 5mg Tab    EC-ECHOCARDIOGRAM COMPLETE W/O CONT    CANCELED: EC-ECHOCARDIOGRAM COMPLETE W/O CONT          Medical Decision Making: Today's Assessment/Status/Plan:        CAD, s/p MANJEET/PCI to LAD (2010); HTN; DLD  -No ASA due to OAC  -Continue high intensity statin at 40 mg daily  -Continue metoprolol 3 500 twice daily, losartan 25 g daily    Acute PE with cor pulmonale  -Add spironolactone.  Check BMP in 2 weeks  -Reevaluate RV on echo in 3 months  -Eliquis 5 mg twice daily.  Refer to vascular for duration recommendations  -Referred to heme-onc prior to discharge    History of cryptogenic stroke  -ILR in place.  No A-fib burden detected on  review    FU in clinic in 3 months with Dr. Lake. Sooner if needed.    Patient verbalizes understanding and agrees with the plan of care.     I personally spent a total of 36 minutes which includes face-to-face time and non-face-to-face time spent on preparing to see the patient, reviewing prior notes and tests, obtaining history from the patient, performing a medically appropriate exam, counseling and educating the patient, ordering medications/tests/procedures/referrals as clinically indicated, and documenting information in the electronic medical record.    AMISHA Rojas.   [facility]  [phone]    PLEASE NOTE: This Note was created using voice recognition Software. I have made every reasonable attempt to correct obvious errors, but I expect that there are errors of grammar and possibly content that I did not discover before finalizing the note

## 2023-06-07 ENCOUNTER — DOCUMENTATION (OUTPATIENT)
Dept: VASCULAR LAB | Facility: MEDICAL CENTER | Age: 73
End: 2023-06-07
Payer: MEDICARE

## 2023-06-08 ENCOUNTER — OFFICE VISIT (OUTPATIENT)
Dept: VASCULAR LAB | Facility: MEDICAL CENTER | Age: 73
End: 2023-06-08
Attending: FAMILY MEDICINE
Payer: MEDICARE

## 2023-06-08 VITALS
SYSTOLIC BLOOD PRESSURE: 121 MMHG | WEIGHT: 233.3 LBS | DIASTOLIC BLOOD PRESSURE: 67 MMHG | HEART RATE: 47 BPM | BODY MASS INDEX: 31.64 KG/M2

## 2023-06-08 DIAGNOSIS — Z95.5 STENTED CORONARY ARTERY: ICD-10-CM

## 2023-06-08 DIAGNOSIS — Z79.01 CHRONIC ANTICOAGULATION: ICD-10-CM

## 2023-06-08 DIAGNOSIS — Z86.73 HISTORY OF CVA (CEREBROVASCULAR ACCIDENT): ICD-10-CM

## 2023-06-08 DIAGNOSIS — E78.49 OTHER HYPERLIPIDEMIA: ICD-10-CM

## 2023-06-08 DIAGNOSIS — I71.21 ANEURYSM OF ASCENDING AORTA WITHOUT RUPTURE (HCC): ICD-10-CM

## 2023-06-08 DIAGNOSIS — I10 PRIMARY HYPERTENSION: ICD-10-CM

## 2023-06-08 DIAGNOSIS — G47.33 OSA ON CPAP: ICD-10-CM

## 2023-06-08 DIAGNOSIS — I65.23 BILATERAL CAROTID ARTERY STENOSIS: ICD-10-CM

## 2023-06-08 DIAGNOSIS — I25.10 CORONARY ARTERY DISEASE INVOLVING NATIVE CORONARY ARTERY OF NATIVE HEART WITHOUT ANGINA PECTORIS: ICD-10-CM

## 2023-06-08 DIAGNOSIS — I26.99 PULMONARY EMBOLUS, RIGHT (HCC): ICD-10-CM

## 2023-06-08 PROCEDURE — 99214 OFFICE O/P EST MOD 30 MIN: CPT | Performed by: FAMILY MEDICINE

## 2023-06-08 PROCEDURE — 3078F DIAST BP <80 MM HG: CPT | Performed by: FAMILY MEDICINE

## 2023-06-08 PROCEDURE — 3074F SYST BP LT 130 MM HG: CPT | Performed by: FAMILY MEDICINE

## 2023-06-08 PROCEDURE — 99212 OFFICE O/P EST SF 10 MIN: CPT

## 2023-06-08 ASSESSMENT — FIBROSIS 4 INDEX: FIB4 SCORE: 1.67

## 2023-06-08 ASSESSMENT — ENCOUNTER SYMPTOMS
WHEEZING: 0
PALPITATIONS: 0
FEVER: 0
BRUISES/BLEEDS EASILY: 0
CLAUDICATION: 0
BLOOD IN STOOL: 0
PND: 0
ORTHOPNEA: 0
HEMOPTYSIS: 0
SHORTNESS OF BREATH: 0
COUGH: 0
CHILLS: 0
SPUTUM PRODUCTION: 0

## 2023-06-08 NOTE — PROGRESS NOTES
INITIAL VASCULAR ANTICOAGULATION VISIT  06/08/23     Jan Rivas is a 73 y.o. male who presents for  Chief Complaint   Patient presents with    Hypertension     NP Dx: Primary hypertension    Hyperlipidemia     NP Dx: Pure hypercholesterolemia    Pulmonary Embolism     NP Dx: Acute pulmonary embolism with acute cor pulmonale, unspecified pulmonary embolism type (HCC)     Initially referred by Raysa Pringle A.* for length of therapy (LOT) determination and management of anticoagulation in context of acute venothromboembolic disease, est 6/2023    Subjective      VTE disease / Anticoagulation:   Date of initiation of anticoagulation: 5/25/23   Initial visit symptoms:  Denies current SOB, CP, leg swelling, edema, leg pain, cyanosis of digits, redness of extremities.  Current antithrombotic agent: eliquis 5mg BID   Complications: none, tolerating well, no bleeding or bruising   Adherence: reports complete, no missed doses    _____Pertinent VTE pmhx:   Date of Diagnosis: 5/25/25  Type of Venous thromboembolic disease (VTE): acute R main PE, no cor pulm   Preceding/presenting hx: 5/23 - acute onset SOB, progressive MEJIA after walking, seen at  and transferred to ED on 5/25 and dx  Antithrombotic therapy at time of VTE event: yes - plavix  VTE tx course: Eliquis 10mg BID x 7d, now eliquis 5mg BID   Any personal VTE hx? No  Any family VTE hx? No  _____PROVOKING FACTORS:  Recent surgery ? No  Recent trauma ? No  Smoker?  reports that he quit smoking about 13 years ago. His smoking use included cigarettes. He has a 21.00 pack-year smoking history. He has never been exposed to tobacco smoke. He has never used smokeless tobacco.    Extended travel? Yes, Details: 3/2023 - had been on cruise,   Other periods of immobility? No  Other known or potential risk factors for VTE disease:  no  MEN:  Hx of cancer or abnormal cancer screenings: no  Hx of Testosterone therapy: no  Hypercoaguability work-up completed?   No    CAD s/p PCI to LAD ()  Currently feeling well. Seeing cardiology. No other issues     CVA:  Stable, no current new sx    Past Medical History:   Diagnosis Date    Acute stroke due to ischemia (HCC) 2022    MRI with acute infarct of left posterior insular cortex.    Apnea, sleep     Uses CPAP    CAD (coronary artery disease) 2010    Acute apical infarct. PCI/MANJEET (Taxus 3.5 x 32mm) to the LAD. 2015: [facility] with patent stent.    Chickenpox     [place] measles     H/O heart artery stent 2010    Heart attack (HCC)     Hyperlipidemia     Hypertension     Mumps     Sleep apnea     Snoring     Stroke (HCC)     Wears glasses      Past Surgical History:   Procedure Laterality Date    [facility] CARDIAC CATH  2015    Cath at [place].  Patent stent and no obstructive CAD.    [facility] CARDIAC CATH  2010    Taxus stent to LAD    OTHER CARDIAC SURGERY  2010    Heart Stent    CYST EXCISION      posterior neck       Current Outpatient Medications:     spironolactone, 25 mg, Oral, DAILY, Taking    apixaban, 5 mg, Oral, BID, Taking    sertraline, TAKE 1 TABLET BY MOUTH EVERY DAY AT BEDTIME FOR 90 DAYS, Taking    traZODone, 100 mg, Oral, Nightly, Taking    Camphor-Eucalyptus-Menthol (VICKS VAPORUB EX), 1 Application, Apply externally, QDAY PRN, Taking    Aromatic Inhalants (VICKS VAPOR INH), 1 Each, Inhalation, QDAY PRN, Taking    atorvastatin, 40 mg, Oral, AC PM MEAL, Taking    losartan, 25 mg, Oral, DAILY, Taking    metoprolol tartrate, 25 mg, Oral, BID, Taking    latanoprost, 1 Drop, Both Eyes, QHS, Taking     No Known Allergies    Family History   Problem Relation Age of Onset    Heart Attack Mother     Cancer Mother     Cancer Father     Cancer Sister      Social History     Tobacco Use    Smoking status: Former     Packs/day: 0.50     Years: 42.00     Pack years: 21.00     Types: Cigarettes     Quit date: 2010     Years since quittin.1     Passive exposure: Never    Smokeless tobacco: Never    Vaping Use    Vaping Use: Never used   Substance Use Topics    Alcohol use: Yes     Alcohol/week: 8.4 oz     Types: 14 Standard drinks or equivalent per week     Comment: 2 drinks per day     Drug use: Yes     Types: Marijuana, Inhaled     Comment: Every other day marijuana       DIET AND EXERCISE:  Weight Change:stable   BMI Readings from Last 5 Encounters:   06/08/23 31.64 kg/m²   06/06/23 29.97 kg/m²   05/27/23 30.02 kg/m²   05/25/23 32.41 kg/m²   03/23/23 32.19 kg/m²     Diet: common adult  Exercise: moderate regular exercise program     Review of Systems   Constitutional:  Negative for chills, fever and malaise/fatigue.   HENT:  Negative for nosebleeds.    Respiratory:  Negative for cough, hemoptysis, sputum production, shortness of breath and wheezing.    Cardiovascular:  Negative for chest pain, palpitations, orthopnea, claudication, leg swelling and PND.   Gastrointestinal:  Negative for blood in stool.   Endo/Heme/Allergies:  Does not bruise/bleed easily.           Objective    Vitals:    06/08/23 1027   BP: 121/67   BP Location: Left arm   Patient Position: Sitting   BP Cuff Size: Large adult   Pulse: (!) 47   Weight: 106 kg (233 lb 4.8 oz)      BP Readings from Last 5 Encounters:   06/08/23 121/67   06/06/23 124/60   05/27/23 121/72   05/25/23 116/70   03/23/23 126/70      Body mass index is 31.64 kg/m².  Physical Exam  Cardiovascular:      Rate and Rhythm: Normal rate.         Lab Results   Component Value Date    CHOLSTRLTOT 95 (L) 05/26/2023    LDL 31 05/26/2023    HDL 48 05/26/2023    TRIGLYCERIDE 81 05/26/2023      No results found for: LIPOPROTA   No results found for: APOB    Lab Results   Component Value Date    PROTHROMBTM 14.8 (H) 05/25/2023    INR 1.18 (H) 05/25/2023       Lab Results   Component Value Date    HBA1C 5.6 05/10/2022      Lab Results   Component Value Date    SODIUM 136 05/27/2023    POTASSIUM 3.7 05/27/2023    CHLORIDE 96 05/27/2023    CO2 22 05/27/2023    GLUCOSE 105 (H)  2023    BUN 17 2023    CREATININE 0.94 2023    BUNCREATRAT 15 2021    IFAFRICA 79 2021    IFNOTAFR 68 2021        Lab Results   Component Value Date    WBC 9.5 2023    RBC 4.49 (L) 2023    HEMOGLOBIN 14.4 2023    HEMATOCRIT 43.4 2023    MCV 96.7 2023    MCH 32.1 2023    MCHC 33.2 2023    MPV 10.5 2023        VASCULAR IMAGING:     Results for orders placed or performed during the hospital encounter of 23   EKG   Result Value Ref Range    Report       [facility]    Test Date:  2023  Pt Name:    SHAD DONALDSON                 Department: [facility]  MRN:        [id number]                      [facility]  Gender:     Male                         Technician: BRENDAN  :        1950                   Requested By:MANUEL VALENTINO  Order #:    [id number]                    Reading MD: Manuel Valentino MD    Measurements  Intervals                                Axis  Rate:       66                           P:          39  AL:         187                          QRS:        -81  QRSD:       98                           T:          14  QT:         445  QTc:        467    Interpretive Statements  Sinus rhythm  Inferior infarct, old  T wave inversions in anterior lateral leads  Baseline wander in lead(s) III  Compared to ECG 2022 12:40:16  ST (T wave) deviation no longer present  Myocardial infarct finding still present  Electronically Signed On 2023 13:29:54 PDT by Manuel Valentino MD       CTA neck 2023   1. No evidence of flow-limiting stenosis in the cervical carotid or cervical vertebral arteries.    CTA head 2023  1. No hemodynamically significant narrowing of the major intracranial vessels.   2. There is decreased flow in a tiny arterial branch in the left parietal region, concerning for partial occlusion.    CTPA 23  1.  Large right main pulmonary embolus extending into  "right upper, middle, and lower lobe segmental and subsegmental branches  2.  Left upper, lower, and lingular segmental and subsegmental pulmonary emboli  3.  Asymmetric prominence of the right ventricle with slight bowing of the intraventricular septum, appearance favoring component of right heart strain.  4.  Large right upper pole renal cyst without visualized complex features    Echo 5/2023  Compared to the images of the prior study 5/11/2022, RV is now dilated   with flattened septum.  Normal left ventricular size and systolic function.  No regional wall motion abnormality noted.  Dilated right ventricle with reduced systolic function.  Flattened septum in diastole (\"D\"-shaped left ventricle) consistent   with RV volume overload.  Estimated right ventricular systolic pressure is 35 mmHg.  Aorta  Normal aortic root for body surface area. . The ascending aorta is   dilated with a diameter of 4.2 cm.          Medical Decision Making:  Today's Assessment / Status / Plan:     1. Pulmonary embolus, right (HCC)        2. Chronic anticoagulation        3. Coronary artery disease involving native coronary artery of native heart without angina pectoris        4. Stented coronary artery        5. Bilateral carotid artery stenosis        6. Primary hypertension  CBC WITHOUT DIFFERENTIAL    Comp Metabolic Panel    Lipid Profile    Lipoprotein (a)      7. FRANK on CPAP        8. History of CVA (cerebrovascular accident)        9. Aneurysm of ascending aorta without rupture (HCC)        10. Other hyperlipidemia  Comp Metabolic Panel    Lipid Profile    Lipoprotein (a)        PATIENT TYPE: Secondary Prevention    Etiology of Established CVD if Present:     1) VTE disease   5/2023 - acute bilat PE w/o cor pulm  Thrombophilia/hypercoag evaluation:  not recommended  - no imaging surveillance needed at this time  - antthrombotic plan as noted below     2) Ascending aortic aneurysm - stable, asymptomatic  - 4.2cm on most recent echo " 5/2023   - no repair needed unless growth to 5.5+cm or expansion rate of 1+cm/yr (or 0.5+cm/6mo)  -Presumed cystic medial degeneration with sporadic development.   -Echo shows no biscupid Ao valve  -No fhx or pmhx of connective tissue disease  - reviewed anatomy, risks, emergency s/s requiring immediate attention, and thresholds for surgical intervention and gave handouts.   Plan:   - continue med mgmt   - recommend 1st degree relatives get screened with echo for TAA  - check echo to r/o expansion and bicuspid Ao valve   - consider AAA duplex to eval for comorbid AAA  - if normal exams, then repeat annual echo surveillance, if indications of rapid expansion then repeat CTA  - focus on ARB and BB for BP control  - activity restrictions including no extreme endurance activities, smoking, stimulants, and extreme weight lifting >20lbs      ANTITHROMBOTIC THERAPY:  Date of initiation: 5/2023   HAS-BLED bleeding risk calc ([email or web address]): 1 pt, 3.4%, low risk  Factors to consider for indefinite OAC: Unprovoked VTE event, Life-threatening or very extensive proximal DVT , and Male sex   Last CBC, BMP: reviewed above   Expected duration: reviewed standard of care as per Chest 2021 guidelines is 3-6 months minimum on full dose OAC.  After review of risks/benefits/alternatives, through shared decision-making, we will continue indefinite OAC   Antithrombotic therapy plan:  - continue eliquis 5mg BID for 6mo, then consider transition to 2.5mg BID ongoing  - pending with hematology - will defer to their tx decision-making   - advised to postpone elective procedures until after 11/2023  - stressed strict adherence to tx and avoid early termination due to increased risk for recurrent VTE  - check CBC, BMP (CMP if any underlying liver disease), and monitor CrCl as needed Q6mo while on DOAC  - counseled on signs and symptoms of acute VTE that require seeking prompt attention in the ED to include shortness of breath, chest pain, pain  with deep inhalation, acute leg swelling and/or pain in calf or leg   - elevate legs as much as possible, use compression stockings/socks if directed by your provider  - Avoid hormonal therapies including estrogen or testosterone-containing meds, or raloxifene or tamoxifene (commonly used for osteoporosis)  - Avoid sedentary periods  - continue complete avoidance of tobacco products  - if having any invasive procedure,please make sure the doctor knows of your history of blood clots and current anticoagulation status  - avoid Aspirin and anti-inflammatories (eg. Advil, ibuprofen, Aleve, naproxen, etc) while anticoagulated   - avoid skiing or other dangerous activities to reduce risk of head injury and brain bleeds  - recommended to see your PCP to discuss if you need age-appropriate cancer screenings as a small % of blood clots may be caused by an underlying malignancy  - if any bleeding lasting 30min without stopping, please seek care with your PCP, urgent care, or ED  - reversal agents for most blood thinners are now available and used if you have major bleeding    LIPID MANAGEMENT:   Qualifies for Statin Therapy Based on 2018 ACC/AHA Guidelines: yes, Secondary Prevention - Very high risk ASCVD - 2 major events or 1 event with other high-risk conditions  The ASCVD Risk score (Paige FITZGERALD, et al., 2019) failed to calculate., N/A  Major ASCVD events: History of ischemic CVA   High-risk conditions: >64yr old  and Hypertension   Currently on Statin: Yes  Tx goals: LDL-C <55 (if HTG, consider non-HDL-C <85, apoB: not defined)   At goal? Yes, 5/2023   Plan:   - reinforced ongoing TLC measures as noted   - monitor labs   Meds:   - continue atorva 40mg daily     BLOOD PRESSURE MANAGEMENT:  ACC/AHA (2017) goal <130/80  Home BP at goal:  yes  Office BP at goal:  yes  24h ABPM: not ordered to date  Plan:   Monitoring:   - start/continue home BP monitoring, reviewed correct technique, provide BP log and instructions  - order 24h  ABPM:  NO  - monitor lytes/gfr routinely   - contact office if BP consistently >140/>90 to discussion of tx adjustments   Medications:  - continue losartan 25mg daily   - continue metoprolol 25mg BID     GLYCEMIC STATUS:  Normal    LIFESTYLE INTERVENTIONS:    SMOKING: Stages of Change: Maintenance (sustained change >6mo)    reports that he quit smoking about 13 years ago. His smoking use included cigarettes. He has a 21.00 pack-year smoking history. He has never been exposed to tobacco smoke. He has never used smokeless tobacco.   - continued complete avoidance of all tobacco products     PHYSICAL ACTIVITY: continue healthy activity to improve CV fitness.  In general, targeting >150min/week of moderate-level activity or as much as tolerated in light of functional status and co-morbidities     WEIGHT MANAGEMENT AND NUTRITION: Mediterranean style dietary approach       OTHER:     # FRANK on CPAP  Strongly encouraged CPAP adherence to reduce RV strain, improve overall fatigue symptoms, and improve BP in both the short- and long-term as per HIPARCO (2013) and HIPARCO-2 (2021) studies.   - continue CPAP, defer mgmt to sleep MD     Instructed to follow-up with PCP for remainder of adult medical needs: yes  We will partner with other providers in the management of established vascular disease and cardiometabolic risk factors.    Studies to Be Obtained: none    Labs to Be Obtained: as noted above     Follow up in: 6 months    Victoriano Peña M.D.  [facility]   [facility]   [phone]

## 2023-06-19 ENCOUNTER — NON-PROVIDER VISIT (OUTPATIENT)
Dept: CARDIOLOGY | Facility: MEDICAL CENTER | Age: 73
End: 2023-06-19
Payer: MEDICARE

## 2023-06-19 PROCEDURE — 93298 REM INTERROG DEV EVAL SCRMS: CPT | Performed by: INTERNAL MEDICINE

## 2023-06-20 NOTE — CARDIAC REMOTE MONITOR - SCAN
Device transmission reviewed. Device demonstrated appropriate function.       Electronically Signed by: Blake Monterroso M.D.    6/20/2023  10:46 AM

## 2023-06-30 LAB
ALBUMIN SERPL-MCNC: 4.6 G/DL (ref 3.7–4.7)
ALBUMIN/GLOB SERPL: 1.7 {RATIO} (ref 1.2–2.2)
ALP SERPL-CCNC: 62 IU/L (ref 44–121)
ALT SERPL-CCNC: 17 IU/L (ref 0–44)
AST SERPL-CCNC: 17 IU/L (ref 0–40)
BILIRUB SERPL-MCNC: 0.5 MG/DL (ref 0–1.2)
BUN SERPL-MCNC: 16 MG/DL (ref 8–27)
BUN/CREAT SERPL: 16 (ref 10–24)
CALCIUM SERPL-MCNC: 10 MG/DL (ref 8.6–10.2)
CHLORIDE SERPL-SCNC: 103 MMOL/L (ref 96–106)
CHOLEST SERPL-MCNC: 120 MG/DL (ref 100–199)
CO2 SERPL-SCNC: 24 MMOL/L (ref 20–29)
CREAT SERPL-MCNC: 0.99 MG/DL (ref 0.76–1.27)
EGFRCR SERPLBLD CKD-EPI 2021: 81 ML/MIN/1.73
ERYTHROCYTE [DISTWIDTH] IN BLOOD BY AUTOMATED COUNT: 12.2 % (ref 11.6–15.4)
GLOBULIN SER CALC-MCNC: 2.7 G/DL (ref 1.5–4.5)
GLUCOSE SERPL-MCNC: 111 MG/DL (ref 70–99)
HCT VFR BLD AUTO: 39.1 % (ref 37.5–51)
HDLC SERPL-MCNC: 50 MG/DL
HGB BLD-MCNC: 13.3 G/DL (ref 13–17.7)
LABORATORY COMMENT REPORT: NORMAL
LDLC SERPL CALC-MCNC: 51 MG/DL (ref 0–99)
LPA SERPL-SCNC: 9.1 NMOL/L
MCH RBC QN AUTO: 31.7 PG (ref 26.6–33)
MCHC RBC AUTO-ENTMCNC: 34 G/DL (ref 31.5–35.7)
MCV RBC AUTO: 93 FL (ref 79–97)
NRBC BLD AUTO-RTO: NORMAL %
PLATELET # BLD AUTO: 211 X10E3/UL (ref 150–450)
POTASSIUM SERPL-SCNC: 4.7 MMOL/L (ref 3.5–5.2)
PROT SERPL-MCNC: 7.3 G/DL (ref 6–8.5)
RBC # BLD AUTO: 4.19 X10E6/UL (ref 4.14–5.8)
SODIUM SERPL-SCNC: 142 MMOL/L (ref 134–144)
TRIGL SERPL-MCNC: 104 MG/DL (ref 0–149)
VLDLC SERPL CALC-MCNC: 19 MG/DL (ref 5–40)
WBC # BLD AUTO: 5.4 X10E3/UL (ref 3.4–10.8)

## 2023-07-18 NOTE — PROGRESS NOTES
[facility] Follow-up Visit    Date of Visit: 8/3/2023     CC: Follow-up for FRANK management      HPI:  Jan Rivas is a very pleasant 72 y.o. year old male former smoker (21 pack-years, quit in 2010), with a PMHx of FRANK on CPAP, elevated BMI, HTN, PVD, CAD with stents who presented to the [facility] for a regular follow up. Last seen in the office on 10/13/2022 with myself.     Patient presents for his annual compliance check.  Patient states that he will have an occasional dry mouth in the morning, which water alleviates.  He denies any significant morning headaches, daytime/driving drowsy, issues by sleep, snoring, gasping, apneas, palpitations, aerophagia, mask leak, or skin irritation.  Patient goes to bed between 1030-11 PM and wakes up between 7-8 AM.  He will rarely have awakenings at night to use the restroom and will rarely have any issues falling back to sleep.  He will occasionally nap for 45-60 minutes.    DME provider: Miguel  Device: Hortau 2 autoCPAP  Settings: CPAP at 10 CWP  When: December 2021  Mask: Fullface  Chin strap: No     Cleaning regimen: Once a week with CPAP wipes    Compliance:  Compliance data reviewed showing 100% usage > 4hours in last 30 days. Average AHI 1.6 events/hour.  Average time in large leak per day 0 seconds. Patient continues to use and benefit from machine.      Sleep History:  Diagnosed with FRANK on 11/27/2018 with a AHI was 87.6/hr and the best tolerated pressure was CPAP 10 cm. The AHI improved to 1.6/hr.      Patient Active Problem List    Diagnosis Date Noted    FRANK on CPAP 12/06/2019    History of CVA (cerebrovascular accident) 06/08/2023    Demand ischemia (HCC) 05/25/2023    Acute pulmonary embolism with acute cor pulmonale (HCC) 05/25/2023    Acute hypoxemic respiratory failure (HCC) 05/25/2023    Dysphagia 08/15/2022    Late effect of stroke 06/27/2022    Acute ischemic stroke (HCC) 05/09/2022    Ear itching 11/11/2020     Male erectile disorder 2020    Hair loss 2020    BMI 34.0-34.9,adult 2019    Low back pain with radiation 2019    Elevated hemoglobin A1c 2019    Dermatitis 2019    High risk medication use 2018    PVD (peripheral vascular disease) ([facility]) 02/10/2017    Carotid stenosis 2014    Stented coronary artery 10/31/2013    Primary hypertension 10/31/2013    Pure hypercholesterolemia 10/31/2013    CAD (coronary artery disease) 10/22/2013     Past Medical History:   Diagnosis Date    Acute stroke due to ischemia ([facility]) 2022    MRI with acute infarct of left posterior insular cortex.    Apnea, sleep     Uses CPAP    CAD (coronary artery disease) 2010    Acute apical infarct. PCI/MANJEET (Taxus 3.5 x 32mm) to the LAD. 2015: [facility] with patent stent.    Chickenpox     Japanese measles     H/O heart artery stent 2010    Heart attack ([facility])     Hyperlipidemia     Hypertension     Mumps     Sleep apnea     Snoring     Stroke ([facility])     Wears glasses       Past Surgical History:   Procedure Laterality Date    [facility] CARDIAC CATH  2015    Cath at [facility].  Patent stent and no obstructive CAD.    [facility] CARDIAC CATH  2010    Taxus stent to LAD    OTHER CARDIAC SURGERY  2010    Heart Stent    CYST EXCISION      posterior neck     Family History   Problem Relation Age of Onset    Heart Attack Mother     Cancer Mother     Cancer Father     Cancer Sister      Social History     Socioeconomic History    Marital status:      Spouse name: Not on file    Number of children: Not on file    Years of education: Not on file    Highest education level: Associate degree: academic program   Occupational History    Not on file   Tobacco Use    Smoking status: Former     Packs/day: 0.50     Years: 42.00     Pack years: 21.00     Types: Cigarettes     Quit date: 2010     Years since quittin.2     Passive exposure: Never    Smokeless tobacco: Never   Vaping Use    Vaping Use:  Never used   Substance and Sexual Activity    Alcohol use: Yes     Alcohol/week: 8.4 oz     Types: 14 Standard drinks or equivalent per week     Comment: 2 drinks per day     Drug use: Yes     Types: Marijuana, Inhaled     Comment: Every other day marijuana    Sexual activity: Not on file   Other Topics Concern    Not on file   Social History Narrative    Not on file     Social Determinants of Health     Financial Resource Strain: Low Risk  (5/26/2023)    Overall Financial Resource Strain (CARDIA)     Difficulty of Paying Living Expenses: Not hard at all   Food Insecurity: No Food Insecurity (5/26/2023)    Hunger Vital Sign     Worried About Running Out of Food in the Last Year: Never true     Ran Out of Food in the Last Year: Never true   Transportation Needs: No Transportation Needs (5/26/2023)    PRAPARE - Transportation     Lack of Transportation (Medical): No     Lack of Transportation (Non-Medical): No   Physical Activity: Insufficiently Active (5/25/2023)    Exercise Vital Sign     Days of Exercise per Week: 2 days     Minutes of Exercise per Session: 30 min   Stress: No Stress Concern Present (5/25/2023)    [employer] [employer] - Occupational Stress Questionnaire     Feeling of Stress : Not at all   Social Connections: Moderately Integrated (5/25/2023)    Social Connection and Isolation Panel [NHANES]     Frequency of Communication with Friends and Family: More than three times a week     Frequency of Social Gatherings with Friends and Family: Twice a week     Attends Yazdanism Services: Never     Active Member of Clubs or Organizations: Yes     Attends Club or Organization Meetings: 1 to 4 times per year     Marital Status:    Intimate Partner Violence: Not on file   Housing Stability: Low Risk  (5/26/2023)    Housing Stability Vital Sign     Unable to Pay for Housing in the Last Year: No     Number of Places Lived in the Last Year: 1     Unstable Housing in the Last Year: No      Current Outpatient Medications   Medication Sig Dispense Refill    spironolactone (ALDACTONE) 25 MG Tab Take 1 Tablet by mouth every day. 90 Tablet 3    apixaban (ELIQUIS) 5mg Tab Take 1 Tablet by mouth 2 times a day. 180 Tablet 3    sertraline (ZOLOFT) 50 MG Tab       traZODone (DESYREL) 50 MG Tab Take 100 mg by mouth every evening.      Camphor-Eucalyptus-Menthol (VICKS VAPORUB EX) Apply 1 Application topically 1 time a day as needed (Under nose for CPAP).        Aromatic Inhalants (VICKS VAPOR INH) Inhale 1 Each 1 time a day as needed (nasal stick).      atorvastatin (LIPITOR) 40 MG Tab Take 1 Tablet by mouth 1/2 hour before dinner. 90 Tablet 3    losartan (COZAAR) 25 MG Tab Take 1 Tablet by mouth every day. 90 Tablet 3    metoprolol tartrate (LOPRESSOR) 25 MG Tab Take 1 Tablet by mouth 2 times a day. TAKE 1 TAB BY MOUTH 2 TIMES A DAY. 180 Tablet 3    latanoprost (XALATAN) 0.005 % Solution Administer 1 Drop into both eyes at bedtime.       No current facility-administered medications for this visit.      ALLERGIES: Patient has no allergy information on record.    ROS:  Constitutional: Denies fever, chills, sweats,  weight loss, fatigue  Cardiovascular: Denies chest pain, tightness, palpitations, swelling in legs/feet  Respiratory: Denies shortness of breath, cough, sputum, wheezing, painful breathing   Sleep: per HPI  Gastrointestinal: Denies  difficulty swallowing, nausea, abdominal pain, diarrhea, constipation, heartburn.  Musculoskeletal: Denies painful joints, sore muscles,       PHYSICAL EXAM:  /74 (BP Location: Right arm, Patient Position: Sitting, BP Cuff Size: Adult)   Pulse (!) 50   Resp 16   Ht 1.829 m (6')   Wt 103 kg (227 lb)   SpO2 94% Comment: room air at rest  BMI 30.79 kg/m²   Appearance: Well-nourished, well-developed, no acute distress  Eyes:  No scleral icterus , EOMI  ENMT: No redness of the oropharynx  Lung auscultation:  No wheezes rhonchi rubs or rales  Cardiac: No murmurs,  rubs, or gallops; regular rhythm, normal rate; no edema  Musculoskeletal:  Grossly normal; gait and station normal; digits and nails normal  Skin:  No rashes, petechiae, cyanosis  Neurologic: without focal signs; oriented to person, time, place, and purpose; judgement intact  Psychiatric:  No depression, anxiety, agitation      Assessment and Plan:    The medical record was reviewed.    Diagnostic and titration nocturnal polysomnogram's, home sleep apnea tests, continuous nocturnal oximetry results, multiple sleep latency tests, and compliance reports reviewed.    Problem List Items Addressed This Visit       FRANK on CPAP     Sleep Apnea:    The pathophysiology of sleep anea and the increased risk of cardiovascular morbidity from untreated sleep apnea is discussed in detail with the patient.  Urged to avoid supine sleep, weight gain and alcoholic beverages since all of these can worsen sleep apnea. Cautioned against drowsy driving. If feeling sleepy while driving, pull over for a break/nap, rather than persist on the road, in the interest of own safety and that of others on the road.    Compliance download was reviewed and discussed with the patient.     - Order placed for mask and supplies to [facility]  - Compliance was reinforced  - Recommended the patient against the use of Ozone , such as SoClean  - Clean supplies a couple times a week with dish soap and water and air dry  - Recommended the patient change out supplies as recommended for best mask fit and usage of the machine  - Advised patient to reach out via [employer] if any questions or concerns should arise.   - Equipment replacement schedule:  Mask cushion every month  Nasal pillows 2 times per month  Mask every 6 months  Head gear every 6 months  Tubing every 3 months  Ultra-fine filters 2 times per month  Foam filter every 6 months  Humidifier chamber every 6 months  Chin strap every 6 months    Has been advised to continue the current CPAP, clean  equipment frequently, and get new mask and supplies as allowed by insurance and DME. Recommend an earlier appointment, if significant treatment barriers develop.    The risks of untreated sleep apnea were discussed with the patient at length. Patients with sleep apnea are at increased risk of cardiovascular disease including coronary artery disease, systemic arterial hypertension, pulmonary arterial hypertension, cardiac arrythmias, and stroke. The patient was advised to avoid driving a motor vehicle when drowsy.    Positive airway pressure will favorably impact many of the adverse conditions and effects provoked by sleep apnea.         Relevant Orders    DME Mask and Supplies     Have advised the patient to follow up with the appropriate healthcare practitioners for all other medical problems and issues.    Return in about 1 year (around 8/3/2024), or if symptoms worsen or fail to improve, for compliance, with [facility].    Please note portions of this record was created using voice recognition software. I have made every reasonable attempt to correct obvious errors, but I expect that there are errors of grammar and possibly content I did not discover before finalizing the note.    Time spent in record review prior to patient arrival, reviewing results, and in face-to-face encounter totaled 12 min.  __________  DANIELA Watkins  Pulmonary & Sleep Medicine  Formerly [facility]

## 2023-07-20 ENCOUNTER — NON-PROVIDER VISIT (OUTPATIENT)
Dept: CARDIOLOGY | Facility: MEDICAL CENTER | Age: 73
End: 2023-07-20
Payer: MEDICARE

## 2023-07-20 PROCEDURE — 93298 REM INTERROG DEV EVAL SCRMS: CPT | Performed by: INTERNAL MEDICINE

## 2023-07-21 NOTE — CARDIAC REMOTE MONITOR - SCAN
Device transmission reviewed. Device demonstrated appropriate function.       Electronically Signed by: Obdulio Joseph M.D.    7/28/2023  4:31 PM

## 2023-08-03 ENCOUNTER — OFFICE VISIT (OUTPATIENT)
Dept: SLEEP MEDICINE | Facility: MEDICAL CENTER | Age: 73
End: 2023-08-03
Payer: MEDICARE

## 2023-08-03 VITALS
SYSTOLIC BLOOD PRESSURE: 128 MMHG | DIASTOLIC BLOOD PRESSURE: 74 MMHG | BODY MASS INDEX: 30.75 KG/M2 | OXYGEN SATURATION: 94 % | HEART RATE: 50 BPM | WEIGHT: 227 LBS | RESPIRATION RATE: 16 BRPM | HEIGHT: 72 IN

## 2023-08-03 DIAGNOSIS — G47.33 OSA ON CPAP: ICD-10-CM

## 2023-08-03 PROCEDURE — 3074F SYST BP LT 130 MM HG: CPT

## 2023-08-03 PROCEDURE — 3078F DIAST BP <80 MM HG: CPT

## 2023-08-03 PROCEDURE — 99212 OFFICE O/P EST SF 10 MIN: CPT

## 2023-08-03 PROCEDURE — 99213 OFFICE O/P EST LOW 20 MIN: CPT

## 2023-08-03 ASSESSMENT — FIBROSIS 4 INDEX: FIB4 SCORE: 1.39

## 2023-08-03 NOTE — PATIENT INSTRUCTIONS
Equipment replacement schedule:  Mask cushion every month  Mask every 6 months  Head gear every 6 months  Tubing every 3 months  Ultra-fine filters 2 times per month  Foam filter every 6 months  Humidifier chamber every 6 months

## 2023-08-03 NOTE — ASSESSMENT & PLAN NOTE
Sleep Apnea:    The pathophysiology of sleep anea and the increased risk of cardiovascular morbidity from untreated sleep apnea is discussed in detail with the patient.  Urged to avoid supine sleep, weight gain and alcoholic beverages since all of these can worsen sleep apnea. Cautioned against drowsy driving. If feeling sleepy while driving, pull over for a break/nap, rather than persist on the road, in the interest of own safety and that of others on the road.    Compliance download was reviewed and discussed with the patient.     - Order placed for mask and supplies to [facility]  - Compliance was reinforced  - Recommended the patient against the use of Ozone , such as SoClean  - Clean supplies a couple times a week with dish soap and water and air dry  - Recommended the patient change out supplies as recommended for best mask fit and usage of the machine  - Advised patient to reach out via [employer] if any questions or concerns should arise.   - Equipment replacement schedule:  Mask cushion every month  Nasal pillows 2 times per month  Mask every 6 months  Head gear every 6 months  Tubing every 3 months  Ultra-fine filters 2 times per month  Foam filter every 6 months  Humidifier chamber every 6 months  Chin strap every 6 months    Has been advised to continue the current CPAP, clean equipment frequently, and get new mask and supplies as allowed by insurance and DME. Recommend an earlier appointment, if significant treatment barriers develop.    The risks of untreated sleep apnea were discussed with the patient at length. Patients with sleep apnea are at increased risk of cardiovascular disease including coronary artery disease, systemic arterial hypertension, pulmonary arterial hypertension, cardiac arrythmias, and stroke. The patient was advised to avoid driving a motor vehicle when drowsy.    Positive airway pressure will favorably impact many of the adverse conditions and effects provoked by sleep  apnea.

## 2023-08-20 ENCOUNTER — NON-PROVIDER VISIT (OUTPATIENT)
Dept: CARDIOLOGY | Facility: MEDICAL CENTER | Age: 73
End: 2023-08-20
Payer: MEDICARE

## 2023-08-20 PROCEDURE — 93298 REM INTERROG DEV EVAL SCRMS: CPT | Performed by: INTERNAL MEDICINE

## 2023-08-21 NOTE — CARDIAC REMOTE MONITOR - SCAN
Device transmission reviewed. Device demonstrated appropriate function.       Electronically Signed by: Obdulio Joseph M.D.    8/23/2023  10:27 AM

## 2023-08-22 ENCOUNTER — APPOINTMENT (RX ONLY)
Dept: URBAN - METROPOLITAN AREA CLINIC 4 | Facility: CLINIC | Age: 73
Setting detail: DERMATOLOGY
End: 2023-08-22

## 2023-08-22 DIAGNOSIS — L30.9 DERMATITIS, UNSPECIFIED: ICD-10-CM | Status: IMPROVED

## 2023-08-22 DIAGNOSIS — D18.0 HEMANGIOMA: ICD-10-CM

## 2023-08-22 DIAGNOSIS — L81.4 OTHER MELANIN HYPERPIGMENTATION: ICD-10-CM

## 2023-08-22 DIAGNOSIS — L82.1 OTHER SEBORRHEIC KERATOSIS: ICD-10-CM

## 2023-08-22 DIAGNOSIS — D22 MELANOCYTIC NEVI: ICD-10-CM

## 2023-08-22 PROBLEM — D22.5 MELANOCYTIC NEVI OF TRUNK: Status: ACTIVE | Noted: 2023-08-22

## 2023-08-22 PROBLEM — D18.01 HEMANGIOMA OF SKIN AND SUBCUTANEOUS TISSUE: Status: ACTIVE | Noted: 2023-08-22

## 2023-08-22 PROCEDURE — ? COUNSELING

## 2023-08-22 PROCEDURE — 99213 OFFICE O/P EST LOW 20 MIN: CPT

## 2023-08-22 PROCEDURE — ? TREATMENT REGIMEN

## 2023-08-22 ASSESSMENT — LOCATION DETAILED DESCRIPTION DERM
LOCATION DETAILED: RIGHT MEDIAL SUPERIOR CHEST
LOCATION DETAILED: LEFT PROXIMAL DORSAL FOREARM
LOCATION DETAILED: SUPERIOR THORACIC SPINE
LOCATION DETAILED: RIGHT MID-UPPER BACK
LOCATION DETAILED: RIGHT DISTAL DORSAL FOREARM
LOCATION DETAILED: EPIGASTRIC SKIN
LOCATION DETAILED: LEFT MEDIAL INFERIOR CHEST

## 2023-08-22 ASSESSMENT — LOCATION SIMPLE DESCRIPTION DERM
LOCATION SIMPLE: UPPER BACK
LOCATION SIMPLE: CHEST
LOCATION SIMPLE: RIGHT FOREARM
LOCATION SIMPLE: ABDOMEN
LOCATION SIMPLE: RIGHT UPPER BACK
LOCATION SIMPLE: LEFT FOREARM

## 2023-08-22 ASSESSMENT — LOCATION ZONE DERM
LOCATION ZONE: TRUNK
LOCATION ZONE: ARM

## 2023-08-28 ENCOUNTER — TELEPHONE (OUTPATIENT)
Dept: CARDIOLOGY | Facility: MEDICAL CENTER | Age: 73
End: 2023-08-28
Payer: MEDICARE

## 2023-08-28 NOTE — TELEPHONE ENCOUNTER
Called pt to see if he would like to come in on 9/7/2023 or if he would like to wait until after the Echo is completed, no answer, left vm.

## 2023-08-29 NOTE — TELEPHONE ENCOUNTER
Ac Whelan,    This patient called back to get reschedule till after his echo and has an appt w/ THIAGO on 10-.      Thank you,    JOEY

## 2023-09-06 NOTE — PROGRESS NOTES
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 Franci Espinal after a hospitalization in May 2023 for a pulmonary embolism. He had taken a 10 hour train trip a month prior. He also has a history of a CVA and CAD with a stent and FRANK. Initial coagulation w/u included a normal platelet count, negative Factor V Leiden and normal Protein C. Anticardiolipin abs were negative. He was discharged on Eliquis 5 mg BID. He did not have lower extremity evaluation at the time of the PE. He only got up twice during the 10 hour train ride. He does not recall if he was anticoagulated when he had cardiac stents placed following an MI 10 years ago.     ROS:    Constitutional: No weight loss  Skin: No rash or jaundice  HENT: No change in eyesight or hearing  Cardiovascular:No chest pain or arrythmia  Respiratory:No cough or SOB  GI:No nausea, vomiting, diarrhea, constipation  :No dysuria or frequency  Musculoskeletal:No bone or joint pain  Neuro:No sx's of neuropathy  Psych: No complaints    PMH:      No Known Allergies    Past Medical History:   Diagnosis Date    Acute stroke due to ischemia (HCC) 05/2022    MRI with acute infarct of left posterior insular cortex.    Apnea, sleep     Uses CPAP    CAD (coronary artery disease) 05/2010    Acute apical infarct. PCI/MANJEET (Taxus 3.5 x 32mm) to the LAD. 2015: [facility] with patent stent.    Chickenpox     Portuguese measles     H/O heart artery stent 05/04/2010    Heart attack (HCC)     Hyperlipidemia     Hypertension     Mumps     Sleep apnea     Snoring     Stroke (HCC)     Wears glasses         Past Surgical History:   Procedure Laterality Date    [facility] CARDIAC CATH  02/21/2015    Cath at [place].  Patent stent and no obstructive CAD.    [facility] CARDIAC CATH  05/04/2010    Taxus stent to LAD    OTHER CARDIAC SURGERY  05/04/2010    Heart Stent    CYST EXCISION      posterior neck         Medications:    Current Outpatient Medications on File Prior to Encounter   Medication Sig Dispense Refill    spironolactone (ALDACTONE) 25 MG Tab Take 1 Tablet by mouth every day. 90 Tablet 3    apixaban (ELIQUIS) 5mg Tab Take 1 Tablet by mouth 2 times a day. 180 Tablet 3    sertraline (ZOLOFT) 50 MG Tab       traZODone (DESYREL) 50 MG Tab Take 100 mg by mouth every evening.      Camphor-Eucalyptus-Menthol (VICKS VAPORUB EX) Apply 1 Application topically 1 time a day as needed (Under nose for CPAP).        Aromatic Inhalants (VICKS VAPOR INH) Inhale 1 Each 1 time a day as needed (nasal stick).      atorvastatin (LIPITOR) 40 MG Tab Take 1 Tablet by mouth 1/2 hour before dinner. 90 Tablet 3    losartan (COZAAR) 25 MG Tab Take 1 Tablet by mouth every day. 90 Tablet 3    metoprolol tartrate (LOPRESSOR) 25 MG Tab Take 1 Tablet by mouth 2 times a day. TAKE 1 TAB BY MOUTH 2 TIMES A DAY. 180 Tablet 3    latanoprost (XALATAN) 0.005 % Solution Administer 1 Drop into both eyes at bedtime.       No current facility-administered medications on file prior to encounter.       Social History     Tobacco Use    Smoking status: Former     Current packs/day: 0.00     Average packs/day: 0.5 packs/day for 42.0 years (21.0 ttl pk-yrs)     Types: Cigarettes     Start date: 1968     Quit date: 2010     Years since quittin.3     Passive exposure: Never    Smokeless tobacco: Never   Substance Use Topics    Alcohol use: Yes     Alcohol/week: 8.4 oz     Types: 14 Standard drinks or equivalent per week     Comment: 2 drinks per day         Family History   Problem Relation Age of Onset    Heart Attack Mother     Cancer Mother     Cancer Father     Cancer Sister         Objective    Vitals:    Pulse (!) 44   Temp 36.4 °C (97.6 °F) (Temporal)   Resp 12   Ht 1.829 m (6')   Wt 105 kg (230 lb 11.4 oz)   SpO2 96%   BMI 31.29 kg/m²     Physical Exam:    Appears well-developed and well-nourished. No distress.    Head -   Normocephalic .   Eyes - Pupils are equal. Conjunctivae normal. No scleral icterus.   Ears - normal hearing  Mouth - Throat: Oropharynx is clear and moist. No oropharyngeal exudate  Neck - Neck supple. No thyromegaly  Cardiovascular - Normal rate, regular rhythm, normal heart sounds and intact distal pulses. No  gallop, murmur or rub  Pulmonary - Normal breath sounds.  No wheeze, rales or rhonchi  Abdominal -Soft. No distension, tenderness, organomegaly or mass  Extremities-  No edema or tenderness.    Nodes - No submental, submandibular, preauricular, cervical, axillary or inguinal adenopathy.    Neurological -   Alert and oriented.  Skin - Skin is warm and dry. No rash noted. Not diaphoretic. No erythema. No pallor. No jaundice   Psychiatric -  Normal mood and affect.    Labs:     Latest Reference Range & Units 05/26/23 11:27   Anti-Cardiolipin Ab Iga <=11 APL <10   Anti-Cardiolipin Ab Igm <=12 MPL <10   Anti-Cariolipin Ab Igg <=14 GPL <10      Latest Reference Range & Units 05/26/23 11:27 05/26/23 14:16   V Leiden Factor  Negative    Protein C Functional 83 - 168 %  141      Latest Reference Range & Units 06/27/23 06:57   WBC 3.4 - 10.8 x10E3/uL 5.4   RBC 4.14 - 5.80 x10E6/uL 4.19   Hemoglobin 13.0 - 17.7 g/dL 13.3   Hematocrit 37.5 - 51.0 % 39.1   MCV 79 - 97 fL 93   MCH 26.6 - 33.0 pg 31.7   MCHC 31.5 - 35.7 g/dL 34.0   RDW 11.6 - 15.4 % 12.2   Platelet Count 150 - 450 x10E3/uL 211       Assessment    Imp:    Visit Diagnosis:    1. History of pulmonary embolism  ANTITHROMBIN PANEL ([facility])    PROTEIN S FUNCTIONAL    FACTOR II DNA ANALYSIS      2. History of CVA (cerebrovascular accident)  Referral to Genetic Research Studies      3. FRANK on CPAP          Plan:  Continue Eliquis until 6 months, then dc for a week and complete thrombophilia lab  Return when lab results available    Rei Roberto M.D.

## 2023-09-18 ENCOUNTER — HOSPITAL ENCOUNTER (OUTPATIENT)
Dept: HEMATOLOGY ONCOLOGY | Facility: MEDICAL CENTER | Age: 73
End: 2023-09-18
Attending: INTERNAL MEDICINE
Payer: MEDICARE

## 2023-09-18 VITALS
TEMPERATURE: 97.6 F | RESPIRATION RATE: 12 BRPM | HEIGHT: 72 IN | BODY MASS INDEX: 31.25 KG/M2 | OXYGEN SATURATION: 96 % | HEART RATE: 44 BPM | WEIGHT: 230.71 LBS

## 2023-09-18 DIAGNOSIS — Z86.73 HISTORY OF CVA (CEREBROVASCULAR ACCIDENT): ICD-10-CM

## 2023-09-18 DIAGNOSIS — G47.33 OSA ON CPAP: ICD-10-CM

## 2023-09-18 DIAGNOSIS — Z86.711 HISTORY OF PULMONARY EMBOLISM: ICD-10-CM

## 2023-09-18 PROCEDURE — 99204 OFFICE O/P NEW MOD 45 MIN: CPT | Performed by: INTERNAL MEDICINE

## 2023-09-18 PROCEDURE — 99212 OFFICE O/P EST SF 10 MIN: CPT | Performed by: INTERNAL MEDICINE

## 2023-09-18 ASSESSMENT — FIBROSIS 4 INDEX: FIB4 SCORE: 1.39

## 2023-09-18 ASSESSMENT — PAIN SCALES - GENERAL: PAINLEVEL: NO PAIN

## 2023-09-19 ENCOUNTER — RESEARCH ENCOUNTER (OUTPATIENT)
Dept: RESEARCH | Facility: WORKSITE | Age: 73
End: 2023-09-19
Attending: INTERNAL MEDICINE
Payer: MEDICARE

## 2023-09-19 NOTE — RESEARCH NOTE
Confirmed with the participant which designated provider they would like study results shared with. Patient will have an opportunity to share the results with any providers of their choosing in the future by accessing their results from [employer].

## 2023-09-20 ENCOUNTER — NON-PROVIDER VISIT (OUTPATIENT)
Dept: CARDIOLOGY | Facility: MEDICAL CENTER | Age: 73
End: 2023-09-20
Payer: MEDICARE

## 2023-09-20 PROCEDURE — 93298 REM INTERROG DEV EVAL SCRMS: CPT | Performed by: INTERNAL MEDICINE

## 2023-09-21 NOTE — CARDIAC REMOTE MONITOR - SCAN
Device transmission reviewed. Device demonstrated appropriate function.       Electronically Signed by: Damian Yanes M.D.    9/25/2023  6:29 PM

## 2023-09-26 ENCOUNTER — RESEARCH ENCOUNTER (OUTPATIENT)
Dept: RESEARCH | Facility: WORKSITE | Age: 73
End: 2023-09-26
Payer: MEDICARE

## 2023-09-26 DIAGNOSIS — Z00.6 RESEARCH STUDY PATIENT: ICD-10-CM

## 2023-09-26 NOTE — RESEARCH NOTE
[employer] [place] [facility] enrollment complete. Saliva sample collected onsite.   Confirmed with the participant which designated provider TOBY Reyes, they would like study results shared with. Patient will have an opportunity to share the results with any providers of their choosing in the future by accessing their results from [employer].

## 2023-09-28 ENCOUNTER — OFFICE VISIT (OUTPATIENT)
Dept: CARDIOLOGY | Facility: MEDICAL CENTER | Age: 73
End: 2023-09-28
Attending: NURSE PRACTITIONER
Payer: MEDICARE

## 2023-09-28 VITALS
SYSTOLIC BLOOD PRESSURE: 100 MMHG | OXYGEN SATURATION: 95 % | BODY MASS INDEX: 31.86 KG/M2 | HEIGHT: 72 IN | WEIGHT: 235.2 LBS | RESPIRATION RATE: 18 BRPM | HEART RATE: 49 BPM | DIASTOLIC BLOOD PRESSURE: 52 MMHG

## 2023-09-28 DIAGNOSIS — G47.33 OSA ON CPAP: ICD-10-CM

## 2023-09-28 DIAGNOSIS — R00.1 BRADYCARDIA, DRUG INDUCED: ICD-10-CM

## 2023-09-28 DIAGNOSIS — I10 PRIMARY HYPERTENSION: ICD-10-CM

## 2023-09-28 DIAGNOSIS — I65.23 BILATERAL CAROTID ARTERY STENOSIS: ICD-10-CM

## 2023-09-28 DIAGNOSIS — Z95.5 STENTED CORONARY ARTERY: ICD-10-CM

## 2023-09-28 DIAGNOSIS — Z79.899 HIGH RISK MEDICATION USE: ICD-10-CM

## 2023-09-28 DIAGNOSIS — I25.10 CORONARY ARTERY DISEASE INVOLVING NATIVE CORONARY ARTERY OF NATIVE HEART WITHOUT ANGINA PECTORIS: ICD-10-CM

## 2023-09-28 DIAGNOSIS — T50.905A BRADYCARDIA, DRUG INDUCED: ICD-10-CM

## 2023-09-28 DIAGNOSIS — E78.00 PURE HYPERCHOLESTEROLEMIA: ICD-10-CM

## 2023-09-28 PROCEDURE — 3074F SYST BP LT 130 MM HG: CPT | Performed by: NURSE PRACTITIONER

## 2023-09-28 PROCEDURE — 99214 OFFICE O/P EST MOD 30 MIN: CPT | Performed by: NURSE PRACTITIONER

## 2023-09-28 PROCEDURE — 3078F DIAST BP <80 MM HG: CPT | Performed by: NURSE PRACTITIONER

## 2023-09-28 RX ORDER — METOPROLOL SUCCINATE 25 MG/1
25 TABLET, EXTENDED RELEASE ORAL EVERY EVENING
Qty: 90 TABLET | Refills: 3 | Status: SHIPPED | OUTPATIENT
Start: 2023-09-28 | End: 2023-09-28

## 2023-09-28 RX ORDER — METOPROLOL SUCCINATE 25 MG/1
25 TABLET, EXTENDED RELEASE ORAL EVERY EVENING
Qty: 90 TABLET | Refills: 3 | Status: SHIPPED
Start: 2023-09-28 | End: 2023-11-21 | Stop reason: SDUPTHER

## 2023-09-28 ASSESSMENT — FIBROSIS 4 INDEX: FIB4 SCORE: 1.39

## 2023-09-28 NOTE — PROGRESS NOTES
Chief Complaint   Patient presents with    Coronary Artery Disease     F/V DX:Coronary artery disease involving native coronary artery of native heart without angina pectoris       Subjective     [place] is a 73 y.o. male who presents today for hospital follow-up after recent hospitalization on 5/25/2023 for severe shortness of breath and acute respiratory failure found to have PE.      Patient has past medical history of CAD s/p PCI to LAD (2010), carotid artery stenosis, hypertension, dyslipidemia, FRANK on CPAP, CVA.  ILR placed post-CVA to monitor for A-fib.  Patient was last seen by Dr. Lake on 3/23/2023 and myself on 6/6/23.  Patient also seen by vascular med on 6/8/23    Today, patient reports he is tolerating physical activity and walking 1 mile a day.  His heart rates been persistently low but denies exacerbation of symptoms with the lower heart rate.  Otherwise, patient denies chest pain, MEJIA, palpitations, dizziness/lightheadedness, orthopnea, PND, or edema.  Patient appears euvolemic on exam.  Patient reports he was instructed by hematology to hold his Eliquis on October 1 for additional testing.    Patient appears euvolemic on exam.  Patient's follow-up echocardiogram is scheduled for tomorrow morning.    We will decrease patient's daily dose of his beta-blocker therapy for bradycardia.  We will continue medical therapy is optimized per below.  We will update patient regarding his echocardiogram results via MyChart or telephone.  We will have patient follow-up in 6 months.    Past Medical History:   Diagnosis Date    Acute stroke due to ischemia ([facility]) 05/2022    MRI with acute infarct of left posterior insular cortex.    Apnea, sleep     Uses CPAP    CAD (coronary artery disease) 05/2010    Acute apical infarct. PCI/MANJEET (Taxus 3.5 x 32mm) to the LAD. 2015: [facility] with patent stent.    Chickenpox     [place] measles     H/O heart artery stent 05/04/2010    Heart attack (HCC)     Hyperlipidemia      Hypertension     Mumps     Sleep apnea     Snoring     Stroke (HCC)     Wears glasses      Past Surgical History:   Procedure Laterality Date    [facility] CARDIAC CATH  2015    Cath at [place].  Patent stent and no obstructive CAD.    [facility] CARDIAC CATH  2010    Taxus stent to LAD    OTHER CARDIAC SURGERY  2010    Heart Stent    CYST EXCISION      posterior neck     Family History   Problem Relation Age of Onset    Heart Attack Mother     Cancer Mother     Cancer Father     Cancer Sister      Social History     Socioeconomic History    Marital status:      Spouse name: Not on file    Number of children: Not on file    Years of education: Not on file    Highest education level: Associate degree: academic program   Occupational History    Not on file   Tobacco Use    Smoking status: Former     Current packs/day: 0.00     Average packs/day: 0.5 packs/day for 42.0 years (21.0 ttl pk-yrs)     Types: Cigarettes     Start date: 1968     Quit date: 2010     Years since quittin.4     Passive exposure: Never    Smokeless tobacco: Never   Vaping Use    Vaping Use: Never used   Substance and Sexual Activity    Alcohol use: Yes     Alcohol/week: 8.4 oz     Types: 14 Standard drinks or equivalent per week     Comment: 2 drinks per day     Drug use: Yes     Types: Marijuana, Inhaled     Comment: Every other day marijuana    Sexual activity: Not on file   Other Topics Concern    Not on file   Social History Narrative    Not on file     Social Determinants of Health     Financial Resource Strain: Low Risk  (2023)    Overall Financial Resource Strain (CARDIA)     Difficulty of Paying Living Expenses: Not hard at all   Food Insecurity: No Food Insecurity (2023)    Hunger Vital Sign     Worried About Running Out of Food in the Last Year: Never true     Ran Out of Food in the Last Year: Never true   Transportation Needs: No Transportation Needs (2023)    PRAPARE - Transportation     Lack  of Transportation (Medical): No     Lack of Transportation (Non-Medical): No   Physical Activity: Insufficiently Active (5/25/2023)    Exercise Vital Sign     Days of Exercise per Week: 2 days     Minutes of Exercise per Session: 30 min   Stress: No Stress Concern Present (5/25/2023)    [employer] [place] - Occupational Stress Questionnaire     Feeling of Stress : Not at all   Social Connections: Moderately Integrated (5/25/2023)    Social Connection and Isolation Panel [NHANES]     Frequency of Communication with Friends and Family: More than three times a week     Frequency of Social Gatherings with Friends and Family: Twice a week     Attends Religion Services: Never     Active Member of Clubs or Organizations: Yes     Attends Club or Organization Meetings: 1 to 4 times per year     Marital Status:    Intimate Partner Violence: Not on file   Housing Stability: Low Risk  (5/26/2023)    Housing Stability Vital Sign     Unable to Pay for Housing in the Last Year: No     Number of Places Lived in the Last Year: 1     Unstable Housing in the Last Year: No     No Known Allergies  Outpatient Encounter Medications as of 9/28/2023   Medication Sig Dispense Refill    metoprolol SR (TOPROL XL) 25 MG TABLET SR 24 HR Take 1 Tablet by mouth every evening. 90 Tablet 3    spironolactone (ALDACTONE) 25 MG Tab Take 1 Tablet by mouth every day. 90 Tablet 3    apixaban (ELIQUIS) 5mg Tab Take 1 Tablet by mouth 2 times a day. 180 Tablet 3    sertraline (ZOLOFT) 50 MG Tab Take 50 mg by mouth every day.      traZODone (DESYREL) 50 MG Tab Take 100 mg by mouth every evening.      atorvastatin (LIPITOR) 40 MG Tab Take 1 Tablet by mouth 1/2 hour before dinner. 90 Tablet 3    losartan (COZAAR) 25 MG Tab Take 1 Tablet by mouth every day. 90 Tablet 3    latanoprost (XALATAN) 0.005 % Solution Administer 1 Drop into both eyes at bedtime.      [DISCONTINUED] metoprolol SR (TOPROL XL) 25 MG TABLET SR 24 HR Take 1  Tablet by mouth every evening. 90 Tablet 3    [DISCONTINUED] Camphor-Eucalyptus-Menthol (VICKS VAPORUB EX) Apply 1 Application topically 1 time a day as needed (Under nose for CPAP).   (Patient not taking: Reported on 9/28/2023)      [DISCONTINUED] Aromatic Inhalants (VICKS VAPOR INH) Inhale 1 Each 1 time a day as needed (nasal stick). (Patient not taking: Reported on 9/28/2023)      [DISCONTINUED] metoprolol tartrate (LOPRESSOR) 25 MG Tab Take 1 Tablet by mouth 2 times a day. TAKE 1 TAB BY MOUTH 2 TIMES A DAY. 180 Tablet 3     No facility-administered encounter medications on file as of 9/28/2023.     ROS Complete review of systems negative except as noted in HPI/subjective           Objective     /52 (BP Location: Left arm, Patient Position: Sitting, BP Cuff Size: Adult)   Pulse (!) 49   Resp 18   Ht 1.829 m (6')   Wt 107 kg (235 lb 3.2 oz)   SpO2 95%   BMI 31.90 kg/m²     Physical Exam  Vitals reviewed.   Constitutional:       Appearance: He is well-developed.   HENT:      Head: Normocephalic and atraumatic.   Eyes:      Pupils: Pupils are equal, round, and reactive to light.   Neck:      Vascular: No JVD.   Cardiovascular:      Rate and Rhythm: Normal rate and regular rhythm.      Pulses: Normal pulses.      Heart sounds: Normal heart sounds. No murmur heard.     No friction rub. No gallop.   Pulmonary:      Effort: Pulmonary effort is normal. No respiratory distress.      Breath sounds: Normal breath sounds.   Abdominal:      General: Bowel sounds are normal. There is no distension.      Palpations: Abdomen is soft.   Musculoskeletal:      Right lower leg: No edema.      Left lower leg: No edema.   Skin:     General: Skin is warm and dry.      Findings: No erythema.   Neurological:      Mental Status: He is alert and oriented to person, place, and time.   Psychiatric:         Behavior: Behavior normal.            Lab Results   Component Value Date/Time    CHOLSTRLTOT 120 06/27/2023 06:57 AM     "CHOLSTRLTOT 95 (L) 05/26/2023 04:34 AM    LDL 31 05/26/2023 04:34 AM    HDL 50 06/27/2023 06:57 AM    HDL 48 05/26/2023 04:34 AM    TRIGLYCERIDE 104 06/27/2023 06:57 AM    TRIGLYCERIDE 81 05/26/2023 04:34 AM       Lab Results   Component Value Date/Time    SODIUM 141 06/27/2023 07:04 AM    SODIUM 136 05/27/2023 01:54 AM    POTASSIUM 4.7 06/27/2023 07:04 AM    POTASSIUM 3.7 05/27/2023 01:54 AM    CHLORIDE 102 06/27/2023 07:04 AM    CHLORIDE 96 05/27/2023 01:54 AM    CO2 24 06/27/2023 07:04 AM    CO2 22 05/27/2023 01:54 AM    GLUCOSE 109 (H) 06/27/2023 07:04 AM    GLUCOSE 105 (H) 05/27/2023 01:54 AM    BUN 16 06/27/2023 07:04 AM    BUN 17 05/27/2023 01:54 AM    CREATININE 1.01 06/27/2023 07:04 AM    CREATININE 0.94 05/27/2023 01:54 AM    BUNCREATRAT 16 06/27/2023 07:04 AM     Lab Results   Component Value Date/Time    ALKPHOSPHAT 62 06/27/2023 06:57 AM    ALKPHOSPHAT 61 05/26/2023 04:34 AM    ASTSGOT 17 06/27/2023 06:57 AM    ASTSGOT 20 05/26/2023 04:34 AM    ALTSGPT 17 06/27/2023 06:57 AM    ALTSGPT 14 05/26/2023 04:34 AM    TBILIRUBIN 0.5 06/27/2023 06:57 AM    TBILIRUBIN 0.6 05/26/2023 04:34 AM      Chest CTA (5/5/2023):  1.  Large right main pulmonary embolus extending into right upper, middle, and lower lobe segmental and subsegmental branches  2.  Left upper, lower, and lingular segmental and subsegmental pulmonary emboli  3.  Asymmetric prominence of the right ventricle with slight bowing of the intraventricular septum, appearance favoring component of right heart strain.  4.  Large right upper pole renal cyst without visualized complex features    TTE (5/25/2023)  Compared to the images of the prior study 5/11/2022, RV is now dilated   with flattened septum.  Normal left ventricular size and systolic function.  No regional wall motion abnormality noted.  Dilated right ventricle with reduced systolic function.  Flattened septum in diastole (\"D\"-shaped left ventricle) consistent   with RV volume " overload.  Estimated right ventricular systolic pressure is 35 mmHg.      Assessment & Plan     1. Coronary artery disease involving native coronary artery of native heart without angina pectoris  metoprolol SR (TOPROL XL) 25 MG TABLET SR 24 HR    DISCONTINUED: metoprolol SR (TOPROL XL) 25 MG TABLET SR 24 HR      2. FRANK on CPAP  metoprolol SR (TOPROL XL) 25 MG TABLET SR 24 HR    DISCONTINUED: metoprolol SR (TOPROL XL) 25 MG TABLET SR 24 HR      3. Stented coronary artery  metoprolol SR (TOPROL XL) 25 MG TABLET SR 24 HR    DISCONTINUED: metoprolol SR (TOPROL XL) 25 MG TABLET SR 24 HR      4. High risk medication use  metoprolol SR (TOPROL XL) 25 MG TABLET SR 24 HR    DISCONTINUED: metoprolol SR (TOPROL XL) 25 MG TABLET SR 24 HR      5. Primary hypertension        6. Pure hypercholesterolemia        7. Bilateral carotid artery stenosis        8. Bradycardia, drug induced            Medical Decision Making: Today's Assessment/Status/Plan:        CAD, s/p MANJEET/PCI to LAD (2010); HTN; DLD  -No ASA due to OAC  -Continue high intensity statin at 40 mg daily  -Decrease Lopressor to metoprolol XL 25 mg daily, losartan 25 mg daily  -LPa 9, WDL    Acute PE with cor pulmonale; FRANK  -Patient reports CPAP compliance  -We discussed importance of treating nocturnal hypoxia due to causing increased risk for cardiac disease; patient verbalizes understanding.  -Per sleep medicine  -Continue spironolactone.    -Reevaluate RV on echo in 3 months. Scheduled for tomorrow  -Eliquis 5 mg twice daily.  Refer to vascular for duration recommendations  -Established with Dr. Roberto in heme oncology    History of cryptogenic stroke  -ILR in place.  No A-fib burden detected on review    Ascending aorta is dilated with a diameter of 4.2 cm  -followed by vasc  -Reevaluate on echo tomorrow    FU in clinic in 6 months. Sooner if needed.    Patient verbalizes understanding and agrees with the plan of care.     TOBY Rojas  [facility]  [phone]    PLEASE NOTE: This Note was created using voice recognition Software. I have made every reasonable attempt to correct obvious errors, but I expect that there are errors of grammar and possibly content that I did not discover before finalizing the note

## 2023-09-29 ENCOUNTER — HOSPITAL ENCOUNTER (OUTPATIENT)
Dept: CARDIOLOGY | Facility: MEDICAL CENTER | Age: 73
End: 2023-09-29
Attending: INTERNAL MEDICINE
Payer: MEDICARE

## 2023-09-29 DIAGNOSIS — I73.9 PVD (PERIPHERAL VASCULAR DISEASE) (HCC): ICD-10-CM

## 2023-09-29 DIAGNOSIS — I65.23 BILATERAL CAROTID ARTERY STENOSIS: ICD-10-CM

## 2023-09-29 DIAGNOSIS — E78.00 PURE HYPERCHOLESTEROLEMIA: ICD-10-CM

## 2023-09-29 DIAGNOSIS — I25.10 CORONARY ARTERY DISEASE INVOLVING NATIVE CORONARY ARTERY OF NATIVE HEART WITHOUT ANGINA PECTORIS: ICD-10-CM

## 2023-09-29 DIAGNOSIS — I10 PRIMARY HYPERTENSION: ICD-10-CM

## 2023-09-29 DIAGNOSIS — Z79.899 HIGH RISK MEDICATION USE: ICD-10-CM

## 2023-09-29 DIAGNOSIS — I26.09 ACUTE PULMONARY EMBOLISM WITH ACUTE COR PULMONALE, UNSPECIFIED PULMONARY EMBOLISM TYPE (HCC): ICD-10-CM

## 2023-09-29 DIAGNOSIS — Z95.5 STENTED CORONARY ARTERY: ICD-10-CM

## 2023-09-29 LAB
LV EJECT FRACT  99904: 55
LV EJECT FRACT MOD 2C 99903: 49.35
LV EJECT FRACT MOD 4C 99902: 54.61
LV EJECT FRACT MOD BP 99901: 54.5

## 2023-09-29 PROCEDURE — 700117 HCHG RX CONTRAST REV CODE 255: Performed by: INTERNAL MEDICINE

## 2023-09-29 PROCEDURE — 93306 TTE W/DOPPLER COMPLETE: CPT

## 2023-09-29 PROCEDURE — 93306 TTE W/DOPPLER COMPLETE: CPT | Mod: 26 | Performed by: INTERNAL MEDICINE

## 2023-09-29 RX ADMIN — HUMAN ALBUMIN MICROSPHERES AND PERFLUTREN 3 ML: 10; .22 INJECTION, SOLUTION INTRAVENOUS at 09:30

## 2023-10-06 ENCOUNTER — TELEPHONE (OUTPATIENT)
Dept: CARDIOLOGY | Facility: MEDICAL CENTER | Age: 73
End: 2023-10-06
Payer: MEDICARE

## 2023-10-06 NOTE — TELEPHONE ENCOUNTER
Phone Number Called: [phone]    Call outcome: Spoke to patient regarding message below.    Message: Called to inform patient of JG recommendations below. Patient verbalized understanding. No further questions at this time.       ----- Message from TOBY Rojas sent at 10/5/2023  4:32 PM PDT -----  Please let patient know good news of recovered LV dilation and no signs of fluid overload with medical therapy.  His ascending aortic diameter remains stable at 4.1 which he follows with Dr. Peña  ----- Message -----  From: LINDA Garcia  Sent: 9/29/2023  11:01 AM PDT  To: TOBY Rojas    Looks like you just saw the patient yesterday.  Thx!  AB

## 2023-10-21 ENCOUNTER — NON-PROVIDER VISIT (OUTPATIENT)
Dept: CARDIOLOGY | Facility: MEDICAL CENTER | Age: 73
End: 2023-10-21
Payer: MEDICARE

## 2023-10-21 PROCEDURE — 93298 REM INTERROG DEV EVAL SCRMS: CPT | Performed by: INTERNAL MEDICINE

## 2023-10-23 NOTE — CARDIAC REMOTE MONITOR - SCAN
Device transmission reviewed. Device demonstrated appropriate function.       Electronically Signed by: Damian Yanes M.D.    10/24/2023  9:52 AM

## 2023-10-31 LAB
APOB+LDLR+PCSK9 GENE MUT ANL BLD/T: NOT DETECTED
BRCA1+BRCA2 DEL+DUP + FULL MUT ANL BLD/T: NOT DETECTED
MLH1+MSH2+MSH6+PMS2 GN DEL+DUP+FUL M: NOT DETECTED

## 2023-11-17 NOTE — PROGRESS NOTES
11/27/23    Subjective    Chief Complaint:  Follow up from consultation to r/o thrombophilia    HPI:  73 male with h/o a PE and a partial negative thrombophilia work up.he was seen in consultation 9/16/23 with the plan to complete a full 6 months of Eliquis and then dc and complete the work up a week after stopping. Lab all negative for thrombophilia.     ROS:    Constitutional: No weight loss  Skin: No rash or jaundice  HENT: No change in eyesight or hearing  Cardiovascular:No chest pain or arrythmia  Respiratory:No cough or SOB  GI:No nausea, vomiting, diarrhea, constipation  :No dysuria or frequency  Musculoskeletal:No bone or joint pain  Neuro:No sx's of neuropathy  Psych: No complaints    PMH:      No Known Allergies    Past Medical History:   Diagnosis Date    Acute stroke due to ischemia (HCC) 05/2022    MRI with acute infarct of left posterior insular cortex.    Apnea, sleep     Uses CPAP    CAD (coronary artery disease) 05/2010    Acute apical infarct. PCI/MANJEET (Taxus 3.5 x 32mm) to the LAD. 2015: [facility] with patent stent.    Chickenpox     [place] measles     H/O heart artery stent 05/04/2010    Heart attack (HCC)     Hyperlipidemia     Hypertension     Mumps     Sleep apnea     Snoring     Stroke (HCC)     Wears glasses         Past Surgical History:   Procedure Laterality Date    [facility] CARDIAC CATH  02/21/2015    Cath at [place].  Patent stent and no obstructive CAD.    [facility] CARDIAC CATH  05/04/2010    Taxus stent to LAD    OTHER CARDIAC SURGERY  05/04/2010    Heart Stent    CYST EXCISION      posterior neck        Medications:    Current Outpatient Medications on File Prior to Encounter   Medication Sig Dispense Refill    atorvastatin (LIPITOR) 40 MG Tab Take 1 Tablet by mouth 1/2 hour before dinner. 90 Tablet 3    losartan (COZAAR) 25 MG Tab Take 1 Tablet by mouth every day. 90 Tablet 3    metoprolol SR (TOPROL XL) 25 MG TABLET SR 24 HR Take 1 Tablet by mouth every evening. 90 Tablet 3    spironolactone  (ALDACTONE) 25 MG Tab Take 1 Tablet by mouth every day. 90 Tablet 3    sertraline (ZOLOFT) 50 MG Tab Take 50 mg by mouth every day.      traZODone (DESYREL) 50 MG Tab Take 100 mg by mouth every evening.      latanoprost (XALATAN) 0.005 % Solution Administer 1 Drop into both eyes at bedtime.      apixaban (ELIQUIS) 5mg Tab Take 1 Tablet by mouth 2 times a day. (Patient not taking: Reported on 2023) 180 Tablet 3     No current facility-administered medications on file prior to encounter.       Social History     Tobacco Use    Smoking status: Former     Current packs/day: 0.00     Average packs/day: 0.5 packs/day for 42.0 years (21.0 ttl pk-yrs)     Types: Cigarettes     Start date: 1968     Quit date: 2010     Years since quittin.5     Passive exposure: Never    Smokeless tobacco: Never   Substance Use Topics    Alcohol use: Yes     Alcohol/week: 8.4 oz     Types: 14 Standard drinks or equivalent per week     Comment: 2 drinks per day         Family History   Problem Relation Age of Onset    Heart Attack Mother     Cancer Mother     Cancer Father     Cancer Sister         Objective    Vitals:    BP 94/60 (BP Location: Right arm, Patient Position: Sitting, BP Cuff Size: Adult)   Pulse (!) 50   Temp 36 °C (96.8 °F) (Temporal)   Resp 12   Ht 1.829 m (6')   Wt 106 kg (234 lb 2.1 oz)   SpO2 95%   BMI 31.75 kg/m²     Physical Exam:    Appears well-developed and well-nourished. No distress.    Head -  Normocephalic .   Eyes - Pupils are equal. Conjunctivae normal. No scleral icterus.   Ears - normal hearing  Neurological -   Alert and oriented.  Skin - No rash noted. Not diaphoretic. No erythema. No pallor. No jaundice   Psychiatric -  Normal mood and affect.    Labs:      Ass   Latest Reference Range & Units 23 06:57   WBC 3.4 - 10.8 x10E3/uL 5.4   RBC 4.14 - 5.80 x10E6/uL 4.19   Hemoglobin 13.0 - 17.7 g/dL 13.3   Hematocrit 37.5 - 51.0 % 39.1   MCV 79 - 97 fL 93   MCH 26.6 - 33.0 pg 31.7    MCHC 31.5 - 35.7 g/dL 34.0   RDW 11.6 - 15.4 % 12.2   Platelet Count 150 - 450 x10E3/uL 211      Latest Reference Range & Units 05/26/23 11:27   Anti-Cardiolipin Ab Iga <=11 APL <10   Anti-Cardiolipin Ab Igm <=12 MPL <10   Anti-Cariolipin Ab Igg <=14 GPL <10      Latest Reference Range & Units 11/21/23 07:13   Protein S-Functional 66 - 143 % 115   Antithrombin III Ag Immuno 82 - 136 % 92   Antithrombin III, Functional 76 - 128 % 105      05/26/23 11:27 11/21/23 07:13   Factor Ii Dna Analysis Pt Gene  Negative   V Leiden Factor Negative    Assessment:    No thrombophilia    Imp:    Visit Diagnosis:    1. History of pulmonary embolism          Plan:  Stay off the eliquis  RTC    Rei Roberto M.D.

## 2023-11-21 ENCOUNTER — NON-PROVIDER VISIT (OUTPATIENT)
Dept: CARDIOLOGY | Facility: MEDICAL CENTER | Age: 73
End: 2023-11-21

## 2023-11-21 ENCOUNTER — OFFICE VISIT (OUTPATIENT)
Dept: VASCULAR LAB | Facility: MEDICAL CENTER | Age: 73
End: 2023-11-21
Payer: MEDICARE

## 2023-11-21 ENCOUNTER — HOSPITAL ENCOUNTER (OUTPATIENT)
Dept: LAB | Facility: MEDICAL CENTER | Age: 73
End: 2023-11-21
Attending: NURSE PRACTITIONER
Payer: MEDICARE

## 2023-11-21 ENCOUNTER — TELEPHONE (OUTPATIENT)
Dept: HEMATOLOGY ONCOLOGY | Facility: MEDICAL CENTER | Age: 73
End: 2023-11-21

## 2023-11-21 ENCOUNTER — HOSPITAL ENCOUNTER (OUTPATIENT)
Dept: LAB | Facility: MEDICAL CENTER | Age: 73
End: 2023-11-21
Attending: INTERNAL MEDICINE
Payer: MEDICARE

## 2023-11-21 VITALS
BODY MASS INDEX: 31.83 KG/M2 | WEIGHT: 235 LBS | HEIGHT: 72 IN | SYSTOLIC BLOOD PRESSURE: 115 MMHG | HEART RATE: 53 BPM | DIASTOLIC BLOOD PRESSURE: 58 MMHG

## 2023-11-21 DIAGNOSIS — G47.33 OSA ON CPAP: ICD-10-CM

## 2023-11-21 DIAGNOSIS — Z79.01 CHRONIC ANTICOAGULATION: ICD-10-CM

## 2023-11-21 DIAGNOSIS — Z79.899 HIGH RISK MEDICATION USE: ICD-10-CM

## 2023-11-21 DIAGNOSIS — E78.00 PURE HYPERCHOLESTEROLEMIA: ICD-10-CM

## 2023-11-21 DIAGNOSIS — E78.01 FAMILIAL HYPERCHOLESTEROLEMIA: ICD-10-CM

## 2023-11-21 DIAGNOSIS — I73.9 PVD (PERIPHERAL VASCULAR DISEASE) (HCC): ICD-10-CM

## 2023-11-21 DIAGNOSIS — Z86.711 HISTORY OF PULMONARY EMBOLISM: ICD-10-CM

## 2023-11-21 DIAGNOSIS — I10 ESSENTIAL HYPERTENSION: ICD-10-CM

## 2023-11-21 DIAGNOSIS — Z95.5 STENTED CORONARY ARTERY: ICD-10-CM

## 2023-11-21 DIAGNOSIS — I25.10 CORONARY ARTERY DISEASE INVOLVING NATIVE CORONARY ARTERY OF NATIVE HEART WITHOUT ANGINA PECTORIS: ICD-10-CM

## 2023-11-21 DIAGNOSIS — R73.01 ELEVATED FASTING BLOOD SUGAR: ICD-10-CM

## 2023-11-21 LAB
ALBUMIN SERPL BCP-MCNC: 4.4 G/DL (ref 3.2–4.9)
ALBUMIN/GLOB SERPL: 1.6 G/DL
ALP SERPL-CCNC: 51 U/L (ref 30–99)
ALT SERPL-CCNC: 13 U/L (ref 2–50)
ANION GAP SERPL CALC-SCNC: 11 MMOL/L (ref 7–16)
AST SERPL-CCNC: 17 U/L (ref 12–45)
BILIRUB SERPL-MCNC: 0.4 MG/DL (ref 0.1–1.5)
BUN SERPL-MCNC: 16 MG/DL (ref 8–22)
CALCIUM ALBUM COR SERPL-MCNC: 9 MG/DL (ref 8.5–10.5)
CALCIUM SERPL-MCNC: 9.3 MG/DL (ref 8.4–10.2)
CHLORIDE SERPL-SCNC: 100 MMOL/L (ref 96–112)
CHOLEST SERPL-MCNC: 144 MG/DL (ref 100–199)
CO2 SERPL-SCNC: 28 MMOL/L (ref 20–33)
CREAT SERPL-MCNC: 0.91 MG/DL (ref 0.5–1.4)
EST. AVERAGE GLUCOSE BLD GHB EST-MCNC: 123 MG/DL
FASTING STATUS PATIENT QL REPORTED: NORMAL
GFR SERPLBLD CREATININE-BSD FMLA CKD-EPI: 90 ML/MIN/1.73 M 2
GLOBULIN SER CALC-MCNC: 2.8 G/DL (ref 1.9–3.5)
GLUCOSE SERPL-MCNC: 103 MG/DL (ref 65–99)
HBA1C MFR BLD: 5.9 % (ref 4–5.6)
HDLC SERPL-MCNC: 68 MG/DL
LDLC SERPL CALC-MCNC: 47 MG/DL
POTASSIUM SERPL-SCNC: 4.1 MMOL/L (ref 3.6–5.5)
PROT SERPL-MCNC: 7.2 G/DL (ref 6–8.2)
SODIUM SERPL-SCNC: 139 MMOL/L (ref 135–145)
TRIGL SERPL-MCNC: 146 MG/DL (ref 0–149)
TSH SERPL DL<=0.005 MIU/L-ACNC: 4.68 UIU/ML (ref 0.38–5.33)

## 2023-11-21 PROCEDURE — 3074F SYST BP LT 130 MM HG: CPT

## 2023-11-21 PROCEDURE — 99212 OFFICE O/P EST SF 10 MIN: CPT

## 2023-11-21 PROCEDURE — 36415 COLL VENOUS BLD VENIPUNCTURE: CPT

## 2023-11-21 PROCEDURE — 93298 REM INTERROG DEV EVAL SCRMS: CPT | Performed by: INTERNAL MEDICINE

## 2023-11-21 PROCEDURE — 99214 OFFICE O/P EST MOD 30 MIN: CPT

## 2023-11-21 PROCEDURE — 80061 LIPID PANEL: CPT

## 2023-11-21 PROCEDURE — 80053 COMPREHEN METABOLIC PANEL: CPT

## 2023-11-21 PROCEDURE — 85301 ANTITHROMBIN III ANTIGEN: CPT

## 2023-11-21 PROCEDURE — 81240 F2 GENE: CPT

## 2023-11-21 PROCEDURE — 83036 HEMOGLOBIN GLYCOSYLATED A1C: CPT

## 2023-11-21 PROCEDURE — 85306 CLOT INHIBIT PROT S FREE: CPT

## 2023-11-21 PROCEDURE — 3078F DIAST BP <80 MM HG: CPT

## 2023-11-21 PROCEDURE — 84443 ASSAY THYROID STIM HORMONE: CPT

## 2023-11-21 PROCEDURE — 85300 ANTITHROMBIN III ACTIVITY: CPT

## 2023-11-21 RX ORDER — LOSARTAN POTASSIUM 25 MG/1
25 TABLET ORAL DAILY
Qty: 90 TABLET | Refills: 3 | Status: SHIPPED | OUTPATIENT
Start: 2023-11-21

## 2023-11-21 RX ORDER — ATORVASTATIN CALCIUM 40 MG/1
40 TABLET, FILM COATED ORAL
Qty: 90 TABLET | Refills: 3 | Status: SHIPPED | OUTPATIENT
Start: 2023-11-21

## 2023-11-21 RX ORDER — METOPROLOL SUCCINATE 25 MG/1
25 TABLET, EXTENDED RELEASE ORAL EVERY EVENING
Qty: 90 TABLET | Refills: 3 | Status: SHIPPED | OUTPATIENT
Start: 2023-11-21 | End: 2024-03-08

## 2023-11-21 ASSESSMENT — ENCOUNTER SYMPTOMS
ORTHOPNEA: 0
FOCAL WEAKNESS: 0
HEADACHES: 0
WEAKNESS: 0
SHORTNESS OF BREATH: 0
CHILLS: 0
COUGH: 0
MYALGIAS: 0
BRUISES/BLEEDS EASILY: 0
CLAUDICATION: 0
PALPITATIONS: 0
SPEECH CHANGE: 0
PND: 0
SPUTUM PRODUCTION: 0
HEMOPTYSIS: 0
DIZZINESS: 0
BLOOD IN STOOL: 0
FEVER: 0
WHEEZING: 0

## 2023-11-21 ASSESSMENT — FIBROSIS 4 INDEX: FIB4 SCORE: 1.59

## 2023-11-21 NOTE — PROGRESS NOTES
===================  PRE-HOSPITAL COURSE  ===================  SOURCE: JUANCHO Myles/ ED documentation     DETAILS: Pt. presented to ED c/o increased anxiety/panic and SI          ============  ED COURSE  ============  SOURCE: JUANCHO Myles/ ED documentation     ARRIVAL: BIB/accompanied by sister     BELONGINGS: No belongings of note     BEHAVIOR: Pt. arrived to ED alert and oriented, appearing in good hygiene.  Pt. initially anxious in triage, asking sister to answer questions for her and reports pt. had panic attack while trying to get ready for work this evening.  Pt. has been calm and cooperative, complied willingly with all triage processes.  She maintains eye contact, speech is clear and coherent, thoughts logical and linear in nature.  Pt. 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  Pt. denies SI/HI and AH/VH in ED.  Sister reports pt. did state earlier today that she "can't continue living like this and would rather die".  Pt. also reports decreased appetite and insomnia lately. Pt. presents as depressed in mood with congruent tearful affect observed. Interactions with family at bedside appear positive/supportive.    TREATMENT: no medications given, no security intervention required        VISITORS: sister at bedside initially, mother currently present         ================================  COLLATERAL   ================================  NAME:  Lynn Fernandes     NUMBER: [phone]     RELATIONSHIP: mother     RELIABILITY: reliable     COMMENTS: Mother reports pt. resides with both parents and 1 younger sibling, has 2 adult siblings who reside nearby.  Pt. enjoys biking, knitting, swimming, and spending time with boyfriend, family, and dog.  Pt. has no formal psychiatric treatment, has never seen psychiatrist or therapist.  Mom does share that at baseline pt. wears her emotions on her sleeve and can be dramatic at times. Most recently, pt. 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  Collateral states pt. is often floated to other floors, often uncomfortable with the frequent changes, and overall lack of predictability and support at work.  Recently pt. has been getting increasingly worked up while getting ready for her night shifts, and has had difficulty sleeping between them.  This week she worked Sunday and Monday night and was scheduled for Tuesday and Wednesday as well.  Yesterday pt. should have already left for work when mom found her crying on the couch, seemingly unable to get up and go. She voiced frustration over her monthly parking pass not being up to date and mother offered to drop her off, but pt. declined and called out sick.  This evening was similar in that pt. was sitting on the couch in her scrubs, with her lunch bag packed, crying.  However, pt. was inconsolable tonight, was experiencing difficulty catching her breath, reported numbness and tingling.  Eventually pt. was able to get up and get in the car for mom and older sister who happened to be over to take her to work.  Upon arrival, pt. began crying again and was unable to gather herself to go in to work.  At this time pt. made statement of vague SI in saying that she would rather die than go into work.  Mother denies any hx of SIB/SI/SA.  At this time mother expressed wanting pt. to speak to someone and suggested the ED.  Pt. was agreeable as long as it was not the ED of her workplace, so mother brought her straight to this ED for evaluation.  She does not believe pt. is a risk to herself or others at this time, but does believe she would benefit from receiving outpatient treatment to manage her anxiety.           COVID Exposure Screen- collateral (i.e. third-party, chart review, belongings, etc; include EMS and ED staff)  1.            *Has the patient had a COVID-19 test in the last 90 days?  (  ) Yes   ( X ) No   (  ) Unknown- Reason: No collateral for screener questions_____  IF YES PROCEED TO QUESTION #2. IF NO OR UNKNOWN, PLEASE SKIP TO QUESTION #3.  2.            Date of test(s) and result(s): ________  3.            *Has the patient tested positive for COVID-19 antibodies? (  ) Yes   ( X ) No   (  ) Unknown- Reason: _____  IF YES PROCEED TO QUESTION #4. IF NO or UNKNOWN, PLEASE SKIP TO QUESTION #5.  4.            Date of positive antibody test: ________  5.            *Has the patient received 2 doses of the COVID-19 vaccine? ( X ) Yes   (  ) No   ( x ) Unknown- Reason: _____  IF YES PROCEED TO QUESTION #6. IF NO or UNKNOWN, PLEASE SKIP TO QUESTION #7.  6.             Date of second dose: Moderna, second dose in March 2021  7.            *In the past 10 days, has the patient been around anyone with a positive COVID-19 test?* (  ) Yes   (X  ) No   (  ) Unknown- Reason: __  IF YES PROCEED TO QUESTION #8. IF NO or UNKNOWN, PLEASE SKIP TO QUESTION #13.  8.            Was the patient within 6 feet of them for at least 15 minutes? (  ) Yes   (  ) No   (  ) Unknown- Reason: _____  9.            Did the patient provide care for them? (  ) Yes   (  ) No   (  ) Unknown- Reason: ______  10.          Did the patient have direct physical contact with them (touched, hugged, or kissed them)? (  ) Yes   (  ) No    (  ) Unknown- Reason: __  11.          Did the patient share eating or drinking utensils with them? (  ) Yes   (  ) No    (  ) Unknown- Reason: ____  12.          Did they sneeze, cough, or somehow get respiratory droplets on the patient? (  ) Yes   (  ) No    (  ) Unknown- Reason: ______  13.          *Has the patient been out of [place] within the past 10 days?* (  ) Yes   (X  ) No   (   ) Unknown- Reason: _____  IF YES PLEASE ANSWER THE FOLLOWING QUESTIONS:  14.          Which state/country did they go to? ______  15.          Were they there over 24 hours? (  ) Yes   (  ) No    (  ) Unknown- Reason: ______  16.          Date of return to [facility]: ______ VASCULAR ANTICOAGULATION Follow Up VISIT  11/21/2023     Jan Rivas is a 73 y.o. male who presents for  Chief Complaint   Patient presents with    Follow-Up     Initially referred by Raysa Pringle A.* for length of therapy (LOT) determination and management of anticoagulation in context of acute venothromboembolic disease, est 6/2023    Subjective      Doing well,  No concerns  Stopped Eliquis 11/1 per heme for prior to hypercoag labs  Had labs drawn this am  Has heme appt next week to follow up  No SOB, CP, palpitations or leg swelling  Not checking BPs at home, good in other offices  Had imaging done in sept    VTE disease / Anticoagulation:   Date of initiation of anticoagulation: 5/25/23   Initial visit symptoms:  Denies current SOB, CP, leg swelling, edema, leg pain, cyanosis of digits, redness of extremities.  Current antithrombotic agent: eliquis 5mg BID   Complications: none, tolerating well, no bleeding or bruising   Adherence: reports complete, no missed doses    _____Pertinent VTE pmhx:   Date of Diagnosis: 5/25/25  Type of Venous thromboembolic disease (VTE): acute R main PE, no cor pulm   Preceding/presenting hx: 5/23 - acute onset SOB, progressive MEJIA after walking, seen at  and transferred to ED on 5/25 and dx  Antithrombotic therapy at time of VTE event: yes - plavix  VTE tx course: Eliquis 10mg BID x 7d, now eliquis 5mg BID   Any personal VTE hx? No  Any family VTE hx? No  _____PROVOKING FACTORS:  Recent surgery ? No  Recent trauma ? No  Smoker?  reports that he quit smoking about 13 years ago. His smoking use included cigarettes. He started smoking about 55 years ago. He has a 21.0 pack-year smoking history. He has never been exposed to tobacco smoke. He has never used smokeless tobacco.    Extended travel? Yes, Details: 3/2023 - had been on cruise,   Other periods of immobility? No  Other known or potential risk factors for VTE disease:  no  MEN:  Hx of cancer or abnormal cancer  ===================  PRE-HOSPITAL COURSE  ===================  SOURCE: JUANCHO Myles/ ED documentation     DETAILS: Pt. presented to ED c/o increased anxiety/panic and SI          ============  ED COURSE  ============  SOURCE: JUANCHO Myles/ ED documentation     ARRIVAL: BIB/accompanied by sister     BELONGINGS: No belongings of note     BEHAVIOR: Pt. arrived to ED alert and oriented, appearing in good hygiene.  Pt. initially anxious in triage, asking sister to answer questions for her and reports pt. had panic attack while trying to get ready for work this evening.  Pt. has been calm and cooperative, complied willingly with all triage processes.  She maintains eye contact, speech is clear and coherent, thoughts logical and linear in nature.  Pt. 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  Pt. denies SI/HI and AH/VH in ED.  Sister reports pt. did state earlier today that she "can't continue living like this and would rather die".  Pt. also reports decreased appetite and insomnia lately. Pt. presents as depressed in mood with congruent tearful affect observed. Interactions with family at bedside appear positive/supportive.    TREATMENT: no medications given, no security intervention required        VISITORS: sister at bedside initially, mother currently present         ================================  COLLATERAL   ================================  NAME:  Lynn Fernandes     NUMBER: [phone]     RELATIONSHIP: mother     RELIABILITY: reliable     COMMENTS: Mother reports pt. resides with both parents and 1 younger sibling, has 2 adult siblings who reside nearby.  Pt. enjoys biking, knitting, swimming, and spending time with boyfriend, family, and dog.  Pt. has no formal psychiatric treatment, has never seen psychiatrist or therapist.  Mom does share that at baseline pt. wears her emotions on her sleeve and can be dramatic at times. Most recently, pt. 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  Collateral states pt. is often floated to other floors, often uncomfortable with the frequent changes, and overall lack of predictability and support at work.  Recently pt. has been getting increasingly worked up while getting ready for her night shifts, and has had difficulty sleeping between them.  This week she worked Sunday and Monday night and was scheduled for Tuesday and Wednesday as well.  Yesterday pt. should have already left for work when mom found her crying on the couch, seemingly unable to get up and go. She voiced frustration over her monthly parking pass not being up to date and mother offered to drop her off, but pt. declined and called out sick.  This evening was similar in that pt. was sitting on the couch in her scrubs, with her lunch bag packed, crying.  However, pt. was inconsolable tonight, was experiencing difficulty catching her breath, reported numbness and tingling.  Eventually pt. was able to get up and get in the car for mom and older sister who happened to be over to take her to work.  Upon arrival, pt. began crying again and was unable to gather herself to go in to work.  At this time pt. made statement of vague SI in saying that she would rather die than go into work.  Mother denies any hx of SIB/SI/SA.  At this time mother expressed wanting pt. to speak to someone and suggested the ED.  Pt. was agreeable as long as it was not the ED of her workplace, so mother brought her straight to this ED for evaluation.  She does not believe pt. is a risk to herself or others at this time, but does believe she would benefit from receiving outpatient treatment to manage her anxiety.           COVID Exposure Screen- collateral (i.e. third-party, chart review, belongings, etc; include EMS and ED staff)  1.            *Has the patient had a COVID-19 test in the last 90 days?  (  ) Yes   ( X ) No   (  ) Unknown- Reason: No collateral for screener questions_____  IF YES PROCEED TO QUESTION #2. IF NO OR UNKNOWN, PLEASE SKIP TO QUESTION #3.  2.            Date of test(s) and result(s): ________  3.            *Has the patient tested positive for COVID-19 antibodies? (  ) Yes   ( X ) No   (  ) Unknown- Reason: _____  IF YES PROCEED TO QUESTION #4. IF NO or UNKNOWN, PLEASE SKIP TO QUESTION #5.  4.            Date of positive antibody test: ________  5.            *Has the patient received 2 doses of the COVID-19 vaccine? ( X ) Yes   (  ) No   (  ) Unknown- Reason: _____  IF YES PROCEED TO QUESTION #6. IF NO or UNKNOWN, PLEASE SKIP TO QUESTION #7.  6.             Date of second dose: Moderna, second dose in March 2021  7.            *In the past 10 days, has the patient been around anyone with a positive COVID-19 test?* (  ) Yes   (X  ) No   (  ) Unknown- Reason: __  IF YES PROCEED TO QUESTION #8. IF NO or UNKNOWN, PLEASE SKIP TO QUESTION #13.  8.            Was the patient within 6 feet of them for at least 15 minutes? (  ) Yes   (  ) No   (  ) Unknown- Reason: _____  9.            Did the patient provide care for them? (  ) Yes   (  ) No   (  ) Unknown- Reason: ______  10.          Did the patient have direct physical contact with them (touched, hugged, or kissed them)? (  ) Yes   (  ) No    (  ) Unknown- Reason: __  11.          Did the patient share eating or drinking utensils with them? (  ) Yes   (  ) No    (  ) Unknown- Reason: ____  12.          Did they sneeze, cough, or somehow get respiratory droplets on the patient? (  ) Yes   (  ) No    (  ) Unknown- Reason: ______  13.          *Has the patient been out of [place] within the past 10 days?* (  ) Yes   (X  ) No   (   ) Unknown- Reason: _____  IF YES PLEASE ANSWER THE FOLLOWING QUESTIONS:  14.          Which state/country did they go to? ______  15.          Were they there over 24 hours? (  ) Yes   (  ) No    (  ) Unknown- Reason: ______  16.          Date of return to [facility]: ______ screenings: no  Hx of Testosterone therapy: no  Hypercoaguability work-up completed?  No    CAD s/p PCI to LAD ()  Currently feeling well. Seeing cardiology. No other issues   Will need refills on bp meds    CVA:  Stable, no current new sx          Current Outpatient Medications:     atorvastatin, 40 mg, Oral, AC PM MEAL    losartan, 25 mg, Oral, DAILY    metoprolol SR, 25 mg, Oral, Q EVENING    spironolactone, 25 mg, Oral, DAILY, Taking    sertraline, 50 mg, Oral, DAILY, Taking    traZODone, 100 mg, Oral, Nightly, Taking    latanoprost, 1 Drop, Both Eyes, QHS, Taking    apixaban, 5 mg, Oral, BID (Patient not taking: Reported on 2023), Not Taking     No Known Allergies    Family History   Problem Relation Age of Onset    Heart Attack Mother     Cancer Mother     Cancer Father     Cancer Sister      Social History     Tobacco Use    Smoking status: Former     Current packs/day: 0.00     Average packs/day: 0.5 packs/day for 42.0 years (21.0 ttl pk-yrs)     Types: Cigarettes     Start date: 1968     Quit date: 2010     Years since quittin.5     Passive exposure: Never    Smokeless tobacco: Never   Vaping Use    Vaping Use: Never used   Substance Use Topics    Alcohol use: Yes     Alcohol/week: 8.4 oz     Types: 14 Standard drinks or equivalent per week     Comment: 2 drinks per day     Drug use: Yes     Types: Marijuana, Inhaled     Comment: Every other day marijuana       DIET AND EXERCISE:  Weight Change:stable   BMI Readings from Last 5 Encounters:   23 31.87 kg/m²   23 31.90 kg/m²   23 31.29 kg/m²   23 30.79 kg/m²   23 31.64 kg/m²     Diet: common adult  Exercise: moderate regular exercise program     Review of Systems   Constitutional:  Negative for chills, fever and malaise/fatigue.   HENT:  Negative for nosebleeds.    Respiratory:  Negative for cough, hemoptysis, sputum production, shortness of breath and wheezing.    Cardiovascular:  Negative for chest pain,  palpitations, orthopnea, claudication, leg swelling and PND.   Gastrointestinal:  Negative for blood in stool and melena.   Genitourinary:  Negative for hematuria.   Musculoskeletal:  Negative for myalgias.   Neurological:  Negative for dizziness, speech change, focal weakness, weakness and headaches.   Endo/Heme/Allergies:  Does not bruise/bleed easily.           Objective    Vitals:    11/21/23 1001   BP: 115/58   BP Location: Left arm   Patient Position: Sitting   BP Cuff Size: Adult   Pulse: (!) 53   Weight: 107 kg (235 lb)   Height: 1.829 m (6')      BP Readings from Last 5 Encounters:   11/21/23 115/58   09/28/23 100/52   08/03/23 128/74   06/08/23 121/67   06/06/23 124/60      Body mass index is 31.87 kg/m².  Physical Exam  Vitals reviewed.   Constitutional:       General: He is not in acute distress.     Appearance: He is not diaphoretic.   HENT:      Head: Normocephalic and atraumatic.   Eyes:      General: No scleral icterus.  Cardiovascular:      Rate and Rhythm: Bradycardia present.      Heart sounds: No murmur heard.  Pulmonary:      Effort: Pulmonary effort is normal. No respiratory distress.      Breath sounds: Normal breath sounds. No wheezing or rales.   Musculoskeletal:      Right lower leg: No edema.      Left lower leg: No edema.   Skin:     General: Skin is warm and dry.      Coloration: Skin is not pale.   Neurological:      General: No focal deficit present.      Mental Status: He is alert and oriented to person, place, and time. Mental status is at baseline.      Coordination: Coordination normal.      Gait: Gait normal.   Psychiatric:         Mood and Affect: Mood normal.         Behavior: Behavior normal.         Thought Content: Thought content normal.         Lab Results   Component Value Date    CHOLSTRLTOT 144 11/21/2023    LDL 47 11/21/2023    HDL 68 11/21/2023    TRIGLYCERIDE 146 11/21/2023      Lab Results   Component Value Date/Time    LIPOPROTA 9.1 06/27/2023 06:57 AM      No  "results found for: \"APOB\"    Lab Results   Component Value Date    PROTHROMBTM 14.8 (H) 2023    INR 1.18 (H) 2023       Lab Results   Component Value Date    HBA1C 5.6 05/10/2022      Lab Results   Component Value Date    SODIUM 139 2023    POTASSIUM 4.1 2023    CHLORIDE 100 2023    CO2 28 2023    GLUCOSE 103 (H) 2023    BUN 16 2023    CREATININE 0.91 2023    BUNCREATRAT 16 2023        Lab Results   Component Value Date    WBC 5.4 2023    WBC 9.5 2023    RBC 4.19 2023    RBC 4.49 (L) 2023    HEMOGLOBIN 13.3 2023    HEMOGLOBIN 14.4 2023    HEMATOCRIT 39.1 2023    HEMATOCRIT 43.4 2023    MCV 93 2023    MCV 96.7 2023    MCH 31.7 2023    MCH 32.1 2023    MCHC 34.0 2023    MCHC 33.2 2023    MPV 10.5 2023        VASCULAR IMAGING:     Results for orders placed or performed during the hospital encounter of 23   EKG   Result Value Ref Range    Report       [facility]    Test Date:  2023  Pt Name:    SHAD DONALDSON                 Department: [facility]  MRN:        [id number]                      [facility]  Gender:     Male                         Technician: BRENDAN  :        1950                   Requested By:MANUEL VALENTINO  Order #:    [id number]                    Reading MD: Manuel Valentino MD    Measurements  Intervals                                Axis  Rate:       66                           P:          39  MA:         187                          QRS:        -81  QRSD:       98                           T:          14  QT:         445  QTc:        467    Interpretive Statements  Sinus rhythm  Inferior infarct, old  T wave inversions in anterior lateral leads  Baseline wander in lead(s) III  Compared to ECG 2022 12:40:16  ST (T wave) deviation no longer present  Myocardial infarct finding still " "present  Electronically Signed On 5- 13:29:54 PDT by Noe Bain MD       CTA neck 5/2023   1. No evidence of flow-limiting stenosis in the cervical carotid or cervical vertebral arteries.    CTA head 5/2023  1. No hemodynamically significant narrowing of the major intracranial vessels.   2. There is decreased flow in a tiny arterial branch in the left parietal region, concerning for partial occlusion.    CTPA 5/25/23  1.  Large right main pulmonary embolus extending into right upper, middle, and lower lobe segmental and subsegmental branches  2.  Left upper, lower, and lingular segmental and subsegmental pulmonary emboli  3.  Asymmetric prominence of the right ventricle with slight bowing of the intraventricular septum, appearance favoring component of right heart strain.  4.  Large right upper pole renal cyst without visualized complex features    Echo 5/2023  Compared to the images of the prior study 5/11/2022, RV is now dilated   with flattened septum.  Normal left ventricular size and systolic function.  No regional wall motion abnormality noted.  Dilated right ventricle with reduced systolic function.  Flattened septum in diastole (\"D\"-shaped left ventricle) consistent   with RV volume overload.  Estimated right ventricular systolic pressure is 35 mmHg.  Aorta  Normal aortic root for body surface area. . The ascending aorta is   dilated with a diameter of 4.2 cm.    Echo 9/2023  Compared to the prior study on 5/25/23, right ventricular size and   function normalized.  The left ventricular ejection fraction is visually estimated to be 55%.  Normal regional wall motion.  No significant valvular disease.   The ascending aorta diameter is 4.1 cm.        Medical Decision Making:  Today's Assessment / Status / Plan:     1. PVD (peripheral vascular disease) ([facility])  atorvastatin (LIPITOR) 40 MG Tab    losartan (COZAAR) 25 MG Tab      2. Stented coronary artery  atorvastatin (LIPITOR) 40 MG Tab    metoprolol SR " (TOPROL XL) 25 MG TABLET SR 24 HR      3. Essential hypertension  atorvastatin (LIPITOR) 40 MG Tab    losartan (COZAAR) 25 MG Tab    Comp Metabolic Panel    MICROALBUMIN CREAT RATIO URINE      4. Familial hypercholesterolemia  atorvastatin (LIPITOR) 40 MG Tab    Lipid Profile      5. FRANK on CPAP  losartan (COZAAR) 25 MG Tab    metoprolol SR (TOPROL XL) 25 MG TABLET SR 24 HR      6. Pure hypercholesterolemia  losartan (COZAAR) 25 MG Tab    Lipid Profile      7. High risk medication use  losartan (COZAAR) 25 MG Tab    metoprolol SR (TOPROL XL) 25 MG TABLET SR 24 HR      8. Coronary artery disease involving native coronary artery of native heart without angina pectoris  metoprolol SR (TOPROL XL) 25 MG TABLET SR 24 HR    Lipid Profile      9. Chronic anticoagulation  CBC WITHOUT DIFFERENTIAL      10. Elevated fasting blood sugar  HEMOGLOBIN A1C (Glycohemoglobin GHB Total/A1C with MBG Estimate)        PATIENT TYPE: Secondary Prevention    Etiology of Established CVD if Present:     1) VTE disease   5/2023 - acute bilat PE w/o cor pulm  9/2023 - improved right ventricle size and function on follow up imaging  Thrombophilia/hypercoag evaluation:  as per heme  - no imaging surveillance needed at this time  - antthrombotic plan as noted below     2) Ascending aortic aneurysm - stable, asymptomatic  - 4.1cm on most recent echo 9/2023, 4.2cm on previous 5/2023   - no repair needed unless growth to 5.5+cm or expansion rate of 1+cm/yr (or 0.5+cm/6mo)  -Presumed cystic medial degeneration with sporadic development.   -Echo shows no biscupid Ao valve  -No fhx or pmhx of connective tissue disease  - reviewed anatomy, risks, emergency s/s requiring immediate attention, and thresholds for surgical intervention and gave handouts.   Plan:   - continue med mgmt   - recommend 1st degree relatives get screened with echo for TAA  - check echo to r/o expansion   - consider AAA duplex to eval for comorbid AAA  - if normal exams, then repeat  annual echo surveillance, if indications of rapid expansion then repeat CTA, repeat echo 1 year, due 9/2024 - not ordered  - focus on ARB and BB for BP control  - activity restrictions including no extreme endurance activities, smoking, stimulants, and extreme weight lifting >20lbs      ANTITHROMBOTIC THERAPY:  Date of initiation: 5/2023   HAS-BLED bleeding risk calc ([email or web address]): 1 pt, 3.4%, low risk  Factors to consider for indefinite OAC: Unprovoked VTE event, Life-threatening or very extensive proximal DVT , and Male sex   Last CBC, BMP: reviewed above   Expected duration: reviewed standard of care as per Chest 2021 guidelines is 3-6 months minimum on full dose OAC.  After review of risks/benefits/alternatives, through shared decision-making, we will continue indefinite OAC   Per pt understanding of heme instructions, pt stopped 11/1/2023, had hypercoag panel drawn 11/21/2023, pending heme follow up next week.  Has not restarted eliquis.  Message to Dr. Roberto, pt ok to remain off eliquis until follow up appt next week, but recommend starting ASA 81mg.    Antithrombotic therapy plan:  - continue to hold eliquis 5mg BID for now (completed 6mo), then consider transition to 2.5mg BID ongoing - message sent to heme to clarify restarting and dose.  Pt to follow up next week with heme as scheduled  - pending with hematology - will defer to their tx decision-making   - START ASA 81mg daily per heme, pt to follow up with Dr. Roberto as scheduled    - advised to postpone elective procedures until after 11/2023  - stressed strict adherence to tx and avoid early termination due to increased risk for recurrent VTE  - check CBC, BMP (CMP if any underlying liver disease), and monitor CrCl as needed Q6mo while on DOAC  - counseled on signs and symptoms of acute VTE that require seeking prompt attention in the ED to include shortness of breath, chest pain, pain with deep inhalation, acute leg swelling and/or pain in calf or leg    - elevate legs as much as possible, use compression stockings/socks if directed by your provider  - Avoid hormonal therapies including estrogen or testosterone-containing meds, or raloxifene or tamoxifene (commonly used for osteoporosis)  - Avoid sedentary periods  - continue complete avoidance of tobacco products  - if having any invasive procedure,please make sure the doctor knows of your history of blood clots and current anticoagulation status  - avoid Aspirin and anti-inflammatories (eg. Advil, ibuprofen, Aleve, naproxen, etc) while anticoagulated   - avoid skiing or other dangerous activities to reduce risk of head injury and brain bleeds  - recommended to see your PCP to discuss if you need age-appropriate cancer screenings as a small % of blood clots may be caused by an underlying malignancy  - if any bleeding lasting 30min without stopping, please seek care with your PCP, urgent care, or ED  - reversal agents for most blood thinners are now available and used if you have major bleeding    LIPID MANAGEMENT:   Qualifies for Statin Therapy Based on 2018 ACC/AHA Guidelines: yes, Secondary Prevention - Very high risk ASCVD - 2 major events or 1 event with other high-risk conditions  The ASCVD Risk score (Paige FITZGERALD, et al., 2019) failed to calculate., N/A  Major ASCVD events: History of ischemic CVA   High-risk conditions: >64yr old  and Hypertension   Currently on Statin: Yes  Tx goals: LDL-C <55 (if HTG, consider non-HDL-C <85, apoB: not defined)   At goal? Yes, 9/2023   Plan:   - reinforced ongoing TLC measures as noted   - monitor labs   Meds:   - continue atorva 40mg daily     BLOOD PRESSURE MANAGEMENT:  ACC/AHA (2017) goal <130/80  Home BP at goal:  yes  Office BP at goal:  yes  24h ABPM: not ordered to date  Plan:   Monitoring:   - start/continue home BP monitoring, reviewed correct technique, provide BP log and instructions  - order 24h ABPM:  NO  - monitor lytes/gfr routinely   - contact office if BP  consistently >140/>90 to discussion of tx adjustments   Medications:  - continue losartan 25mg daily   - continue metoprolol SR 25mg daily  - continue spironolactone 25mg daily    GLYCEMIC STATUS:  Normal    LIFESTYLE INTERVENTIONS:    SMOKING: Stages of Change: Maintenance (sustained change >6mo)    reports that he quit smoking about 13 years ago. His smoking use included cigarettes. He started smoking about 55 years ago. He has a 21.0 pack-year smoking history. He has never been exposed to tobacco smoke. He has never used smokeless tobacco.   - continued complete avoidance of all tobacco products     PHYSICAL ACTIVITY: continue healthy activity to improve CV fitness.  In general, targeting >150min/week of moderate-level activity or as much as tolerated in light of functional status and co-morbidities     WEIGHT MANAGEMENT AND NUTRITION: Mediterranean style dietary approach       OTHER:     # FRANK on CPAP  Strongly encouraged CPAP adherence to reduce RV strain, improve overall fatigue symptoms, and improve BP in both the short- and long-term as per HIPARCO (2013) and HIPARCO-2 (2021) studies.   - continue CPAP, defer mgmt to sleep MD     Instructed to follow-up with PCP for remainder of adult medical needs: yes  We will partner with other providers in the management of established vascular disease and cardiometabolic risk factors.    Studies to Be Obtained: echo - 9/2024 - not ordered   Labs to Be Obtained: cbc, cmp, lipid, micro alb, A1c,      Follow up in: 6 months    AMISHA Varghese.  [facility]   [facility]   [phone]

## 2023-11-21 NOTE — Clinical Note
Can you please call pt and let him know I heard back from Dr. Roberto.  He would like pt to remain off eliqius until his follow up appt next week, but would like him to START ASA 81 mg daily until then.   Thanks!

## 2023-11-21 NOTE — TELEPHONE ENCOUNTER
Pt called questioning when he should restart Eliquis. Dr Roberto stated he should take a baby aspirin until he sees us again.  Reported that all the labs are in process. Pt verified understanding.

## 2023-11-22 NOTE — CARDIAC REMOTE MONITOR - SCAN
Device transmission reviewed. Device demonstrated appropriate function.       Electronically Signed by: Blake Monterroso M.D.    11/22/2023  11:26 AM

## 2023-11-23 LAB — PROT S ACT/NOR PPP: 115 % (ref 66–143)

## 2023-11-24 LAB
AT III ACT/NOR PPP CHRO: 105 % (ref 76–128)
AT III AG ACT/NOR PPP IA: 92 % (ref 82–136)

## 2023-11-25 LAB — F2 C.20210G>A GENO BLD/T: NEGATIVE

## 2023-11-27 ENCOUNTER — HOSPITAL ENCOUNTER (OUTPATIENT)
Dept: HEMATOLOGY ONCOLOGY | Facility: MEDICAL CENTER | Age: 73
End: 2023-11-27
Attending: INTERNAL MEDICINE
Payer: MEDICARE

## 2023-11-27 VITALS
RESPIRATION RATE: 12 BRPM | OXYGEN SATURATION: 95 % | BODY MASS INDEX: 31.71 KG/M2 | DIASTOLIC BLOOD PRESSURE: 60 MMHG | SYSTOLIC BLOOD PRESSURE: 94 MMHG | WEIGHT: 234.13 LBS | HEART RATE: 50 BPM | TEMPERATURE: 96.8 F | HEIGHT: 72 IN

## 2023-11-27 DIAGNOSIS — Z86.711 HISTORY OF PULMONARY EMBOLISM: ICD-10-CM

## 2023-11-27 PROCEDURE — 99212 OFFICE O/P EST SF 10 MIN: CPT | Performed by: INTERNAL MEDICINE

## 2023-11-27 PROCEDURE — 99213 OFFICE O/P EST LOW 20 MIN: CPT | Performed by: INTERNAL MEDICINE

## 2023-11-27 ASSESSMENT — FIBROSIS 4 INDEX: FIB4 SCORE: 1.59

## 2023-12-22 ENCOUNTER — NON-PROVIDER VISIT (OUTPATIENT)
Dept: CARDIOLOGY | Facility: MEDICAL CENTER | Age: 73
End: 2023-12-22
Payer: MEDICARE

## 2023-12-26 PROCEDURE — 93298 REM INTERROG DEV EVAL SCRMS: CPT | Performed by: INTERNAL MEDICINE

## 2023-12-26 NOTE — CARDIAC REMOTE MONITOR - SCAN
Device transmission reviewed. Device demonstrated appropriate function.       Electronically Signed by: Blake Monterroso M.D.    12/27/2023  2:39 PM

## 2024-01-22 ENCOUNTER — NON-PROVIDER VISIT (OUTPATIENT)
Dept: CARDIOLOGY | Facility: MEDICAL CENTER | Age: 74
End: 2024-01-22
Payer: MEDICARE

## 2024-01-22 PROCEDURE — 93298 REM INTERROG DEV EVAL SCRMS: CPT | Mod: 26 | Performed by: INTERNAL MEDICINE

## 2024-01-23 NOTE — CARDIAC REMOTE MONITOR - SCAN
Device transmission reviewed. Device demonstrated appropriate function.       Electronically Signed by: Blake Monterroso M.D.    1/29/2024  12:15 PM

## 2024-02-05 ENCOUNTER — TELEPHONE (OUTPATIENT)
Dept: CARDIOLOGY | Facility: MEDICAL CENTER | Age: 74
End: 2024-02-05
Payer: MEDICARE

## 2024-02-05 NOTE — TELEPHONE ENCOUNTER
THIAGO  Caller: Jan Rivas     Topic/issue: Patient states he has an emergency supply of Nitroglycerin that he rarely takes. He usually keeps this in a glass container that was dropped and broke. He discarded the medication and is hoping for a new orde to be placed to his pharmacy ; [id number] - [place], [place] - [address] [[id number]]     Callback Number: [phone]    Thank you  Liz ARCOS

## 2024-02-06 NOTE — TELEPHONE ENCOUNTER
Phone number called: [phone] (home)     Call outcome: nitroglycerin is not on pt's list. Pt is scheduled to see JG on 03/26/24. Per pt's wife the medication was prescribed back when he had his heart attack in [facility]. Janes told that pt is already scheduled to see JG and could discuss this on his visit.

## 2024-02-22 ENCOUNTER — NON-PROVIDER VISIT (OUTPATIENT)
Dept: CARDIOLOGY | Facility: MEDICAL CENTER | Age: 74
End: 2024-02-22
Payer: MEDICARE

## 2024-02-22 PROCEDURE — 93298 REM INTERROG DEV EVAL SCRMS: CPT | Mod: 26 | Performed by: INTERNAL MEDICINE

## 2024-02-23 NOTE — CARDIAC REMOTE MONITOR - SCAN
Device transmission reviewed. Device demonstrated appropriate function.       Electronically Signed by: Damian Yanes M.D.    2/23/2024  12:32 PM

## 2024-03-06 ENCOUNTER — TELEPHONE (OUTPATIENT)
Dept: CARDIOLOGY | Facility: MEDICAL CENTER | Age: 74
End: 2024-03-06
Payer: MEDICARE

## 2024-03-06 NOTE — TELEPHONE ENCOUNTER
Roman Wheeler D.O.  You31 minutes ago (12:39 PM)     BD  Please have patient follow up with JG in next 24-48 hours

## 2024-03-06 NOTE — TELEPHONE ENCOUNTER
Phone number called: [phone] (home)     To BD: (HTIAGO pt) Cardiac monitor please advise. I spoke to pt, he states that he was probably washing dishes at the time. He denies feeling any symptoms. No dizziness, feeling of passing out, SOB, CP.

## 2024-03-06 NOTE — TELEPHONE ENCOUNTER
Remote transmission received via home monitor, full report scanned into Media.    Pause Episode  -Episode Onset: 3/5/2024 @ 7:36 PM  -Duration: 7 seconds  -Average Rate: 63 bpm    Pause Episode (concurrently with previous pause)  -Episode Onset: 3/5/2024 @ 7:36 PM  -Duration: 2.5 seconds  -Average Rate: 63 bpm

## 2024-03-08 ENCOUNTER — OFFICE VISIT (OUTPATIENT)
Dept: CARDIOLOGY | Facility: MEDICAL CENTER | Age: 74
End: 2024-03-08
Attending: NURSE PRACTITIONER
Payer: MEDICARE

## 2024-03-08 VITALS
SYSTOLIC BLOOD PRESSURE: 106 MMHG | HEIGHT: 72 IN | BODY MASS INDEX: 31.69 KG/M2 | DIASTOLIC BLOOD PRESSURE: 64 MMHG | RESPIRATION RATE: 16 BRPM | WEIGHT: 234 LBS | HEART RATE: 56 BPM | OXYGEN SATURATION: 95 %

## 2024-03-08 DIAGNOSIS — T50.905A BRADYCARDIA, DRUG INDUCED: ICD-10-CM

## 2024-03-08 DIAGNOSIS — I25.10 CORONARY ARTERY DISEASE INVOLVING NATIVE CORONARY ARTERY OF NATIVE HEART WITHOUT ANGINA PECTORIS: ICD-10-CM

## 2024-03-08 DIAGNOSIS — Z86.711 HISTORY OF PULMONARY EMBOLISM: ICD-10-CM

## 2024-03-08 DIAGNOSIS — Z95.5 STENTED CORONARY ARTERY: ICD-10-CM

## 2024-03-08 DIAGNOSIS — G47.33 OSA ON CPAP: ICD-10-CM

## 2024-03-08 DIAGNOSIS — R00.1 BRADYCARDIA, DRUG INDUCED: ICD-10-CM

## 2024-03-08 DIAGNOSIS — I10 ESSENTIAL HYPERTENSION, BENIGN: ICD-10-CM

## 2024-03-08 DIAGNOSIS — E78.5 DYSLIPIDEMIA: ICD-10-CM

## 2024-03-08 PROCEDURE — 3078F DIAST BP <80 MM HG: CPT | Performed by: NURSE PRACTITIONER

## 2024-03-08 PROCEDURE — 99214 OFFICE O/P EST MOD 30 MIN: CPT | Performed by: NURSE PRACTITIONER

## 2024-03-08 PROCEDURE — 3074F SYST BP LT 130 MM HG: CPT | Performed by: NURSE PRACTITIONER

## 2024-03-08 PROCEDURE — 99212 OFFICE O/P EST SF 10 MIN: CPT | Performed by: NURSE PRACTITIONER

## 2024-03-08 RX ORDER — NITROGLYCERIN 0.4 MG/1
0.4 TABLET SUBLINGUAL PRN
Qty: 25 TABLET | Refills: 5 | Status: SHIPPED | OUTPATIENT
Start: 2024-03-08

## 2024-03-08 RX ORDER — ASPIRIN 81 MG/1
81 TABLET ORAL DAILY
Qty: 100 TABLET | Refills: 3 | Status: SHIPPED | OUTPATIENT
Start: 2024-03-08

## 2024-03-08 ASSESSMENT — FIBROSIS 4 INDEX: FIB4 SCORE: 1.59

## 2024-03-08 NOTE — PROGRESS NOTES
Chief Complaint   Patient presents with    Other     F/V Dx: Heart monitor       Subjective     New Rivas is a 73 y.o. male who presents today for cardiology follow-up after implanted loop recorder noted 7-second pause on 3/5/2024.  Previous hospitalization on 5/25/2023 for severe shortness of breath and acute respiratory failure found to have PE determined to be provoked from DVT after travel.  Patient has additional past medical history of CAD s/p PCI to LAD (2010), carotid artery stenosis, hypertension, dyslipidemia, FRANK on CPAP, CVA.  ILR placed post-CVA to monitor for potential A-fib.  Patient was last seen by Dr. Lake on 3/23/2023 and myself on 9/28/2023.  Patient also followed by vascular medicine and was recently discharged from hematology clinic after his Eliquis was discontinued.    Today, patient feels well, denies chest pain, shortness of breath, palpitations, dizziness/lightheadedness, orthopnea, PND or Edema.  Patient reports at documented time he was doing the dishes and did not experience any abnormal symptoms such as chest pain, shortness of breath, dizziness/lightheadedness, palpitations, presyncope/syncope.  Patient reports he is compliant with CPAP use and current on annual follow-ups with sleep medicine.    We discussed and personally reviewed event monitor results per below.  Previous event monitor bradycardia noted during patient was sleeping.  We reviewed previous echocardiogram findings with recovered RV function.  We discussed potential indication for permanent pacemaker placement.  At this time, we will discontinue beta-blocker therapy and will continue to monitor loop recorder as patient asymptomatic.  We discussed if any abnormal symptoms such as noted above develop to notify cardiology office or be evaluated at ER during off business hours.    Close follow-up in 2 months, sooner if needed.    Past Medical History:   Diagnosis Date    Acute stroke due to ischemia (HCC) 05/2022     MRI with acute infarct of left posterior insular cortex.    Apnea, sleep     Uses CPAP    CAD (coronary artery disease) 2010    Acute apical infarct. PCI/MANJEET (Taxus 3.5 x 32mm) to the LAD. 2015: [facility] with patent stent.    Chickenpox     Cymraes measles     H/O heart artery stent 2010    Heart attack (HCC)     Hyperlipidemia     Hypertension     Mumps     Sleep apnea     Snoring     Stroke (HCC)     Wears glasses      Past Surgical History:   Procedure Laterality Date    [facility] CARDIAC CATH  2015    Cath at [place].  Patent stent and no obstructive CAD.    Z CARDIAC CATH  2010    Taxus stent to LAD    OTHER CARDIAC SURGERY  2010    Heart Stent    CYST EXCISION      posterior neck     Family History   Problem Relation Age of Onset    Heart Attack Mother     Cancer Mother     Cancer Father     Cancer Sister      Social History     Socioeconomic History    Marital status:      Spouse name: Not on file    Number of children: Not on file    Years of education: Not on file    Highest education level: Associate degree: academic program   Occupational History    Not on file   Tobacco Use    Smoking status: Former     Current packs/day: 0.00     Average packs/day: 0.5 packs/day for 42.0 years (21.0 ttl pk-yrs)     Types: Cigarettes     Start date: 1968     Quit date: 2010     Years since quittin.8     Passive exposure: Never    Smokeless tobacco: Never   Vaping Use    Vaping Use: Never used   Substance and Sexual Activity    Alcohol use: Yes     Alcohol/week: 8.4 oz     Types: 14 Standard drinks or equivalent per week     Comment: 2 drinks per day     Drug use: Yes     Types: Marijuana, Inhaled     Comment: Every other day marijuana    Sexual activity: Not on file   Other Topics Concern    Not on file   Social History Narrative    Not on file     Social Determinants of Health     Financial Resource Strain: Low Risk  (2023)    Overall Financial Resource Strain (CARDIA)      Difficulty of Paying Living Expenses: Not hard at all   Food Insecurity: No Food Insecurity (5/26/2023)    Hunger Vital Sign     Worried About Running Out of Food in the Last Year: Never true     Ran Out of Food in the Last Year: Never true   Transportation Needs: No Transportation Needs (5/26/2023)    PRAPARE - Transportation     Lack of Transportation (Medical): No     Lack of Transportation (Non-Medical): No   Physical Activity: Insufficiently Active (5/25/2023)    Exercise Vital Sign     Days of Exercise per Week: 2 days     Minutes of Exercise per Session: 30 min   Stress: No Stress Concern Present (5/25/2023)    [employer] [place] [employer] - Occupational Stress Questionnaire     Feeling of Stress : Not at all   Social Connections: Moderately Integrated (5/25/2023)    Social Connection and Isolation Panel [NHANES]     Frequency of Communication with Friends and Family: More than three times a week     Frequency of Social Gatherings with Friends and Family: Twice a week     Attends Muslim Services: Never     Active Member of Clubs or Organizations: Yes     Attends Club or Organization Meetings: 1 to 4 times per year     Marital Status:    Intimate Partner Violence: Not on file   Housing Stability: Low Risk  (5/26/2023)    Housing Stability Vital Sign     Unable to Pay for Housing in the Last Year: No     Number of Places Lived in the Last Year: 1     Unstable Housing in the Last Year: No     No Known Allergies  Outpatient Encounter Medications as of 3/8/2024   Medication Sig Dispense Refill    nitroglycerin (NITROSTAT) 0.4 MG SL Tab Place 1 Tablet under the tongue as needed for Chest Pain (every 5 minutes for 3 doses, no refief, call 911). 25 Tablet 5    aspirin 81 MG EC tablet Take 1 Tablet by mouth every day. 100 Tablet 3    atorvastatin (LIPITOR) 40 MG Tab Take 1 Tablet by mouth 1/2 hour before dinner. 90 Tablet 3    losartan (COZAAR) 25 MG Tab Take 1 Tablet by mouth every day. 90  Tablet 3    spironolactone (ALDACTONE) 25 MG Tab Take 1 Tablet by mouth every day. 90 Tablet 3    sertraline (ZOLOFT) 50 MG Tab Take 50 mg by mouth every day.      traZODone (DESYREL) 50 MG Tab Take 100 mg by mouth every evening.      latanoprost (XALATAN) 0.005 % Solution Administer 1 Drop into both eyes at bedtime.      [DISCONTINUED] metoprolol SR (TOPROL XL) 25 MG TABLET SR 24 HR Take 1 Tablet by mouth every evening. 90 Tablet 3    [DISCONTINUED] apixaban (ELIQUIS) 5mg Tab Take 1 Tablet by mouth 2 times a day. 180 Tablet 3     No facility-administered encounter medications on file as of 3/8/2024.     ROS Complete review of systems negative except as noted in HPI/subjective           Objective     /64 (BP Location: Left arm, Patient Position: Sitting, BP Cuff Size: Adult)   Pulse (!) 56   Resp 16   Ht 1.829 m (6')   Wt 106 kg (234 lb)   SpO2 95%   BMI 31.74 kg/m²     Physical Exam  Vitals reviewed.   Constitutional:       Appearance: He is well-developed.   HENT:      Head: Normocephalic and atraumatic.   Eyes:      Pupils: Pupils are equal, round, and reactive to light.   Neck:      Vascular: No JVD.   Cardiovascular:      Rate and Rhythm: Normal rate and regular rhythm.      Pulses: Normal pulses.      Heart sounds: Normal heart sounds. No murmur heard.     No friction rub. No gallop.   Pulmonary:      Effort: Pulmonary effort is normal. No respiratory distress.      Breath sounds: Normal breath sounds.   Abdominal:      General: Bowel sounds are normal. There is no distension.      Palpations: Abdomen is soft.   Musculoskeletal:      Right lower leg: No edema.      Left lower leg: No edema.   Skin:     General: Skin is warm and dry.      Findings: No erythema.   Neurological:      Mental Status: He is alert and oriented to person, place, and time.   Psychiatric:         Behavior: Behavior normal.            Lab Results   Component Value Date/Time    CHOLSTRLTOT 144 11/21/2023 07:08 AM    LDL 47  "11/21/2023 07:08 AM    HDL 68 11/21/2023 07:08 AM    TRIGLYCERIDE 146 11/21/2023 07:08 AM       Lab Results   Component Value Date/Time    SODIUM 139 11/21/2023 07:08 AM    POTASSIUM 4.1 11/21/2023 07:08 AM    CHLORIDE 100 11/21/2023 07:08 AM    CO2 28 11/21/2023 07:08 AM    GLUCOSE 103 (H) 11/21/2023 07:08 AM    BUN 16 11/21/2023 07:08 AM    CREATININE 0.91 11/21/2023 07:08 AM    BUNCREATRAT 16 06/27/2023 07:04 AM     Lab Results   Component Value Date/Time    ALKPHOSPHAT 51 11/21/2023 07:08 AM    ASTSGOT 17 11/21/2023 07:08 AM    ALTSGPT 13 11/21/2023 07:08 AM    TBILIRUBIN 0.4 11/21/2023 07:08 AM       Latest Reference Range & Units 11/21/23 07:08   TSH 0.380 - 5.330 uIU/mL 4.680     Chest CTA (5/5/2023):  1.  Large right main pulmonary embolus extending into right upper, middle, and lower lobe segmental and subsegmental branches  2.  Left upper, lower, and lingular segmental and subsegmental pulmonary emboli  3.  Asymmetric prominence of the right ventricle with slight bowing of the intraventricular septum, appearance favoring component of right heart strain.  4.  Large right upper pole renal cyst without visualized complex features    TTE (5/25/2023)  Compared to the images of the prior study 5/11/2022, RV is now dilated   with flattened septum.  Normal left ventricular size and systolic function.  No regional wall motion abnormality noted.  Dilated right ventricle with reduced systolic function.  Flattened septum in diastole (\"D\"-shaped left ventricle) consistent   with RV volume overload.  Estimated right ventricular systolic pressure is 35 mmHg.    TTE (9/29/2023):  Compared to the prior study on 5/25/23, right ventricular size and   function normalized.  The left ventricular ejection fraction is visually estimated to be 55%.  Normal regional wall motion.  No significant valvular disease.   The ascending aorta diameter is 4.1 cm.    Cardiac event monitor (6/30/2022):  Personally reviewed by myself notable " for Mobitz type I during sleep hours    Patient's ILR recorded Pause Episode  -Episode Onset: 3/5/2024 @ 7:36 PM  -Duration: 7 seconds  -Average Rate: 63 bpm  Pause Episode (concurrently with previous pause)  -Episode Onset: 3/5/2024 @ 7:36 PM  -Duration: 2.5 seconds  -Average Rate: 63 bpm    Assessment & Plan     1. History of pulmonary embolism  nitroglycerin (NITROSTAT) 0.4 MG SL Tab    aspirin 81 MG EC tablet      2. FRANK on CPAP  nitroglycerin (NITROSTAT) 0.4 MG SL Tab    aspirin 81 MG EC tablet      3. Bradycardia, drug induced  nitroglycerin (NITROSTAT) 0.4 MG SL Tab    aspirin 81 MG EC tablet      4. Dyslipidemia        5. Essential hypertension, benign        6. Coronary artery disease involving native coronary artery of native heart without angina pectoris        7. Stented coronary artery              Medical Decision Making: Today's Assessment/Status/Plan:        Bradycardia, medication induced  -Stop metoprolol  -If pause re-occurs, refer for PPM. Discussed in office today  -ER precautions discussed    CAD, s/p MANJEET/PCI to LAD (2010); HTN; DLD  -ASA  -Continue high intensity statin at 40 mg daily. LDL at goal 47 (11/21/2023)  -Continue, losartan 25 mg daily and spironolactone  -LPa 9, WDL  -NTG PRN    FRANK  -Patient reports CPAP compliance  -We discussed importance of treating nocturnal hypoxia due to causing increased risk for cardiac disease; patient verbalizes understanding.  -Per sleep medicine    History of cryptogenic stroke; provoked DVT/PE while traveling  -ILR in place.  No A-fib burden detected on review  -Discharge from hematology clinic.  OAC discontinued.    Ascending aorta is dilated with a diameter of 4.2 cm  -followed by vasc  -4.1 com on recent echo. Continue surveillance       FU in clinic in 6 months. Sooner if needed.    Patient verbalizes understanding and agrees with the plan of care.     MIR [email or web address].   [facility]  [phone]  [phone]    PLEASE NOTE: This Note was created using voice recognition Software. I have made every reasonable attempt to correct obvious errors, but I expect that there are errors of grammar and possibly content that I did not discover before finalizing the note

## 2024-03-08 NOTE — TELEPHONE ENCOUNTER
Noted below:    TOBY Rojas   to Me       3/7/24  1:02 PM  Please schedule patient for Friday      Called pt and updated; scheduled for tomorrow morning at 0915

## 2024-03-24 ENCOUNTER — NON-PROVIDER VISIT (OUTPATIENT)
Dept: CARDIOLOGY | Facility: MEDICAL CENTER | Age: 74
End: 2024-03-24
Payer: MEDICARE

## 2024-03-24 PROCEDURE — 93298 REM INTERROG DEV EVAL SCRMS: CPT | Mod: 26 | Performed by: INTERNAL MEDICINE

## 2024-03-25 NOTE — CARDIAC REMOTE MONITOR - SCAN
Device transmission reviewed. Device demonstrated appropriate function.       Electronically Signed by: Obdulio Joseph M.D.    3/30/2024  2:40 PM

## 2024-04-24 ENCOUNTER — NON-PROVIDER VISIT (OUTPATIENT)
Dept: CARDIOLOGY | Facility: MEDICAL CENTER | Age: 74
End: 2024-04-24
Payer: MEDICARE

## 2024-05-06 ENCOUNTER — RX ONLY (OUTPATIENT)
Age: 74
Setting detail: RX ONLY
End: 2024-05-06

## 2024-05-06 RX ORDER — PIMECROLIMUS 10 MG/G
1 CREAM TOPICAL QD
Qty: 60 | Refills: 4 | Status: ERX | COMMUNITY
Start: 2024-05-05

## 2024-05-11 ENCOUNTER — HOSPITAL ENCOUNTER (OUTPATIENT)
Dept: LAB | Facility: MEDICAL CENTER | Age: 74
End: 2024-05-11
Attending: NURSE PRACTITIONER
Payer: MEDICARE

## 2024-05-11 ENCOUNTER — HOSPITAL ENCOUNTER (OUTPATIENT)
Dept: LAB | Facility: MEDICAL CENTER | Age: 74
End: 2024-05-11
Payer: MEDICARE

## 2024-05-11 DIAGNOSIS — E78.00 PURE HYPERCHOLESTEROLEMIA: ICD-10-CM

## 2024-05-11 DIAGNOSIS — I10 ESSENTIAL HYPERTENSION: ICD-10-CM

## 2024-05-11 DIAGNOSIS — E78.01 FAMILIAL HYPERCHOLESTEROLEMIA: ICD-10-CM

## 2024-05-11 DIAGNOSIS — Z79.01 CHRONIC ANTICOAGULATION: ICD-10-CM

## 2024-05-11 DIAGNOSIS — R73.01 ELEVATED FASTING BLOOD SUGAR: ICD-10-CM

## 2024-05-11 DIAGNOSIS — I25.10 CORONARY ARTERY DISEASE INVOLVING NATIVE CORONARY ARTERY OF NATIVE HEART WITHOUT ANGINA PECTORIS: ICD-10-CM

## 2024-05-11 LAB
ALBUMIN SERPL BCP-MCNC: 4.5 G/DL (ref 3.2–4.9)
ALBUMIN/GLOB SERPL: 1.8 G/DL
ALP SERPL-CCNC: 49 U/L (ref 30–99)
ALT SERPL-CCNC: 21 U/L (ref 2–50)
ANION GAP SERPL CALC-SCNC: 12 MMOL/L (ref 7–16)
AST SERPL-CCNC: 25 U/L (ref 12–45)
BASOPHILS # BLD AUTO: 0.8 % (ref 0–1.8)
BASOPHILS # BLD: 0.05 K/UL (ref 0–0.12)
BILIRUB SERPL-MCNC: 0.3 MG/DL (ref 0.1–1.5)
BUN SERPL-MCNC: 19 MG/DL (ref 8–22)
CALCIUM ALBUM COR SERPL-MCNC: 9.3 MG/DL (ref 8.5–10.5)
CALCIUM SERPL-MCNC: 9.7 MG/DL (ref 8.5–10.5)
CHLORIDE SERPL-SCNC: 104 MMOL/L (ref 96–112)
CHOLEST SERPL-MCNC: 141 MG/DL (ref 100–199)
CO2 SERPL-SCNC: 25 MMOL/L (ref 20–33)
CREAT SERPL-MCNC: 0.96 MG/DL (ref 0.5–1.4)
CREAT UR-MCNC: 145.45 MG/DL
EOSINOPHIL # BLD AUTO: 0.19 K/UL (ref 0–0.51)
EOSINOPHIL NFR BLD: 3.1 % (ref 0–6.9)
ERYTHROCYTE [DISTWIDTH] IN BLOOD BY AUTOMATED COUNT: 46.4 FL (ref 35.9–50)
ERYTHROCYTE [DISTWIDTH] IN BLOOD BY AUTOMATED COUNT: 46.5 FL (ref 35.9–50)
EST. AVERAGE GLUCOSE BLD GHB EST-MCNC: 120 MG/DL
FASTING STATUS PATIENT QL REPORTED: NORMAL
GFR SERPLBLD CREATININE-BSD FMLA CKD-EPI: 84 ML/MIN/1.73 M 2
GLOBULIN SER CALC-MCNC: 2.5 G/DL (ref 1.9–3.5)
GLUCOSE SERPL-MCNC: 105 MG/DL (ref 65–99)
HBA1C MFR BLD: 5.8 % (ref 4–5.6)
HCT VFR BLD AUTO: 41.8 % (ref 42–52)
HCT VFR BLD AUTO: 42 % (ref 42–52)
HDLC SERPL-MCNC: 66 MG/DL
HGB BLD-MCNC: 13.4 G/DL (ref 14–18)
HGB BLD-MCNC: 13.5 G/DL (ref 14–18)
IMM GRANULOCYTES # BLD AUTO: 0.04 K/UL (ref 0–0.11)
IMM GRANULOCYTES NFR BLD AUTO: 0.7 % (ref 0–0.9)
LDLC SERPL CALC-MCNC: 58 MG/DL
LYMPHOCYTES # BLD AUTO: 1.69 K/UL (ref 1–4.8)
LYMPHOCYTES NFR BLD: 28 % (ref 22–41)
MCH RBC QN AUTO: 32.8 PG (ref 27–33)
MCH RBC QN AUTO: 32.8 PG (ref 27–33)
MCHC RBC AUTO-ENTMCNC: 32.1 G/DL (ref 32.3–36.5)
MCHC RBC AUTO-ENTMCNC: 32.1 G/DL (ref 32.3–36.5)
MCV RBC AUTO: 102.2 FL (ref 81.4–97.8)
MCV RBC AUTO: 102.2 FL (ref 81.4–97.8)
MICROALBUMIN UR-MCNC: 1.4 MG/DL
MICROALBUMIN/CREAT UR: 10 MG/G (ref 0–30)
MONOCYTES # BLD AUTO: 0.73 K/UL (ref 0–0.85)
MONOCYTES NFR BLD AUTO: 12.1 % (ref 0–13.4)
NEUTROPHILS # BLD AUTO: 3.34 K/UL (ref 1.82–7.42)
NEUTROPHILS NFR BLD: 55.3 % (ref 44–72)
NRBC # BLD AUTO: 0 K/UL
NRBC BLD-RTO: 0 /100 WBC (ref 0–0.2)
PLATELET # BLD AUTO: 227 K/UL (ref 164–446)
PLATELET # BLD AUTO: 237 K/UL (ref 164–446)
PMV BLD AUTO: 10.2 FL (ref 9–12.9)
PMV BLD AUTO: 9.9 FL (ref 9–12.9)
POTASSIUM SERPL-SCNC: 4.6 MMOL/L (ref 3.6–5.5)
PROT SERPL-MCNC: 7 G/DL (ref 6–8.2)
RBC # BLD AUTO: 4.09 M/UL (ref 4.7–6.1)
RBC # BLD AUTO: 4.11 M/UL (ref 4.7–6.1)
SODIUM SERPL-SCNC: 141 MMOL/L (ref 135–145)
TRIGL SERPL-MCNC: 84 MG/DL (ref 0–149)
WBC # BLD AUTO: 5.9 K/UL (ref 4.8–10.8)
WBC # BLD AUTO: 6 K/UL (ref 4.8–10.8)

## 2024-05-21 ENCOUNTER — OFFICE VISIT (OUTPATIENT)
Dept: VASCULAR LAB | Facility: MEDICAL CENTER | Age: 74
End: 2024-05-21
Payer: MEDICARE

## 2024-05-21 VITALS
SYSTOLIC BLOOD PRESSURE: 117 MMHG | DIASTOLIC BLOOD PRESSURE: 69 MMHG | WEIGHT: 235 LBS | HEART RATE: 59 BPM | BODY MASS INDEX: 31.83 KG/M2 | HEIGHT: 72 IN

## 2024-05-21 DIAGNOSIS — R06.02 SOB (SHORTNESS OF BREATH): ICD-10-CM

## 2024-05-21 DIAGNOSIS — I10 ESSENTIAL HYPERTENSION: ICD-10-CM

## 2024-05-21 DIAGNOSIS — R73.03 PREDIABETES: ICD-10-CM

## 2024-05-21 DIAGNOSIS — I71.21 ANEURYSM OF ASCENDING AORTA WITHOUT RUPTURE (HCC): ICD-10-CM

## 2024-05-21 DIAGNOSIS — Z86.711 HISTORY OF PULMONARY EMBOLISM: ICD-10-CM

## 2024-05-21 PROCEDURE — 3074F SYST BP LT 130 MM HG: CPT

## 2024-05-21 PROCEDURE — 99214 OFFICE O/P EST MOD 30 MIN: CPT

## 2024-05-21 PROCEDURE — 3078F DIAST BP <80 MM HG: CPT

## 2024-05-21 ASSESSMENT — ENCOUNTER SYMPTOMS
BLOOD IN STOOL: 0
SPEECH CHANGE: 0
HEADACHES: 0
MYALGIAS: 0
CHILLS: 0
WEAKNESS: 0
WHEEZING: 0
DIZZINESS: 0
BRUISES/BLEEDS EASILY: 0
PALPITATIONS: 0
FOCAL WEAKNESS: 0
SHORTNESS OF BREATH: 1
COUGH: 0
FEVER: 0

## 2024-05-21 ASSESSMENT — FIBROSIS 4 INDEX: FIB4 SCORE: 1.66

## 2024-05-21 NOTE — PROGRESS NOTES
VASCULAR ANTICOAGULATION Follow Up VISIT  05/21/2024     Jan Rivas is a 74 y.o. male who presents for  Chief Complaint   Patient presents with    Follow-Up     Initially referred by Raysa Pringle A.* for length of therapy (LOT) determination and management of anticoagulation in context of acute venothromboembolic disease, est 6/2023    Subjective      Doing well,  Is still having some ongoing SOB and easily fatigued with activity  Very little exercise tolerance.    Also reports some issue with balance and coordination at times which limits activity  Has been off eliquis since 11/2023  Saw shelia who recommended he stay off OAC,   Remains on ASA, no bleeding  Had fasting labs, reviewed   No CP, palpitations or leg swelling  Not checking BPs at home, good in other offices  Has upcoming travel/flights in June to [place], wondering if should do anything specific for them  Has an upcoming eye dr hancock next month  Also has follow up cardiology and pcp in the next month     VTE disease / Anticoagulation:   Date of initiation of anticoagulation: 5/25/23   Initial visit symptoms:  Denies current CP, leg swelling, edema, leg pain, cyanosis of digits, redness of extremities.  Ongoing SOB and fatigue  Current antithrombotic agent: none, ASA 81mg   Complications: none, tolerating well, no bleeding or bruising   Adherence: reports complete, no missed doses    _____Pertinent VTE pmhx:   Date of Diagnosis: 5/25/25  Type of Venous thromboembolic disease (VTE): acute R main PE, no cor pulm   Preceding/presenting hx: 5/23 - acute onset SOB, progressive MEJIA after walking, seen at  and transferred to ED on 5/25 and dx  Antithrombotic therapy at time of VTE event: yes - plavix  VTE tx course: Eliquis 10mg BID x 7d, now eliquis 5mg BID   Any personal VTE hx? No  Any family VTE hx? No  _____PROVOKING FACTORS:  Recent surgery ? No  Recent trauma ? No  Smoker?  reports that he quit smoking about 14 years ago. His smoking use  included cigarettes. He started smoking about 56 years ago. He has a 21 pack-year smoking history. He has never been exposed to tobacco smoke. He has never used smokeless tobacco.    Extended travel? Yes, Details: 3/2023 - had been on cruise,   Other periods of immobility? No  Other known or potential risk factors for VTE disease:  no  MEN:  Hx of cancer or abnormal cancer screenings: no  Hx of Testosterone therapy: no  Hypercoaguability work-up completed?  No    CAD s/p PCI to LAD ()  Currently feeling well. Seeing cardiology. No other issues       CVA:  Stable, no current new sx          Current Outpatient Medications:     nitroglycerin, 0.4 mg, Sublingual, PRN, PRN    aspirin, 81 mg, Oral, DAILY, Taking    atorvastatin, 40 mg, Oral, AC PM MEAL, Taking    losartan, 25 mg, Oral, DAILY, Taking    spironolactone, 25 mg, Oral, DAILY, Taking    sertraline, 50 mg, Oral, DAILY, Taking    traZODone, 100 mg, Oral, Nightly, Taking    latanoprost, 1 Drop, Both Eyes, QHS, Taking     No Known Allergies    Family History   Problem Relation Age of Onset    Heart Attack Mother     Cancer Mother     Cancer Father     Cancer Sister      Social History     Tobacco Use    Smoking status: Former     Current packs/day: 0.00     Average packs/day: 0.5 packs/day for 42.0 years (21.0 ttl pk-yrs)     Types: Cigarettes     Start date: 1968     Quit date: 2010     Years since quittin.0     Passive exposure: Never    Smokeless tobacco: Never   Vaping Use    Vaping status: Never Used   Substance Use Topics    Alcohol use: Yes     Alcohol/week: 8.4 oz     Types: 14 Standard drinks or equivalent per week     Comment: 2 drinks per day     Drug use: Yes     Types: Marijuana, Inhaled     Comment: Every other day marijuana       DIET AND EXERCISE:  Weight Change:stable   BMI Readings from Last 5 Encounters:   24 31.87 kg/m²   24 31.74 kg/m²   23 31.75 kg/m²   23 31.87 kg/m²   23 31.90 kg/m²     Diet:  common adult  Exercise: moderate regular exercise program     Review of Systems   Constitutional:  Negative for chills, fever and malaise/fatigue.   HENT:  Negative for nosebleeds.    Respiratory:  Positive for shortness of breath. Negative for cough and wheezing.    Cardiovascular:  Negative for chest pain, palpitations and leg swelling.   Gastrointestinal:  Negative for blood in stool and melena.   Genitourinary:  Negative for hematuria.   Musculoskeletal:  Negative for myalgias.   Neurological:  Negative for dizziness, speech change, focal weakness, weakness and headaches.   Endo/Heme/Allergies:  Does not bruise/bleed easily.           Objective    Vitals:    05/21/24 1104   BP: 117/69   BP Location: Left arm   Patient Position: Sitting   BP Cuff Size: Large adult   Pulse: (!) 59   Weight: 107 kg (235 lb)   Height: 1.829 m (6')      BP Readings from Last 5 Encounters:   05/21/24 117/69   03/08/24 106/64   11/27/23 94/60   11/21/23 115/58   09/28/23 100/52      Body mass index is 31.87 kg/m².  Physical Exam  Vitals reviewed.   Constitutional:       General: He is not in acute distress.     Appearance: He is not diaphoretic.   HENT:      Head: Normocephalic and atraumatic.   Eyes:      General: No scleral icterus.  Cardiovascular:      Rate and Rhythm: Regular rhythm. Bradycardia present.      Heart sounds: No murmur heard.  Pulmonary:      Effort: Pulmonary effort is normal. No respiratory distress.      Breath sounds: Normal breath sounds. No wheezing or rales.   Musculoskeletal:      Right lower leg: No edema.      Left lower leg: No edema.   Skin:     General: Skin is warm and dry.      Coloration: Skin is not pale.   Neurological:      General: No focal deficit present.      Mental Status: He is alert and oriented to person, place, and time. Mental status is at baseline.      Coordination: Coordination normal.      Gait: Gait normal.   Psychiatric:         Mood and Affect: Mood normal.         Behavior:  "Behavior normal.         Thought Content: Thought content normal.         Lab Results   Component Value Date    CHOLSTRLTOT 141 2024    LDL 58 2024    HDL 66 2024    TRIGLYCERIDE 84 2024      Lab Results   Component Value Date/Time    LIPOPROTA 9.1 2023 06:57 AM      No results found for: \"APOB\"    Lab Results   Component Value Date    PROTHROMBTM 14.8 (H) 2023    INR 1.18 (H) 2023       Lab Results   Component Value Date    HBA1C 5.8 (H) 2024      Lab Results   Component Value Date    SODIUM 141 2024    POTASSIUM 4.6 2024    CHLORIDE 104 2024    CO2 25 2024    GLUCOSE 105 (H) 2024    BUN 19 2024    CREATININE 0.96 2024    BUNCREATRAT 16 2023        Lab Results   Component Value Date    WBC 6.0 2024    RBC 4.09 (L) 2024    HEMOGLOBIN 13.4 (L) 2024    HEMATOCRIT 41.8 (L) 2024    .2 (H) 2024    MCH 32.8 2024    MCHC 32.1 (L) 2024    MPV 9.9 2024        VASCULAR IMAGING:     Results for orders placed or performed during the hospital encounter of 23   EKG   Result Value Ref Range    Report       [facility] Emergency Dept.    Test Date:  2023  Pt Name:    SHAD DONALDSON                 Department: [facility]  MRN:        [id number]                      Room:       -ROOM 3  Gender:     Male                         Technician: [facility]  :        1950                   Requested By:MANUEL VALENTINO  Order #:    [id number]                    Reading MD: Manuel Valentino MD    Measurements  Intervals                                Axis  Rate:       66                           P:          39  WA:         187                          QRS:        -81  QRSD:       98                           T:          14  QT:         445  QTc:        467    Interpretive Statements  Sinus rhythm  Inferior infarct, old  T wave inversions in anterior lateral leads  Baseline " "wander in lead(s) III  Compared to ECG 05/09/2022 12:40:16  ST (T wave) deviation no longer present  Myocardial infarct finding still present  Electronically Signed On 5- 13:29:54 PDT by Noe Bain MD       CTA neck 5/2023   1. No evidence of flow-limiting stenosis in the cervical carotid or cervical vertebral arteries.    CTA head 5/2023  1. No hemodynamically significant narrowing of the major intracranial vessels.   2. There is decreased flow in a tiny arterial branch in the left parietal region, concerning for partial occlusion.    CTPA 5/25/23  1.  Large right main pulmonary embolus extending into right upper, middle, and lower lobe segmental and subsegmental branches  2.  Left upper, lower, and lingular segmental and subsegmental pulmonary emboli  3.  Asymmetric prominence of the right ventricle with slight bowing of the intraventricular septum, appearance favoring component of right heart strain.  4.  Large right upper pole renal cyst without visualized complex features    Echo 5/2023  Compared to the images of the prior study 5/11/2022, RV is now dilated   with flattened septum.  Normal left ventricular size and systolic function.  No regional wall motion abnormality noted.  Dilated right ventricle with reduced systolic function.  Flattened septum in diastole (\"D\"-shaped left ventricle) consistent   with RV volume overload.  Estimated right ventricular systolic pressure is 35 mmHg.  Aorta  Normal aortic root for body surface area. . The ascending aorta is   dilated with a diameter of 4.2 cm.    Echo 9/2023  Compared to the prior study on 5/25/23, right ventricular size and   function normalized.  The left ventricular ejection fraction is visually estimated to be 55%.  Normal regional wall motion.  No significant valvular disease.   The ascending aorta diameter is 4.1 cm.        Medical Decision Making:  Today's Assessment / Status / Plan:     1. SOB (shortness of breath)  EC-ECHOCARDIOGRAM COMPLETE " W/O CONT      2. History of pulmonary embolism  EC-ECHOCARDIOGRAM COMPLETE W/O CONT      3. Aneurysm of ascending aorta without rupture (HCC)  EC-ECHOCARDIOGRAM COMPLETE W/O CONT      4. Essential hypertension  Basic Metabolic Panel      5. Prediabetes  Basic Metabolic Panel    HEMOGLOBIN A1C (Glycohemoglobin GHB Total/A1C with MBG Estimate)          PATIENT TYPE: Secondary Prevention    Etiology of Established CVD if Present:     1) VTE disease   5/2023 - acute bilat PE w/o cor pulm  9/2023 - improved right ventricle size and function on follow up imaging  Thrombophilia/hypercoag evaluation:  as per heme - negative 11/2023  - no imaging surveillance needed at this time  - antthrombotic plan as noted below     2) Ascending aortic aneurysm - stable, asymptomatic  - 4.1cm on most recent echo 9/2023, 4.2cm on previous 5/2023   - no repair needed unless growth to 5.5+cm or expansion rate of 1+cm/yr (or 0.5+cm/6mo)  -Presumed cystic medial degeneration with sporadic development.   -Echo shows no biscupid Ao valve  -No fhx or pmhx of connective tissue disease  - reviewed anatomy, risks, emergency s/s requiring immediate attention, and thresholds for surgical intervention and gave handouts.   Plan:   - continue med mgmt   - recommend 1st degree relatives get screened with echo for TAA  - check echo to r/o expansion - due 9/2024, will obtain now as pt is increased SOB.  Ordered and scheduled today  - consider AAA duplex to eval for comorbid AAA  - if normal exams, then repeat annual echo surveillance, if indications of rapid expansion then repeat CTA, repeat echo 1 year, due 9/2024 - not ordered  - focus on ARB and BB for BP control  - activity restrictions including no extreme endurance activities, smoking, stimulants, and extreme weight lifting >20lbs      ANTITHROMBOTIC THERAPY:  Date of initiation: 5/2023   HAS-BLED bleeding risk calc ([email or web address]): 1 pt, 3.4%, low risk  Factors to consider for indefinite OAC: Unprovoked  VTE event, Life-threatening or very extensive proximal DVT , and Male sex   Last CBC, BMP: reviewed above   Expected duration: reviewed standard of care as per Chest 2021 guidelines is 3-6 months minimum on full dose OAC.  After review of risks/benefits/alternatives, through shared decision-making, we will continue indefinite OAC   Per pt understanding of heme instructions, pt stopped 11/1/2023, had hypercoag panel done 11/21/2023, and negative.  Has followed up with Dr. Roberto who recommends pt remain off OAC, and continue ASA 81mg.    Long discussion today regarding upcoming long travel/flights to [place].  Reviewed pt continue with ASA, and wear compression stockings during flights.    Antithrombotic therapy plan:  - continue ASA 81mg indefinitely, could consider Eliquis 2.5mg BID ongoing in future if pt desired as he tolerated it well previously .    - counseled on signs and symptoms of acute VTE that require seeking prompt attention in the ED to include shortness of breath, chest pain, pain with deep inhalation, acute leg swelling and/or pain in calf or leg   - elevate legs as much as possible, use compression stockings/socks if directed by your provider  - Avoid hormonal therapies including estrogen or testosterone-containing meds, or raloxifene or tamoxifene (commonly used for osteoporosis)  - Avoid sedentary periods  - continue complete avoidance of tobacco products  - if having any invasive procedure,please make sure the doctor knows of your history of blood clots and current anticoagulation status  - recommended to see your PCP to discuss if you need age-appropriate cancer screenings as a small % of blood clots may be caused by an underlying malignancy    LIPID MANAGEMENT:   Qualifies for Statin Therapy Based on 2018 ACC/AHA Guidelines: yes, Secondary Prevention - Very high risk ASCVD - 2 major events or 1 event with other high-risk conditions  The ASCVD Risk score (Paige DK, et al., 2019) failed to  calculate., N/A  Major ASCVD events: History of ischemic CVA   High-risk conditions: >64yr old  and Hypertension   Currently on Statin: Yes  Tx goals: LDL-C <55 (if HTG, consider non-HDL-C <85, apoB: not defined)   At goal? Approaching, 5/2024 LDL slightly elevated at 58  Plan:   - reinforced ongoing TLC measures as noted   - monitor labs   Meds:   - continue atorva 40mg daily     BLOOD PRESSURE MANAGEMENT:  ACC/AHA (2017) goal <130/80  Home BP at goal:  not checking  Office BP at goal:  yes  24h ABPM: not ordered to date  Plan:   Monitoring:   - start/continue home BP monitoring, reviewed correct technique, provide BP log and instructions  - order 24h ABPM:  NO  - monitor lytes/gfr routinely   - contact office if BP consistently >140/>90 to discussion of tx adjustments   Medications:  - continue losartan 25mg daily   - hold metoprolol SR 25mg daily per cardiology  - continue spironolactone 25mg daily    GLYCEMIC STATUS:  Normal    LIFESTYLE INTERVENTIONS:    SMOKING: Stages of Change: Maintenance (sustained change >6mo)    reports that he quit smoking about 14 years ago. His smoking use included cigarettes. He started smoking about 56 years ago. He has a 21 pack-year smoking history. He has never been exposed to tobacco smoke. He has never used smokeless tobacco.   - continued complete avoidance of all tobacco products     PHYSICAL ACTIVITY: continue healthy activity to improve CV fitness.  In general, targeting >150min/week of moderate-level activity or as much as tolerated in light of functional status and co-morbidities, increase at tolerated     WEIGHT MANAGEMENT AND NUTRITION: Mediterranean style dietary approach       OTHER:     # FRANK on CPAP  Strongly encouraged CPAP adherence to reduce RV strain, improve overall fatigue symptoms, and improve BP in both the short- and long-term as per HIPARCO (2013) and HIPARCO-2 (2021) studies.   - continue CPAP, defer mgmt to sleep MD     Instructed to follow-up with PCP  for remainder of adult medical needs: yes  We will partner with other providers in the management of established vascular disease and cardiometabolic risk factors.    Studies to Be Obtained: echo - 9/2024 - ordered and scheduled today   Labs to Be Obtained:  bmp, A1c,      Follow up in: 6 months, sooner prn    AMISHA Varghese.  [facility]   [facility]   [phone]

## 2024-05-22 ENCOUNTER — HOSPITAL ENCOUNTER (OUTPATIENT)
Dept: CARDIOLOGY | Facility: MEDICAL CENTER | Age: 74
End: 2024-05-22
Payer: MEDICARE

## 2024-05-22 ENCOUNTER — DOCUMENTATION (OUTPATIENT)
Dept: VASCULAR LAB | Facility: MEDICAL CENTER | Age: 74
End: 2024-05-22

## 2024-05-22 DIAGNOSIS — I71.21 ANEURYSM OF ASCENDING AORTA WITHOUT RUPTURE (HCC): ICD-10-CM

## 2024-05-22 DIAGNOSIS — Z86.711 HISTORY OF PULMONARY EMBOLISM: ICD-10-CM

## 2024-05-22 DIAGNOSIS — R06.02 SOB (SHORTNESS OF BREATH): ICD-10-CM

## 2024-05-22 LAB — LV EJECT FRACT  99904: 55

## 2024-05-22 PROCEDURE — 93306 TTE W/DOPPLER COMPLETE: CPT | Mod: 26 | Performed by: INTERNAL MEDICINE

## 2024-05-22 RX ADMIN — HUMAN ALBUMIN MICROSPHERES AND PERFLUTREN 3 ML: 10; .22 INJECTION, SOLUTION INTRAVENOUS at 12:15

## 2024-05-23 NOTE — PROGRESS NOTES
Echo with small stable ascending TAA - will repeat in 2 years - may 2026 pending further imaging from other providers    Michael Bloch, MD  [facility]

## 2024-05-24 ENCOUNTER — DOCUMENTATION (OUTPATIENT)
Dept: VASCULAR LAB | Facility: MEDICAL CENTER | Age: 74
End: 2024-05-24
Payer: MEDICARE

## 2024-05-25 ENCOUNTER — NON-PROVIDER VISIT (OUTPATIENT)
Dept: CARDIOLOGY | Facility: MEDICAL CENTER | Age: 74
End: 2024-05-25
Payer: MEDICARE

## 2024-05-28 PROCEDURE — 93298 REM INTERROG DEV EVAL SCRMS: CPT | Mod: 26 | Performed by: INTERNAL MEDICINE

## 2024-05-28 NOTE — CARDIAC REMOTE MONITOR - SCAN
Device transmission reviewed. Device demonstrated appropriate function.       Electronically Signed by: Blake Monterroso M.D.    5/28/2024  10:45 AM

## 2024-05-29 DIAGNOSIS — I73.9 PVD (PERIPHERAL VASCULAR DISEASE) (HCC): ICD-10-CM

## 2024-05-29 DIAGNOSIS — I26.09 ACUTE PULMONARY EMBOLISM WITH ACUTE COR PULMONALE, UNSPECIFIED PULMONARY EMBOLISM TYPE (HCC): ICD-10-CM

## 2024-05-29 DIAGNOSIS — I65.23 BILATERAL CAROTID ARTERY STENOSIS: ICD-10-CM

## 2024-05-29 DIAGNOSIS — E78.00 PURE HYPERCHOLESTEROLEMIA: ICD-10-CM

## 2024-05-29 DIAGNOSIS — Z95.5 STENTED CORONARY ARTERY: ICD-10-CM

## 2024-05-29 DIAGNOSIS — I10 PRIMARY HYPERTENSION: ICD-10-CM

## 2024-05-29 DIAGNOSIS — I25.10 CORONARY ARTERY DISEASE INVOLVING NATIVE CORONARY ARTERY OF NATIVE HEART WITHOUT ANGINA PECTORIS: ICD-10-CM

## 2024-05-29 DIAGNOSIS — Z79.899 HIGH RISK MEDICATION USE: ICD-10-CM

## 2024-05-29 RX ORDER — SPIRONOLACTONE 25 MG/1
25 TABLET ORAL DAILY
Qty: 90 TABLET | Refills: 3 | Status: SHIPPED | OUTPATIENT
Start: 2024-05-29

## 2024-05-31 ENCOUNTER — OFFICE VISIT (OUTPATIENT)
Dept: CARDIOLOGY | Facility: MEDICAL CENTER | Age: 74
End: 2024-05-31
Attending: NURSE PRACTITIONER
Payer: MEDICARE

## 2024-05-31 ENCOUNTER — OFFICE VISIT (OUTPATIENT)
Dept: URGENT CARE | Facility: CLINIC | Age: 74
End: 2024-05-31
Payer: MEDICARE

## 2024-05-31 VITALS
OXYGEN SATURATION: 98 % | SYSTOLIC BLOOD PRESSURE: 124 MMHG | HEIGHT: 72 IN | HEART RATE: 58 BPM | TEMPERATURE: 98 F | DIASTOLIC BLOOD PRESSURE: 78 MMHG | RESPIRATION RATE: 18 BRPM | WEIGHT: 235 LBS | BODY MASS INDEX: 31.83 KG/M2

## 2024-05-31 VITALS
RESPIRATION RATE: 18 BRPM | HEIGHT: 72 IN | WEIGHT: 235.2 LBS | OXYGEN SATURATION: 94 % | DIASTOLIC BLOOD PRESSURE: 64 MMHG | HEART RATE: 59 BPM | BODY MASS INDEX: 31.86 KG/M2 | SYSTOLIC BLOOD PRESSURE: 112 MMHG

## 2024-05-31 DIAGNOSIS — Z95.818 STATUS POST PLACEMENT OF IMPLANTABLE LOOP RECORDER: ICD-10-CM

## 2024-05-31 DIAGNOSIS — I25.2 OLD MI (MYOCARDIAL INFARCTION): ICD-10-CM

## 2024-05-31 DIAGNOSIS — I63.9 CRYPTOGENIC STROKE (HCC): ICD-10-CM

## 2024-05-31 DIAGNOSIS — G47.33 OSA ON CPAP: ICD-10-CM

## 2024-05-31 DIAGNOSIS — Z87.19 HISTORY OF RECTAL BLEEDING: ICD-10-CM

## 2024-05-31 DIAGNOSIS — Z95.5 STENTED CORONARY ARTERY: ICD-10-CM

## 2024-05-31 DIAGNOSIS — I25.119 CORONARY ARTERY DISEASE INVOLVING NATIVE CORONARY ARTERY OF NATIVE HEART WITH ANGINA PECTORIS (HCC): ICD-10-CM

## 2024-05-31 DIAGNOSIS — R94.31 NONSPECIFIC ABNORMAL ELECTROCARDIOGRAM (ECG) (EKG): ICD-10-CM

## 2024-05-31 DIAGNOSIS — I71.21 ANEURYSM OF ASCENDING AORTA WITHOUT RUPTURE (HCC): ICD-10-CM

## 2024-05-31 DIAGNOSIS — Z79.899 HIGH RISK MEDICATION USE: ICD-10-CM

## 2024-05-31 DIAGNOSIS — E78.5 DYSLIPIDEMIA: ICD-10-CM

## 2024-05-31 DIAGNOSIS — K62.5 RECTAL BLEEDING: ICD-10-CM

## 2024-05-31 DIAGNOSIS — R73.09 ELEVATED HEMOGLOBIN A1C: ICD-10-CM

## 2024-05-31 DIAGNOSIS — I65.23 BILATERAL CAROTID ARTERY STENOSIS: ICD-10-CM

## 2024-05-31 DIAGNOSIS — I10 ESSENTIAL HYPERTENSION: ICD-10-CM

## 2024-05-31 RX ORDER — PIMECROLIMUS 10 MG/G
CREAM TOPICAL
COMMUNITY
Start: 2024-05-05

## 2024-05-31 RX ORDER — CLOTRIMAZOLE AND BETAMETHASONE DIPROPIONATE 10; .64 MG/G; MG/G
CREAM TOPICAL
COMMUNITY
Start: 2024-04-30

## 2024-05-31 ASSESSMENT — FIBROSIS 4 INDEX
FIB4 SCORE: 1.66
FIB4 SCORE: 1.66

## 2024-05-31 ASSESSMENT — ENCOUNTER SYMPTOMS: BLOOD IN STOOL: 1

## 2024-05-31 NOTE — PROGRESS NOTES
Subjective     New Rivas is a 74 y.o. male who presents with Rectal Bleeding (X3 days )    - This is a very pleasant 74 y.o. who has come to the walk-in clinic today for rectal bleeding.  2 nights ago noticed he had a dark red streak of blood in his underwear last night also found a red streak of blood in his underwear and this morning as well when he woke up and saw another red streak.  Says sometimes it hurts when he wipes but does not feel anything abnormal otherwise.  No constipation no diarrhea denies any lopez bloody stools or melena.  Feels okay otherwise no dizziness shortness of breath fatigue weakness lightheadedness.  No abdominal pain.          ALLERGIES:  Patient has no known allergies.     PMH:  Past Medical History:   Diagnosis Date    Acute stroke due to ischemia (HCC) 05/2022    MRI with acute infarct of left posterior insular cortex.    Apnea, sleep     Uses CPAP    CAD (coronary artery disease) 05/2010    Acute apical infarct. PCI/MANJEET (Taxus 3.5 x 32mm) to the LAD. 2015: [facility] with patent stent.    Chickenpox     [place] measles     H/O heart artery stent 05/04/2010    Heart attack (HCC)     Hyperlipidemia     Hypertension     Mumps     Sleep apnea     Snoring     Stroke (HCC)     Wears glasses         PSH:  Past Surgical History:   Procedure Laterality Date    [facility] CARDIAC CATH  02/21/2015    Cath at [place].  Patent stent and no obstructive CAD.    [facility] CARDIAC CATH  05/04/2010    Taxus stent to LAD    OTHER CARDIAC SURGERY  05/04/2010    Heart Stent    CYST EXCISION      posterior neck       MEDS:    Current Outpatient Medications:     pimecrolimus (ELIDEL) 1 % cream, APPLY TO AFFECTED AREA FOR ITCHING TWICE DAILY, Disp: , Rfl:     clotrimazole-betamethasone (LOTRISONE) 1-0.05 % Cream, APPLY TO AFFECTED AREA TOPICALLY TWICE A DAY FOR 10 DAYS, Disp: , Rfl:     spironolactone (ALDACTONE) 25 MG Tab, TAKE 1 TABLET BY MOUTH EVERY DAY, Disp: 90 Tablet, Rfl: 3    nitroglycerin (NITROSTAT) 0.4 MG  SL Tab, Place 1 Tablet under the tongue as needed for Chest Pain (every 5 minutes for 3 doses, no refief, call 911)., Disp: 25 Tablet, Rfl: 5    aspirin 81 MG EC tablet, Take 1 Tablet by mouth every day., Disp: 100 Tablet, Rfl: 3    atorvastatin (LIPITOR) 40 MG Tab, Take 1 Tablet by mouth 1/2 hour before dinner., Disp: 90 Tablet, Rfl: 3    losartan (COZAAR) 25 MG Tab, Take 1 Tablet by mouth every day., Disp: 90 Tablet, Rfl: 3    sertraline (ZOLOFT) 50 MG Tab, Take 50 mg by mouth every day., Disp: , Rfl:     traZODone (DESYREL) 50 MG Tab, Take 100 mg by mouth every evening., Disp: , Rfl:     latanoprost (XALATAN) 0.005 % Solution, Administer 1 Drop into both eyes at bedtime., Disp: , Rfl:     ** I have documented what I find to be significant in regards to past medical, social, family and surgical history  in my HPI or under PMH/PSH/FH review section, otherwise it is noncontributory **           HPI    Review of Systems   Gastrointestinal:  Positive for blood in stool (rectal bleed).   All other systems reviewed and are negative.             Objective     /78   Pulse (!) 58   Temp 36.7 °C (98 °F) (Temporal)   Resp 18   Ht 1.829 m (6')   Wt 107 kg (235 lb)   SpO2 98%   BMI 31.87 kg/m²      Physical Exam  Vitals and nursing note reviewed.   Constitutional:       General: He is not in acute distress.     Appearance: Normal appearance. He is well-developed. He is not ill-appearing, toxic-appearing or diaphoretic.   HENT:      Head: Normocephalic.   Cardiovascular:      Rate and Rhythm: Normal rate and regular rhythm.   Pulmonary:      Effort: Pulmonary effort is normal. No respiratory distress.   Abdominal:      Palpations: Abdomen is soft.      Tenderness: There is no abdominal tenderness. There is no guarding or rebound.   Neurological:      Mental Status: He is alert.      Motor: No abnormal muscle tone.   Psychiatric:         Mood and Affect: Mood normal.         Behavior: Behavior normal.                              Assessment & Plan     1. Rectal bleeding  Referral to Gastroenterology          - Dx, plan & d/c instructions discussed   - Rest, stay hydrated  - E.R. precautions discussed     Follow up with your regular primary care providers office within a week to keep them updated and informed of this visit and for regular routine health maintenance check-ups. ER if not improving in 2-3 days or if feeling/getting worse. (If you do not have a primary care provider and need to schedule one you may call [facility] at [phone] to do this).    Patient left in stable condition       Discussed if any testing, labs or imaging studies are obtained outside of the [facility] facility, it is their responsibility to contact the Urgent Care and let us know that it was done and get us the results so adequate follow up can be initiated    Pertinent prior lab work and/or imaging studies in Epic have been reviewed by me today on day of this visit and taken into account for my treatment and plan today    Pertinent PMH/PSH and/or chronic conditions and medications if any were reviewed today and taken into account for my treatment and plan today    Pertinent prior office visit notes in [facility] have been reviewed by me today on day of this visit.    Please note that this dictation may have been created using voice recognition software, if so I have made every reasonable attempt to correct obvious errors, but I expect that there are errors of grammar and possibly content that I did not discover before finalizing the note.

## 2024-05-31 NOTE — PROGRESS NOTES
Chief Complaint   Patient presents with    Coronary Artery Disease     F/V DX:Coronary artery disease involving native coronary artery of native heart without angina pectoris           Subjective     [place] is a 73 y.o. male who presents today for cardiology follow-up after implanted loop recorder noted 7-second pause on 3/5/2024.  Previous hospitalization on 5/25/2023 for severe shortness of breath and acute respiratory failure found to have PE determined to be provoked from DVT after travel.  Patient has additional past medical history of CAD s/p PCI to LAD (2010), carotid artery stenosis, hypertension, dyslipidemia, FRANK on CPAP, CVA.  ILR placed post-CVA to monitor for potential A-fib.  Patient was last seen by Dr. Lake on 3/23/2023 and myself on 9/28/2023.  Patient also followed by vascular medicine and was recently discharged from hematology clinic after his Eliquis was discontinued.    ILR interrogation from 5/25/2024 showed appropriate function and no events    Today, patient reports intermittent dizziness but has not affected his activities of daily living.  Patient had echocardiogram for MEJIA and atypical chest pain which is worse in the morning or with physical activity.  Patient also notes recent development of blood in his stool.  Otherwise, patient denies orthopnea, PND, or edema.  Patient endorses CPAP compliance.    We will refer patient for cardiac stress testing.  We will also initiate referral to GI for patient.  But also recommended complete evaluation for blood in stool at urgent care.  We will continue medical therapy as previously prescribed, but we discussed holding aspirin until blood in stool has been resolved and/or cleared by urgent care or GI.  ER precautions discussed.  Patient to follow-up in 2 to 3 months, sooner if needed.    Past Medical History:   Diagnosis Date    Acute stroke due to ischemia ([facility]) 05/2022    MRI with acute infarct of left posterior insular cortex.     Apnea, sleep     Uses CPAP    CAD (coronary artery disease) 2010    Acute apical infarct. PCI/MANJEET (Taxus 3.5 x 32mm) to the LAD. 2015: [facility] with patent stent.    Chickenpox     [place] measles     H/O heart artery stent 2010    Heart attack (HCC)     Hyperlipidemia     Hypertension     Mumps     Sleep apnea     Snoring     Stroke (HCC)     Wears glasses      Past Surgical History:   Procedure Laterality Date    [facility] CARDIAC CATH  2015    Cath at [place].  Patent stent and no obstructive CAD.    [facility] CARDIAC CATH  2010    Taxus stent to LAD    OTHER CARDIAC SURGERY  2010    Heart Stent    CYST EXCISION      posterior neck     Family History   Problem Relation Age of Onset    Heart Attack Mother     Cancer Mother     Cancer Father     Cancer Sister      Social History     Socioeconomic History    Marital status:      Spouse name: Not on file    Number of children: Not on file    Years of education: Not on file    Highest education level: Associate degree: academic program   Occupational History    Not on file   Tobacco Use    Smoking status: Former     Current packs/day: 0.00     Average packs/day: 0.5 packs/day for 42.0 years (21.0 ttl pk-yrs)     Types: Cigarettes     Start date: 1968     Quit date: 2010     Years since quittin.0     Passive exposure: Never    Smokeless tobacco: Never   Vaping Use    Vaping status: Never Used   Substance and Sexual Activity    Alcohol use: Yes     Alcohol/week: 8.4 oz     Types: 14 Standard drinks or equivalent per week     Comment: 2 drinks per day     Drug use: Yes     Types: Marijuana, Inhaled     Comment: Every other day marijuana    Sexual activity: Not on file   Other Topics Concern    Not on file   Social History Narrative    Not on file     Social Determinants of Health     Financial Resource Strain: Low Risk  (2023)    Overall Financial Resource Strain (CARDIA)     Difficulty of Paying Living Expenses: Not hard at all    Food Insecurity: No Food Insecurity (5/26/2023)    Hunger Vital Sign     Worried About Running Out of Food in the Last Year: Never true     Ran Out of Food in the Last Year: Never true   Transportation Needs: No Transportation Needs (5/26/2023)    PRAPARE - Transportation     Lack of Transportation (Medical): No     Lack of Transportation (Non-Medical): No   Physical Activity: Insufficiently Active (5/25/2023)    Exercise Vital Sign     Days of Exercise per Week: 2 days     Minutes of Exercise per Session: 30 min   Stress: No Stress Concern Present (5/25/2023)    [employer] - Occupational Stress Questionnaire     Feeling of Stress : Not at all   Social Connections: Moderately Integrated (5/25/2023)    Social Connection and Isolation Panel [NHANES]     Frequency of Communication with Friends and Family: More than three times a week     Frequency of Social Gatherings with Friends and Family: Twice a week     Attends Mandaen Services: Never     Active Member of Clubs or Organizations: Yes     Attends Club or Organization Meetings: 1 to 4 times per year     Marital Status:    Intimate Partner Violence: Not on file   Housing Stability: Low Risk  (5/26/2023)    Housing Stability Vital Sign     Unable to Pay for Housing in the Last Year: No     Number of Places Lived in the Last Year: 1     Unstable Housing in the Last Year: No     No Known Allergies  Outpatient Encounter Medications as of 5/31/2024   Medication Sig Dispense Refill    spironolactone (ALDACTONE) 25 MG Tab TAKE 1 TABLET BY MOUTH EVERY DAY 90 Tablet 3    nitroglycerin (NITROSTAT) 0.4 MG SL Tab Place 1 Tablet under the tongue as needed for Chest Pain (every 5 minutes for 3 doses, no refief, call 911). 25 Tablet 5    aspirin 81 MG EC tablet Take 1 Tablet by mouth every day. 100 Tablet 3    atorvastatin (LIPITOR) 40 MG Tab Take 1 Tablet by mouth 1/2 hour before dinner. 90 Tablet 3    losartan (COZAAR) 25 MG Tab Take 1  Tablet by mouth every day. 90 Tablet 3    sertraline (ZOLOFT) 50 MG Tab Take 50 mg by mouth every day.      traZODone (DESYREL) 50 MG Tab Take 100 mg by mouth every evening.      latanoprost (XALATAN) 0.005 % Solution Administer 1 Drop into both eyes at bedtime.       No facility-administered encounter medications on file as of 5/31/2024.     ROS Complete review of systems negative except as noted in HPI/subjective           Objective     /64 (BP Location: Left arm, Patient Position: Sitting, BP Cuff Size: Adult)   Pulse (!) 59   Resp 18   Ht 1.829 m (6')   Wt 107 kg (235 lb 3.2 oz)   SpO2 94%   BMI 31.90 kg/m²     Physical Exam  Vitals reviewed.   Constitutional:       Appearance: He is well-developed.   HENT:      Head: Normocephalic and atraumatic.   Eyes:      Pupils: Pupils are equal, round, and reactive to light.   Neck:      Vascular: No JVD.   Cardiovascular:      Rate and Rhythm: Normal rate and regular rhythm.      Pulses: Normal pulses.      Heart sounds: Normal heart sounds. No murmur heard.     No friction rub. No gallop.   Pulmonary:      Effort: Pulmonary effort is normal. No respiratory distress.      Breath sounds: Normal breath sounds.   Abdominal:      General: Bowel sounds are normal. There is no distension.      Palpations: Abdomen is soft.   Musculoskeletal:      Right lower leg: No edema.      Left lower leg: No edema.   Skin:     General: Skin is warm and dry.      Findings: No erythema.   Neurological:      Mental Status: He is alert and oriented to person, place, and time.   Psychiatric:         Behavior: Behavior normal.            Lab Results   Component Value Date/Time    CHOLSTRLTOT 141 05/11/2024 08:14 AM    LDL 58 05/11/2024 08:14 AM    HDL 66 05/11/2024 08:14 AM    TRIGLYCERIDE 84 05/11/2024 08:14 AM       Lab Results   Component Value Date/Time    SODIUM 141 05/11/2024 08:14 AM    POTASSIUM 4.6 05/11/2024 08:14 AM    CHLORIDE 104 05/11/2024 08:14 AM    CO2 25 05/11/2024  "08:14 AM    GLUCOSE 105 (H) 05/11/2024 08:14 AM    BUN 19 05/11/2024 08:14 AM    CREATININE 0.96 05/11/2024 08:14 AM    BUNCREATRAT 16 06/27/2023 07:04 AM     Lab Results   Component Value Date/Time    ALKPHOSPHAT 49 05/11/2024 08:14 AM    ASTSGOT 25 05/11/2024 08:14 AM    ALTSGPT 21 05/11/2024 08:14 AM    TBILIRUBIN 0.3 05/11/2024 08:14 AM       Latest Reference Range & Units 11/21/23 07:08   TSH 0.380 - 5.330 uIU/mL 4.680     Chest CTA (5/5/2023):  1.  Large right main pulmonary embolus extending into right upper, middle, and lower lobe segmental and subsegmental branches  2.  Left upper, lower, and lingular segmental and subsegmental pulmonary emboli  3.  Asymmetric prominence of the right ventricle with slight bowing of the intraventricular septum, appearance favoring component of right heart strain.  4.  Large right upper pole renal cyst without visualized complex features    TTE (5/25/2023)  Compared to the images of the prior study 5/11/2022, RV is now dilated   with flattened septum.  Normal left ventricular size and systolic function.  No regional wall motion abnormality noted.  Dilated right ventricle with reduced systolic function.  Flattened septum in diastole (\"D\"-shaped left ventricle) consistent   with RV volume overload.  Estimated right ventricular systolic pressure is 35 mmHg.    TTE (9/29/2023):  Compared to the prior study on 5/25/23, right ventricular size and   function normalized.  The left ventricular ejection fraction is visually estimated to be 55%.  Normal regional wall motion.  No significant valvular disease.   The ascending aorta diameter is 4.1 cm.    Cardiac event monitor (6/30/2022):  Personally reviewed by myself notable for Mobitz type I during sleep hours    Patient's ILR recorded Pause Episode  -Episode Onset: 3/5/2024 @ 7:36 PM  -Duration: 7 seconds  -Average Rate: 63 bpm  Pause Episode (concurrently with previous pause)  -Episode Onset: 3/5/2024 @ 7:36 PM  -Duration: 2.5 " seconds  -Average Rate: 63 bpm    TTE (5/22/2024):  Compared to the images of the prior study on 09/29/2023 - there is no   significant change.   Normal left ventricular systolic function.  The left ventricular ejection fraction is visually estimated to be 55%.  Hypokinesis of the apex.  Normal diastolic function.  Normal right ventricular systolic function.  No significant valvular abnormalities.   The ascending aorta diameter is dilated at 4.1 cm.    Assessment & Plan     1. Coronary artery disease involving native coronary artery of native heart with angina pectoris (HCC)  Referral to Gastroenterology    [id number] PET W/CT ATTENUATION    CANCELED: [id number] PET W/CT ATTENUATION      2. Stented coronary artery  Referral to Gastroenterology    [id number]-[id number] PET W/CT ATTENUATION    CANCELED: [id number] PET W/CT ATTENUATION      3. BMI 34.0-34.9,adult  Referral to Gastroenterology    [id number]-[id number] PET W/CT ATTENUATION    CANCELED: WT-JMVPN-ASODEXS PET W/CT ATTENUATION      4. High risk medication use  Referral to Gastroenterology    [id number]-[id number] PET W/CT ATTENUATION    CANCELED: CT-XRITE-KSCAABZ PET W/CT ATTENUATION      5. Elevated hemoglobin A1c  Referral to Gastroenterology    [id number] PET W/CT ATTENUATION    CANCELED: UD-IBAQP-ZRPHYMC PET W/CT ATTENUATION      6. Old MI (myocardial infarction)  Referral to Gastroenterology    UK-LVMDR-WJXFSMD PET W/CT ATTENUATION    CANCELED: HW-CMBGW-[id number] PET W/CT ATTENUATION      7. Nonspecific abnormal electrocardiogram (ECG) (EKG)  Referral to Gastroenterology    ZI-[id number]-[id number] PET W/CT ATTENUATION    CANCELED: US-EEYMP-JOCAVMY PET W/CT ATTENUATION      8. History of rectal bleeding        9. Bilateral carotid artery stenosis        10. Aneurysm of ascending aorta without rupture (HCC)        11. FRANK on CPAP        12. Dyslipidemia        13. Cryptogenic stroke (HCC)        14. Status post placement of implantable loop recorder         15. Essential hypertension                Medical Decision Making: Today's Assessment/Status/Plan:        MEJIA; Atypical chest pain  -Last stress test was in 2013, cardiac PET ordered today  -[place] wall motion abnormalities noted on most recent echo    CAD, s/p MANJEET/PCI to LAD (2010); HTN; DLD  -ASA.  Hold until cleared by GI  -Continue high intensity statin at 40 mg daily. LDL at goal 47 (11/21/2023)  -Continue, losartan 25 mg daily and spironolactone  -LPa 9, WDL  -NTG PRN    FRANK  -Patient reports CPAP compliance  -We discussed importance of treating nocturnal hypoxia due to causing increased risk for cardiac disease; patient verbalizes understanding.  -Per sleep medicine    History of cryptogenic stroke; provoked DVT/PE while traveling  -ILR in place.  No A-fib burden detected on review  -Discharge from hematology clinic.  OAC discontinued.    Ascending aorta is dilated with a diameter of 4.2 cm  -followed by vasc  -4.1 com on recent echo. Continue surveillance     Blood from rectum  -Patient unable to determine if bright red or dark red.  Patient to complete full evaluation at urgent care  -Referred to GI for antiplatelet therapy recommendations    FU in clinic in 2-3 months. Sooner if needed.    Patient verbalizes understanding and agrees with the plan of care.     DUNCAN Rojas.R.N.   [facility]  [phone]    PLEASE NOTE: This Note was created using voice recognition Software. I have made every reasonable attempt to correct obvious errors, but I expect that there are errors of grammar and possibly content that I did not discover before finalizing the note

## 2024-06-25 ENCOUNTER — NON-PROVIDER VISIT (OUTPATIENT)
Dept: CARDIOLOGY | Facility: MEDICAL CENTER | Age: 74
End: 2024-06-25
Payer: MEDICARE

## 2024-06-25 PROCEDURE — 93298 REM INTERROG DEV EVAL SCRMS: CPT | Mod: 26 | Performed by: INTERNAL MEDICINE

## 2024-07-02 ENCOUNTER — TELEPHONE (OUTPATIENT)
Dept: CARDIOLOGY | Facility: MEDICAL CENTER | Age: 74
End: 2024-07-02
Payer: MEDICARE

## 2024-07-17 ENCOUNTER — HOSPITAL ENCOUNTER (OUTPATIENT)
Dept: RADIOLOGY | Facility: MEDICAL CENTER | Age: 74
End: 2024-07-17
Attending: NURSE PRACTITIONER
Payer: MEDICARE

## 2024-07-17 DIAGNOSIS — I25.2 OLD MI (MYOCARDIAL INFARCTION): ICD-10-CM

## 2024-07-17 DIAGNOSIS — Z79.899 HIGH RISK MEDICATION USE: ICD-10-CM

## 2024-07-17 DIAGNOSIS — R73.09 ELEVATED HEMOGLOBIN A1C: ICD-10-CM

## 2024-07-17 DIAGNOSIS — R94.31 NONSPECIFIC ABNORMAL ELECTROCARDIOGRAM (ECG) (EKG): ICD-10-CM

## 2024-07-17 DIAGNOSIS — Z95.5 STENTED CORONARY ARTERY: ICD-10-CM

## 2024-07-17 DIAGNOSIS — I25.119 CORONARY ARTERY DISEASE INVOLVING NATIVE CORONARY ARTERY OF NATIVE HEART WITH ANGINA PECTORIS (HCC): ICD-10-CM

## 2024-07-17 PROCEDURE — 93018 CV STRESS TEST I&R ONLY: CPT | Performed by: INTERNAL MEDICINE

## 2024-07-17 PROCEDURE — 78431 MYOCRD IMG PET RST&STRS CT: CPT | Mod: 26 | Performed by: INTERNAL MEDICINE

## 2024-07-26 ENCOUNTER — NON-PROVIDER VISIT (OUTPATIENT)
Dept: CARDIOLOGY | Facility: MEDICAL CENTER | Age: 74
End: 2024-07-26
Payer: MEDICARE

## 2024-07-30 NOTE — PROGRESS NOTES
Chief Complaint   Patient presents with    Coronary Artery Disease     F/V DX:Coronary artery disease involving native coronary artery of native heart with angina pectoris ([facility])           Subjective     [place] is a 73 y.o. male who presents today for cardiology follow-up after implanted loop recorder noted 7-second pause on 3/5/2024.  Previous hospitalization on 5/25/2023 for severe shortness of breath and acute respiratory failure found to have PE determined to be provoked from DVT after travel.  Patient has additional past medical history of CAD s/p PCI to LAD (2010), carotid artery stenosis, hypertension, dyslipidemia, FRANK on CPAP, CVA.  ILR placed post-CVA to monitor for potential A-fib.  Patient was last seen by Dr. Lake on 3/23/2023 and myself on 5/31/2024.  Patient also followed by vascular medicine and was recently discharged from hematology clinic after his Eliquis was discontinued.    ILR interrogation from 5/25/2024 showed appropriate function and no events    Cardiac PET/CT results discussed per below    Today, patient reports chest pain has resolved and he has been feeling well.  Patient has not needed to use sublingual nitroglycerin.  Otherwise, Patient denies shortness of breath, palpitations, dizziness/lightheadedness, orthopnea, PND or Edema.     We discussed optimizing medical therapy.  Unfortunately heart rate limits escalation of beta-blockers in office today.  Patient may continue to hold aspirin 81 mg until cleared by gastroenterology for rectal bleeding after colonoscopy on 8/16/2024.  We discussed notifying office if increased frequency of sublingual nitroglycerin usage.  Otherwise we will continue medical therapy as previously prescribed.  Patient to follow-up in 6 months, sooner if needed.    Past Medical History:   Diagnosis Date    Acute stroke due to ischemia ([facility]) 05/2022    MRI with acute infarct of left posterior insular cortex.    Apnea, sleep     Uses CPAP    CAD  (coronary artery disease) 2010    Acute apical infarct. PCI/MANJEET (Taxus 3.5 x 32mm) to the LAD. 2015: [facility] with patent stent.    Chickenpox     [place] measles     H/O heart artery stent 2010    Heart attack (HCC)     Hyperlipidemia     Hypertension     Mumps     Sleep apnea     Snoring     Stroke (HCC)     Wears glasses      Past Surgical History:   Procedure Laterality Date    [facility] CARDIAC CATH  2015    Cath at [place].  Patent stent and no obstructive CAD.    [facility] CARDIAC CATH  2010    Taxus stent to LAD    OTHER CARDIAC SURGERY  2010    Heart Stent    CYST EXCISION      posterior neck     Family History   Problem Relation Age of Onset    Heart Attack Mother     Cancer Mother     Cancer Father     Cancer Sister      Social History     Socioeconomic History    Marital status:      Spouse name: Not on file    Number of children: Not on file    Years of education: Not on file    Highest education level: Associate degree: academic program   Occupational History    Not on file   Tobacco Use    Smoking status: Former     Current packs/day: 0.00     Average packs/day: 0.5 packs/day for 42.0 years (21.0 ttl pk-yrs)     Types: Cigarettes     Start date: 1968     Quit date: 2010     Years since quittin.2     Passive exposure: Never    Smokeless tobacco: Never   Vaping Use    Vaping status: Never Used   Substance and Sexual Activity    Alcohol use: Yes     Alcohol/week: 8.4 oz     Types: 14 Standard drinks or equivalent per week     Comment: 2 drinks per day     Drug use: Yes     Types: Marijuana, Inhaled, Oral     Comment: Every other day marijuana    Sexual activity: Not on file   Other Topics Concern    Not on file   Social History Narrative    Not on file     Social Determinants of Health     Financial Resource Strain: Low Risk  (2023)    Overall Financial Resource Strain (CARDIA)     Difficulty of Paying Living Expenses: Not hard at all   Food Insecurity: No Food  Insecurity (5/26/2023)    Hunger Vital Sign     Worried About Running Out of Food in the Last Year: Never true     Ran Out of Food in the Last Year: Never true   Transportation Needs: No Transportation Needs (5/26/2023)    PRAPARE - Transportation     Lack of Transportation (Medical): No     Lack of Transportation (Non-Medical): No   Physical Activity: Insufficiently Active (5/25/2023)    Exercise Vital Sign     Days of Exercise per Week: 2 days     Minutes of Exercise per Session: 30 min   Stress: No Stress Concern Present (5/25/2023)    [employer] - Occupational Stress Questionnaire     Feeling of Stress : Not at all   Social Connections: Moderately Integrated (5/25/2023)    Social Connection and Isolation Panel [NHANES]     Frequency of Communication with Friends and Family: More than three times a week     Frequency of Social Gatherings with Friends and Family: Twice a week     Attends Nondenominational Services: Never     Active Member of Clubs or Organizations: Yes     Attends Club or Organization Meetings: 1 to 4 times per year     Marital Status:    Intimate Partner Violence: Not on file   Housing Stability: Low Risk  (5/26/2023)    Housing Stability Vital Sign     Unable to Pay for Housing in the Last Year: No     Number of Places Lived in the Last Year: 1     Unstable Housing in the Last Year: No     No Known Allergies  Outpatient Encounter Medications as of 8/2/2024   Medication Sig Dispense Refill    pimecrolimus (ELIDEL) 1 % cream APPLY TO AFFECTED AREA FOR ITCHING TWICE DAILY      clotrimazole-betamethasone (LOTRISONE) 1-0.05 % Cream APPLY TO AFFECTED AREA TOPICALLY TWICE A DAY FOR 10 DAYS      spironolactone (ALDACTONE) 25 MG Tab TAKE 1 TABLET BY MOUTH EVERY DAY 90 Tablet 3    nitroglycerin (NITROSTAT) 0.4 MG SL Tab Place 1 Tablet under the tongue as needed for Chest Pain (every 5 minutes for 3 doses, no refief, call 911). 25 Tablet 5    atorvastatin (LIPITOR) 40 MG Tab  Take 1 Tablet by mouth 1/2 hour before dinner. 90 Tablet 3    losartan (COZAAR) 25 MG Tab Take 1 Tablet by mouth every day. 90 Tablet 3    sertraline (ZOLOFT) 50 MG Tab Take 50 mg by mouth every day.      traZODone (DESYREL) 50 MG Tab Take 100 mg by mouth every evening.      latanoprost (XALATAN) 0.005 % Solution Administer 1 Drop into both eyes at bedtime.      aspirin 81 MG EC tablet Take 1 Tablet by mouth every day. (Patient not taking: Reported on 8/2/2024) 100 Tablet 3     No facility-administered encounter medications on file as of 8/2/2024.     ROS Complete review of systems negative except as noted in HPI/subjective           Objective     /58 (BP Location: Left arm, Patient Position: Sitting, BP Cuff Size: Adult)   Pulse (!) 59   Resp 17   Ht 1.829 m (6')   Wt 109 kg (240 lb)   SpO2 94%   BMI 32.55 kg/m²     Physical Exam  Vitals reviewed.   Constitutional:       Appearance: He is well-developed.   HENT:      Head: Normocephalic and atraumatic.   Eyes:      Pupils: Pupils are equal, round, and reactive to light.   Neck:      Vascular: No JVD.   Cardiovascular:      Rate and Rhythm: Normal rate and regular rhythm.      Pulses: Normal pulses.      Heart sounds: Normal heart sounds. No murmur heard.     No friction rub. No gallop.   Pulmonary:      Effort: Pulmonary effort is normal. No respiratory distress.      Breath sounds: Normal breath sounds.   Abdominal:      General: Bowel sounds are normal. There is no distension.      Palpations: Abdomen is soft.   Musculoskeletal:      Right lower leg: No edema.      Left lower leg: No edema.   Skin:     General: Skin is warm and dry.      Findings: No erythema.   Neurological:      Mental Status: He is alert and oriented to person, place, and time.   Psychiatric:         Behavior: Behavior normal.            Lab Results   Component Value Date/Time    CHOLSTRLTOT 141 05/11/2024 08:14 AM    LDL 58 05/11/2024 08:14 AM    HDL 66 05/11/2024 08:14 AM     "TRIGLYCERIDE 84 05/11/2024 08:14 AM       Lab Results   Component Value Date/Time    SODIUM 141 05/11/2024 08:14 AM    POTASSIUM 4.6 05/11/2024 08:14 AM    CHLORIDE 104 05/11/2024 08:14 AM    CO2 25 05/11/2024 08:14 AM    GLUCOSE 105 (H) 05/11/2024 08:14 AM    BUN 19 05/11/2024 08:14 AM    CREATININE 0.96 05/11/2024 08:14 AM    BUNCREATRAT 16 06/27/2023 07:04 AM     Lab Results   Component Value Date/Time    ALKPHOSPHAT 49 05/11/2024 08:14 AM    ASTSGOT 25 05/11/2024 08:14 AM    ALTSGPT 21 05/11/2024 08:14 AM    TBILIRUBIN 0.3 05/11/2024 08:14 AM       Latest Reference Range & Units 11/21/23 07:08   TSH 0.380 - 5.330 uIU/mL 4.680     Chest CTA (5/5/2023):  1.  Large right main pulmonary embolus extending into right upper, middle, and lower lobe segmental and subsegmental branches  2.  Left upper, lower, and lingular segmental and subsegmental pulmonary emboli  3.  Asymmetric prominence of the right ventricle with slight bowing of the intraventricular septum, appearance favoring component of right heart strain.  4.  Large right upper pole renal cyst without visualized complex features    TTE (5/25/2023)  Compared to the images of the prior study 5/11/2022, RV is now dilated   with flattened septum.  Normal left ventricular size and systolic function.  No regional wall motion abnormality noted.  Dilated right ventricle with reduced systolic function.  Flattened septum in diastole (\"D\"-shaped left ventricle) consistent   with RV volume overload.  Estimated right ventricular systolic pressure is 35 mmHg.    TTE (9/29/2023):  Compared to the prior study on 5/25/23, right ventricular size and   function normalized.  The left ventricular ejection fraction is visually estimated to be 55%.  Normal regional wall motion.  No significant valvular disease.   The ascending aorta diameter is 4.1 cm.    Cardiac event monitor (6/30/2022):  Personally reviewed by myself notable for Mobitz type I during sleep hours    Patient's ILR " recorded Pause Episode  -Episode Onset: 3/5/2024 @ 7:36 PM  -Duration: 7 seconds  -Average Rate: 63 bpm  Pause Episode (concurrently with previous pause)  -Episode Onset: 3/5/2024 @ 7:36 PM  -Duration: 2.5 seconds  -Average Rate: 63 bpm    TTE (5/22/2024):  Compared to the images of the prior study on 09/29/2023 - there is no   significant change.   Normal left ventricular systolic function.  The left ventricular ejection fraction is visually estimated to be 55%.  Hypokinesis of the apex.  Normal diastolic function.  Normal right ventricular systolic function.  No significant valvular abnormalities.   The ascending aorta diameter is dilated at 4.1 cm.    Cardiac PET (7/17/2024):  Small fixed perfusion defect in the distal anterior/anteroseptal wall   segments   TID is 0.99   Preserved LVEF of 64%   Predominately normal myocardial blood flow except in apical segments and    borderline abnormal in distal anterior/anteroseptal.   Impaired coronary flow reserve throughout wall segments (CFR<2)   Negative stress ECG for ischemia.  Assessment & Plan     1. Coronary artery disease involving native coronary artery of native heart with angina pectoris (HCC)        2. Stented coronary artery        3. History of rectal bleeding        4. Bilateral carotid artery stenosis        5. Aneurysm of ascending aorta without rupture (HCC)        6. Dyslipidemia        7. Essential hypertension, benign                  Medical Decision Making: Today's Assessment/Status/Plan:        CAD, s/p MANJEET/PCI to LAD (2010); HTN; DLD  -ASA.  Hold until cleared by GI  -Impaired coronary flow reserve throughout wall segments on cardiac PET.    -Discussed optimizing medical therapy.  Heart rate limits initiation of beta-blocker therapy.  NTG as needed  -Continue high intensity statin at 40 mg daily. LDL at goal 47 (11/21/2023)  -Continue, losartan 25 mg daily and spironolactone  -LPa 9, WDL    FRANK  -Patient reports CPAP compliance  -We discussed  importance of treating nocturnal hypoxia due to causing increased risk for cardiac disease; patient verbalizes understanding.  -Per sleep medicine    History of cryptogenic stroke; provoked DVT/PE while traveling  -ILR in place.  No A-fib burden detected on review  -Discharge from hematology clinic.  OAC discontinued.    Ascending aorta is dilated with a diameter of 4.2 cm  -followed by vasc  -4.1 com on recent echo. Continue surveillance     Blood from rectum  -Improved but still reoccurs  -Patient awaiting colonoscopy with GI on 8/16.  We discussed resuming aspirin once cleared by GI    FU in clinic in 6 months. Sooner if needed.    Patient verbalizes understanding and agrees with the plan of care.     I personally spent a total of 23 minutes which includes face-to-face time and non-face-to-face time spent on preparing to see the patient, reviewing prior notes and tests, obtaining history from the patient, performing a medically appropriate exam, counseling and educating the patient, ordering medications/tests/procedures/referrals as clinically indicated,and documenting information in the electronic medical record.    ELIZA [email or web address].[email or web address].   [facility]  [phone]    PLEASE NOTE: This Note was created using voice recognition Software. I have made every reasonable attempt to correct obvious errors, but I expect that there are errors of grammar and possibly content that I did not discover before finalizing the note

## 2024-08-01 NOTE — PROGRESS NOTES
[facility] Follow-up Visit    Date of Visit: 8/7/2024     CC: Follow-up for FRANK management      HPI:  Jan Rivas is a very pleasant 72 y.o. year old male former smoker (21 pack-years, quit in 2010), with a PMHx of FRANK on CPAP, elevated BMI, HTN, PVD, CAD with stents who presented to the [facility] for a regular follow up. Last seen in the office on 10/13/2022 with myself.     Patient presents for his annual compliance.  Patient does find his sleep more restful with CPAP usage.  He denies any significant morning headaches, daytime/driving drowsy, issues with sleep, snoring, gasping, apneas, palpitations, dry mouth, aerophagia, mask leak, or skin irritation.  Patient will go to bed at 10:30 PM and wakes up at 7:30 AM.  He will have 1 awakening at night but will not have any issues longer sleep.  He does not nap.    DME provider: Miguel  Device: Stripe 2 autoCPAP  Settings: CPAP at 10 CWP  Oxygen:  None  When: December 2021  Mask: Fullface  Chin strap: No     Cleaning regimen: Once a week with soap and water     Compliance:  Compliance data reviewed showing 100% usage > 4hours in last 30 days. Average AHI 1.2 events/hour.  Large leak per day 2 minutes 48 seconds. Patient continues to use and benefit from machine.        Sleep History:  Diagnosed with FRANK on 11/27/2018 with a AHI was 87.6/hr and the best tolerated pressure was CPAP 10 cm. The AHI improved to 1.6/hr.      Patient Active Problem List    Diagnosis Date Noted    FRANK on CPAP 12/06/2019    Personal history of tobacco use 08/07/2024    History of pulmonary embolism 09/18/2023    History of CVA (cerebrovascular accident) 06/08/2023    Demand ischemia (HCC) 05/25/2023    Acute pulmonary embolism with acute cor pulmonale (HCC) 05/25/2023    Acute hypoxemic respiratory failure (HCC) 05/25/2023    Dysphagia 08/15/2022    Late effect of stroke 06/27/2022    Acute ischemic stroke (HCC) 05/09/2022    Ear itching 11/11/2020     Male erectile disorder 2020    Hair loss 2020    BMI 34.0-34.9,adult 2019    Low back pain with radiation 2019    Elevated hemoglobin A1c 2019    Dermatitis 2019    High risk medication use 2018    PVD (peripheral vascular disease) ([facility]) 02/10/2017    Carotid stenosis 2014    Stented coronary artery 10/31/2013    Primary hypertension 10/31/2013    Pure hypercholesterolemia 10/31/2013    CAD (coronary artery disease) 10/22/2013     Past Medical History:   Diagnosis Date    Acute stroke due to ischemia ([facility]) 2022    MRI with acute infarct of left posterior insular cortex.    Apnea, sleep     Uses CPAP    CAD (coronary artery disease) 2010    Acute apical infarct. PCI/MANJEET (Taxus 3.5 x 32mm) to the LAD. 2015: [facility] with patent stent.    Chickenpox     Serbian measles     H/O heart artery stent 2010    Heart attack ([facility])     Hyperlipidemia     Hypertension     Mumps     Sleep apnea     Snoring     Stroke ([facility])     Wears glasses       Past Surgical History:   Procedure Laterality Date    [facility] CARDIAC CATH  2015    Cath at [place].  Patent stent and no obstructive CAD.    [facility] CARDIAC CATH  2010    Taxus stent to LAD    OTHER CARDIAC SURGERY  2010    Heart Stent    CYST EXCISION      posterior neck     Family History   Problem Relation Age of Onset    Heart Attack Mother     Cancer Mother     Cancer Father     Cancer Sister      Social History     Socioeconomic History    Marital status:      Spouse name: Not on file    Number of children: Not on file    Years of education: Not on file    Highest education level: Associate degree: academic program   Occupational History    Not on file   Tobacco Use    Smoking status: Former     Current packs/day: 0.00     Average packs/day: 0.5 packs/day for 42.0 years (21.0 ttl pk-yrs)     Types: Cigarettes     Start date: 1968     Quit date: 2010     Years since quittin.2     Passive exposure:  Never    Smokeless tobacco: Never   Vaping Use    Vaping status: Never Used   Substance and Sexual Activity    Alcohol use: Yes     Alcohol/week: 8.4 oz     Types: 14 Standard drinks or equivalent per week     Comment: 2 drinks per day     Drug use: Yes     Types: Marijuana, Inhaled, Oral     Comment: Every other day marijuana    Sexual activity: Not on file   Other Topics Concern    Not on file   Social History Narrative    Not on file     Social Determinants of Health     Financial Resource Strain: Low Risk  (5/26/2023)    Overall Financial Resource Strain (CARDIA)     Difficulty of Paying Living Expenses: Not hard at all   Food Insecurity: No Food Insecurity (5/26/2023)    Hunger Vital Sign     Worried About Running Out of Food in the Last Year: Never true     Ran Out of Food in the Last Year: Never true   Transportation Needs: No Transportation Needs (5/26/2023)    PRAPARE - Transportation     Lack of Transportation (Medical): No     Lack of Transportation (Non-Medical): No   Physical Activity: Insufficiently Active (5/25/2023)    Exercise Vital Sign     Days of Exercise per Week: 2 days     Minutes of Exercise per Session: 30 min   Stress: No Stress Concern Present (5/25/2023)    [employer] [place] [employer] - Occupational Stress Questionnaire     Feeling of Stress : Not at all   Social Connections: Moderately Integrated (5/25/2023)    Social Connection and Isolation Panel [NHANES]     Frequency of Communication with Friends and Family: More than three times a week     Frequency of Social Gatherings with Friends and Family: Twice a week     Attends Anabaptist Services: Never     Active Member of Clubs or Organizations: Yes     Attends Club or Organization Meetings: 1 to 4 times per year     Marital Status:    Intimate Partner Violence: Not on file   Housing Stability: Low Risk  (5/26/2023)    Housing Stability Vital Sign     Unable to Pay for Housing in the Last Year: No     Number of Places  Lived in the Last Year: 1     Unstable Housing in the Last Year: No     Current Outpatient Medications   Medication Sig Dispense Refill    pimecrolimus (ELIDEL) 1 % cream APPLY TO AFFECTED AREA FOR ITCHING TWICE DAILY      clotrimazole-betamethasone (LOTRISONE) 1-0.05 % Cream APPLY TO AFFECTED AREA TOPICALLY TWICE A DAY FOR 10 DAYS      spironolactone (ALDACTONE) 25 MG Tab TAKE 1 TABLET BY MOUTH EVERY DAY 90 Tablet 3    nitroglycerin (NITROSTAT) 0.4 MG SL Tab Place 1 Tablet under the tongue as needed for Chest Pain (every 5 minutes for 3 doses, no refief, call 911). 25 Tablet 5    atorvastatin (LIPITOR) 40 MG Tab Take 1 Tablet by mouth 1/2 hour before dinner. 90 Tablet 3    losartan (COZAAR) 25 MG Tab Take 1 Tablet by mouth every day. 90 Tablet 3    sertraline (ZOLOFT) 50 MG Tab Take 50 mg by mouth every day.      traZODone (DESYREL) 50 MG Tab Take 100 mg by mouth every evening.      latanoprost (XALATAN) 0.005 % Solution Administer 1 Drop into both eyes at bedtime.      aspirin 81 MG EC tablet Take 1 Tablet by mouth every day. (Patient not taking: Reported on 8/7/2024) 100 Tablet 3     No current facility-administered medications for this visit.      ALLERGIES: Patient has no allergy information on record.    ROS:  Constitutional: Denies fever, chills, sweats,  weight loss, fatigue  Cardiovascular: Denies chest pain, tightness, palpitations, swelling in legs/feet  Respiratory: Denies shortness of breath, cough, sputum, wheezing, painful breathing   Sleep: per HPI  Gastrointestinal: Denies  difficulty swallowing, nausea, abdominal pain, diarrhea, constipation, heartburn.  Musculoskeletal: Denies painful joints, sore muscles,       PHYSICAL EXAM:  /56 (BP Location: Right arm, Patient Position: Sitting, BP Cuff Size: Adult)   Pulse (!) 55   Ht 1.829 m (6')   Wt 108 kg (237 lb)   SpO2 96%   BMI 32.14 kg/m²   Appearance: Well-nourished, well-developed, no acute distress  Eyes:  No scleral icterus ,  EOMI  ENMT: No redness of the oropharynx  Lung auscultation:  No wheezes rhonchi rubs or rales  Cardiac: No murmurs, rubs, or gallops; regular rhythm, normal rate; no edema  Musculoskeletal:  Grossly normal; gait and station normal; digits and nails normal  Skin:  No rashes, petechiae, cyanosis  Neurologic: without focal signs; oriented to person, time, place, and purpose; judgement intact  Psychiatric:  No depression, anxiety, agitation      Assessment and Plan:    The medical record was reviewed.    Diagnostic and titration nocturnal polysomnogram's, home sleep apnea tests, continuous nocturnal oximetry results, multiple sleep latency tests, and compliance reports reviewed.    Problem List Items Addressed This Visit          Pulmonary/Sleep Medicine Problems    FRANK on CPAP     Sleep Apnea:    The pathophysiology of sleep anea and the increased risk of cardiovascular morbidity from untreated sleep apnea is discussed in detail with the patient.  Urged to avoid supine sleep, weight gain and alcoholic beverages since all of these can worsen sleep apnea. Cautioned against drowsy driving. If feeling sleepy while driving, pull over for a break/nap, rather than persist on the road, in the interest of own safety and that of others on the road.  The risks of untreated sleep apnea were discussed with the patient at length. Patients with sleep apnea are at increased risk of cardiovascular disease including coronary artery disease, systemic arterial hypertension, pulmonary arterial hypertension, cardiac arrythmias, and stroke.  Positive airway pressure will favorably impact many of the adverse conditions and effects provoked by sleep apnea.    Plan:    Compliance download was reviewed and discussed with the patient.  Patient is compliant and AHI is well-controlled.  Will see him back in 1 year for annual compliance.    - Order placed for mask and supplies to [facility]  - Compliance was reinforced  - Clean supplies a least once a  week with dish soap and water and air dry  - Recommended the patient against the use of Ozone , such as SoClean  - Recommended the patient change out supplies as recommended for best mask fit and usage of the machine  - Equipment replacement schedule:  Mask cushion every month  Nasal pillows 2 times per month  Mask every 6 months  Head gear every 6 months  Tubing every 3 months  Ultra-fine filters 2 times per month  Foam filter every 6 months  Humidifier chamber every 6 months  Chin strap every 6 months    Has been advised to continue the current CPAP, clean equipment frequently, and get new mask and supplies as allowed by insurance and DME. Recommend an earlier appointment, if significant treatment barriers develop.    Advised patient to reach out via [employer] if any questions or concerns should arise.          Relevant Orders    DME Mask and Supplies       Other    Personal history of tobacco use     Patient has a 20-pack-year history, quitting in 2010.  He does have baseline shortness of breath, which he reports is not new for him.  No other red flag symptoms.  --Referral to lung cancer screening program.         Relevant Orders    REFERRAL TO LUNG CANCER SCREENING PROGRAM     Have advised the patient to follow up with the appropriate healthcare practitioners for all other medical problems and issues.    Return in about 1 year (around 8/7/2025), or if symptoms worsen or fail to improve, for compliance, with [facility].    Please note portions of this record was created using voice recognition software. I have made every reasonable attempt to correct obvious errors, but I expect that there are errors of grammar and possibly content I did not discover before finalizing the note.    Time spent in record review prior to patient arrival, reviewing results, and in face-to-face encounter totaled 20 min.  __________  DANIELA Watkins  Pulmonary & Sleep Medicine  [facility]

## 2024-08-02 ENCOUNTER — OFFICE VISIT (OUTPATIENT)
Dept: CARDIOLOGY | Facility: MEDICAL CENTER | Age: 74
End: 2024-08-02
Attending: NURSE PRACTITIONER
Payer: MEDICARE

## 2024-08-02 VITALS
DIASTOLIC BLOOD PRESSURE: 58 MMHG | BODY MASS INDEX: 32.51 KG/M2 | HEART RATE: 59 BPM | WEIGHT: 240 LBS | OXYGEN SATURATION: 94 % | SYSTOLIC BLOOD PRESSURE: 102 MMHG | HEIGHT: 72 IN | RESPIRATION RATE: 17 BRPM

## 2024-08-02 DIAGNOSIS — E78.5 DYSLIPIDEMIA: ICD-10-CM

## 2024-08-02 DIAGNOSIS — I10 ESSENTIAL HYPERTENSION, BENIGN: ICD-10-CM

## 2024-08-02 DIAGNOSIS — Z95.5 STENTED CORONARY ARTERY: ICD-10-CM

## 2024-08-02 DIAGNOSIS — I71.21 ANEURYSM OF ASCENDING AORTA WITHOUT RUPTURE (HCC): ICD-10-CM

## 2024-08-02 DIAGNOSIS — I25.119 CORONARY ARTERY DISEASE INVOLVING NATIVE CORONARY ARTERY OF NATIVE HEART WITH ANGINA PECTORIS (HCC): ICD-10-CM

## 2024-08-02 DIAGNOSIS — I65.23 BILATERAL CAROTID ARTERY STENOSIS: ICD-10-CM

## 2024-08-02 DIAGNOSIS — Z87.19 HISTORY OF RECTAL BLEEDING: ICD-10-CM

## 2024-08-02 PROCEDURE — 3074F SYST BP LT 130 MM HG: CPT | Performed by: NURSE PRACTITIONER

## 2024-08-02 PROCEDURE — 99212 OFFICE O/P EST SF 10 MIN: CPT | Performed by: NURSE PRACTITIONER

## 2024-08-02 PROCEDURE — 99213 OFFICE O/P EST LOW 20 MIN: CPT | Performed by: NURSE PRACTITIONER

## 2024-08-02 PROCEDURE — 3078F DIAST BP <80 MM HG: CPT | Performed by: NURSE PRACTITIONER

## 2024-08-02 ASSESSMENT — FIBROSIS 4 INDEX: FIB4 SCORE: 1.66

## 2024-08-07 ENCOUNTER — OFFICE VISIT (OUTPATIENT)
Dept: SLEEP MEDICINE | Facility: MEDICAL CENTER | Age: 74
End: 2024-08-07
Payer: MEDICARE

## 2024-08-07 VITALS
WEIGHT: 237 LBS | BODY MASS INDEX: 32.1 KG/M2 | OXYGEN SATURATION: 96 % | HEART RATE: 55 BPM | HEIGHT: 72 IN | SYSTOLIC BLOOD PRESSURE: 108 MMHG | DIASTOLIC BLOOD PRESSURE: 56 MMHG

## 2024-08-07 DIAGNOSIS — Z87.891 PERSONAL HISTORY OF TOBACCO USE: ICD-10-CM

## 2024-08-07 DIAGNOSIS — G47.33 OSA ON CPAP: ICD-10-CM

## 2024-08-07 PROCEDURE — 3078F DIAST BP <80 MM HG: CPT

## 2024-08-07 PROCEDURE — 3074F SYST BP LT 130 MM HG: CPT

## 2024-08-07 PROCEDURE — 99212 OFFICE O/P EST SF 10 MIN: CPT

## 2024-08-07 PROCEDURE — 99213 OFFICE O/P EST LOW 20 MIN: CPT

## 2024-08-07 ASSESSMENT — FIBROSIS 4 INDEX: FIB4 SCORE: 1.66

## 2024-08-07 NOTE — ASSESSMENT & PLAN NOTE
Sleep Apnea:    The pathophysiology of sleep anea and the increased risk of cardiovascular morbidity from untreated sleep apnea is discussed in detail with the patient.  Urged to avoid supine sleep, weight gain and alcoholic beverages since all of these can worsen sleep apnea. Cautioned against drowsy driving. If feeling sleepy while driving, pull over for a break/nap, rather than persist on the road, in the interest of own safety and that of others on the road.  The risks of untreated sleep apnea were discussed with the patient at length. Patients with sleep apnea are at increased risk of cardiovascular disease including coronary artery disease, systemic arterial hypertension, pulmonary arterial hypertension, cardiac arrythmias, and stroke.  Positive airway pressure will favorably impact many of the adverse conditions and effects provoked by sleep apnea.    Plan:    Compliance download was reviewed and discussed with the patient.  Patient is compliant and AHI is well-controlled.  Will see him back in 1 year for annual compliance.    - Order placed for mask and supplies to [facility]  - Compliance was reinforced  - Clean supplies a least once a week with dish soap and water and air dry  - Recommended the patient against the use of Ozone , such as SoClean  - Recommended the patient change out supplies as recommended for best mask fit and usage of the machine  - Equipment replacement schedule:  Mask cushion every month  Nasal pillows 2 times per month  Mask every 6 months  Head gear every 6 months  Tubing every 3 months  Ultra-fine filters 2 times per month  Foam filter every 6 months  Humidifier chamber every 6 months  Chin strap every 6 months    Has been advised to continue the current CPAP, clean equipment frequently, and get new mask and supplies as allowed by insurance and DME. Recommend an earlier appointment, if significant treatment barriers develop.    Advised patient to reach out via [employer] if any  questions or concerns should arise.

## 2024-08-07 NOTE — ASSESSMENT & PLAN NOTE
Patient has a 20-pack-year history, quitting in 2010.  He does have baseline shortness of breath, which he reports is not new for him.  No other red flag symptoms.  --Referral to lung cancer screening program.

## 2024-08-09 ENCOUNTER — TELEPHONE (OUTPATIENT)
Dept: HEALTH INFORMATION MANAGEMENT | Facility: OTHER | Age: 74
End: 2024-08-09
Payer: MEDICARE

## 2024-08-09 NOTE — TELEPHONE ENCOUNTER
1. Age 50-77 yrs of age?   74 yrs      2. Total Years Smoking cigarettes?   42 yrs    3. 20 pack year hx of smoking, or greater (average   packs per day)?   42 yrs x .50 pk= 21 pk yr hx    4. Current smoker or if quit, has pt quit within last 15 yrs?   Former, Quit smoking 2010 (14 yrs)    5. Completed Lung Cancer screen CT with [facility] previously?  No    6. Anything noted on previous CT involving lungs? ([place], [place], Etc.)   N/A    7. Any signs or symptoms of lung cancer? ( New / change to Cough, Wheezing, S.O.B., coughing up blood, unexplained weight loss within last year)?   No      8. Previous history of lung cancer?   None

## 2024-08-09 NOTE — TELEPHONE ENCOUNTER
Outcome: LVM    Called to verify program eligibility/LVM with Direct line for call back/Warm Transfer to Patient Outreach [id number]

## 2024-08-16 NOTE — PROGRESS NOTES
Subjective     New Rivas is a 74 y.o. male who presents with hx of nicotine dependence            HPI  Patient seen today for initial lung cancer screening visit. Patient referred by Viridiana SUAREZ.  His PCP is Maggie SUAREZ.     The patient meets eligibility criteria including age, smoking history (20+ pack years), if former smoker, quit in the last 15 years, and absence of signs or symptoms of lung cancer.    - Age - 74  - Smoking history - Patient has smoked for 42 years at an average of 0.5 ppd = 21 pack year smoking history.  - Current smoking status - former smoker, quit smoking in 2010  - No symptoms of lung cancer and no previous history of lung cancer     No Known Allergies    Current Outpatient Medications on File Prior to Visit   Medication Sig Dispense Refill    pimecrolimus (ELIDEL) 1 % cream APPLY TO AFFECTED AREA FOR ITCHING TWICE DAILY      clotrimazole-betamethasone (LOTRISONE) 1-0.05 % Cream APPLY TO AFFECTED AREA TOPICALLY TWICE A DAY FOR 10 DAYS      spironolactone (ALDACTONE) 25 MG Tab TAKE 1 TABLET BY MOUTH EVERY DAY 90 Tablet 3    nitroglycerin (NITROSTAT) 0.4 MG SL Tab Place 1 Tablet under the tongue as needed for Chest Pain (every 5 minutes for 3 doses, no refief, call 911). 25 Tablet 5    aspirin 81 MG EC tablet Take 1 Tablet by mouth every day. (Patient not taking: Reported on 8/7/2024) 100 Tablet 3    atorvastatin (LIPITOR) 40 MG Tab Take 1 Tablet by mouth 1/2 hour before dinner. 90 Tablet 3    losartan (COZAAR) 25 MG Tab Take 1 Tablet by mouth every day. 90 Tablet 3    sertraline (ZOLOFT) 50 MG Tab Take 50 mg by mouth every day.      traZODone (DESYREL) 50 MG Tab Take 100 mg by mouth every evening.      latanoprost (XALATAN) 0.005 % Solution Administer 1 Drop into both eyes at bedtime.       No current facility-administered medications on file prior to visit.       Review of Systems   Constitutional:  Negative for chills, fever and weight loss.   Respiratory:  Negative  for cough, hemoptysis, sputum production, shortness of breath and wheezing.    Cardiovascular:  Positive for chest pain. Negative for palpitations.        He has chronic, intermittent left rib chest pains for which he has had cardiac work-up with PCP.  He has not required any recent nitroglycerin and no recent worsening.              Objective     Vitals:    08/19/24 0811   BP: 100/60   Pulse: 60   Resp: 16   SpO2: 93%         Physical Exam  Constitutional:       Appearance: Normal appearance.   Cardiovascular:      Rate and Rhythm: Normal rate and regular rhythm.   Pulmonary:      Effort: Pulmonary effort is normal.      Breath sounds: Normal breath sounds.   Musculoskeletal:         General: No swelling.   Neurological:      Mental Status: He is alert.                             Assessment & Plan       Assessment & Plan  Personal history of tobacco use, presenting hazards to health    Orders:    CT-LUNG CANCER-SCREENING; Future     We conducted a shared decision-making process using a decision aid. We reviewed benefits and harms of screening, including false positives and potential need for additional diagnostic testing, the possibility of over diagnosis, and total radiation exposure.    We discussed the importance of adhering to annual LDCT screening. We also discussed the impact of comorbities on the patient's the ability or willingness to undergo diagnostic procedure(s) and treatment.    Counseling on the importance of maintaining cigarette smoking abstinence if former smoker; or the importance of smoking cessation if current smoker and, if appropriate, furnishing of information about tobacco cessation interventions.    Based on our discussion, we have decided to begin annual lung cancer screening starting now.    New is not interested in participating in research regarding lung cancer screening.    No follow-up needed unless imaging is abnormal

## 2024-08-19 ENCOUNTER — TELEPHONE (OUTPATIENT)
Dept: HEALTH INFORMATION MANAGEMENT | Facility: OTHER | Age: 74
End: 2024-08-19

## 2024-08-19 ENCOUNTER — OFFICE VISIT (OUTPATIENT)
Dept: SLEEP MEDICINE | Facility: MEDICAL CENTER | Age: 74
End: 2024-08-19
Attending: FAMILY MEDICINE
Payer: MEDICARE

## 2024-08-19 VITALS
OXYGEN SATURATION: 93 % | SYSTOLIC BLOOD PRESSURE: 100 MMHG | HEIGHT: 72 IN | DIASTOLIC BLOOD PRESSURE: 60 MMHG | HEART RATE: 60 BPM | BODY MASS INDEX: 31.83 KG/M2 | RESPIRATION RATE: 16 BRPM | WEIGHT: 235 LBS

## 2024-08-19 DIAGNOSIS — Z87.891 PERSONAL HISTORY OF TOBACCO USE, PRESENTING HAZARDS TO HEALTH: ICD-10-CM

## 2024-08-19 PROCEDURE — G0296 VISIT TO DETERM LDCT ELIG: HCPCS | Performed by: FAMILY MEDICINE

## 2024-08-19 PROCEDURE — 3078F DIAST BP <80 MM HG: CPT | Performed by: FAMILY MEDICINE

## 2024-08-19 PROCEDURE — 3074F SYST BP LT 130 MM HG: CPT | Performed by: FAMILY MEDICINE

## 2024-08-19 ASSESSMENT — ENCOUNTER SYMPTOMS
COUGH: 0
HEMOPTYSIS: 0
WHEEZING: 0
PALPITATIONS: 0
SHORTNESS OF BREATH: 0
SPUTUM PRODUCTION: 0
FEVER: 0
WEIGHT LOSS: 0
CHILLS: 0

## 2024-08-19 ASSESSMENT — FIBROSIS 4 INDEX: FIB4 SCORE: 1.66

## 2024-08-19 NOTE — Clinical Note
Thank you for referring New to the Lung Cancer Screening program.  I enrolled him today. I will update you re: abnormal findings. -Dr. Shalini Garcia

## 2024-08-19 NOTE — TELEPHONE ENCOUNTER
Return call to pt to schedule LD LC CT-   No new symptoms, no LC, former smoker quit 14 yrs ago, no prior CT Lung Scan in past yr

## 2024-08-26 ENCOUNTER — NON-PROVIDER VISIT (OUTPATIENT)
Dept: CARDIOLOGY | Facility: MEDICAL CENTER | Age: 74
End: 2024-08-26
Payer: MEDICARE

## 2024-08-27 NOTE — CARDIAC REMOTE MONITOR - SCAN
Device transmission reviewed. Device demonstrated appropriate function.       Electronically Signed by: Blake Monterroso M.D.    8/27/2024  2:28 PM

## 2024-09-02 DIAGNOSIS — Z79.899 HIGH RISK MEDICATION USE: ICD-10-CM

## 2024-09-02 DIAGNOSIS — Z95.5 STENTED CORONARY ARTERY: ICD-10-CM

## 2024-09-02 DIAGNOSIS — I73.9 PVD (PERIPHERAL VASCULAR DISEASE) (HCC): ICD-10-CM

## 2024-09-02 DIAGNOSIS — G47.33 OSA ON CPAP: ICD-10-CM

## 2024-09-02 DIAGNOSIS — E78.01 FAMILIAL HYPERCHOLESTEROLEMIA: ICD-10-CM

## 2024-09-02 DIAGNOSIS — I10 ESSENTIAL HYPERTENSION: ICD-10-CM

## 2024-09-02 DIAGNOSIS — E78.00 PURE HYPERCHOLESTEROLEMIA: ICD-10-CM

## 2024-09-03 RX ORDER — LOSARTAN POTASSIUM 25 MG/1
25 TABLET ORAL DAILY
Qty: 90 TABLET | Refills: 3 | Status: SHIPPED | OUTPATIENT
Start: 2024-09-03

## 2024-09-03 RX ORDER — ATORVASTATIN CALCIUM 40 MG/1
40 TABLET, FILM COATED ORAL
Qty: 90 TABLET | Refills: 3 | Status: SHIPPED | OUTPATIENT
Start: 2024-09-03

## 2024-09-04 ENCOUNTER — HOSPITAL ENCOUNTER (OUTPATIENT)
Dept: RADIOLOGY | Facility: MEDICAL CENTER | Age: 74
End: 2024-09-04
Attending: FAMILY MEDICINE
Payer: MEDICARE

## 2024-09-04 DIAGNOSIS — Z87.891 PERSONAL HISTORY OF TOBACCO USE, PRESENTING HAZARDS TO HEALTH: ICD-10-CM

## 2024-09-04 PROCEDURE — 71271 CT THORAX LUNG CANCER SCR C-: CPT

## 2024-09-09 ENCOUNTER — TELEPHONE (OUTPATIENT)
Dept: SLEEP MEDICINE | Facility: MEDICAL CENTER | Age: 74
End: 2024-09-09
Payer: MEDICARE

## 2024-09-09 NOTE — TELEPHONE ENCOUNTER
"Phoned patient with results of LDCT exam performed 9/4/24.  He did not answer.  LVM requesting he check his Mychart for a message from me that summarizes his results.    Notified him that the results showed no concerning lung nodules.  Recommend no further Cts for lung cancer screening since he will have reached >15 years of tobacco avoidance in 12 months.    Informed patient of incidental findings of coronary artery calcifications and \"degenerative change of thoracic spine with flowing anterior osteophytes suggesting diffuse idiopathic skeletal hyperostosis\"  with recommendation to follow up with PCP.    His PCP, Maggie SUAREZ, was notified of results and incidental findings via this communication.    [facility] [place] updated and patient sent lung cancer screening result letter.        CT-LUNG CANCER-SCREENING    Result Date: 9/4/2024 9/4/2024 7:48 AM HISTORY/REASON FOR EXAM:  Lung Cancer Screening. 21 pack year smoking history. TECHNIQUE/EXAM DESCRIPTION AND NUMBER OF VIEWS: Lung cancer screening without contrast. Low dose noncontrast helical images were obtained of the chest from the lung apices through the costophrenic sulci utilizing thin collimation and intervals with reconstructed images sent to PACS in axial, coronal and sagittal planes. Low dose optimization technique was utilized for this CT exam including automated exposure control and adjustment of the mA and/or kV according to patient size. COMPARISON: 5/25/2023 FINDINGS: Lungs: No nodules or airspace process. Mediastinum/Theresa: No significant adenopathy. Pleura: No pleural effusion. Cardiac: Heart normal in size without pericardial effusion. There is coronary artery calcification. Vascular: Mild atherosclerotic calcification of thoracic aorta. Soft tissues: Unremarkable. Bones: Degenerative change of thoracic spine with flowing anterior osteophytes suggesting diffuse idiopathic skeletal hyperostosis.     1.  No suspicious pulmonary nodule. 2.  No " acute cardiopulmonary disease. Lung RADS:

## 2024-09-26 ENCOUNTER — NON-PROVIDER VISIT (OUTPATIENT)
Dept: CARDIOLOGY | Facility: MEDICAL CENTER | Age: 74
End: 2024-09-26
Payer: MEDICARE

## 2024-10-21 ENCOUNTER — HOSPITAL ENCOUNTER (EMERGENCY)
Facility: MEDICAL CENTER | Age: 74
End: 2024-10-21
Attending: EMERGENCY MEDICINE
Payer: MEDICARE

## 2024-10-21 ENCOUNTER — APPOINTMENT (OUTPATIENT)
Dept: RADIOLOGY | Facility: MEDICAL CENTER | Age: 74
End: 2024-10-21
Attending: EMERGENCY MEDICINE
Payer: MEDICARE

## 2024-10-21 ENCOUNTER — TELEPHONE (OUTPATIENT)
Dept: CARDIOLOGY | Facility: MEDICAL CENTER | Age: 74
End: 2024-10-21
Payer: MEDICARE

## 2024-10-21 ENCOUNTER — OFFICE VISIT (OUTPATIENT)
Dept: URGENT CARE | Facility: CLINIC | Age: 74
End: 2024-10-21
Payer: MEDICARE

## 2024-10-21 VITALS
HEIGHT: 72 IN | BODY MASS INDEX: 31.83 KG/M2 | OXYGEN SATURATION: 93 % | DIASTOLIC BLOOD PRESSURE: 72 MMHG | RESPIRATION RATE: 18 BRPM | TEMPERATURE: 97.8 F | HEART RATE: 53 BPM | SYSTOLIC BLOOD PRESSURE: 126 MMHG | WEIGHT: 235 LBS

## 2024-10-21 VITALS
BODY MASS INDEX: 31.83 KG/M2 | TEMPERATURE: 98.4 F | OXYGEN SATURATION: 97 % | SYSTOLIC BLOOD PRESSURE: 106 MMHG | DIASTOLIC BLOOD PRESSURE: 54 MMHG | WEIGHT: 235 LBS | HEIGHT: 72 IN | HEART RATE: 62 BPM

## 2024-10-21 DIAGNOSIS — R20.0 NUMBNESS AND TINGLING OF RIGHT LEG: ICD-10-CM

## 2024-10-21 DIAGNOSIS — R53.1 WEAKNESS: Primary | ICD-10-CM

## 2024-10-21 DIAGNOSIS — Z86.711 HISTORY OF PULMONARY EMBOLISM: ICD-10-CM

## 2024-10-21 DIAGNOSIS — Z86.73 HISTORY OF ISCHEMIC STROKE: ICD-10-CM

## 2024-10-21 DIAGNOSIS — R55 SYNCOPE, UNSPECIFIED SYNCOPE TYPE: ICD-10-CM

## 2024-10-21 DIAGNOSIS — R20.2 NUMBNESS AND TINGLING OF RIGHT LEG: ICD-10-CM

## 2024-10-21 LAB
ALBUMIN SERPL BCP-MCNC: 4.3 G/DL (ref 3.2–4.9)
ALBUMIN/GLOB SERPL: 1.7 G/DL
ALP SERPL-CCNC: 54 U/L (ref 30–99)
ALT SERPL-CCNC: 27 U/L (ref 2–50)
ANION GAP SERPL CALC-SCNC: 15 MMOL/L (ref 7–16)
AST SERPL-CCNC: 29 U/L (ref 12–45)
BASOPHILS # BLD AUTO: 0.5 % (ref 0–1.8)
BASOPHILS # BLD: 0.04 K/UL (ref 0–0.12)
BILIRUB SERPL-MCNC: 0.3 MG/DL (ref 0.1–1.5)
BUN SERPL-MCNC: 16 MG/DL (ref 8–22)
CALCIUM ALBUM COR SERPL-MCNC: 9.8 MG/DL (ref 8.5–10.5)
CALCIUM SERPL-MCNC: 10 MG/DL (ref 8.5–10.5)
CHLORIDE SERPL-SCNC: 99 MMOL/L (ref 96–112)
CO2 SERPL-SCNC: 25 MMOL/L (ref 20–33)
CREAT SERPL-MCNC: 0.96 MG/DL (ref 0.5–1.4)
EKG IMPRESSION: NORMAL
EKG IMPRESSION: NORMAL
EOSINOPHIL # BLD AUTO: 0.06 K/UL (ref 0–0.51)
EOSINOPHIL NFR BLD: 0.7 % (ref 0–6.9)
ERYTHROCYTE [DISTWIDTH] IN BLOOD BY AUTOMATED COUNT: 44 FL (ref 35.9–50)
GFR SERPLBLD CREATININE-BSD FMLA CKD-EPI: 84 ML/MIN/1.73 M 2
GLOBULIN SER CALC-MCNC: 2.6 G/DL (ref 1.9–3.5)
GLUCOSE BLD STRIP.AUTO-MCNC: 88 MG/DL (ref 65–99)
GLUCOSE SERPL-MCNC: 96 MG/DL (ref 65–99)
HCT VFR BLD AUTO: 38.7 % (ref 42–52)
HGB BLD-MCNC: 13.1 G/DL (ref 14–18)
IMM GRANULOCYTES # BLD AUTO: 0.04 K/UL (ref 0–0.11)
IMM GRANULOCYTES NFR BLD AUTO: 0.5 % (ref 0–0.9)
LYMPHOCYTES # BLD AUTO: 1.99 K/UL (ref 1–4.8)
LYMPHOCYTES NFR BLD: 23.5 % (ref 22–41)
MCH RBC QN AUTO: 33.1 PG (ref 27–33)
MCHC RBC AUTO-ENTMCNC: 33.9 G/DL (ref 32.3–36.5)
MCV RBC AUTO: 97.7 FL (ref 81.4–97.8)
MONOCYTES # BLD AUTO: 0.68 K/UL (ref 0–0.85)
MONOCYTES NFR BLD AUTO: 8 % (ref 0–13.4)
NEUTROPHILS # BLD AUTO: 5.66 K/UL (ref 1.82–7.42)
NEUTROPHILS NFR BLD: 66.8 % (ref 44–72)
NRBC # BLD AUTO: 0 K/UL
NRBC BLD-RTO: 0 /100 WBC (ref 0–0.2)
NT-PROBNP SERPL IA-MCNC: 75 PG/ML (ref 0–125)
PLATELET # BLD AUTO: 240 K/UL (ref 164–446)
PMV BLD AUTO: 9.9 FL (ref 9–12.9)
POTASSIUM SERPL-SCNC: 4.2 MMOL/L (ref 3.6–5.5)
PROT SERPL-MCNC: 6.9 G/DL (ref 6–8.2)
RBC # BLD AUTO: 3.96 M/UL (ref 4.7–6.1)
SODIUM SERPL-SCNC: 139 MMOL/L (ref 135–145)
TROPONIN T SERPL-MCNC: 7 NG/L (ref 6–19)
TSH SERPL-ACNC: 1.76 UIU/ML (ref 0.35–5.5)
WBC # BLD AUTO: 8.5 K/UL (ref 4.8–10.8)

## 2024-10-21 PROCEDURE — 85025 COMPLETE CBC W/AUTO DIFF WBC: CPT

## 2024-10-21 PROCEDURE — 3074F SYST BP LT 130 MM HG: CPT | Performed by: STUDENT IN AN ORGANIZED HEALTH CARE EDUCATION/TRAINING PROGRAM

## 2024-10-21 PROCEDURE — 3078F DIAST BP <80 MM HG: CPT | Performed by: STUDENT IN AN ORGANIZED HEALTH CARE EDUCATION/TRAINING PROGRAM

## 2024-10-21 PROCEDURE — 70496 CT ANGIOGRAPHY HEAD: CPT

## 2024-10-21 PROCEDURE — 99285 EMERGENCY DEPT VISIT HI MDM: CPT

## 2024-10-21 PROCEDURE — 80053 COMPREHEN METABOLIC PANEL: CPT

## 2024-10-21 PROCEDURE — 99215 OFFICE O/P EST HI 40 MIN: CPT | Performed by: STUDENT IN AN ORGANIZED HEALTH CARE EDUCATION/TRAINING PROGRAM

## 2024-10-21 PROCEDURE — 93005 ELECTROCARDIOGRAM TRACING: CPT

## 2024-10-21 PROCEDURE — 84484 ASSAY OF TROPONIN QUANT: CPT

## 2024-10-21 PROCEDURE — 94760 N-INVAS EAR/PLS OXIMETRY 1: CPT

## 2024-10-21 PROCEDURE — 93005 ELECTROCARDIOGRAM TRACING: CPT | Mod: XE | Performed by: EMERGENCY MEDICINE

## 2024-10-21 PROCEDURE — 36415 COLL VENOUS BLD VENIPUNCTURE: CPT

## 2024-10-21 PROCEDURE — 83880 ASSAY OF NATRIURETIC PEPTIDE: CPT

## 2024-10-21 PROCEDURE — 71045 X-RAY EXAM CHEST 1 VIEW: CPT

## 2024-10-21 PROCEDURE — 84443 ASSAY THYROID STIM HORMONE: CPT

## 2024-10-21 PROCEDURE — 82962 GLUCOSE BLOOD TEST: CPT

## 2024-10-21 PROCEDURE — 93971 EXTREMITY STUDY: CPT | Mod: RT

## 2024-10-21 PROCEDURE — 700117 HCHG RX CONTRAST REV CODE 255: Performed by: EMERGENCY MEDICINE

## 2024-10-21 PROCEDURE — 70498 CT ANGIOGRAPHY NECK: CPT

## 2024-10-21 RX ADMIN — IOHEXOL 80 ML: 350 INJECTION, SOLUTION INTRAVENOUS at 20:35

## 2024-10-21 ASSESSMENT — ENCOUNTER SYMPTOMS
PALPITATIONS: 0
SHORTNESS OF BREATH: 0
TINGLING: 1
FEVER: 0
HEADACHES: 0
COUGH: 0
SORE THROAT: 0
DIZZINESS: 0
WHEEZING: 0
CHILLS: 0

## 2024-10-21 ASSESSMENT — FIBROSIS 4 INDEX
FIB4 SCORE: 1.66
FIB4 SCORE: 1.66

## 2024-10-27 ENCOUNTER — NON-PROVIDER VISIT (OUTPATIENT)
Dept: CARDIOLOGY | Facility: MEDICAL CENTER | Age: 74
End: 2024-10-27
Payer: MEDICARE

## 2024-10-28 PROCEDURE — 93298 REM INTERROG DEV EVAL SCRMS: CPT | Mod: 26 | Performed by: INTERNAL MEDICINE

## 2024-11-18 ENCOUNTER — APPOINTMENT (OUTPATIENT)
Dept: VASCULAR LAB | Facility: MEDICAL CENTER | Age: 74
End: 2024-11-18
Attending: NURSE PRACTITIONER
Payer: MEDICARE

## 2024-11-22 ENCOUNTER — HOSPITAL ENCOUNTER (OUTPATIENT)
Dept: LAB | Facility: MEDICAL CENTER | Age: 74
End: 2024-11-22
Payer: MEDICARE

## 2024-11-22 DIAGNOSIS — R73.03 PREDIABETES: ICD-10-CM

## 2024-11-22 DIAGNOSIS — I10 ESSENTIAL HYPERTENSION: ICD-10-CM

## 2024-11-22 LAB
ALBUMIN SERPL BCP-MCNC: 4.2 G/DL (ref 3.2–4.9)
ALBUMIN/GLOB SERPL: 1.7 G/DL
ALP SERPL-CCNC: 48 U/L (ref 30–99)
ALT SERPL-CCNC: 25 U/L (ref 2–50)
ANION GAP SERPL CALC-SCNC: 14 MMOL/L (ref 7–16)
AST SERPL-CCNC: 22 U/L (ref 12–45)
BASOPHILS # BLD AUTO: 0.7 % (ref 0–1.8)
BASOPHILS # BLD: 0.04 K/UL (ref 0–0.12)
BILIRUB SERPL-MCNC: 0.3 MG/DL (ref 0.1–1.5)
BUN SERPL-MCNC: 16 MG/DL (ref 8–22)
CALCIUM ALBUM COR SERPL-MCNC: 9.1 MG/DL (ref 8.5–10.5)
CALCIUM SERPL-MCNC: 9.3 MG/DL (ref 8.5–10.5)
CHLORIDE SERPL-SCNC: 103 MMOL/L (ref 96–112)
CHOLEST SERPL-MCNC: 123 MG/DL (ref 100–199)
CO2 SERPL-SCNC: 24 MMOL/L (ref 20–33)
CREAT SERPL-MCNC: 0.85 MG/DL (ref 0.5–1.4)
EOSINOPHIL # BLD AUTO: 0.09 K/UL (ref 0–0.51)
EOSINOPHIL NFR BLD: 1.5 % (ref 0–6.9)
ERYTHROCYTE [DISTWIDTH] IN BLOOD BY AUTOMATED COUNT: 45.8 FL (ref 35.9–50)
EST. AVERAGE GLUCOSE BLD GHB EST-MCNC: 126 MG/DL
GFR SERPLBLD CREATININE-BSD FMLA CKD-EPI: 92 ML/MIN/1.73 M 2
GLOBULIN SER CALC-MCNC: 2.5 G/DL (ref 1.9–3.5)
GLUCOSE SERPL-MCNC: 100 MG/DL (ref 65–99)
HBA1C MFR BLD: 6 % (ref 4–5.6)
HCT VFR BLD AUTO: 40.1 % (ref 42–52)
HDLC SERPL-MCNC: 62 MG/DL
HGB BLD-MCNC: 13.4 G/DL (ref 14–18)
IMM GRANULOCYTES # BLD AUTO: 0.03 K/UL (ref 0–0.11)
IMM GRANULOCYTES NFR BLD AUTO: 0.5 % (ref 0–0.9)
LDLC SERPL CALC-MCNC: 45 MG/DL
LYMPHOCYTES # BLD AUTO: 1.83 K/UL (ref 1–4.8)
LYMPHOCYTES NFR BLD: 29.8 % (ref 22–41)
MCH RBC QN AUTO: 33.3 PG (ref 27–33)
MCHC RBC AUTO-ENTMCNC: 33.4 G/DL (ref 32.3–36.5)
MCV RBC AUTO: 99.5 FL (ref 81.4–97.8)
MONOCYTES # BLD AUTO: 0.7 K/UL (ref 0–0.85)
MONOCYTES NFR BLD AUTO: 11.4 % (ref 0–13.4)
NEUTROPHILS # BLD AUTO: 3.46 K/UL (ref 1.82–7.42)
NEUTROPHILS NFR BLD: 56.1 % (ref 44–72)
NRBC # BLD AUTO: 0 K/UL
NRBC BLD-RTO: 0 /100 WBC (ref 0–0.2)
PLATELET # BLD AUTO: 247 K/UL (ref 164–446)
PMV BLD AUTO: 9.8 FL (ref 9–12.9)
POTASSIUM SERPL-SCNC: 4.6 MMOL/L (ref 3.6–5.5)
PROT SERPL-MCNC: 6.7 G/DL (ref 6–8.2)
RBC # BLD AUTO: 4.03 M/UL (ref 4.7–6.1)
SODIUM SERPL-SCNC: 141 MMOL/L (ref 135–145)
TRIGL SERPL-MCNC: 80 MG/DL (ref 0–149)
WBC # BLD AUTO: 6.2 K/UL (ref 4.8–10.8)

## 2024-11-22 PROCEDURE — 80061 LIPID PANEL: CPT

## 2024-11-22 PROCEDURE — 36415 COLL VENOUS BLD VENIPUNCTURE: CPT

## 2024-11-22 PROCEDURE — 85025 COMPLETE CBC W/AUTO DIFF WBC: CPT

## 2024-11-22 PROCEDURE — 83036 HEMOGLOBIN GLYCOSYLATED A1C: CPT

## 2024-11-22 PROCEDURE — 80053 COMPREHEN METABOLIC PANEL: CPT

## 2024-11-22 PROCEDURE — 80048 BASIC METABOLIC PNL TOTAL CA: CPT

## 2024-11-22 PROCEDURE — 84153 ASSAY OF PSA TOTAL: CPT | Mod: GA

## 2024-11-23 LAB
ANION GAP SERPL CALC-SCNC: 13 MMOL/L (ref 7–16)
BUN SERPL-MCNC: 16 MG/DL (ref 8–22)
CALCIUM SERPL-MCNC: 9.3 MG/DL (ref 8.5–10.5)
CHLORIDE SERPL-SCNC: 104 MMOL/L (ref 96–112)
CO2 SERPL-SCNC: 26 MMOL/L (ref 20–33)
CREAT SERPL-MCNC: 0.84 MG/DL (ref 0.5–1.4)
GFR SERPLBLD CREATININE-BSD FMLA CKD-EPI: 92 ML/MIN/1.73 M 2
GLUCOSE SERPL-MCNC: 100 MG/DL (ref 65–99)
POTASSIUM SERPL-SCNC: 4.6 MMOL/L (ref 3.6–5.5)
SODIUM SERPL-SCNC: 143 MMOL/L (ref 135–145)

## 2024-11-24 LAB
PSA FREE MFR SERPL: NORMAL %
PSA FREE SERPL-MCNC: NORMAL NG/ML
PSA SERPL-MCNC: 1.1 NG/ML (ref 0–4)

## 2024-11-27 ENCOUNTER — NON-PROVIDER VISIT (OUTPATIENT)
Dept: CARDIOLOGY | Facility: MEDICAL CENTER | Age: 74
End: 2024-11-27
Payer: MEDICARE

## 2024-12-02 PROCEDURE — 93298 REM INTERROG DEV EVAL SCRMS: CPT | Mod: 26 | Performed by: STUDENT IN AN ORGANIZED HEALTH CARE EDUCATION/TRAINING PROGRAM

## 2024-12-02 NOTE — CARDIAC REMOTE MONITOR - SCAN
Device transmission reviewed. Device demonstrated appropriate function.       Electronically Signed by: Yordan Velazco MD, PhD    12/4/2024  5:37 PM

## 2024-12-04 ENCOUNTER — APPOINTMENT (OUTPATIENT)
Dept: VASCULAR LAB | Facility: MEDICAL CENTER | Age: 74
End: 2024-12-04
Payer: MEDICARE

## 2024-12-04 VITALS
BODY MASS INDEX: 32.1 KG/M2 | HEART RATE: 55 BPM | HEIGHT: 72 IN | WEIGHT: 237 LBS | DIASTOLIC BLOOD PRESSURE: 74 MMHG | SYSTOLIC BLOOD PRESSURE: 124 MMHG

## 2024-12-04 DIAGNOSIS — E78.00 PURE HYPERCHOLESTEROLEMIA: ICD-10-CM

## 2024-12-04 DIAGNOSIS — I10 PRIMARY HYPERTENSION: ICD-10-CM

## 2024-12-04 DIAGNOSIS — I73.9 PVD (PERIPHERAL VASCULAR DISEASE) (HCC): ICD-10-CM

## 2024-12-04 DIAGNOSIS — I25.119 CORONARY ARTERY DISEASE INVOLVING NATIVE CORONARY ARTERY OF NATIVE HEART WITH ANGINA PECTORIS (HCC): ICD-10-CM

## 2024-12-04 DIAGNOSIS — Z95.5 STENTED CORONARY ARTERY: ICD-10-CM

## 2024-12-04 DIAGNOSIS — R73.03 PREDIABETES: ICD-10-CM

## 2024-12-04 DIAGNOSIS — Z86.711 HISTORY OF PULMONARY EMBOLISM: ICD-10-CM

## 2024-12-04 DIAGNOSIS — Z86.73 HISTORY OF CVA (CEREBROVASCULAR ACCIDENT): ICD-10-CM

## 2024-12-04 PROBLEM — I63.9 ACUTE ISCHEMIC STROKE (HCC): Status: RESOLVED | Noted: 2022-05-09 | Resolved: 2024-12-04

## 2024-12-04 PROBLEM — J96.01 ACUTE HYPOXEMIC RESPIRATORY FAILURE (HCC): Status: RESOLVED | Noted: 2023-05-25 | Resolved: 2024-12-04

## 2024-12-04 PROBLEM — I26.09 ACUTE PULMONARY EMBOLISM WITH ACUTE COR PULMONALE (HCC): Status: RESOLVED | Noted: 2023-05-25 | Resolved: 2024-12-04

## 2024-12-04 PROCEDURE — 99212 OFFICE O/P EST SF 10 MIN: CPT

## 2024-12-04 PROCEDURE — 3078F DIAST BP <80 MM HG: CPT | Performed by: NURSE PRACTITIONER

## 2024-12-04 PROCEDURE — 3074F SYST BP LT 130 MM HG: CPT | Performed by: NURSE PRACTITIONER

## 2024-12-04 PROCEDURE — 99214 OFFICE O/P EST MOD 30 MIN: CPT | Performed by: NURSE PRACTITIONER

## 2024-12-04 RX ORDER — MUPIROCIN 20 MG/G
1 OINTMENT TOPICAL DAILY
COMMUNITY
Start: 2024-12-03

## 2024-12-04 ASSESSMENT — ENCOUNTER SYMPTOMS
SPEECH CHANGE: 0
SHORTNESS OF BREATH: 1
WHEEZING: 0
PALPITATIONS: 0
DIZZINESS: 0
COUGH: 0
HEADACHES: 0
BLOOD IN STOOL: 0
BRUISES/BLEEDS EASILY: 0
MYALGIAS: 0
CHILLS: 0
FOCAL WEAKNESS: 0
FEVER: 0
WEAKNESS: 0

## 2024-12-04 ASSESSMENT — FIBROSIS 4 INDEX: FIB4 SCORE: 1.28

## 2024-12-04 NOTE — PROGRESS NOTES
VASCULAR ANTICOAGULATION Follow Up VISIT  12/04/2024    Jan Rivas is a 74 y.o. male who presents for  Chief Complaint   Patient presents with    Follow-Up     Initially referred by Raysa Pringle A.* for length of therapy (LOT) determination and management of anticoagulation in context of acute venothromboembolic disease, est 6/2023    Subjective    Doing well  An event occurred in Oct where his arms/legs gave out  He was at a cocktail party, arms stopped working and had to be lowered to ground d/t legs giving out   Resolved spontaneously after a few minutes  Went to the ER 2 days later-- unclear etiology  Denies loss of consciousness   No CP, SOB, palpitations   Saw PCP in follow up  Has not seen cardiology yet- has ILR  Not checking home BP    VTE disease / Anticoagulation:   Date of initiation of anticoagulation: 5/25/23   Initial visit symptoms:  Denies current CP, leg swelling, edema, leg pain, cyanosis of digits, redness of extremities.  Ongoing SOB and fatigue  Current antithrombotic agent: none, ASA 81mg   Complications: none, tolerating well, no bleeding or bruising   Adherence: reports complete, no missed doses    _____Pertinent VTE pmhx:   Date of Diagnosis: 5/25/25  Type of Venous thromboembolic disease (VTE): acute R main PE, no cor pulm   Preceding/presenting hx: 5/23 - acute onset SOB, progressive MEJIA after walking, seen at  and transferred to ED on 5/25 and dx  Antithrombotic therapy at time of VTE event: yes - plavix  VTE tx course: Eliquis 10mg BID x 7d, now eliquis 5mg BID   Any personal VTE hx? No  Any family VTE hx? No  _____PROVOKING FACTORS:  Recent surgery ? No  Recent trauma ? No  Smoker?  reports that he quit smoking about 14 years ago. His smoking use included cigarettes. He started smoking about 56 years ago. He has a 21 pack-year smoking history. He has never been exposed to tobacco smoke. He has never used smokeless tobacco.    Extended travel? Yes, Details: 3/2023 -  had been on cruise,   Other periods of immobility? No  Other known or potential risk factors for VTE disease:  no  MEN:  Hx of cancer or abnormal cancer screenings: no  Hx of Testosterone therapy: no  Hypercoaguability work-up completed?  No    CAD s/p PCI to LAD ()  Currently feeling well. Seeing cardiology.    CVA:  Stable, no current new sx      Current Outpatient Medications:     mupirocin, 1 Application, Topical, DAILY, Taking    losartan, 25 mg, Oral, DAILY, Taking    atorvastatin, 40 mg, Oral, AC PM MEAL, Taking    pimecrolimus, APPLY TO AFFECTED AREA FOR ITCHING TWICE DAILY, Taking    clotrimazole-betamethasone, APPLY TO AFFECTED AREA TOPICALLY TWICE A DAY FOR 10 DAYS, Taking    spironolactone, 25 mg, Oral, DAILY, Taking    nitroglycerin, 0.4 mg, Sublingual, PRN, PRN    aspirin, 81 mg, Oral, DAILY, Taking    sertraline, 50 mg, Oral, DAILY, Taking    traZODone, 100 mg, Oral, Nightly, Taking    latanoprost, 1 Drop, Both Eyes, QHS, Taking     No Known Allergies    Family History   Problem Relation Age of Onset    Heart Attack Mother     Cancer Mother     Cancer Father     Cancer Sister      Social History     Tobacco Use    Smoking status: Former     Current packs/day: 0.00     Average packs/day: 0.5 packs/day for 42.0 years (21.0 ttl pk-yrs)     Types: Cigarettes     Start date: 1968     Quit date: 2010     Years since quittin.5     Passive exposure: Never    Smokeless tobacco: Never   Vaping Use    Vaping status: Never Used   Substance Use Topics    Alcohol use: Yes     Alcohol/week: 8.4 oz     Types: 14 Standard drinks or equivalent per week     Comment: 2 drinks per day (wine/beer/ spirits)    Drug use: Yes     Types: Marijuana, Inhaled, Oral     Comment: Every other day marijuana     DIET AND EXERCISE:  Weight Change:stable   BMI Readings from Last 5 Encounters:   24 32.14 kg/m²   10/21/24 31.87 kg/m²   10/21/24 31.87 kg/m²   24 31.87 kg/m²   24 32.14 kg/m²     Diet:  common adult  Exercise: moderate regular exercise program     Review of Systems   Constitutional:  Negative for chills, fever and malaise/fatigue.   HENT:  Negative for nosebleeds.    Respiratory:  Positive for shortness of breath. Negative for cough and wheezing.    Cardiovascular:  Negative for chest pain, palpitations and leg swelling.   Gastrointestinal:  Negative for blood in stool and melena.   Genitourinary:  Negative for hematuria.   Musculoskeletal:  Negative for myalgias.   Neurological:  Negative for dizziness, speech change, focal weakness, weakness and headaches.   Endo/Heme/Allergies:  Does not bruise/bleed easily.         Objective    Vitals:    12/04/24 1536   BP: 124/74   BP Location: Left arm   Patient Position: Sitting   BP Cuff Size: Large adult   Pulse: (!) 55   Weight: 108 kg (237 lb)   Height: 1.829 m (6')      BP Readings from Last 5 Encounters:   12/04/24 124/74   10/21/24 126/72   10/21/24 106/54   08/19/24 100/60   08/07/24 108/56      Body mass index is 32.14 kg/m².  Physical Exam  Vitals reviewed.   Constitutional:       General: He is not in acute distress.     Appearance: He is not diaphoretic.   HENT:      Head: Normocephalic and atraumatic.   Eyes:      General: No scleral icterus.  Cardiovascular:      Rate and Rhythm: Regular rhythm. Bradycardia present.      Heart sounds: No murmur heard.  Pulmonary:      Effort: Pulmonary effort is normal. No respiratory distress.      Breath sounds: Normal breath sounds. No wheezing or rales.   Musculoskeletal:      Right lower leg: No edema.      Left lower leg: No edema.   Skin:     General: Skin is warm and dry.      Coloration: Skin is not pale.   Neurological:      General: No focal deficit present.      Mental Status: He is alert and oriented to person, place, and time. Mental status is at baseline.      Coordination: Coordination normal.      Gait: Gait normal.   Psychiatric:         Mood and Affect: Mood normal.         Behavior:  Behavior normal.         Thought Content: Thought content normal.       Lab Results   Component Value Date    CHOLSTRLTOT 123 2024    LDL 45 2024    HDL 62 2024    TRIGLYCERIDE 80 2024      Lab Results   Component Value Date/Time    LIPOPROTA 9.1 2023 06:57 AM      Lab Results   Component Value Date    PROTHROMBTM 14.8 (H) 2023    INR 1.18 (H) 2023       Lab Results   Component Value Date    HBA1C 6.0 (H) 2024      Lab Results   Component Value Date    SODIUM 143 2024    POTASSIUM 4.6 2024    CHLORIDE 104 2024    CO2 26 2024    GLUCOSE 100 (H) 2024    BUN 16 2024    CREATININE 0.84 2024    BUNCREATRAT 16 2023        Lab Results   Component Value Date    WBC 6.2 2024    RBC 4.03 (L) 2024    HEMOGLOBIN 13.4 (L) 2024    HEMATOCRIT 40.1 (L) 2024    MCV 99.5 (H) 2024    MCH 33.3 (H) 2024    MCHC 33.4 2024    MPV 9.8 2024      VASCULAR IMAGING:     Results for orders placed or performed during the hospital encounter of 10/21/24   EKG (NOW)   Result Value Ref Range    Report       [facility]    Test Date:  2024-10-21  Pt Name:    SHAD DONALDSON                 Department: ER  MRN:        [id number]                      Room:  Gender:     Male                         Technician: [id number]  :        1950                   Requested By:ER TRIAGE PROTOCOL  Order #:    [id number]                    Reading MD:    Measurements  Intervals                                Axis  Rate:       57                           P:          44  NM:         180                          QRS:        -86  QRSD:       100                          T:          52  QT:         428  QTc:        417    Interpretive Statements  Sinus bradycardia  Abnormal inferior Q waves  Anterior infarct, old  Compared to ECG 2023 09:34:38  Inferior Q waves now present  Q waves now  present  Sinus rhythm no longer present  Myocardial infarct finding still present     EKG   Result Value Ref Range    Report       [facility] Emergency Dept.    Test Date:  2024-10-21  Pt Name:    SHAD DONALDSON                 Department: ER  MRN:        [id number]                      Room:  Gender:     Male                         Technician: [id number]  :        1950                   Requested By:ER TRIAGE PROTOCOL  Order #:    [id number]                    Reading MD: Damian Larsen    Measurements  Intervals                                Axis  Rate:       57                           P:          44  FL:         180                          QRS:        -86  QRSD:       100                          T:          52  QT:         428  QTc:        417    Interpretive Statements  Sinus bradycardia  Abnormal inferior Q waves  Anterior infarct, old  Compared to ECG 2023 09:34:38  Inferior Q waves now present  Q waves now present  Sinus rhythm no longer present  Myocardial infarct finding still present  Electronically Signed On 10- 17:57:43 PDT by Damian Larsen       CTA neck 2023   1. No evidence of flow-limiting stenosis in the cervical carotid or cervical vertebral arteries.    CTA head 2023  1. No hemodynamically significant narrowing of the major intracranial vessels.   2. There is decreased flow in a tiny arterial branch in the left parietal region, concerning for partial occlusion.    CTPA 23  1.  Large right main pulmonary embolus extending into right upper, middle, and lower lobe segmental and subsegmental branches  2.  Left upper, lower, and lingular segmental and subsegmental pulmonary emboli  3.  Asymmetric prominence of the right ventricle with slight bowing of the intraventricular septum, appearance favoring component of right heart strain.  4.  Large right upper pole renal cyst without visualized complex features    Echo 2023  Compared to the images of the  "prior study 5/11/2022, RV is now dilated   with flattened septum.  Normal left ventricular size and systolic function.  No regional wall motion abnormality noted.  Dilated right ventricle with reduced systolic function.  Flattened septum in diastole (\"D\"-shaped left ventricle) consistent   with RV volume overload.  Estimated right ventricular systolic pressure is 35 mmHg.  Aorta  Normal aortic root for body surface area. . The ascending aorta is   dilated with a diameter of 4.2 cm.    Echo 9/2023  Compared to the prior study on 5/25/23, right ventricular size and   function normalized.  The left ventricular ejection fraction is visually estimated to be 55%.  Normal regional wall motion.  No significant valvular disease.   The ascending aorta diameter is 4.1 cm.        Medical Decision Making:  Today's Assessment / Status / Plan:     1. Primary hypertension  Comp Metabolic Panel    CBC WITHOUT DIFFERENTIAL      2. Coronary artery disease involving native coronary artery of native heart with angina pectoris ([facility])        3. Stented coronary artery        4. Pure hypercholesterolemia  Lipid Profile    CBC WITHOUT DIFFERENTIAL      5. PVD (peripheral vascular disease) ([facility])        6. History of CVA (cerebrovascular accident)        7. History of pulmonary embolism        8. Prediabetes  HEMOGLOBIN A1C        PATIENT TYPE: Secondary Prevention    Etiology of Established CVD if Present:     1) VTE disease   5/2023 - acute bilat PE w/o cor pulm  9/2023 - improved right ventricle size and function on follow up imaging  Thrombophilia/hypercoag evaluation:  as per heme - negative 11/2023  - no imaging surveillance needed at this time  - antthrombotic plan as noted below     2) Ascending aortic aneurysm - stable, asymptomatic  - 4.1cm on most recent echo 9/2023, 4.2cm on previous 5/2023   - no repair needed unless growth to 5.5+cm or expansion rate of 1+cm/yr (or 0.5+cm/6mo)  -Presumed cystic medial degeneration with sporadic " development.   -Echo shows no biscupid Ao valve  -No fhx or pmhx of connective tissue disease  - reviewed anatomy, risks, emergency s/s requiring immediate attention, and thresholds for surgical intervention and gave handouts.   Plan:   - continue med mgmt   - recommend 1st degree relatives get screened with echo for TAA  - check echo to r/o expansion - due 9/2024, will obtain now as pt is increased SOB.  Ordered and scheduled today  - consider AAA duplex to eval for comorbid AAA  - if normal exams, then repeat annual echo surveillance, if indications of rapid expansion then repeat CTA, repeat echo 1 year, 5/2026  - focus on ARB and BB for BP control  - activity restrictions including no extreme endurance activities, smoking, stimulants, and extreme weight lifting >20lbs      ANTITHROMBOTIC THERAPY:  Date of initiation: 5/2023   HAS-BLED bleeding risk calc ([email or web address]): 1 pt, 3.4%, low risk  Factors to consider for indefinite OAC: Unprovoked VTE event, Life-threatening or very extensive proximal DVT , and Male sex   Last CBC, BMP: reviewed above   Expected duration: reviewed standard of care as per Chest 2021 guidelines is 3-6 months minimum on full dose OAC.  After review of risks/benefits/alternatives, through shared decision-making, we will continue indefinite OAC   Per pt understanding of heme instructions, pt stopped 11/1/2023, had hypercoag panel done 11/21/2023, and negative.  Has followed up with Dr. Roberto who recommends pt remain off OAC, and continue ASA 81mg.    Antithrombotic therapy plan:  - continue ASA 81mg indefinitely, could consider Eliquis 2.5mg BID ongoing in future if pt desired as he tolerated it well previously .    - counseled on signs and symptoms of acute VTE that require seeking prompt attention in the ED to include shortness of breath, chest pain, pain with deep inhalation, acute leg swelling and/or pain in calf or leg   - elevate legs as much as possible, use compression stockings/socks  if directed by your provider  - Avoid hormonal therapies including estrogen or testosterone-containing meds, or raloxifene or tamoxifene (commonly used for osteoporosis)  - Avoid sedentary periods  - continue complete avoidance of tobacco products  - if having any invasive procedure,please make sure the doctor knows of your history of blood clots and current anticoagulation status  - recommended to see your PCP to discuss if you need age-appropriate cancer screenings as a small % of blood clots may be caused by an underlying malignancy    LIPID MANAGEMENT:   Qualifies for Statin Therapy Based on 2018 ACC/AHA Guidelines: yes, Secondary Prevention - Very high risk ASCVD - 2 major events or 1 event with other high-risk conditions  The ASCVD Risk score (Paige DK, et al., 2019) failed to calculate., N/A  Major ASCVD events: History of ischemic CVA   High-risk conditions: >64yr old  and Hypertension   Currently on Statin: Yes  Tx goals: LDL-C <55 (if HTG, consider non-HDL-C <85, apoB: not defined)   At goal? Approaching, 5/2024 LDL slightly elevated at 58  Plan:   - reinforced ongoing TLC measures as noted   - monitor labs   Meds:   - continue atorva 40mg daily     BLOOD PRESSURE MANAGEMENT:  ACC/AHA (2017) goal <130/80  Home BP at goal:  not checking  Office BP at goal:  yes  24h ABPM: not ordered to date  Plan:   Monitoring:   - start/continue home BP monitoring, reviewed correct technique, provide BP log and instructions  - order 24h ABPM:  NO  - monitor lytes/gfr routinely   - contact office if BP consistently >140/>90 to discussion of tx adjustments   Medications:  - continue losartan 25mg daily   - hold metoprolol SR 25mg daily per cardiology  - continue spironolactone 25mg daily    GLYCEMIC STATUS:  Prediabetes  A1C 6.0  Discussed decreasing simple carbs, increasing exercise-- as below.  - Recheck A1C in 3 months     LIFESTYLE INTERVENTIONS:    SMOKING: Stages of Change: Maintenance (sustained change >6mo)     reports that he quit smoking about 14 years ago. His smoking use included cigarettes. He started smoking about 56 years ago. He has a 21 pack-year smoking history. He has never been exposed to tobacco smoke. He has never used smokeless tobacco.   - continued complete avoidance of all tobacco products     PHYSICAL ACTIVITY: continue healthy activity to improve CV fitness.  In general, targeting >150min/week of moderate-level activity or as much as tolerated in light of functional status and co-morbidities, increase at tolerated     WEIGHT MANAGEMENT AND NUTRITION: Mediterranean style dietary approach       OTHER:     # FRANK on CPAP  Strongly encouraged CPAP adherence to reduce RV strain, improve overall fatigue symptoms, and improve BP in both the short- and long-term as per HIPARCO (2013) and HIPARCO-2 (2021) studies.   - continue CPAP, defer mgmt to sleep MD     # Bilateral arm/leg weakness. Now symptoms resolved.  Continue work up with PCP and cardiology.  Needs appt with cardiology.  If recurs, needs to go to ER immediately.  He states understanding.      Instructed to follow-up with PCP for remainder of adult medical needs: yes  We will partner with other providers in the management of established vascular disease and cardiometabolic risk factors.    Studies to Be Obtained: echo - May 2026   Labs to Be Obtained:  as above     Follow up in: 6 months    RONNIE Allen.  [facility]   [facility]   [phone]

## 2024-12-05 ENCOUNTER — TELEPHONE (OUTPATIENT)
Dept: CARDIOLOGY | Facility: MEDICAL CENTER | Age: 74
End: 2024-12-05
Payer: MEDICARE

## 2024-12-05 NOTE — TELEPHONE ENCOUNTER
JG    Caller: Jan Rivas    Topic/issue: Patient called because on 10.19.24 at about 3:30 pm he has passed out while at a cocktail party. He is inquiring if his loop recorder caught any information as to what might have caused the syncope episode.     Please advise.     Callback Number: [phone]    Thank you,     Zhane HALE

## 2024-12-05 NOTE — TELEPHONE ENCOUNTER
Phone number called: [phone] (home)      Call outcome: I spoke to pt and let him know that from last enc 10/22/24 per device team there were no episodes noted. Pt reported that it was a one time incident.

## 2024-12-28 ENCOUNTER — NON-PROVIDER VISIT (OUTPATIENT)
Dept: CARDIOLOGY | Facility: MEDICAL CENTER | Age: 74
End: 2024-12-28
Payer: MEDICARE

## 2024-12-30 PROCEDURE — 93298 REM INTERROG DEV EVAL SCRMS: CPT | Mod: 26 | Performed by: STUDENT IN AN ORGANIZED HEALTH CARE EDUCATION/TRAINING PROGRAM

## 2025-01-28 ENCOUNTER — NON-PROVIDER VISIT (OUTPATIENT)
Dept: CARDIOLOGY | Facility: MEDICAL CENTER | Age: 75
End: 2025-01-28
Payer: MEDICARE

## 2025-01-29 PROCEDURE — 93298 REM INTERROG DEV EVAL SCRMS: CPT | Performed by: INTERNAL MEDICINE

## 2025-01-29 NOTE — CARDIAC REMOTE MONITOR - SCAN
Device transmission reviewed. Device demonstrated appropriate function.       Electronically Signed by: Blake Monterroso M.D.    1/29/2025  3:37 PM

## 2025-02-03 ENCOUNTER — TELEPHONE (OUTPATIENT)
Dept: CARDIOLOGY | Facility: MEDICAL CENTER | Age: 75
End: 2025-02-03
Payer: MEDICARE

## 2025-02-03 DIAGNOSIS — I48.0 PAROXYSMAL ATRIAL FIBRILLATION (HCC): ICD-10-CM

## 2025-02-03 NOTE — PROGRESS NOTES
Phone Number Called: [phone]    Call outcome: Did not leave a detailed message. Requested patient to call back.    Message: Called to discuss HJ recommendations  Awaiting phone call back

## 2025-02-03 NOTE — PROGRESS NOTES
New onset Afib. [id number]. Will start apixaban 5 mg BID and stop asa.  Will schedule a follow-up on 2/11/2025.  Check CBC, BMP on 2/10/2025.

## 2025-02-03 NOTE — TELEPHONE ENCOUNTER
THIAGO    Caller: Jan Rivas    Topic/issue: Patient returned phone call.    Please advise.    Callback Number: [phone]    Thank you,     Zhane HALE

## 2025-02-03 NOTE — PROGRESS NOTES
Called and spoke with patient and advised of HJ recommendations. Patient agreeable to plan and believes he has left over Eliquis because he was on it before. I advised patient of his current dosage and if his medication is , he cannot take it. Patient verbalized understanding

## 2025-02-03 NOTE — TELEPHONE ENCOUNTER
Remote transmission received via home monitor, patient presenting with new onset AF, full report scanned into Media.     New onset AF  -Episode Onset: 1/10/2025 @ 11:36 PM  -Duration: 44 minutes  -Average V Rate:80 bpm  -Max V Rate:115 bpm  -Patient on OAC?: No

## 2025-02-03 NOTE — TELEPHONE ENCOUNTER
HJ: Please advise on remote transmission per care team. Pt asymptomatic  =====================  Phone Number Called: [phone]    Call outcome: Spoke to patient regarding message below.    Message: Called to discuss remote transmission. Patient stated he was in [place] at the time and doesn't remember feeling anything. He feels fine now as well. Patient said he didn't do anything particularly different other than eat [place] food. I advised patient I would reach out to provider to see any recommendations they have.

## 2025-02-10 ENCOUNTER — HOSPITAL ENCOUNTER (OUTPATIENT)
Dept: LAB | Facility: MEDICAL CENTER | Age: 75
End: 2025-02-10
Attending: NURSE PRACTITIONER
Payer: MEDICARE

## 2025-02-10 DIAGNOSIS — I48.0 PAROXYSMAL ATRIAL FIBRILLATION (HCC): ICD-10-CM

## 2025-02-10 LAB
ANION GAP SERPL CALC-SCNC: 11 MMOL/L (ref 7–16)
BASOPHILS # BLD AUTO: 0.7 % (ref 0–1.8)
BASOPHILS # BLD: 0.05 K/UL (ref 0–0.12)
BUN SERPL-MCNC: 16 MG/DL (ref 8–22)
CALCIUM SERPL-MCNC: 9.3 MG/DL (ref 8.5–10.5)
CHLORIDE SERPL-SCNC: 103 MMOL/L (ref 96–112)
CO2 SERPL-SCNC: 27 MMOL/L (ref 20–33)
CREAT SERPL-MCNC: 1.03 MG/DL (ref 0.5–1.4)
EOSINOPHIL # BLD AUTO: 0.22 K/UL (ref 0–0.51)
EOSINOPHIL NFR BLD: 3.1 % (ref 0–6.9)
ERYTHROCYTE [DISTWIDTH] IN BLOOD BY AUTOMATED COUNT: 49.2 FL (ref 35.9–50)
GFR SERPLBLD CREATININE-BSD FMLA CKD-EPI: 77 ML/MIN/1.73 M 2
GLUCOSE SERPL-MCNC: 114 MG/DL (ref 65–99)
HCT VFR BLD AUTO: 41.6 % (ref 42–52)
HGB BLD-MCNC: 13.2 G/DL (ref 14–18)
IMM GRANULOCYTES # BLD AUTO: 0.06 K/UL (ref 0–0.11)
IMM GRANULOCYTES NFR BLD AUTO: 0.8 % (ref 0–0.9)
LYMPHOCYTES # BLD AUTO: 2.52 K/UL (ref 1–4.8)
LYMPHOCYTES NFR BLD: 35.2 % (ref 22–41)
MCH RBC QN AUTO: 32.6 PG (ref 27–33)
MCHC RBC AUTO-ENTMCNC: 31.7 G/DL (ref 32.3–36.5)
MCV RBC AUTO: 102.7 FL (ref 81.4–97.8)
MONOCYTES # BLD AUTO: 0.82 K/UL (ref 0–0.85)
MONOCYTES NFR BLD AUTO: 11.5 % (ref 0–13.4)
NEUTROPHILS # BLD AUTO: 3.48 K/UL (ref 1.82–7.42)
NEUTROPHILS NFR BLD: 48.7 % (ref 44–72)
NRBC # BLD AUTO: 0 K/UL
NRBC BLD-RTO: 0 /100 WBC (ref 0–0.2)
PLATELET # BLD AUTO: 260 K/UL (ref 164–446)
PMV BLD AUTO: 10.2 FL (ref 9–12.9)
POTASSIUM SERPL-SCNC: 4.3 MMOL/L (ref 3.6–5.5)
RBC # BLD AUTO: 4.05 M/UL (ref 4.7–6.1)
SODIUM SERPL-SCNC: 141 MMOL/L (ref 135–145)
WBC # BLD AUTO: 7.2 K/UL (ref 4.8–10.8)

## 2025-02-10 PROCEDURE — 36415 COLL VENOUS BLD VENIPUNCTURE: CPT

## 2025-02-10 PROCEDURE — 80048 BASIC METABOLIC PNL TOTAL CA: CPT

## 2025-02-10 PROCEDURE — 85025 COMPLETE CBC W/AUTO DIFF WBC: CPT

## 2025-02-10 NOTE — PROGRESS NOTES
Chief Complaint   Patient presents with    Coronary Artery Disease     F/V Dx: Coronary artery disease involving native coronary artery of native heart with angina pectoris (HCC)        Hypertension    Hyperlipidemia    Atrial Fibrillation       Subjective     [place] is a 74 y.o. male who presents today for cardiology follow-up after episode of Afib detected during  ILR transmission on 2/3/2025. Onset of Afib was on 1/10/2025 while he was in [place] and no symptoms. Duration was 44 minutes, average ventricular rate 80 bpm, max ventricular rate 115 bpm. He was started on apixaban 5 mg BID on 2/3/2025 (started taking 2/4/2025).    Previous hospitalization on 5/25/2023 for severe shortness of breath and acute respiratory failure found to have PE determined to be provoked from DVT after travel.  Patient has additional past medical history of CAD s/p PCI to LAD (2010), carotid artery stenosis, hypertension, dyslipidemia, FRANK on CPAP, CVA.  ILR placed post-CVA to monitor for potential A-fib.  Patient was seen by Dr. Lake on 3/23/2023 and DANIELA Lockhart on 5/31/2024.  Patient also followed by vascular medicine and was discharged from hematology clinic after his Eliquis was discontinued.    He had colonoscopy on 8/16/2024 (outside [facility]). Per patient, showed polyps x4 with bleeding internal hemorrhoids.    ILR interrogation events:  2/3/2025: New onset AF  -Episode Onset: 1/10/2025 @ 11:36 PM--in [place] at that time, no symptoms  -Duration: 44 minutes  -Average V Rate:80 bpm  -Max V Rate:115 bpm    5/25/2024: showed appropriate function and no events  3/5/2024: 7-second pause    He was last seen by DANIELA Lockhart on 8/2/2024. During that visit, PET/CT results were discussed and bradycardia limited the initiation of beta-blocker therapy.    Today he reports feeling well.  No chest pain, dyspnea, or fatigue with walking 1 mile.  No orthopnea, PND, palpitations, lightheadedness, presyncope, syncope, or  lower extremity edema.      Past Medical History:   Diagnosis Date    Acute hypoxemic respiratory failure (HCC) 2023    Acute ischemic stroke ([facility]) 2022    May 2022: MRI with acute infarct of left posterior insular cortex.       Acute stroke due to ischemia ([facility]) 2022    MRI with acute infarct of left posterior insular cortex.    Apnea, sleep     Uses CPAP    CAD (coronary artery disease) 2010    Acute apical infarct. PCI/MANJEET (Taxus 3.5 x 32mm) to the LAD. 2015: [facility] with patent stent.    Chickenpox     Kh[place] measles     H/O heart artery stent 2010    Heart attack (HCC)     Hyperlipidemia     Hypertension     Mumps     Sleep apnea     Snoring     Stroke ([facility])     Wears glasses      Past Surgical History:   Procedure Laterality Date    [facility] CARDIAC CATH  2015    Cath at [place].  Patent stent and no obstructive CAD.    [facility] CARDIAC CATH  2010    Taxus stent to LAD    OTHER CARDIAC SURGERY  2010    Heart Stent    CYST EXCISION      posterior neck     Family History   Problem Relation Age of Onset    Heart Attack Mother     Cancer Mother     Cancer Father     Cancer Sister      Social History     Socioeconomic History    Marital status:      Spouse name: Not on file    Number of children: Not on file    Years of education: Not on file    Highest education level: Associate degree: academic program   Occupational History    Not on file   Tobacco Use    Smoking status: Former     Current packs/day: 0.00     Average packs/day: 0.5 packs/day for 42.0 years (21.0 ttl pk-yrs)     Types: Cigarettes     Start date: 1968     Quit date: 2010     Years since quittin.7     Passive exposure: Never    Smokeless tobacco: Never   Vaping Use    Vaping status: Never Used   Substance and Sexual Activity    Alcohol use: Yes     Alcohol/week: 8.4 oz     Types: 14 Standard drinks or equivalent per week     Comment: 2 drinks per day (wine/beer/ spirits)    Drug use: Yes     Types:  Marijuana, Inhaled, Oral     Comment: Every other day marijuana    Sexual activity: Not on file   Other Topics Concern    Not on file   Social History Narrative    Not on file     Social Drivers of Health     Financial Resource Strain: Low Risk  (5/26/2023)    Overall Financial Resource Strain (CARDIA)     Difficulty of Paying Living Expenses: Not hard at all   Food Insecurity: No Food Insecurity (5/26/2023)    Hunger Vital Sign     Worried About Running Out of Food in the Last Year: Never true     Ran Out of Food in the Last Year: Never true   Transportation Needs: No Transportation Needs (5/26/2023)    PRAPARE - Transportation     Lack of Transportation (Medical): No     Lack of Transportation (Non-Medical): No   Physical Activity: Insufficiently Active (5/25/2023)    Exercise Vital Sign     Days of Exercise per Week: 2 days     Minutes of Exercise per Session: 30 min   Stress: No Stress Concern Present (5/25/2023)    [employer] [place] [employer] - Occupational Stress Questionnaire     Feeling of Stress : Not at all   Social Connections: Moderately Integrated (5/25/2023)    Social Connection and Isolation Panel [NHANES]     Frequency of Communication with Friends and Family: More than three times a week     Frequency of Social Gatherings with Friends and Family: Twice a week     Attends Zoroastrianism Services: Never     Active Member of Clubs or Organizations: Yes     Attends Club or Organization Meetings: 1 to 4 times per year     Marital Status:    Intimate Partner Violence: Not on file   Housing Stability: Low Risk  (5/26/2023)    Housing Stability Vital Sign     Unable to Pay for Housing in the Last Year: No     Number of Places Lived in the Last Year: 1     Unstable Housing in the Last Year: No     No Known Allergies  Outpatient Encounter Medications as of 2/12/2025   Medication Sig Dispense Refill    metoprolol SR (TOPROL XL) 25 MG TABLET SR 24 HR Take 1 Tablet by mouth 1 time a day as needed  (for heart rate over 100 per minute). 30 Tablet 3    apixaban (ELIQUIS) 5mg Tab Take 1 Tablet by mouth 2 times a day. 90 Tablet 3    losartan (COZAAR) 25 MG Tab TAKE 1 TABLET BY MOUTH EVERY DAY 90 Tablet 3    atorvastatin (LIPITOR) 40 MG Tab TAKE 1 TABLET BY MOUTH 1/2 HOUR BEFORE DINNER. 90 Tablet 3    pimecrolimus (ELIDEL) 1 % cream APPLY TO AFFECTED AREA FOR ITCHING TWICE DAILY      clotrimazole-betamethasone (LOTRISONE) 1-0.05 % Cream APPLY TO AFFECTED AREA TOPICALLY TWICE A DAY FOR 10 DAYS      spironolactone (ALDACTONE) 25 MG Tab TAKE 1 TABLET BY MOUTH EVERY DAY 90 Tablet 3    nitroglycerin (NITROSTAT) 0.4 MG SL Tab Place 1 Tablet under the tongue as needed for Chest Pain (every 5 minutes for 3 doses, no refief, call 911). 25 Tablet 5    sertraline (ZOLOFT) 50 MG Tab Take 50 mg by mouth every day.      traZODone (DESYREL) 50 MG Tab Take 100 mg by mouth every evening.      latanoprost (XALATAN) 0.005 % Solution Administer 1 Drop into both eyes at bedtime.      mupirocin (BACTROBAN) 2 % Ointment Apply 1 Application topically every day. (Patient not taking: Reported on 2/12/2025)       No facility-administered encounter medications on file as of 2/12/2025.     ROS Complete review of systems negative except as noted in HPI/subjective     Objective     /64 (BP Location: Left arm, Patient Position: Sitting, BP Cuff Size: Adult)   Pulse (!) 59   Resp 18   Ht 1.829 m (6')   Wt 109 kg (241 lb)   SpO2 95%   BMI 32.69 kg/m²     Physical Exam  Vitals reviewed.   Constitutional:       Appearance: He is well-developed.   HENT:      Head: Normocephalic and atraumatic.   Eyes:      Pupils: Pupils are equal, round, and reactive to light.   Neck:      Vascular: No JVD.   Cardiovascular:      Rate and Rhythm: Normal rate and regular rhythm.      Pulses: Normal pulses.      Heart sounds: Normal heart sounds. No murmur heard.     No friction rub. No gallop.   Pulmonary:      Effort: Pulmonary effort is normal. No  respiratory distress.      Breath sounds: Normal breath sounds.   Abdominal:      General: Bowel sounds are normal. There is no distension.      Palpations: Abdomen is soft.   Musculoskeletal:      Right lower leg: No edema.      Left lower leg: No edema.   Skin:     General: Skin is warm and dry.      Findings: No erythema.   Neurological:      Mental Status: He is alert and oriented to person, place, and time.   Psychiatric:         Behavior: Behavior normal.          Lab Results   Component Value Date/Time    SODIUM 141 02/10/2025 0733    POTASSIUM 4.3 02/10/2025 0733    CHLORIDE 103 02/10/2025 0733    CO2 27 02/10/2025 0733    ANION 11.0 02/10/2025 0733    GLUCOSE 114 (H) 02/10/2025 0733    BUN 16 02/10/2025 0733    CREATININE 1.03 02/10/2025 0733    GFRCKD 77 02/10/2025 0733    CALCIUM 9.3 02/10/2025 0733    CORRCALC 9.1 11/22/2024 0954    ASTSGOT 22 11/22/2024 0954    ALTSGPT 25 11/22/2024 0954    ALKPHOSPHAT 48 11/22/2024 0954    TBILIRUBIN 0.3 11/22/2024 0954    ALBUMIN 4.2 11/22/2024 0954    TOTPROTEIN 6.7 11/22/2024 0954    GLOBULIN 2.5 11/22/2024 0954    AGRATIO 1.7 11/22/2024 0954     NT-proBNP   Date Value Ref Range Status   10/21/2024 75 0 - 125 pg/mL Final   05/25/2023 1723 (H) 0 - 125 pg/mL Final     Lab Results   Component Value Date/Time    CHOLSTRLTOT 123 11/22/2024 0954    CHOLSTRLTOT 141 05/11/2024 0814    TRIGLYCERIDE 80 11/22/2024 0954    TRIGLYCERIDE 84 05/11/2024 0814    HDL 62 11/22/2024 0954    HDL 66 05/11/2024 0814    LDL 45 11/22/2024 0954    LDL 58 05/11/2024 0814     Lab Results   Component Value Date/Time    WBC 7.2 02/10/2025 0733    WBC 6.2 11/22/2024 0954    HEMOGLOBIN 13.2 (L) 02/10/2025 0733    HEMOGLOBIN 13.4 (L) 11/22/2024 0954    HEMATOCRIT 41.6 (L) 02/10/2025 0733    HEMATOCRIT 40.1 (L) 11/22/2024 0954    PLATELETCT 260 02/10/2025 0733    PLATELETCT 247 11/22/2024 0954        Imaging:    EKG: 10/21/2024  Sinus bradycardia   Abnormal inferior Q waves   Anterior infarct, old  "  Compared to ECG 05/25/2023 09:34:38   Inferior Q waves now present   Q waves now present   Sinus rhythm no longer present   Myocardial infarct finding still present       Chest CTA (5/5/2023):  1.  Large right main pulmonary embolus extending into right upper, middle, and lower lobe segmental and subsegmental branches  2.  Left upper, lower, and lingular segmental and subsegmental pulmonary emboli  3.  Asymmetric prominence of the right ventricle with slight bowing of the intraventricular septum, appearance favoring component of right heart strain.  4.  Large right upper pole renal cyst without visualized complex features    TTE (5/25/2023)  Compared to the images of the prior study 5/11/2022, RV is now dilated   with flattened septum.  Normal left ventricular size and systolic function.  No regional wall motion abnormality noted.  Dilated right ventricle with reduced systolic function.  Flattened septum in diastole (\"D\"-shaped left ventricle) consistent   with RV volume overload.  Estimated right ventricular systolic pressure is 35 mmHg.    TTE (9/29/2023):  Compared to the prior study on 5/25/23, right ventricular size and   function normalized.  The left ventricular ejection fraction is visually estimated to be 55%.  Normal regional wall motion.  No significant valvular disease.   The ascending aorta diameter is 4.1 cm.    Cardiac event monitor (6/30/2022):  Personally reviewed by myself notable for Mobitz type I during sleep hours    Patient's ILR recorded Pause Episode  -Episode Onset: 3/5/2024 @ 7:36 PM  -Duration: 7 seconds  -Average Rate: 63 bpm  Pause Episode (concurrently with previous pause)  -Episode Onset: 3/5/2024 @ 7:36 PM  -Duration: 2.5 seconds  -Average Rate: 63 bpm    TTE (5/22/2024):  Compared to the images of the prior study on 09/29/2023 - there is no   significant change.   Normal left ventricular systolic function.  The left ventricular ejection fraction is visually estimated to be " 55%.  Hypokinesis of the apex.  Normal diastolic function.  Normal right ventricular systolic function.  No significant valvular abnormalities.   The ascending aorta diameter is dilated at 4.1 cm.    Cardiac PET (7/17/2024):  Small fixed perfusion defect in the distal anterior/anteroseptal wall   segments   TID is 0.99   Preserved LVEF of 64%   Predominately normal myocardial blood flow except in apical segments and    borderline abnormal in distal anterior/anteroseptal.   Impaired coronary flow reserve throughout wall segments (CFR<2)   Negative stress ECG for ischemia.    Per recent ILR transmission: 2/3/2025  New onset AF  -Episode Onset: 1/10/2025 @ 11:36 PM--in [place] at that time,no symptoms, didn't feel?  -Duration: 44 minutes  -Average V Rate:80 bpm  -Max V Rate:115 bpm    Assessment & Plan     1. Coronary artery disease involving native coronary artery of native heart with angina pectoris (HCC)  EKG      2. Paroxysmal atrial fibrillation (HCC)  metoprolol SR (TOPROL XL) 25 MG TABLET SR 24 HR    EC-ECHOCARDIOGRAM COMPLETE W/O CONT    CANCELED: EC-ECHOCARDIOGRAM COMPLETE W/O CONT      3. Aneurysm of ascending aorta without rupture (HCC)  EC-ECHOCARDIOGRAM COMPLETE W/O CONT      4. Essential hypertension, benign        5. Dyslipidemia        6. FRANK on CPAP          Medical Decision Making: Today's Assessment/Status/Plan:        CAD, s/p MANJEET/PCI to LAD (2010); HTN; DLD  -Impaired coronary flow reserve throughout wall segments on cardiac PET.    -Discussed optimizing medical therapy.  Heart rate limits initiation of beta-blocker therapy.  NTG as needed  -no anginal symptom  -Continue high intensity statin at 40 mg daily. LDL at goal 45 (11/22/2024)  -Continue, losartan 25 mg daily and spironolactone 25 mg daily  -not on asa as he is on apixaban for Afib  -LPa 9, WDL    pAfib  --new onset, 44 minutes on 1/10/2025 while in [place], asymptomatic  --[id number]  --started on apixaban 5 mg BID on 2/3/2025,  tolerating, no GI bleeding  --sinus bradycardia, 56 bpm on EKG today (preliminary)  --HR at home 54-56 bpm, asymptomatic  --will add metoprolol SR 25 mg as needed (for HR > 100 bpm)    FRANK  -Patient reports CPAP compliance  -Per sleep medicine    History of cryptogenic stroke; provoked DVT/PE while traveling  -ILR in place. New onset Afib on 1/10/2025  -started on apixaban 5 mg BID on 2/3/2025. Vascular medicine team made aware.  -per note on 3/8/2024, he was discharged from hematology clinic (no thrombophilia) and OAC completion for acute PE.    Ascending aorta is dilated with a diameter of 4.2 cm  -followed by vasc  -4.1 com on recent echo (5/2024). Continue surveillance   -check TTE in 3 months    FU in clinic in 4 months. Sooner if needed.    Arturo Mark, Cardiology NP  [facility], [address]  [address] [address]  [place] [address]  Phone: [phone]  Fax: [phone]

## 2025-02-12 ENCOUNTER — OFFICE VISIT (OUTPATIENT)
Dept: CARDIOLOGY | Facility: MEDICAL CENTER | Age: 75
End: 2025-02-12
Attending: NURSE PRACTITIONER
Payer: MEDICARE

## 2025-02-12 VITALS
RESPIRATION RATE: 18 BRPM | HEART RATE: 59 BPM | DIASTOLIC BLOOD PRESSURE: 64 MMHG | OXYGEN SATURATION: 95 % | HEIGHT: 72 IN | BODY MASS INDEX: 32.64 KG/M2 | SYSTOLIC BLOOD PRESSURE: 124 MMHG | WEIGHT: 241 LBS

## 2025-02-12 DIAGNOSIS — G47.33 OSA ON CPAP: ICD-10-CM

## 2025-02-12 DIAGNOSIS — I10 ESSENTIAL HYPERTENSION, BENIGN: ICD-10-CM

## 2025-02-12 DIAGNOSIS — I25.119 CORONARY ARTERY DISEASE INVOLVING NATIVE CORONARY ARTERY OF NATIVE HEART WITH ANGINA PECTORIS (HCC): ICD-10-CM

## 2025-02-12 DIAGNOSIS — I48.0 PAROXYSMAL ATRIAL FIBRILLATION (HCC): ICD-10-CM

## 2025-02-12 DIAGNOSIS — E78.5 DYSLIPIDEMIA: ICD-10-CM

## 2025-02-12 DIAGNOSIS — I71.21 ANEURYSM OF ASCENDING AORTA WITHOUT RUPTURE (HCC): ICD-10-CM

## 2025-02-12 LAB — EKG IMPRESSION: NORMAL

## 2025-02-12 PROCEDURE — 3074F SYST BP LT 130 MM HG: CPT | Performed by: NURSE PRACTITIONER

## 2025-02-12 PROCEDURE — 99214 OFFICE O/P EST MOD 30 MIN: CPT | Performed by: NURSE PRACTITIONER

## 2025-02-12 PROCEDURE — 93005 ELECTROCARDIOGRAM TRACING: CPT | Mod: TC | Performed by: NURSE PRACTITIONER

## 2025-02-12 PROCEDURE — 93010 ELECTROCARDIOGRAM REPORT: CPT | Performed by: INTERNAL MEDICINE

## 2025-02-12 PROCEDURE — 99213 OFFICE O/P EST LOW 20 MIN: CPT | Performed by: NURSE PRACTITIONER

## 2025-02-12 PROCEDURE — 3078F DIAST BP <80 MM HG: CPT | Performed by: NURSE PRACTITIONER

## 2025-02-12 RX ORDER — METOPROLOL SUCCINATE 25 MG/1
25 TABLET, EXTENDED RELEASE ORAL
Qty: 30 TABLET | Refills: 3 | Status: SHIPPED | OUTPATIENT
Start: 2025-02-12

## 2025-02-12 ASSESSMENT — FIBROSIS 4 INDEX: FIB4 SCORE: 1.22

## 2025-02-13 ENCOUNTER — RESULTS FOLLOW-UP (OUTPATIENT)
Dept: CARDIOLOGY | Facility: MEDICAL CENTER | Age: 75
End: 2025-02-13

## 2025-02-28 ENCOUNTER — NON-PROVIDER VISIT (OUTPATIENT)
Dept: CARDIOLOGY | Facility: MEDICAL CENTER | Age: 75
End: 2025-02-28
Payer: MEDICARE

## 2025-03-03 PROCEDURE — 93298 REM INTERROG DEV EVAL SCRMS: CPT | Mod: 26 | Performed by: STUDENT IN AN ORGANIZED HEALTH CARE EDUCATION/TRAINING PROGRAM

## 2025-03-05 NOTE — CARDIAC REMOTE MONITOR - SCAN
Device transmission reviewed. Device demonstrated appropriate function.       Electronically Signed by: Yordan Velazco MD, PhD    3/5/2025  4:49 PM

## 2025-03-27 DIAGNOSIS — E78.00 PURE HYPERCHOLESTEROLEMIA: ICD-10-CM

## 2025-03-27 DIAGNOSIS — Z95.5 STENTED CORONARY ARTERY: ICD-10-CM

## 2025-03-27 DIAGNOSIS — I65.23 BILATERAL CAROTID ARTERY STENOSIS: ICD-10-CM

## 2025-03-27 DIAGNOSIS — I10 PRIMARY HYPERTENSION: ICD-10-CM

## 2025-03-27 DIAGNOSIS — I73.9 PVD (PERIPHERAL VASCULAR DISEASE) (HCC): ICD-10-CM

## 2025-03-27 DIAGNOSIS — Z79.899 HIGH RISK MEDICATION USE: ICD-10-CM

## 2025-03-27 DIAGNOSIS — I25.10 CORONARY ARTERY DISEASE INVOLVING NATIVE CORONARY ARTERY OF NATIVE HEART WITHOUT ANGINA PECTORIS: ICD-10-CM

## 2025-03-27 DIAGNOSIS — I26.09 ACUTE PULMONARY EMBOLISM WITH ACUTE COR PULMONALE, UNSPECIFIED PULMONARY EMBOLISM TYPE (HCC): ICD-10-CM

## 2025-03-27 RX ORDER — SPIRONOLACTONE 25 MG/1
25 TABLET ORAL DAILY
Qty: 90 TABLET | Refills: 1 | Status: SHIPPED | OUTPATIENT
Start: 2025-03-27

## 2025-03-31 ENCOUNTER — NON-PROVIDER VISIT (OUTPATIENT)
Dept: CARDIOLOGY | Facility: MEDICAL CENTER | Age: 75
End: 2025-03-31
Payer: MEDICARE

## 2025-04-01 PROCEDURE — 93298 REM INTERROG DEV EVAL SCRMS: CPT | Mod: 26 | Performed by: INTERNAL MEDICINE

## 2025-04-01 NOTE — CARDIAC REMOTE MONITOR - SCAN
Device transmission reviewed. Device demonstrated appropriate function.       Electronically Signed by: Blake Monterroso M.D.    4/1/2025  3:35 PM

## 2025-05-01 ENCOUNTER — NON-PROVIDER VISIT (OUTPATIENT)
Dept: CARDIOLOGY | Facility: MEDICAL CENTER | Age: 75
End: 2025-05-01
Payer: MEDICARE

## 2025-05-01 PROCEDURE — 93298 REM INTERROG DEV EVAL SCRMS: CPT | Mod: 26 | Performed by: INTERNAL MEDICINE

## 2025-05-02 ENCOUNTER — ANCILLARY PROCEDURE (OUTPATIENT)
Dept: CARDIOLOGY | Facility: MEDICAL CENTER | Age: 75
End: 2025-05-02
Attending: INTERNAL MEDICINE
Payer: MEDICARE

## 2025-05-02 DIAGNOSIS — I71.21 ANEURYSM OF ASCENDING AORTA WITHOUT RUPTURE (HCC): ICD-10-CM

## 2025-05-02 DIAGNOSIS — I48.0 PAROXYSMAL ATRIAL FIBRILLATION (HCC): ICD-10-CM

## 2025-05-02 PROCEDURE — 93306 TTE W/DOPPLER COMPLETE: CPT

## 2025-05-02 NOTE — CARDIAC REMOTE MONITOR - SCAN
Device transmission reviewed. Device demonstrated appropriate function.       Electronically Signed by: Obdulio Joseph M.D.    5/9/2025  1:57 PM

## 2025-05-05 ENCOUNTER — RESULTS FOLLOW-UP (OUTPATIENT)
Dept: CARDIOLOGY | Facility: MEDICAL CENTER | Age: 75
End: 2025-05-05

## 2025-05-05 LAB — LV EJECT FRACT  99904: 55

## 2025-05-05 PROCEDURE — 93306 TTE W/DOPPLER COMPLETE: CPT | Mod: 26 | Performed by: INTERNAL MEDICINE

## 2025-06-01 ENCOUNTER — NON-PROVIDER VISIT (OUTPATIENT)
Dept: CARDIOLOGY | Facility: MEDICAL CENTER | Age: 75
End: 2025-06-01
Payer: MEDICARE

## 2025-06-03 NOTE — CARDIAC REMOTE MONITOR - SCAN
Device transmission reviewed. Device demonstrated appropriate function.       Electronically Signed by: Yordan Velazco MD, PhD    6/4/2025  1:38 PM

## 2025-06-09 NOTE — PROGRESS NOTES
Chief Complaint   Patient presents with    Coronary Artery Disease     F/V Dx: Coronary artery disease involving native coronary artery of native heart with angina pectoris (HCC)    Hypertension     F/V Dx: Primary hypertension    Hyperlipidemia     F/V Dx: Pure hypercholesterolemia       Subjective     [place] is a 73 y.o. male who presents today for cardiology follow-up.    Previous hospitalization on 5/25/2023 for severe shortness of breath and acute respiratory failure found to have PE determined to be provoked from DVT after travel.  Patient has additional past medical history of CAD s/p PCI to LAD (2010), carotid artery stenosis, hypertension, dyslipidemia, FRANK on CPAP, CVA.  ILR placed post-CVA to monitor for potential A-fib.      Previous patient of Dr. Lake on 3/23/2023. Last seen by DANIELA Gutierrez on 2/13/2025. Patient also followed by vascular medicine.      Since patient was last seen OAC resumed for 44-minute A-fib episode on January 10.    Today, patient notes undesired weight gain due to frequent traveling.  Otherwise, Patient feels well, denies chest pain, shortness of breath, palpitations, dizziness/lightheadedness, orthopnea, PND or Edema.   Patient has not needed as needed metoprolol.    We reviewed echocardiogram, BMP, CBC and lipids  during visit today.    We will continue medical therapy as previously prescribed.  Patient to follow-up in 6 months, sooner if needed.  Past Medical History:   Diagnosis Date    Acute hypoxemic respiratory failure (HCC) 05/25/2023    Acute ischemic stroke ([facility]) 05/09/2022    May 2022: MRI with acute infarct of left posterior insular cortex.       Acute stroke due to ischemia ([facility]) 05/2022    MRI with acute infarct of left posterior insular cortex.    Apnea, sleep     Uses CPAP    CAD (coronary artery disease) 05/2010    Acute apical infarct. PCI/MANJEET (Taxus 3.5 x 32mm) to the LAD. 2015: [facility] with patent stent.    Chickenpox     Citizen of [place] measles     H/O  heart artery stent 05/04/2010    Heart attack (HCC)     Hyperlipidemia     Hypertension     Mumps     Sleep apnea     Snoring     Stroke (HCC)     Wears glasses      Past Surgical History:   Procedure Laterality Date    [facility] CARDIAC CATH  02/21/2015    Cath at [place].  Patent stent and no obstructive CAD.    [facility] CARDIAC CATH  05/04/2010    Taxus stent to LAD    OTHER CARDIAC SURGERY  05/04/2010    Heart Stent    CYST EXCISION      posterior neck     Family History   Problem Relation Age of Onset    Heart Attack Mother     Cancer Mother     Cancer Father     Cancer Sister      Social History     Socioeconomic History    Marital status:      Spouse name: Not on file    Number of children: Not on file    Years of education: Not on file    Highest education level: Associate degree: academic program   Occupational History    Not on file   Tobacco Use    Smoking status: Former     Current packs/day: 0.00     Average packs/day: 0.5 packs/day for 42.0 years (21.0 ttl pk-yrs)     Types: Cigarettes     Start date: 5/4/1968     Quit date: 5/4/2010     Years since quitting: 15.1     Passive exposure: Never    Smokeless tobacco: Never   Vaping Use    Vaping status: Never Used   Substance and Sexual Activity    Alcohol use: Yes     Alcohol/week: 8.4 oz     Types: 14 Standard drinks or equivalent per week     Comment: 2 drinks per day (wine/beer/ spirits)    Drug use: Not Currently     Types: Marijuana, Inhaled, Oral     Comment: Every other day marijuana    Sexual activity: Not on file   Other Topics Concern    Not on file   Social History Narrative    Not on file     Social Drivers of Health     Financial Resource Strain: Low Risk  (5/26/2023)    Overall Financial Resource Strain (CARDIA)     Difficulty of Paying Living Expenses: Not hard at all   Food Insecurity: No Food Insecurity (5/26/2023)    Hunger Vital Sign     Worried About Running Out of Food in the Last Year: Never true     Ran Out of Food in the Last Year:  Never true   Transportation Needs: No Transportation Needs (5/26/2023)    PRAPARE - Transportation     Lack of Transportation (Medical): No     Lack of Transportation (Non-Medical): No   Physical Activity: Insufficiently Active (5/25/2023)    Exercise Vital Sign     Days of Exercise per Week: 2 days     Minutes of Exercise per Session: 30 min   Stress: No Stress Concern Present (5/25/2023)    [employer] [employer] - Occupational Stress Questionnaire     Feeling of Stress : Not at all   Social Connections: Moderately Integrated (5/25/2023)    Social Connection and Isolation Panel [NHANES]     Frequency of Communication with Friends and Family: More than three times a week     Frequency of Social Gatherings with Friends and Family: Twice a week     Attends Gnosticism Services: Never     Active Member of Clubs or Organizations: Yes     Attends Club or Organization Meetings: 1 to 4 times per year     Marital Status:    Intimate Partner Violence: Not on file   Housing Stability: Low Risk  (5/26/2023)    Housing Stability Vital Sign     Unable to Pay for Housing in the Last Year: No     Number of Places Lived in the Last Year: 1     Unstable Housing in the Last Year: No     No Known Allergies  Outpatient Encounter Medications as of 6/17/2025   Medication Sig Dispense Refill    CLINDAMYCIN PHOSPHATE,TOPICAL, 1 % Lotion APPLY A THIN LAYER TO AFFECTED AREAS ON UPPER BODY ONCE DAILY.      metronidazole (METROCREAM) 0.75 % cream APPLY A THIN LAYER TO AFFECTED AREAS ON LOWER BODY ONCE DAILY.      mometasone (ELOCON) 0.1 % Cream MIX WITH TOPICAL ANTIBIOTIC AND APPLY TO AFFECTED AREAS UNTIL RESOLVED.      spironolactone (ALDACTONE) 25 MG Tab Take 1 Tablet by mouth every day. 90 Tablet 3    metoprolol SR (TOPROL XL) 25 MG TABLET SR 24 HR Take 1 Tablet by mouth 1 time a day as needed (for heart rate over 100 per minute). 30 Tablet 3    apixaban (ELIQUIS) 5mg Tab Take 1 Tablet by mouth 2 times a day. 90  "Tablet 3    mupirocin (BACTROBAN) 2 % Ointment Apply 1 Application topically every day.      losartan (COZAAR) 25 MG Tab TAKE 1 TABLET BY MOUTH EVERY DAY 90 Tablet 3    atorvastatin (LIPITOR) 40 MG Tab TAKE 1 TABLET BY MOUTH 1/2 HOUR BEFORE DINNER. 90 Tablet 3    pimecrolimus (ELIDEL) 1 % cream APPLY TO AFFECTED AREA FOR ITCHING TWICE DAILY      clotrimazole-betamethasone (LOTRISONE) 1-0.05 % Cream APPLY TO AFFECTED AREA TOPICALLY TWICE A DAY FOR 10 DAYS      nitroglycerin (NITROSTAT) 0.4 MG SL Tab Place 1 Tablet under the tongue as needed for Chest Pain (every 5 minutes for 3 doses, no refief, call 911). 25 Tablet 5    sertraline (ZOLOFT) 50 MG Tab Take 50 mg by mouth every day.      traZODone (DESYREL) 50 MG Tab Take 100 mg by mouth every evening. (Patient taking differently: Take 50 mg by mouth every evening. Taking 2 tablets/night)      latanoprost (XALATAN) 0.005 % Solution Administer 1 Drop into both eyes at bedtime.      [DISCONTINUED] spironolactone (ALDACTONE) 25 MG Tab TAKE 1 TABLET BY MOUTH EVERY DAY 90 Tablet 1     No facility-administered encounter medications on file as of 6/17/2025.     ROS Complete review of systems negative except as noted in HPI/subjective           Objective     /70 (BP Location: Left arm, Patient Position: Sitting, BP Cuff Size: Adult)   Pulse 60   Resp 16   Ht 1.829 m (6' 0.01\")   Wt 114 kg (251 lb)   SpO2 95%   BMI 34.03 kg/m²     Physical Exam  Vitals reviewed.   Constitutional:       Appearance: He is well-developed.   HENT:      Head: Normocephalic and atraumatic.   Eyes:      Pupils: Pupils are equal, round, and reactive to light.   Neck:      Vascular: No JVD.   Cardiovascular:      Rate and Rhythm: Normal rate and regular rhythm.      Pulses: Normal pulses.      Heart sounds: Normal heart sounds. No murmur heard.     No friction rub. No gallop.   Pulmonary:      Effort: Pulmonary effort is normal. No respiratory distress.      Breath sounds: Normal breath " sounds.   Abdominal:      General: Bowel sounds are normal. There is no distension.      Palpations: Abdomen is soft.   Musculoskeletal:      Right lower leg: No edema.      Left lower leg: No edema.   Skin:     General: Skin is warm and dry.      Findings: No erythema.   Neurological:      Mental Status: He is alert and oriented to person, place, and time.   Psychiatric:         Behavior: Behavior normal.            Lab Results   Component Value Date/Time    CHOLSTRLTOT 126 06/10/2025 06:34 AM    LDL 45 06/10/2025 06:34 AM    HDL 56 06/10/2025 06:34 AM    TRIGLYCERIDE 124 06/10/2025 06:34 AM       Lab Results   Component Value Date/Time    SODIUM 140 06/10/2025 06:34 AM    POTASSIUM 4.2 06/10/2025 06:34 AM    CHLORIDE 103 06/10/2025 06:34 AM    CO2 25 06/10/2025 06:34 AM    GLUCOSE 99 06/10/2025 06:34 AM    BUN 15 06/10/2025 06:34 AM    CREATININE 1.00 06/10/2025 06:34 AM    BUNCREATRAT 16 06/27/2023 07:04 AM     Lab Results   Component Value Date/Time    ALKPHOSPHAT 56 06/10/2025 06:34 AM    ASTSGOT 23 06/10/2025 06:34 AM    ALTSGPT 22 06/10/2025 06:34 AM    TBILIRUBIN 0.3 06/10/2025 06:34 AM       Latest Reference Range & Units 11/21/23 07:08   TSH 0.380 - 5.330 uIU/mL 4.680     Chest CTA (5/5/2023):  1.  Large right main pulmonary embolus extending into right upper, middle, and lower lobe segmental and subsegmental branches  2.  Left upper, lower, and lingular segmental and subsegmental pulmonary emboli  3.  Asymmetric prominence of the right ventricle with slight bowing of the intraventricular septum, appearance favoring component of right heart strain.  4.  Large right upper pole renal cyst without visualized complex features    TTE (5/25/2023)  Compared to the images of the prior study 5/11/2022, RV is now dilated   with flattened septum.  Normal left ventricular size and systolic function.  No regional wall motion abnormality noted.  Dilated right ventricle with reduced systolic function.  Flattened septum  "in diastole (\"D\"-shaped left ventricle) consistent   with RV volume overload.  Estimated right ventricular systolic pressure is 35 mmHg.    TTE (9/29/2023):  Compared to the prior study on 5/25/23, right ventricular size and   function normalized.  The left ventricular ejection fraction is visually estimated to be 55%.  Normal regional wall motion.  No significant valvular disease.   The ascending aorta diameter is 4.1 cm.    Cardiac event monitor (6/30/2022):  Personally reviewed by myself notable for Mobitz type I during sleep hours    Patient's ILR recorded Pause Episode  -Episode Onset: 3/5/2024 @ 7:36 PM  -Duration: 7 seconds  -Average Rate: 63 bpm  Pause Episode (concurrently with previous pause)  -Episode Onset: 3/5/2024 @ 7:36 PM  -Duration: 2.5 seconds  -Average Rate: 63 bpm    TTE (5/22/2024):  Compared to the images of the prior study on 09/29/2023 - there is no   significant change.   Normal left ventricular systolic function.  The left ventricular ejection fraction is visually estimated to be 55%.  Hypokinesis of the apex.  Normal diastolic function.  Normal right ventricular systolic function.  No significant valvular abnormalities.   The ascending aorta diameter is dilated at 4.1 cm.    Cardiac PET (7/17/2024):  Small fixed perfusion defect in the distal anterior/anteroseptal wall   segments   TID is 0.99   Preserved LVEF of 64%   Predominately normal myocardial blood flow except in apical segments and    borderline abnormal in distal anterior/anteroseptal.   Impaired coronary flow reserve throughout wall segments (CFR<2)   Negative stress ECG for ischemia.    TTE (5/2/2025):  Compared to the prior study on 05/22/2024, no changes.   Normal left ventricular systolic function.   No evidence of valvular abnormality based on Doppler evaluation.   The aortic root diameter is 4.0 cm. The ascending aorta is dilated with   a diameter of 4.2 cm.    Assessment & Plan     1. Coronary artery disease involving " native coronary artery of native heart without angina pectoris        2. Primary hypertension  spironolactone (ALDACTONE) 25 MG Tab          Medical Decision Making: Today's Assessment/Status/Plan:        CAD, s/p MANJEET/PCI to LAD (2010); HTN; DLD  -No ASA due to OAC  -Impaired coronary flow reserve throughout wall segments on cardiac PET.    -Discussed optimizing medical therapy.  Heart rate limits initiation of beta-blocker therapy.  NTG as needed  -Continue high intensity statin at 40 mg daily. LDL at goal 47 (11/21/2023)  -Continue, losartan 25 mg daily and spironolactone  -LPa 9, WDL  -Preserved EF on echo    Paroxysmal A-fib; history of stroke; provoked DVT/PE while traveling  -44-minute A-fib episode noted on loop recorder in January.  -Continue Eliquis 5 mg twice daily  -Metoprolol 25 mg as needed for rate control if needed    Ascending aorta is dilated with a diameter of 4.2 cm  -followed by vasc  -4.2 cm on recent echo. Continue surveillance     FRANK  -Patient reports CPAP compliance  -We discussed importance of treating nocturnal hypoxia due to causing increased risk for cardiac disease; patient verbalizes understanding.  -Per sleep medicine    FU in clinic in 6 months. Sooner if needed.    Patient verbalizes understanding and agrees with the plan of care.     MIR [email or web address].   [facility]  [phone]    PLEASE NOTE: This Note was created using voice recognition Software. I have made every reasonable attempt to correct obvious errors, but I expect that there are errors of grammar and possibly content that I did not discover before finalizing the note

## 2025-06-10 ENCOUNTER — TELEPHONE (OUTPATIENT)
Dept: VASCULAR LAB | Facility: MEDICAL CENTER | Age: 75
End: 2025-06-10
Payer: MEDICARE

## 2025-06-10 ENCOUNTER — HOSPITAL ENCOUNTER (OUTPATIENT)
Dept: LAB | Facility: MEDICAL CENTER | Age: 75
End: 2025-06-10
Attending: NURSE PRACTITIONER
Payer: MEDICARE

## 2025-06-10 DIAGNOSIS — I10 ESSENTIAL HYPERTENSION: ICD-10-CM

## 2025-06-10 DIAGNOSIS — R73.03 PREDIABETES: ICD-10-CM

## 2025-06-10 DIAGNOSIS — I71.21 ANEURYSM OF ASCENDING AORTA WITHOUT RUPTURE (HCC): ICD-10-CM

## 2025-06-10 DIAGNOSIS — I10 PRIMARY HYPERTENSION: ICD-10-CM

## 2025-06-10 DIAGNOSIS — Z79.899 HIGH RISK MEDICATION USE: ICD-10-CM

## 2025-06-10 DIAGNOSIS — I10 ESSENTIAL HYPERTENSION: Primary | ICD-10-CM

## 2025-06-10 DIAGNOSIS — E78.00 PURE HYPERCHOLESTEROLEMIA: ICD-10-CM

## 2025-06-10 LAB
ALBUMIN SERPL BCP-MCNC: 4.2 G/DL (ref 3.2–4.9)
ALBUMIN SERPL BCP-MCNC: 4.2 G/DL (ref 3.2–4.9)
ALBUMIN/GLOB SERPL: 1.6 G/DL
ALBUMIN/GLOB SERPL: 1.6 G/DL
ALP SERPL-CCNC: 56 U/L (ref 30–99)
ALP SERPL-CCNC: 57 U/L (ref 30–99)
ALT SERPL-CCNC: 22 U/L (ref 2–50)
ALT SERPL-CCNC: 23 U/L (ref 2–50)
ANION GAP SERPL CALC-SCNC: 12 MMOL/L (ref 7–16)
ANION GAP SERPL CALC-SCNC: 12 MMOL/L (ref 7–16)
AST SERPL-CCNC: 22 U/L (ref 12–45)
AST SERPL-CCNC: 23 U/L (ref 12–45)
BILIRUB SERPL-MCNC: 0.3 MG/DL (ref 0.1–1.5)
BILIRUB SERPL-MCNC: 0.3 MG/DL (ref 0.1–1.5)
BUN SERPL-MCNC: 15 MG/DL (ref 8–22)
BUN SERPL-MCNC: 15 MG/DL (ref 8–22)
CALCIUM ALBUM COR SERPL-MCNC: 9.1 MG/DL (ref 8.5–10.5)
CALCIUM ALBUM COR SERPL-MCNC: 9.1 MG/DL (ref 8.5–10.5)
CALCIUM SERPL-MCNC: 9.3 MG/DL (ref 8.5–10.5)
CALCIUM SERPL-MCNC: 9.3 MG/DL (ref 8.5–10.5)
CHLORIDE SERPL-SCNC: 103 MMOL/L (ref 96–112)
CHLORIDE SERPL-SCNC: 104 MMOL/L (ref 96–112)
CHOLEST SERPL-MCNC: 126 MG/DL (ref 100–199)
CHOLEST SERPL-MCNC: 126 MG/DL (ref 100–199)
CO2 SERPL-SCNC: 25 MMOL/L (ref 20–33)
CO2 SERPL-SCNC: 25 MMOL/L (ref 20–33)
CREAT SERPL-MCNC: 1 MG/DL (ref 0.5–1.4)
CREAT SERPL-MCNC: 1 MG/DL (ref 0.5–1.4)
ERYTHROCYTE [DISTWIDTH] IN BLOOD BY AUTOMATED COUNT: 45.1 FL (ref 35.9–50)
EST. AVERAGE GLUCOSE BLD GHB EST-MCNC: 126 MG/DL
FASTING STATUS PATIENT QL REPORTED: NORMAL
FASTING STATUS PATIENT QL REPORTED: NORMAL
GFR SERPLBLD CREATININE-BSD FMLA CKD-EPI: 79 ML/MIN/1.73 M 2
GFR SERPLBLD CREATININE-BSD FMLA CKD-EPI: 79 ML/MIN/1.73 M 2
GLOBULIN SER CALC-MCNC: 2.7 G/DL (ref 1.9–3.5)
GLOBULIN SER CALC-MCNC: 2.7 G/DL (ref 1.9–3.5)
GLUCOSE SERPL-MCNC: 97 MG/DL (ref 65–99)
GLUCOSE SERPL-MCNC: 99 MG/DL (ref 65–99)
HBA1C MFR BLD: 6 % (ref 4–5.6)
HCT VFR BLD AUTO: 39 % (ref 42–52)
HDLC SERPL-MCNC: 56 MG/DL
HDLC SERPL-MCNC: 57 MG/DL
HGB BLD-MCNC: 12.7 G/DL (ref 14–18)
LDLC SERPL CALC-MCNC: 45 MG/DL
LDLC SERPL CALC-MCNC: 45 MG/DL
MCH RBC QN AUTO: 32.4 PG (ref 27–33)
MCHC RBC AUTO-ENTMCNC: 32.6 G/DL (ref 32.3–36.5)
MCV RBC AUTO: 99.5 FL (ref 81.4–97.8)
PLATELET # BLD AUTO: 238 K/UL (ref 164–446)
PMV BLD AUTO: 10.2 FL (ref 9–12.9)
POTASSIUM SERPL-SCNC: 4.2 MMOL/L (ref 3.6–5.5)
POTASSIUM SERPL-SCNC: 4.3 MMOL/L (ref 3.6–5.5)
PROT SERPL-MCNC: 6.9 G/DL (ref 6–8.2)
PROT SERPL-MCNC: 6.9 G/DL (ref 6–8.2)
RBC # BLD AUTO: 3.92 M/UL (ref 4.7–6.1)
SODIUM SERPL-SCNC: 140 MMOL/L (ref 135–145)
SODIUM SERPL-SCNC: 141 MMOL/L (ref 135–145)
TRIGL SERPL-MCNC: 122 MG/DL (ref 0–149)
TRIGL SERPL-MCNC: 124 MG/DL (ref 0–149)
WBC # BLD AUTO: 6.9 K/UL (ref 4.8–10.8)

## 2025-06-10 PROCEDURE — 80061 LIPID PANEL: CPT | Mod: 91

## 2025-06-10 PROCEDURE — 83036 HEMOGLOBIN GLYCOSYLATED A1C: CPT | Mod: GA

## 2025-06-10 PROCEDURE — 36415 COLL VENOUS BLD VENIPUNCTURE: CPT

## 2025-06-10 PROCEDURE — 85027 COMPLETE CBC AUTOMATED: CPT

## 2025-06-10 PROCEDURE — 80061 LIPID PANEL: CPT

## 2025-06-10 PROCEDURE — 80053 COMPREHEN METABOLIC PANEL: CPT

## 2025-06-10 PROCEDURE — 80053 COMPREHEN METABOLIC PANEL: CPT | Mod: 91

## 2025-06-10 NOTE — TELEPHONE ENCOUNTER
Vascular    Caller: Jan Rivas     Topic/issue: Patient is needing the lab order expiration date changed as he went and have them done today.     Callback Number: [phone]      Thank you,  Aide VICENTE

## 2025-06-10 NOTE — TELEPHONE ENCOUNTER
Called [facility] and s/w  (Zarina)  She stated that the order CBC w/o from Kathy ORTIZ .    Sent request for updated order for providers to place.    Called and LVM to notify patient we ordered updated labs.    Junie Sepulveda, Med Ass't  [facility]  Ph. [phone]  Fx. [phone]

## 2025-06-10 NOTE — PROGRESS NOTES
Called [facility] and s/w GEORGE Arias (tech's were unavailable)  Notified that new labs were placed for patient ([place]) and she will pass along message    Junie Sepulveda, Med Ass't  [facility]  Ph. [phone]  Fx. [phone]

## 2025-06-17 ENCOUNTER — OFFICE VISIT (OUTPATIENT)
Dept: CARDIOLOGY | Facility: MEDICAL CENTER | Age: 75
End: 2025-06-17
Attending: NURSE PRACTITIONER
Payer: MEDICARE

## 2025-06-17 VITALS
SYSTOLIC BLOOD PRESSURE: 116 MMHG | DIASTOLIC BLOOD PRESSURE: 70 MMHG | HEART RATE: 60 BPM | RESPIRATION RATE: 16 BRPM | BODY MASS INDEX: 34 KG/M2 | OXYGEN SATURATION: 95 % | HEIGHT: 72 IN | WEIGHT: 251 LBS

## 2025-06-17 DIAGNOSIS — I48.0 HYPERCOAGULABLE STATE DUE TO PAROXYSMAL ATRIAL FIBRILLATION (HCC): ICD-10-CM

## 2025-06-17 DIAGNOSIS — G47.33 OSA ON CPAP: ICD-10-CM

## 2025-06-17 DIAGNOSIS — I48.0 PAROXYSMAL ATRIAL FIBRILLATION (HCC): ICD-10-CM

## 2025-06-17 DIAGNOSIS — I71.21 ANEURYSM OF ASCENDING AORTA WITHOUT RUPTURE (HCC): ICD-10-CM

## 2025-06-17 DIAGNOSIS — Z95.5 STENTED CORONARY ARTERY: ICD-10-CM

## 2025-06-17 DIAGNOSIS — D68.69 HYPERCOAGULABLE STATE DUE TO PAROXYSMAL ATRIAL FIBRILLATION (HCC): ICD-10-CM

## 2025-06-17 DIAGNOSIS — I10 PRIMARY HYPERTENSION: ICD-10-CM

## 2025-06-17 DIAGNOSIS — E78.5 DYSLIPIDEMIA: ICD-10-CM

## 2025-06-17 DIAGNOSIS — I25.10 CORONARY ARTERY DISEASE INVOLVING NATIVE CORONARY ARTERY OF NATIVE HEART WITHOUT ANGINA PECTORIS: Primary | ICD-10-CM

## 2025-06-17 PROCEDURE — 99212 OFFICE O/P EST SF 10 MIN: CPT | Performed by: NURSE PRACTITIONER

## 2025-06-17 PROCEDURE — 3078F DIAST BP <80 MM HG: CPT | Performed by: NURSE PRACTITIONER

## 2025-06-17 PROCEDURE — 99214 OFFICE O/P EST MOD 30 MIN: CPT | Performed by: NURSE PRACTITIONER

## 2025-06-17 PROCEDURE — 3074F SYST BP LT 130 MM HG: CPT | Performed by: NURSE PRACTITIONER

## 2025-06-17 RX ORDER — SPIRONOLACTONE 25 MG/1
25 TABLET ORAL DAILY
Qty: 90 TABLET | Refills: 3 | Status: SHIPPED | OUTPATIENT
Start: 2025-06-17

## 2025-06-17 RX ORDER — CLINDAMYCIN PHOSPHATE 10 UG/ML
LOTION TOPICAL
COMMUNITY
Start: 2025-05-09

## 2025-06-17 RX ORDER — MOMETASONE FUROATE 1 MG/G
CREAM TOPICAL
COMMUNITY
Start: 2025-05-08

## 2025-06-17 ASSESSMENT — FIBROSIS 4 INDEX: FIB4 SCORE: 1.5

## 2025-06-23 DIAGNOSIS — I10 ESSENTIAL HYPERTENSION: Primary | ICD-10-CM

## 2025-06-23 NOTE — PROGRESS NOTES
Established patient  Chart prep for upcoming appointment.    Any pending/incomplete orders from last visit? No, all orders completed.  Was patient called and reminded to complete pending orders? N/A orders complete  Were any records requested?  No    Referral up to date? Yes renewal ordered, sent to provider to co-sign, AND new referral attached to upcoming appointment.    Referral attached to appointment (renewals and New patients only)? Yes  Virtual appointment? No            Aminah Campos, Medical Assistant   [facility]   Ph: [phone]  Fx: [phone]

## 2025-07-01 ENCOUNTER — OFFICE VISIT (OUTPATIENT)
Dept: VASCULAR LAB | Facility: MEDICAL CENTER | Age: 75
End: 2025-07-01
Attending: NURSE PRACTITIONER
Payer: MEDICARE

## 2025-07-01 VITALS
BODY MASS INDEX: 33.32 KG/M2 | HEIGHT: 72 IN | HEART RATE: 68 BPM | WEIGHT: 246 LBS | DIASTOLIC BLOOD PRESSURE: 70 MMHG | SYSTOLIC BLOOD PRESSURE: 133 MMHG

## 2025-07-01 DIAGNOSIS — Z86.73 HISTORY OF CVA (CEREBROVASCULAR ACCIDENT): ICD-10-CM

## 2025-07-01 DIAGNOSIS — R73.09 ELEVATED HEMOGLOBIN A1C: ICD-10-CM

## 2025-07-01 DIAGNOSIS — I73.9 PVD (PERIPHERAL VASCULAR DISEASE) (HCC): ICD-10-CM

## 2025-07-01 DIAGNOSIS — E78.00 PURE HYPERCHOLESTEROLEMIA: Primary | ICD-10-CM

## 2025-07-01 DIAGNOSIS — Z86.711 HISTORY OF PULMONARY EMBOLISM: ICD-10-CM

## 2025-07-01 DIAGNOSIS — Z95.5 STENTED CORONARY ARTERY: ICD-10-CM

## 2025-07-01 DIAGNOSIS — I25.119 CORONARY ARTERY DISEASE INVOLVING NATIVE CORONARY ARTERY OF NATIVE HEART WITH ANGINA PECTORIS (HCC): ICD-10-CM

## 2025-07-01 PROCEDURE — 3075F SYST BP GE 130 - 139MM HG: CPT | Performed by: NURSE PRACTITIONER

## 2025-07-01 PROCEDURE — 3078F DIAST BP <80 MM HG: CPT | Performed by: NURSE PRACTITIONER

## 2025-07-01 PROCEDURE — 99212 OFFICE O/P EST SF 10 MIN: CPT

## 2025-07-01 PROCEDURE — 99214 OFFICE O/P EST MOD 30 MIN: CPT | Performed by: NURSE PRACTITIONER

## 2025-07-01 ASSESSMENT — ENCOUNTER SYMPTOMS
BLOOD IN STOOL: 0
MYALGIAS: 0
SHORTNESS OF BREATH: 1
WEAKNESS: 0
PALPITATIONS: 0
CHILLS: 0
DIZZINESS: 0
SPEECH CHANGE: 0
HEADACHES: 0
WHEEZING: 0
FEVER: 0
BRUISES/BLEEDS EASILY: 0
COUGH: 0
FOCAL WEAKNESS: 0

## 2025-07-01 ASSESSMENT — FIBROSIS 4 INDEX: FIB4 SCORE: 1.5

## 2025-07-01 NOTE — PROGRESS NOTES
VASCULAR ANTICOAGULATION Follow Up VISIT  07/01/2025    Jan Rivas is a 75 y.o. male who presents for  Chief Complaint   Patient presents with    Follow-Up     Initially referred by Raysa Pringle A.* for length of therapy (LOT) determination and management of anticoagulation in context of acute venothromboembolic disease, est 6/2023    Subjective    Doing well  Traveling regularly-- feeling good  Home BP <130/80 per pt report  No CP, SOB, palpitations    VTE disease / Anticoagulation:   Date of initiation of anticoagulation: 5/25/23   Initial visit symptoms:  Denies current CP, leg swelling, edema, leg pain, cyanosis of digits, redness of extremities.  Ongoing SOB and fatigue  Current antithrombotic agent: none, ASA 81mg   Complications: none, tolerating well, no bleeding or bruising   Adherence: reports complete, no missed doses    _____Pertinent VTE pmhx:   Date of Diagnosis: 5/25/25  Type of Venous thromboembolic disease (VTE): acute R main PE, no cor pulm   Preceding/presenting hx: 5/23 - acute onset SOB, progressive MEJIA after walking, seen at  and transferred to ED on 5/25 and dx  Antithrombotic therapy at time of VTE event: yes - plavix  VTE tx course: Eliquis 10mg BID x 7d, now eliquis 5mg BID   Any personal VTE hx? No  Any family VTE hx? No  _____PROVOKING FACTORS:  Recent surgery ? No  Recent trauma ? No  Smoker?  reports that he quit smoking about 15 years ago. His smoking use included cigarettes. He started smoking about 57 years ago. He has a 21 pack-year smoking history. He has never been exposed to tobacco smoke. He has never used smokeless tobacco.    Extended travel? Yes, Details: 3/2023 - had been on cruise,   Other periods of immobility? No  Other known or potential risk factors for VTE disease:  no  MEN:  Hx of cancer or abnormal cancer screenings: no  Hx of Testosterone therapy: no  Hypercoaguability work-up completed?  No    CAD s/p PCI to LAD (2010)  Currently feeling well.  Seeing cardiology.    CVA:  Stable, no current new sx      Current Outpatient Medications:     CLINDAMYCIN PHOSPHATE(TOPICAL), APPLY A THIN LAYER TO AFFECTED AREAS ON UPPER BODY ONCE DAILY., Taking    metronidazole, APPLY A THIN LAYER TO AFFECTED AREAS ON LOWER BODY ONCE DAILY., Taking    mometasone, MIX WITH TOPICAL ANTIBIOTIC AND APPLY TO AFFECTED AREAS UNTIL RESOLVED., Taking    spironolactone, 25 mg, Oral, DAILY, Taking    metoprolol SR, 25 mg, Oral, QDAY PRN, Taking As Needed    apixaban, 5 mg, Oral, BID, Taking    mupirocin, 1 Application, Topical, DAILY, Taking    losartan, 25 mg, Oral, DAILY, Taking    atorvastatin, 40 mg, Oral, AC PM MEAL, Taking    pimecrolimus, APPLY TO AFFECTED AREA FOR ITCHING TWICE DAILY, Taking    clotrimazole-betamethasone, APPLY TO AFFECTED AREA TOPICALLY TWICE A DAY FOR 10 DAYS, Taking    nitroglycerin, 0.4 mg, Sublingual, PRN, Taking As Needed    sertraline, 50 mg, Oral, DAILY, Taking    traZODone, 100 mg, Oral, Nightly (Patient taking differently: 50 mg, Oral, NIGHTLY, Taking 2 tablets/night), Taking Differently    latanoprost, 1 Drop, Both Eyes, QHS, Taking     No Known Allergies    Family History   Problem Relation Age of Onset    Heart Attack Mother     Cancer Mother     Cancer Father     Cancer Sister      Social History     Tobacco Use    Smoking status: Former     Current packs/day: 0.00     Average packs/day: 0.5 packs/day for 42.0 years (21.0 ttl pk-yrs)     Types: Cigarettes     Start date: 5/4/1968     Quit date: 5/4/2010     Years since quitting: 15.1     Passive exposure: Never    Smokeless tobacco: Never   Vaping Use    Vaping status: Never Used   Substance Use Topics    Alcohol use: Yes     Alcohol/week: 8.4 oz     Types: 14 Standard drinks or equivalent per week     Comment: 2 drinks per day (wine/beer/ spirits)    Drug use: Not Currently     Types: Marijuana, Inhaled, Oral     Comment: Every other day marijuana     DIET AND EXERCISE:  Weight Change:stable   BMI  Readings from Last 5 Encounters:   07/01/25 33.36 kg/m²   06/17/25 34.03 kg/m²   02/12/25 32.69 kg/m²   12/04/24 32.14 kg/m²   10/21/24 31.87 kg/m²     Diet: common adult  Exercise: moderate regular exercise program     Review of Systems   Constitutional:  Negative for chills, fever and malaise/fatigue.   HENT:  Negative for nosebleeds.    Respiratory:  Positive for shortness of breath. Negative for cough and wheezing.    Cardiovascular:  Negative for chest pain, palpitations and leg swelling.   Gastrointestinal:  Negative for blood in stool and melena.   Genitourinary:  Negative for hematuria.   Musculoskeletal:  Negative for myalgias.   Neurological:  Negative for dizziness, speech change, focal weakness, weakness and headaches.   Endo/Heme/Allergies:  Does not bruise/bleed easily.         Objective    Vitals:    07/01/25 1027   BP: 133/70   BP Location: Left arm   Patient Position: Sitting   BP Cuff Size: Adult   Pulse: 68   Weight: 112 kg (246 lb)   Height: 1.829 m (6')      BP Readings from Last 5 Encounters:   07/01/25 133/70   06/17/25 116/70   02/12/25 124/64   12/04/24 124/74   10/21/24 126/72      Body mass index is 33.36 kg/m².  Physical Exam  Vitals reviewed.   Constitutional:       General: He is not in acute distress.     Appearance: He is not diaphoretic.   HENT:      Head: Normocephalic and atraumatic.   Eyes:      General: No scleral icterus.  Cardiovascular:      Rate and Rhythm: Regular rhythm. Bradycardia present.      Heart sounds: No murmur heard.  Pulmonary:      Effort: Pulmonary effort is normal. No respiratory distress.      Breath sounds: Normal breath sounds. No wheezing or rales.   Musculoskeletal:      Right lower leg: No edema.      Left lower leg: No edema.   Skin:     General: Skin is warm and dry.      Coloration: Skin is not pale.   Neurological:      General: No focal deficit present.      Mental Status: He is alert and oriented to person, place, and time. Mental status is at  baseline.      Coordination: Coordination normal.      Gait: Gait normal.   Psychiatric:         Mood and Affect: Mood normal.         Behavior: Behavior normal.         Thought Content: Thought content normal.       Lab Results   Component Value Date    CHOLSTRLTOT 126 06/10/2025    LDL 45 06/10/2025    HDL 56 06/10/2025    TRIGLYCERIDE 124 06/10/2025      Lab Results   Component Value Date/Time    LIPOPROTA 9.1 2023 06:57 AM         Lab Results   Component Value Date    HBA1C 6.0 (H) 06/10/2025      Lab Results   Component Value Date    SODIUM 140 06/10/2025    POTASSIUM 4.2 06/10/2025    CHLORIDE 103 06/10/2025    CO2 25 06/10/2025    GLUCOSE 99 06/10/2025    BUN 15 06/10/2025    CREATININE 1.00 06/10/2025        Lab Results   Component Value Date    WBC 6.9 06/10/2025    RBC 3.92 (L) 06/10/2025    HEMOGLOBIN 12.7 (L) 06/10/2025    HEMATOCRIT 39.0 (L) 06/10/2025    MCV 99.5 (H) 06/10/2025    MCH 32.4 06/10/2025    MCHC 32.6 06/10/2025    MPV 10.2 06/10/2025      VASCULAR IMAGING:     Results for orders placed or performed in visit on 25   EKG   Result Value Ref Range    Report       [facility]    Test Date:  2025  Pt Name:    SHAD DONALDSON                 Department: [facility]  MRN:        [id number]                      Room:  Gender:     Male                         Technician: DR JAVIER:        1950                   Requested By:EREN CHURCH  Order #:    [id number]                    Reading MD: Roman Wheeler MD    Measurements  Intervals                                Axis  Rate:       56                           P:          21  VT:         181                          QRS:        -78  QRSD:       102                          T:          57  QT:         426  QTc:        412    Interpretive Statements  Sinus bradycardia  Probable left atrial enlargement  Inferior infarct, old  Anteroseptal infarct, age indeterminate  Baseline wander in lead(s) V3  Compared to ECG 10/21/2024  "15:48:50  Inferior Q waves no longer present  Q waves no longer present  Myocardial infarct finding still present  Electronically Signed On 02- 17:46:12 PST by Brandan Wheeler MD       CTA neck 5/2023   1. No evidence of flow-limiting stenosis in the cervical carotid or cervical vertebral arteries.    CTA head 5/2023  1. No hemodynamically significant narrowing of the major intracranial vessels.   2. There is decreased flow in a tiny arterial branch in the left parietal region, concerning for partial occlusion.    CTPA 5/25/23  1.  Large right main pulmonary embolus extending into right upper, middle, and lower lobe segmental and subsegmental branches  2.  Left upper, lower, and lingular segmental and subsegmental pulmonary emboli  3.  Asymmetric prominence of the right ventricle with slight bowing of the intraventricular septum, appearance favoring component of right heart strain.  4.  Large right upper pole renal cyst without visualized complex features    Echo 5/2023  Compared to the images of the prior study 5/11/2022, RV is now dilated   with flattened septum.  Normal left ventricular size and systolic function.  No regional wall motion abnormality noted.  Dilated right ventricle with reduced systolic function.  Flattened septum in diastole (\"D\"-shaped left ventricle) consistent   with RV volume overload.  Estimated right ventricular systolic pressure is 35 mmHg.  Aorta  Normal aortic root for body surface area. . The ascending aorta is   dilated with a diameter of 4.2 cm.    Echo 9/2023  Compared to the prior study on 5/25/23, right ventricular size and   function normalized.  The left ventricular ejection fraction is visually estimated to be 55%.  Normal regional wall motion.  No significant valvular disease.   The ascending aorta diameter is 4.1 cm.    Echo 5/2025  Compared to the prior study on 05/22/2024, no changes.   Normal left ventricular systolic function.   No evidence of valvular abnormality " based on Doppler evaluation.   The aortic root diameter is 4.0 cm. The ascending aorta is dilated with   a diameter of 4.2 cm.        Medical Decision Making:  Today's Assessment / Status / Plan:     1. Pure hypercholesterolemia        2. PVD (peripheral vascular disease) (HCC)        3. Coronary artery disease involving native coronary artery of native heart with angina pectoris (HCC)        4. Stented coronary artery        5. History of CVA (cerebrovascular accident)        6. History of pulmonary embolism        7. Elevated hemoglobin A1c          PATIENT TYPE: Secondary Prevention    Etiology of Established CVD if Present:     1) VTE disease   5/2023 - acute bilat PE w/o cor pulm  9/2023 - improved right ventricle size and function on follow up imaging  Thrombophilia/hypercoag evaluation:  as per heme - negative 11/2023  - no imaging surveillance needed at this time  - antthrombotic plan as noted below     2) Ascending aortic aneurysm - stable, asymptomatic  - 4.1cm on most recent echo 9/2023, 4.2cm on previous 5/2023   - no repair needed unless growth to 5.5+cm or expansion rate of 1+cm/yr (or 0.5+cm/6mo)  -Presumed cystic medial degeneration with sporadic development.   -Echo shows no biscupid Ao valve  -No fhx or pmhx of connective tissue disease  - reviewed anatomy, risks, emergency s/s requiring immediate attention, and thresholds for surgical intervention and gave handouts.   Plan:   - continue med mgmt   - recommend 1st degree relatives get screened with echo for TAA  - check annual echo- May 2026  - consider AAA duplex to eval for comorbid AAA  - if normal exams, then repeat annual echo surveillance, if indications of rapid expansion then repeat CTA, repeat echo 1 year, 5/2026  - focus on ARB and BB for BP control  - activity restrictions including no extreme endurance activities, smoking, stimulants, and extreme weight lifting >20lbs      ANTITHROMBOTIC THERAPY:  Date of initiation: 5/2023   HAS-BLED  bleeding risk calc ([email or web address]): 1 pt, 3.4%, low risk  Factors to consider for indefinite OAC: Unprovoked VTE event, Life-threatening or very extensive proximal DVT , and Male sex   Last CBC, BMP: reviewed above   Expected duration: reviewed standard of care as per Chest 2021 guidelines is 3-6 months minimum on full dose OAC.  After review of risks/benefits/alternatives, through shared decision-making, we will continue indefinite OAC   Per pt understanding of heme instructions, pt stopped 11/1/2023, had hypercoag panel done 11/21/2023, and negative.  Has followed up with Dr. Roberto who recommends pt remain off OAC, and continue ASA 81mg.    Antithrombotic therapy plan:  - continue Eliquis 5mg BID indefinitely     - counseled on signs and symptoms of acute VTE that require seeking prompt attention in the ED to include shortness of breath, chest pain, pain with deep inhalation, acute leg swelling and/or pain in calf or leg   - elevate legs as much as possible, use compression stockings/socks if directed by your provider  - Avoid hormonal therapies including estrogen or testosterone-containing meds, or raloxifene or tamoxifene (commonly used for osteoporosis)  - Avoid sedentary periods  - continue complete avoidance of tobacco products  - if having any invasive procedure, please make sure the doctor knows of your history of blood clots and current anticoagulation status  - recommended to see your PCP to discuss if you need age-appropriate cancer screenings as a small % of blood clots may be caused by an underlying malignancy    LIPID MANAGEMENT:   Qualifies for Statin Therapy Based on 2018 ACC/AHA Guidelines: yes, Secondary Prevention - Very high risk ASCVD - 2 major events or 1 event with other high-risk conditions  The ASCVD Risk score (Paige FITZGERALD, et al., 2019) failed to calculate., N/A  Major ASCVD events: History of ischemic CVA   High-risk conditions: >64yr old  and Hypertension   Currently on Statin: Yes  Tx  goals: LDL-C <55 (if HTG, consider non-HDL-C <85, apoB: not defined)   At goal? Approaching, 5/2024 LDL slightly elevated at 58  Plan:   - reinforced ongoing TLC measures as noted   - monitor labs   Meds:   - continue atorva 40mg daily     BLOOD PRESSURE MANAGEMENT:  ACC/AHA (2017) goal <130/80  Home BP at goal:  not checking  Office BP at goal:  yes  24h ABPM: not ordered to date  Plan:   Monitoring:   - start/continue home BP monitoring, reviewed correct technique, provide BP log and instructions  - order 24h ABPM:  NO  - monitor lytes/gfr routinely   - contact office if BP consistently >140/>90 to discussion of tx adjustments   Medications:  - continue losartan 25mg daily   - hold metoprolol SR 25mg daily per cardiology  - continue spironolactone 25mg daily    GLYCEMIC STATUS:  Prediabetes  A1C 6.0  Discussed decreasing simple carbs, increasing exercise-- as below.  - Recheck A1C in 3 months     LIFESTYLE INTERVENTIONS:    SMOKING: Stages of Change: Maintenance (sustained change >6mo)    reports that he quit smoking about 15 years ago. His smoking use included cigarettes. He started smoking about 57 years ago. He has a 21 pack-year smoking history. He has never been exposed to tobacco smoke. He has never used smokeless tobacco.   - continued complete avoidance of all tobacco products     PHYSICAL ACTIVITY: continue healthy activity to improve CV fitness.  In general, targeting >150min/week of moderate-level activity or as much as tolerated in light of functional status and co-morbidities, increase at tolerated     WEIGHT MANAGEMENT AND NUTRITION: Mediterranean style dietary approach       OTHER:     # FRANK on CPAP  Strongly encouraged CPAP adherence to reduce RV strain, improve overall fatigue symptoms, and improve BP in both the short- and long-term as per HIPARCO (2013) and HIPARCO-2 (2021) studies.   - continue CPAP, defer mgmt to sleep MD     # Bilateral arm/leg weakness. Now symptoms resolved.  Continue work  up with PCP and cardiology.  Needs appt with cardiology.  If recurs, needs to go to ER immediately.  He states understanding.      Instructed to follow-up with PCP for remainder of adult medical needs: yes  We will partner with other providers in the management of established vascular disease and cardiometabolic risk factors.    Studies to Be Obtained: Echo - May 2026     Follow up in: 6 months    RONNIE Allen.  [facility]   [facility]   [phone]

## 2025-07-02 ENCOUNTER — NON-PROVIDER VISIT (OUTPATIENT)
Dept: CARDIOLOGY | Facility: MEDICAL CENTER | Age: 75
End: 2025-07-02
Payer: MEDICARE

## 2025-07-02 PROCEDURE — 93298 REM INTERROG DEV EVAL SCRMS: CPT | Mod: 26 | Performed by: INTERNAL MEDICINE

## 2025-07-29 NOTE — CARDIAC REMOTE MONITOR - SCAN
Device transmission reviewed. Device demonstrated appropriate function.       Electronically Signed by: Blake Monterroso M.D.    7/29/2025  10:11 AM

## 2025-07-30 NOTE — PROGRESS NOTES
[facility] Follow-up Visit    Date of Visit: 7/31/2025     CC: Follow-up for FRANK management      HPI:  Jan Rivas is a very pleasant 72 y.o. year old male former smoker (21 pack-years, quit in 2010), with a PMHx of FRANK on CPAP, elevated BMI, HTN, PVD, CAD with stents who presented to the [facility] for a regular follow up. Last seen in the office on 8/7/2024 with myself.     Patient presents for his annual compliance.  Patient finds his sleep more restful with CPAP use.  He will have occasional issues when asleep related to stress, but this is rare.  He denies any significant morning headaches, daytime/driving drowsy, snoring, gasping, apneas, palpitations, dry mouth, aerophagia, mask leak, or skin irritation.  Patient will go to bed between 1030-11 PM and wake up at 7 AM.  He will rarely have awakenings at night and will not have any issues falling back to sleep.  He does not nap.    DME provider: [facility]  Device: DICOM Grid 2 autoCPAP  Settings: CPAP at 10 CWP  Oxygen:  None  When: December 2021  Mask: Fullface  Chin strap: No     Cleaning regimen: Every 2 weeks with soap and water    Compliance:  Compliance data reviewed showing 100% usage > 4hours in last 30 days. Average AHI 1.5 events/hour.  Average time in large leak per day 6 minutes 51 seconds..       Sleep History:  Diagnosed with FRANK on 11/27/2018 with a AHI was 87.6/hr and the best tolerated pressure was CPAP 10 cm. The AHI improved to 1.6/hr.      Patient Active Problem List    Diagnosis Date Noted    FRANK on CPAP 12/06/2019    Personal history of tobacco use 08/07/2024    History of pulmonary embolism 09/18/2023    History of CVA (cerebrovascular accident) 06/08/2023    Demand ischemia (HCC) 05/25/2023    Dysphagia 08/15/2022    Late effect of stroke 06/27/2022    Ear itching 11/11/2020    Male erectile disorder 11/11/2020    Hair loss 11/11/2020    BMI 33.0-33.9,adult 12/06/2019    Low back pain with radiation  07/31/2019    Elevated hemoglobin A1c 07/31/2019    Dermatitis 07/31/2019    High risk medication use 01/23/2018    PVD (peripheral vascular disease) ([facility]) 02/10/2017    Carotid stenosis 04/30/2014    Stented coronary artery 10/31/2013    Primary hypertension 10/31/2013    Pure hypercholesterolemia 10/31/2013    CAD (coronary artery disease) 10/22/2013     Past Medical History:   Diagnosis Date    Acute hypoxemic respiratory failure ([facility]) 05/25/2023    Acute ischemic stroke ([facility]) 05/09/2022    May 2022: MRI with acute infarct of left posterior insular cortex.       Acute stroke due to ischemia ([facility]) 05/2022    MRI with acute infarct of left posterior insular cortex.    Apnea, sleep     Uses CPAP    CAD (coronary artery disease) 05/2010    Acute apical infarct. PCI/MANJEET (Taxus 3.5 x 32mm) to the LAD. 2015: [facility] with patent stent.    Chickenpox     [place]aroese measles     H/O heart artery stent 05/04/2010    Heart attack ([facility])     Hyperlipidemia     Hypertension     Mumps     Sleep apnea     Snoring     Stroke ([facility])     Wears glasses       Past Surgical History:   Procedure Laterality Date    [facility] CARDIAC CATH  02/21/2015    Cath at [place].  Patent stent and no obstructive CAD.    Z CARDIAC CATH  05/04/2010    Taxus stent to LAD    OTHER CARDIAC SURGERY  05/04/2010    Heart Stent    CYST EXCISION      posterior neck     Family History   Problem Relation Age of Onset    Heart Attack Mother     Cancer Mother     Cancer Father     Cancer Sister      Social History     Socioeconomic History    Marital status:      Spouse name: Not on file    Number of children: Not on file    Years of education: Not on file    Highest education level: Associate degree: academic program   Occupational History    Not on file   Tobacco Use    Smoking status: Former     Current packs/day: 0.00     Average packs/day: 0.5 packs/day for 42.0 years (21.0 ttl pk-yrs)     Types: Cigarettes     Start date: 5/4/1968     Quit date: 5/4/2010      Years since quitting: 15.2     Passive exposure: Never    Smokeless tobacco: Never   Vaping Use    Vaping status: Never Used   Substance and Sexual Activity    Alcohol use: Yes     Alcohol/week: 8.4 oz     Types: 14 Standard drinks or equivalent per week     Comment: 2 drinks per day (wine/beer/ spirits)    Drug use: Not Currently     Types: Marijuana, Inhaled, Oral     Comment: Every other day marijuana    Sexual activity: Not on file   Other Topics Concern    Not on file   Social History Narrative    Not on file     Social Drivers of Health     Financial Resource Strain: Low Risk  (5/26/2023)    Overall Financial Resource Strain (CARDIA)     Difficulty of Paying Living Expenses: Not hard at all   Food Insecurity: No Food Insecurity (5/26/2023)    Hunger Vital Sign     Worried About Running Out of Food in the Last Year: Never true     Ran Out of Food in the Last Year: Never true   Transportation Needs: No Transportation Needs (5/26/2023)    PRAPARE - Transportation     Lack of Transportation (Medical): No     Lack of Transportation (Non-Medical): No   Physical Activity: Insufficiently Active (5/25/2023)    Exercise Vital Sign     Days of Exercise per Week: 2 days     Minutes of Exercise per Session: 30 min   Stress: No Stress Concern Present (5/25/2023)    [employer] [employer] [place] [employer] - Occupational Stress Questionnaire     Feeling of Stress : Not at all   Social Connections: Moderately Integrated (5/25/2023)    Social Connection and Isolation Panel [NHANES]     Frequency of Communication with Friends and Family: More than three times a week     Frequency of Social Gatherings with Friends and Family: Twice a week     Attends Yazidism Services: Never     Active Member of Clubs or Organizations: Yes     Attends Club or Organization Meetings: 1 to 4 times per year     Marital Status:    Intimate Partner Violence: Not on file   Housing Stability: Low Risk  (5/26/2023)    Housing Stability Vital Sign      Unable to Pay for Housing in the Last Year: No     Number of Places Lived in the Last Year: 1     Unstable Housing in the Last Year: No     Current Outpatient Medications   Medication Sig Dispense Refill    CLINDAMYCIN PHOSPHATE,TOPICAL, 1 % Lotion APPLY A THIN LAYER TO AFFECTED AREAS ON UPPER BODY ONCE DAILY.      metronidazole (METROCREAM) 0.75 % cream APPLY A THIN LAYER TO AFFECTED AREAS ON LOWER BODY ONCE DAILY.      mometasone (ELOCON) 0.1 % Cream MIX WITH TOPICAL ANTIBIOTIC AND APPLY TO AFFECTED AREAS UNTIL RESOLVED.      spironolactone (ALDACTONE) 25 MG Tab Take 1 Tablet by mouth every day. 90 Tablet 3    metoprolol SR (TOPROL XL) 25 MG TABLET SR 24 HR Take 1 Tablet by mouth 1 time a day as needed (for heart rate over 100 per minute). 30 Tablet 3    apixaban (ELIQUIS) 5mg Tab Take 1 Tablet by mouth 2 times a day. 90 Tablet 3    mupirocin (BACTROBAN) 2 % Ointment Apply 1 Application topically every day.      losartan (COZAAR) 25 MG Tab TAKE 1 TABLET BY MOUTH EVERY DAY 90 Tablet 3    atorvastatin (LIPITOR) 40 MG Tab TAKE 1 TABLET BY MOUTH 1/2 HOUR BEFORE DINNER. 90 Tablet 3    pimecrolimus (ELIDEL) 1 % cream APPLY TO AFFECTED AREA FOR ITCHING TWICE DAILY      clotrimazole-betamethasone (LOTRISONE) 1-0.05 % Cream APPLY TO AFFECTED AREA TOPICALLY TWICE A DAY FOR 10 DAYS      nitroglycerin (NITROSTAT) 0.4 MG SL Tab Place 1 Tablet under the tongue as needed for Chest Pain (every 5 minutes for 3 doses, no refief, call 911). 25 Tablet 5    sertraline (ZOLOFT) 50 MG Tab Take 50 mg by mouth every day.      traZODone (DESYREL) 50 MG Tab Take 100 mg by mouth every evening. (Patient taking differently: Take 50 mg by mouth every evening. Taking 2 tablets/night)      latanoprost (XALATAN) 0.005 % Solution Administer 1 Drop into both eyes at bedtime.       No current facility-administered medications for this visit.      ALLERGIES: Patient has no allergy information on record.    ROS:  Constitutional: Denies fever,  chills, sweats,  weight loss, fatigue  Cardiovascular: Denies chest pain, tightness, palpitations, swelling in legs/feet  Respiratory: Denies shortness of breath, cough, sputum, wheezing, painful breathing   Sleep: per HPI  Gastrointestinal: Denies  difficulty swallowing, nausea, abdominal pain, diarrhea, constipation, heartburn.  Musculoskeletal: Denies painful joints, sore muscles,       PHYSICAL EXAM:  /64 (BP Location: Left arm, Patient Position: Sitting, BP Cuff Size: Adult)   Pulse (!) 53   Ht 1.829 m (6')   Wt 111 kg (244 lb)   SpO2 96%   BMI 33.09 kg/m²   Appearance: Well-nourished, well-developed, no acute distress  Eyes:  No scleral icterus , EOMI  ENMT: No redness of the oropharynx  Lung auscultation:  No wheezes rhonchi rubs or rales  Cardiac: No murmurs, rubs, or gallops; regular rhythm, normal rate; no edema  Musculoskeletal:  Grossly normal; gait and station normal; digits and nails normal  Skin:  No rashes, petechiae, cyanosis  Neurologic: without focal signs; oriented to person, time, place, and purpose; judgement intact  Psychiatric:  No depression, anxiety, agitation      Assessment and Plan:    The medical record was reviewed.    Diagnostic and titration nocturnal polysomnogram's, home sleep apnea tests, continuous nocturnal oximetry results, multiple sleep latency tests, and compliance reports reviewed.    Problem List Items Addressed This Visit          Pulmonary/Sleep Medicine Problems    FRANK on CPAP - Primary    Sleep Apnea:    The pathophysiology of sleep anea and the increased risk of cardiovascular morbidity from untreated sleep apnea is discussed in detail with the patient.  Urged to avoid supine sleep, weight gain and alcoholic beverages since all of these can worsen sleep apnea. Cautioned against drowsy driving. If feeling sleepy while driving, pull over for a break/nap, rather than persist on the road, in the interest of own safety and that of others on the road.  The risks  of untreated sleep apnea were discussed with the patient at length. Patients with sleep apnea are at increased risk of cardiovascular disease including coronary artery disease, systemic arterial hypertension, pulmonary arterial hypertension, cardiac arrythmias, and stroke.  Positive airway pressure will favorably impact many of the adverse conditions and effects provoked by sleep apnea.    Plan:    Compliance download was reviewed and discussed with the patient.  Patient is compliant and AHI is well-controlled.  Will see the patient back in 1 year for annual compliance.    - Order placed for mask and supplies to [facility]  - Compliance was reinforced  - Clean supplies a least once a week with dish soap and water and air dry  - Recommended the patient against the use of Ozone , such as SoClean  - Recommended the patient change out supplies as recommended for best mask fit and usage of the machine  - Equipment replacement schedule:  Mask cushion every month  Nasal pillows 2 times per month  Mask every 6 months  Head gear every 6 months  Tubing every 3 months  Ultra-fine filters 2 times per month  Foam filter every 6 months  Humidifier chamber every 6 months  Chin strap every 6 months    Has been advised to continue the current CPAP, clean equipment frequently, and get new mask and supplies as allowed by insurance and DME. Recommend an earlier appointment, if significant treatment barriers develop.    Advised patient to reach out via [employer] if any questions or concerns should arise.              Relevant Orders    DME Mask and Supplies       Other    BMI 33.0-33.9,adult     Have advised the patient to follow up with the appropriate healthcare practitioners for all other medical problems and issues.    Return in about 1 year (around 7/31/2026), or if symptoms worsen or fail to improve, for compliance, with [facility].    Please note portions of this record was created using voice recognition software. I have made  every reasonable attempt to correct obvious errors, but I expect that there are errors of grammar and possibly content I did not discover before finalizing the note.    Time spent in record review prior to patient arrival, reviewing results, and in face-to-face encounter totaled 20 min.  __________  DANIELA Watkins  Pulmonary & Sleep Medicine  [facility]

## 2025-07-31 ENCOUNTER — OFFICE VISIT (OUTPATIENT)
Dept: SLEEP MEDICINE | Facility: MEDICAL CENTER | Age: 75
End: 2025-07-31
Payer: MEDICARE

## 2025-07-31 VITALS
OXYGEN SATURATION: 96 % | HEART RATE: 53 BPM | SYSTOLIC BLOOD PRESSURE: 102 MMHG | BODY MASS INDEX: 33.05 KG/M2 | WEIGHT: 244 LBS | DIASTOLIC BLOOD PRESSURE: 64 MMHG | HEIGHT: 72 IN

## 2025-07-31 DIAGNOSIS — G47.33 OSA ON CPAP: Primary | Chronic | ICD-10-CM

## 2025-07-31 PROCEDURE — 99213 OFFICE O/P EST LOW 20 MIN: CPT

## 2025-07-31 ASSESSMENT — FIBROSIS 4 INDEX: FIB4 SCORE: 1.5

## 2025-07-31 NOTE — PATIENT INSTRUCTIONS
Equipment replacement schedule:  Mask cushion every month  Nasal pillows 2 times per month  Mask every 6 months  Head gear every 6 months  Tubing every 3 months  Ultra-fine filters 2 times per month  Foam filter every 6 months  Humidifier chamber every 6 months  Chin strap every 6 months    Cleaning regimen: Mask tubing, and notify her chamber at least once a week with diluted dish soap and water.  Rinse thoroughly to make sure there is no soap residue.

## 2025-07-31 NOTE — ASSESSMENT & PLAN NOTE
Sleep Apnea:    The pathophysiology of sleep anea and the increased risk of cardiovascular morbidity from untreated sleep apnea is discussed in detail with the patient.  Urged to avoid supine sleep, weight gain and alcoholic beverages since all of these can worsen sleep apnea. Cautioned against drowsy driving. If feeling sleepy while driving, pull over for a break/nap, rather than persist on the road, in the interest of own safety and that of others on the road.  The risks of untreated sleep apnea were discussed with the patient at length. Patients with sleep apnea are at increased risk of cardiovascular disease including coronary artery disease, systemic arterial hypertension, pulmonary arterial hypertension, cardiac arrythmias, and stroke.  Positive airway pressure will favorably impact many of the adverse conditions and effects provoked by sleep apnea.    Plan:    Compliance download was reviewed and discussed with the patient.  Patient is compliant and AHI is well-controlled.  Will see the patient back in 1 year for annual compliance.    - Order placed for mask and supplies to [facility]  - Compliance was reinforced  - Clean supplies a least once a week with dish soap and water and air dry  - Recommended the patient against the use of Ozone , such as SoClean  - Recommended the patient change out supplies as recommended for best mask fit and usage of the machine  - Equipment replacement schedule:  Mask cushion every month  Nasal pillows 2 times per month  Mask every 6 months  Head gear every 6 months  Tubing every 3 months  Ultra-fine filters 2 times per month  Foam filter every 6 months  Humidifier chamber every 6 months  Chin strap every 6 months    Has been advised to continue the current CPAP, clean equipment frequently, and get new mask and supplies as allowed by insurance and DME. Recommend an earlier appointment, if significant treatment barriers develop.    Advised patient to reach out via [employer] if  any questions or concerns should arise.

## 2025-08-02 ENCOUNTER — NON-PROVIDER VISIT (OUTPATIENT)
Dept: CARDIOLOGY | Facility: MEDICAL CENTER | Age: 75
End: 2025-08-02
Payer: MEDICARE

## 2025-08-02 PROCEDURE — 93298 REM INTERROG DEV EVAL SCRMS: CPT | Mod: 26 | Performed by: STUDENT IN AN ORGANIZED HEALTH CARE EDUCATION/TRAINING PROGRAM

## 2025-08-08 ENCOUNTER — TELEPHONE (OUTPATIENT)
Dept: CARDIOLOGY | Facility: MEDICAL CENTER | Age: 75
End: 2025-08-08
Payer: MEDICARE

## 2025-08-08 DIAGNOSIS — I48.0 PAROXYSMAL ATRIAL FIBRILLATION (HCC): ICD-10-CM
